# Patient Record
Sex: FEMALE | Race: BLACK OR AFRICAN AMERICAN | Employment: PART TIME | URBAN - METROPOLITAN AREA
[De-identification: names, ages, dates, MRNs, and addresses within clinical notes are randomized per-mention and may not be internally consistent; named-entity substitution may affect disease eponyms.]

---

## 2017-01-09 ENCOUNTER — OFFICE VISIT (OUTPATIENT)
Dept: INTERNAL MEDICINE CLINIC | Age: 27
End: 2017-01-09

## 2017-01-09 ENCOUNTER — PATIENT OUTREACH (OUTPATIENT)
Dept: INTERNAL MEDICINE CLINIC | Age: 27
End: 2017-01-09

## 2017-01-09 VITALS
RESPIRATION RATE: 20 BRPM | SYSTOLIC BLOOD PRESSURE: 130 MMHG | BODY MASS INDEX: 51.91 KG/M2 | OXYGEN SATURATION: 99 % | TEMPERATURE: 97.9 F | WEIGHT: 293 LBS | HEIGHT: 63 IN | DIASTOLIC BLOOD PRESSURE: 71 MMHG | HEART RATE: 82 BPM

## 2017-01-09 DIAGNOSIS — E08.00 DIABETES MELLITUS DUE TO UNDERLYING CONDITION WITH HYPEROSMOLARITY WITHOUT COMA, WITHOUT LONG-TERM CURRENT USE OF INSULIN (HCC): Primary | ICD-10-CM

## 2017-01-09 DIAGNOSIS — R73.9 ELEVATED BLOOD SUGAR: ICD-10-CM

## 2017-01-09 LAB
GLUCOSE POC: 346 MG/DL
HBA1C MFR BLD HPLC: 11.1 %

## 2017-01-09 RX ORDER — METFORMIN HYDROCHLORIDE 500 MG/1
500 TABLET ORAL 2 TIMES DAILY WITH MEALS
Qty: 60 TAB | Refills: 11 | Status: SHIPPED | OUTPATIENT
Start: 2017-01-09 | End: 2017-02-10 | Stop reason: SDUPTHER

## 2017-01-09 NOTE — PROGRESS NOTES
Spoke with patient today to educate on diabetes. Patient is newly diagnosed and has no understanding of diabetes. Patient stated her grandmother and mother are diabetic. Patient asked \"How long would she be a diabetic. \" Educated patient on diabetes disease. Patient encouraged to make dietary changes, exercise and monitor blood glucose daily to ensure diabetes is controlled. Instructed patient on side effects of not controlling blood glucose. During this encounter patient was not engaged with conversation the entire time, very nonchalant and texting on her telephone. Patient stated she was unable to purchase diabetic sticks for glucometer after verifying with her pharmacy the lowest costing supplies. Patient was encouraged to attend [facility] for more intense diabetic teaching. Pamphlets given for diet, disease process and education. Will see patient on return visit in 1 month.

## 2017-01-09 NOTE — PATIENT INSTRUCTIONS

## 2017-01-09 NOTE — MR AVS SNAPSHOT
Visit Information Date & Time Provider Department Dept. Phone Encounter #  
 1/9/2017  8:15 AM Marco Ferreira, [address] [id number] Upcoming Health Maintenance Date Due  
 HPV AGE 9Y-34Y (1 of 3 - Female 3 Dose Series) 7/14/2001 DTaP/Tdap/Td series (1 - Tdap) 7/14/2011 PAP AKA CERVICAL CYTOLOGY 9/6/2019 Allergies as of 1/9/2017  Review Complete On: 1/9/2017 By: Marco Ferreira MD  
 No Known Allergies Current Immunizations  Never Reviewed No immunizations on file. Not reviewed this visit You Were Diagnosed With   
  
 Codes Comments Diabetes mellitus due to underlying condition with hyperosmolarity without coma, without long-term current use of insulin (HCC)    -  Primary ICD-10-CM: E08.00 ICD-9-CM: 249.20 Elevated blood sugar     ICD-10-CM: R73.9 ICD-9-CM: 790.29 Vitals BP Pulse Temp Resp Height(growth percentile) Weight(growth percentile) 130/71 (BP 1 Location: Left arm, BP Patient Position: Sitting) 82 97.9 °F (36.6 °C) (Oral) 20 5' 3\" (1.6 m) 298 lb 4.8 oz (135.3 kg) SpO2 BMI OB Status Smoking Status 99% 52.84 kg/m2 Injection Never Smoker Vitals History BMI and BSA Data Body Mass Index Body Surface Area  
 52.84 kg/m 2 2.45 m 2 Preferred Pharmacy Pharmacy Name Phone Omay[facility] [address] [place] [address] [facility] [phone] Your Updated Medication List  
  
   
This list is accurate as of: 1/9/17  9:32 AM.  Always use your most recent med list.  
  
  
  
  
 medroxyPROGESTERone 150 mg/mL injection Commonly known as:  DEPO-PROVERA  
150 mg by IntraMUSCular route once. metFORMIN 500 mg tablet Commonly known as:  GLUCOPHAGE Take 1 Tab by mouth two (2) times daily (with meals).  Indications: type 2 diabetes mellitus  
  
 miconazole 2 % vaginal cream  
Commonly known as:  MONISTAT 7  
 Insert 1 Applicator into vagina nightly. Prescriptions Sent to Pharmacy Refills  
 metFORMIN (GLUCOPHAGE) 500 mg tablet 11 Sig: Take 1 Tab by mouth two (2) times daily (with meals). Indications: type 2 diabetes mellitus Class: Normal  
 Pharmacy: [facility] [address] [place] Meza [address] [place] [address] #: [phone] Route: Oral  
  
We Performed the Following AMB POC GLUCOSE BLOOD, BY GLUCOSE MONITORING DEVICE [85969 CPT(R)] AMB POC HEMOGLOBIN A1C [22582 CPT(R)] REFERRAL TO DIABETIC EDUCATION [REF20 Custom] Referral Information Referral ID Referred By Referred To  
  
 [id number] Isidoronasrin Sow Not Available Visits Status Start Date End Date 1 New Request 1/9/17 1/9/18 If your referral has a status of pending review or denied, additional information will be sent to support the outcome of this decision. Patient Instructions Learning About Diabetes Food Guidelines Your Care Instructions Meal planning is important to manage diabetes. It helps keep your blood sugar at a target level (which you set with your doctor). You don't have to eat special foods. You can eat what your family eats, including sweets once in a while. But you do have to pay attention to how often you eat and how much you eat of certain foods. You may want to work with a dietitian or a certified diabetes educator (CDE) to help you plan meals and snacks. A dietitian or CDE can also help you lose weight if that is one of your goals. What should you know about eating carbs? Managing the amount of carbohydrate (carbs) you eat is an important part of healthy meals when you have diabetes. Carbohydrate is found in many foods. · Learn which foods have carbs. And learn the amounts of carbs in different foods.  
¨ Bread, cereal, pasta, and rice have about 15 grams of carbs in a serving. A serving is 1 slice of bread (1 ounce), ½ cup of cooked cereal, or 1/3 cup of cooked pasta or rice. ¨ Fruits have 15 grams of carbs in a serving. A serving is 1 small fresh fruit, such as an apple or orange; ½ of a banana; ½ cup of cooked or canned fruit; ½ cup of fruit juice; 1 cup of melon or raspberries; or 2 tablespoons of dried fruit. ¨ Milk and no-sugar-added yogurt have 15 grams of carbs in a serving. A serving is 1 cup of milk or 2/3 cup of no-sugar-added yogurt. ¨ Starchy vegetables have 15 grams of carbs in a serving. A serving is ½ cup of mashed potatoes or sweet potato; 1 cup winter squash; ½ of a small baked potato; ½ cup of cooked beans; or ½ cup cooked corn or green peas. · Learn how much carbs to eat each day and at each meal. A dietitian or CDE can teach you how to keep track of the amount of carbs you eat. This is called carbohydrate counting. · If you are not sure how to count carbohydrate grams, use the Plate Method to plan meals. It is a good, quick way to make sure that you have a balanced meal. It also helps you spread carbs throughout the day. ¨ Divide your plate by types of foods. Put non-starchy vegetables on half the plate, meat or other protein food on one-quarter of the plate, and a grain or starchy vegetable in the final quarter of the plate. To this you can add a small piece of fruit and 1 cup of milk or yogurt, depending on how many carbs you are supposed to eat at a meal. 
· Try to eat about the same amount of carbs at each meal. Do not \"save up\" your daily allowance of carbs to eat at one meal. 
· Proteins have very little or no carbs per serving. Examples of proteins are beef, chicken, turkey, fish, eggs, tofu, cheese, cottage cheese, and peanut butter. A serving size of meat is 3 ounces, which is about the size of a deck of cards.  Examples of meat substitute serving sizes (equal to 1 ounce of meat) are 1/4 cup of cottage cheese, 1 egg, 1 tablespoon of peanut butter, and ½ cup of tofu. How can you eat out and still eat healthy? · Learn to estimate the serving sizes of foods that have carbohydrate. If you measure food at home, it will be easier to estimate the amount in a serving of restaurant food. · If the meal you order has too much carbohydrate (such as potatoes, corn, or baked beans), ask to have a low-carbohydrate food instead. Ask for a salad or green vegetables. · If you use insulin, check your blood sugar before and after eating out to help you plan how much to eat in the future. · If you eat more carbohydrate at a meal than you had planned, take a walk or do other exercise. This will help lower your blood sugar. What else should you know? · Limit saturated fat, such as the fat from meat and dairy products. This is a healthy choice because people who have diabetes are at higher risk of heart disease. So choose lean cuts of meat and nonfat or low-fat dairy products. Use olive or canola oil instead of butter or shortening when cooking. · Don't skip meals. Your blood sugar may drop too low if you skip meals and take insulin or certain medicines for diabetes. · Check with your doctor before you drink alcohol. Alcohol can cause your blood sugar to drop too low. Alcohol can also cause a bad reaction if you take certain diabetes medicines. Follow-up care is a key part of your treatment and safety. Be sure to make and go to all appointments, and call your doctor if you are having problems. It's also a good idea to know your test results and keep a list of the medicines you take. Where can you learn more? Go to http://phil-khari.info/. Enter K762 in the search box to learn more about \"Learning About Diabetes Food Guidelines. \" Current as of: May 23, 2016 Content Version: 11.1 © 4241-1087 [employer].  Care instructions adapted under license by [employer] (which disclaims liability or warranty for this information). If you have questions about a medical condition or this instruction, always ask your healthcare professional. [address] any warranty or liability for your use of this information. Introducing [facility] SERVICES! David Donis introduces [employer] patient portal. Now you can access parts of your medical record, email your doctor's office, and request medication refills online. 1. In your internet browser, go to [email or web address] 2. Click on the First Time User? Click Here link in the Sign In box. You will see the New Member Sign Up page. 3. Enter your [employer] Access Code exactly as it appears below. You will not need to use this code after youve completed the sign-up process. If you do not sign up before the expiration date, you must request a new code. · [employer] Access Code: [email or web address] Expires: 3/27/2017  9:53 PM 
 
4. Enter the last four digits of your Social Security Number (xxxx) and Date of Birth (mm/dd/yyyy) as indicated and click Submit. You will be taken to the next sign-up page. 5. Create a [employer] ID. This will be your [employer] login ID and cannot be changed, so think of one that is secure and easy to remember. 6. Create a [employer] password. You can change your password at any time. 7. Enter your Password Reset Question and Answer. This can be used at a later time if you forget your password. 8. Enter your e-mail address. You will receive e-mail notification when new information is available in [address] 9. Click Sign Up. You can now view and download portions of your medical record. 10. Click the Download Summary menu link to download a portable copy of your medical information. If you have questions, please visit the Frequently Asked Questions section of the [employer] website. Remember, [employer] is NOT to be used for urgent needs. For medical emergencies, dial 911. Now available from your iPhone and Android! Please provide this summary of care documentation to your next provider. Your primary care clinician is listed as Aniceto Habermann. If you have any questions after today's visit, please call [phone].

## 2017-01-09 NOTE — PROGRESS NOTES
Amilcar Nice is a 32 y.o. female and presents with ED Follow-up and High Blood Sugar  . No prior h/o DM. +Fmhx of DM in maternal grandmother. Denies increased thirst or urination. No increased yeast infection. No blurry vision. No CP, SOB, or edema. No paresthesias. Review of Systems  Constitutional: negative for fevers, chills, anorexia and weight loss  Eyes:   negative for visual disturbance and irritation  ENT:   negative for tinnitus,sore throat,nasal congestion,ear pains. hoarseness  Respiratory:  negative for cough, hemoptysis, dyspnea,wheezing  CV:   negative for chest pain, palpitations, lower extremity edema  GI:   negative for nausea, vomiting, diarrhea, abdominal pain,melena  Endo:               negative for polyuria,polydipsia,polyphagia,heat intolerance  Genitourinary: negative for frequency, dysuria and hematuria  Integument:  negative for rash and pruritus  Hematologic:  negative for easy bruising and gum/nose bleeding  Musculoskel: negative for myalgias, arthralgias, back pain, muscle weakness, joint pain  Neurological:  negative for headaches, dizziness, vertigo, memory problems and gait   Behavl/Psych: negative for feelings of anxiety, depression, mood changes    Past Medical History   Diagnosis Date    Asthma     Diabetes ([facility] [address] 2016    Other ill-defined conditions(799.89)      back pain    Seizures ([facility] [address]      Past Surgical History   Procedure Laterality Date    Hx tonsillectomy      Hx gyn       Pt reports  in past.     Social History     Social History    Marital status: SINGLE     Spouse name: N/A    Number of children: N/A    Years of education: N/A     Social History Main Topics    Smoking status: Never Smoker    Smokeless tobacco: Never Used    Alcohol use No    Drug use: No    Sexual activity: Yes     Partners: Male     Birth control/ protection: Condom     Other Topics Concern    None     Social History Narrative     Current Outpatient Prescriptions   Medication Sig Dispense Refill    metFORMIN (GLUCOPHAGE) 500 mg tablet Take 1 Tab by mouth two (2) times daily (with meals). Indications: type 2 diabetes mellitus 60 Tab 11    miconazole (MONISTAT 7) 2 % vaginal cream Insert 1 Applicator into vagina nightly. 45 g 0    medroxyPROGESTERone (DEPO-PROVERA) 150 mg/mL injection 150 mg by IntraMUSCular route once. No Known Allergies    Objective:  Visit Vitals    /71 (BP 1 Location: Left arm, BP Patient Position: Sitting)    Pulse 82    Temp 97.9 °F (36.6 °C) (Oral)    Resp 20    Ht 5' 3\" (1.6 m)    Wt 298 lb 4.8 oz (135.3 kg)    SpO2 99%    BMI 52.84 kg/m2     Physical Exam:   General appearance - alert, well appearing, and in no distress  Mental status - alert, oriented to person, place, and time  Chest - clear to auscultation, no wheezes, rales or rhonchi, symmetric air entry   Heart - normal rate, regular rhythm, normal S1, S2, no murmurs, rubs, clicks or gallops   Abdomen - soft, nontender, nondistended, no masses or organomegaly  Lymph- no adenopathy palpable  Ext-peripheral pulses normal, no pedal edema, no clubbing or cyanosis  Skin-Warm and dry. no hyperpigmentation, vitiligo, or suspicious lesions  Neuro -alert, oriented, normal speech, no focal findings or movement disorder noted      Results for orders placed or performed in visit on 01/09/17   AMB POC GLUCOSE BLOOD, BY GLUCOSE MONITORING DEVICE   Result Value Ref Range    Glucose  mg/dL   AMB POC HEMOGLOBIN A1C   Result Value Ref Range    Hemoglobin A1c (POC) 11.1 %       Assessment/Plan:    ICD-10-CM ICD-9-CM    1. Diabetes mellitus due to underlying condition with hyperosmolarity without coma, without long-term current use of insulin ([facility]) E08.00 249.20 REFERRAL TO DIABETIC EDUCATION   2.  Elevated blood sugar R73.9 790.29 AMB POC GLUCOSE BLOOD, BY GLUCOSE MONITORING DEVICE      AMB POC HEMOGLOBIN A1C     Orders Placed This Encounter    REFERRAL TO DIABETIC EDUCATION     Referral Priority:   Routine     Referral Type:   Consultation     Referral Reason:   Specialty Services Required    AMB POC GLUCOSE BLOOD, BY GLUCOSE MONITORING DEVICE    AMB POC HEMOGLOBIN A1C    metFORMIN (GLUCOPHAGE) 500 mg tablet     Sig: Take 1 Tab by mouth two (2) times daily (with meals).  Indications: type 2 diabetes mellitus     Dispense:  61 Tab     Refill:  6     Newly diagnosed diabetes- DM education ordered, Metformin 500mg bid  HTN- Well controlled  F/u in 1 month    Follow-up Disposition: Not on File

## 2017-01-09 NOTE — PROGRESS NOTES
1. Have you been to the ER, urgent care clinic since your last visit? Hospitalized since your last visit? Yes When: 12/27/2016 [facility] ED Acute Gastroenteritis and 12/28/2016 [facility] ED Elevated Blood Sugar/STD Exposure    2. Have you seen or consulted any other health care providers outside of the [address] [place] [address] since your last visit? Include any pap smears or colon screening.  No

## 2017-02-05 ENCOUNTER — HOSPITAL ENCOUNTER (EMERGENCY)
Age: 27
Discharge: HOME | End: 2017-02-05
Attending: INTERNAL MEDICINE | Admitting: INTERNAL MEDICINE
Payer: SELF-PAY

## 2017-02-05 VITALS
BODY MASS INDEX: 50.69 KG/M2 | HEART RATE: 87 BPM | RESPIRATION RATE: 20 BRPM | TEMPERATURE: 98.7 F | WEIGHT: 286.1 LBS | HEIGHT: 63 IN | OXYGEN SATURATION: 100 % | SYSTOLIC BLOOD PRESSURE: 145 MMHG | DIASTOLIC BLOOD PRESSURE: 86 MMHG

## 2017-02-05 DIAGNOSIS — J02.9 SORE THROAT: Primary | ICD-10-CM

## 2017-02-05 PROCEDURE — 99282 EMERGENCY DEPT VISIT SF MDM: CPT

## 2017-02-05 RX ORDER — TRAMADOL HYDROCHLORIDE AND ACETAMINOPHEN 37.5; 325 MG/1; MG/1
1 TABLET ORAL
Qty: 20 TAB | Refills: 0 | Status: SHIPPED | OUTPATIENT
Start: 2017-02-05 | End: 2017-02-09

## 2017-02-05 NOTE — ED PROVIDER NOTES
HPI Comments: Abigail Dumont is a 32 y.o. female with hx of NIDDM and asthma who presents ambulatory to the ED c/o constant \"10 out of 10\" sore throat since yesterday. She also c/o nasal congestion. Pt denies taking any medications for her symptoms. Other than DM, she denies any known hx of long standing diseases or daily medications. Pt specifically denies fevers, chills, cough, CP, SOB, nausea, vomiting, diarrhea, or abdominal pain. PCP: Barbi Dobbs MD    There are no other complaints, changes or physical findings at this time. The history is provided by the patient. Past Medical History:   Diagnosis Date    Asthma     Diabetes ([address] 2016    Other ill-defined conditions(799.89)      back pain    Seizures (HCC)        Past Surgical History:   Procedure Laterality Date    Hx tonsillectomy      Hx gyn  2014     Pt reports  in past.         Family History:   Problem Relation Age of Onset    Diabetes Mother     Diabetes Father     Diabetes Maternal Grandmother        Social History     Social History    Marital status: SINGLE     Spouse name: N/A    Number of children: N/A    Years of education: N/A     Occupational History    Not on file. Social History Main Topics    Smoking status: Never Smoker    Smokeless tobacco: Never Used    Alcohol use No    Drug use: No    Sexual activity: No     Other Topics Concern    Not on file     Social History Narrative         ALLERGIES: Review of patient's allergies indicates no known allergies. Review of Systems   Constitutional: Negative for chills, fatigue and fever. HENT: Positive for congestion and sore throat. Negative for rhinorrhea. Eyes: Negative for pain, discharge and visual disturbance. Respiratory: Negative for cough, chest tightness, shortness of breath and wheezing. Cardiovascular: Negative for chest pain, palpitations and leg swelling.    Gastrointestinal: Negative for abdominal pain, constipation, diarrhea, nausea and vomiting. Genitourinary: Negative for dysuria, frequency and hematuria. Musculoskeletal: Negative for arthralgias, back pain and myalgias. Skin: Negative for rash. Neurological: Negative for dizziness, weakness, light-headedness and headaches. Psychiatric/Behavioral: Negative. All other systems reviewed and are negative. Vitals:    02/05/17 0925   BP: 145/86   Pulse: 87   Resp: 20   Temp: 98.7 °F (37.1 °C)   SpO2: 100%   Weight: 129.8 kg (286 lb 1.6 oz)   Height: 5' 3\" (1.6 m)            Physical Exam   Constitutional: She is oriented to person, place, and time. She appears well-developed and well-nourished. HENT:   Head: Normocephalic and atraumatic. Mouth/Throat: Posterior oropharyngeal erythema (slight) present. Eyes: Conjunctivae and EOM are normal. Pupils are equal, round, and reactive to light. Neck: Normal range of motion. Neck supple. Cardiovascular: Normal rate, regular rhythm and normal heart sounds. Exam reveals no gallop and no friction rub. No murmur heard. Pulmonary/Chest: Effort normal and breath sounds normal. No respiratory distress. She has no wheezes. She has no rales. Abdominal: Soft. Bowel sounds are normal. She exhibits no distension. There is no tenderness. There is no rebound and no guarding. Musculoskeletal: Normal range of motion. She exhibits no edema or tenderness. Lymphadenopathy:     She has no cervical adenopathy. Neurological: She is alert and oriented to person, place, and time. She has normal strength. No cranial nerve deficit or sensory deficit. She displays a negative Romberg sign. Coordination and gait normal.   Skin: Skin is warm and dry. No ecchymosis, no lesion and no rash noted. Rash is not urticarial. She is not diaphoretic. No erythema. Psychiatric: She has a normal mood and affect. Nursing note and vitals reviewed.        MDM  Number of Diagnoses or Management Options  Sore throat:   Diagnosis management comments: DDx: URI, viral illness       Amount and/or Complexity of Data Reviewed  Review and summarize past medical records: yes    Patient Progress  Patient progress: stable    ED Course       Procedures    PROGRESS NOTE:  10:14 AM  Pt is now stating that she has attempted multiple OTC medications for her sore throat without any relief and is requesting a prescription for pain medications. Written by Endy Diamond, ED Scribe, as dictated by Mitzi Hitchcock MD.      IMPRESSION:  1. Sore throat        PLAN:  1. Discharge home. Rx: Ultracet  Discharge Medication List as of 2/5/2017  9:57 AM      CONTINUE these medications which have NOT CHANGED    Details   metFORMIN (GLUCOPHAGE) 500 mg tablet Take 1 Tab by mouth two (2) times daily (with meals). Indications: type 2 diabetes mellitus, Normal, Disp-60 Tab, R-11      miconazole (MONISTAT 7) 2 % vaginal cream Insert 1 Applicator into vagina nightly., Disp-45 g, R-0, Print      medroxyPROGESTERone (DEPO-PROVERA) 150 mg/mL injection 150 mg by IntraMUSCular route once., Historical Med           2. Follow-up Information     Follow up With Details Comments [address] Aury Gamboa MD In 2 days If symptoms worsen [address]  [phone]        Return to ED if worse     DISCHARGE NOTE  9:59 AM  The patient has been re-evaluated and is ready for discharge. Reviewed available results with patient. Counseled pt on diagnosis and care plan. Pt has expressed understanding, and all questions have been answered. Pt agrees with plan and agrees to F/U as recommended, or return to the ED if their sxs worsen. Discharge instructions have been provided and explained to the pt, along with reasons to return to the ED.     This note is prepared by Endy Diamond, acting as Scribe for Mitzi Hitchcock MD.    Mitzi Hitchcock MD: The scribe's documentation has been prepared under my direction and personally reviewed by me in its entirety. I confirm that the note above accurately reflects all work, treatment, procedures, and medical decision making performed by me.

## 2017-02-05 NOTE — ED NOTES
Patient (s)   given copy of dc instructions and 1 script(s). Patient (s)   verbalized understanding of instructions and script (s). Patient given a current medication reconciliation form and verbalized understanding of their medications. Patient (s)  verbalized understanding of the importance of discussing medications with  his or her physician or clinic they will be following up with. Patient alert and oriented and in no acute distress. Pt verbalizes pain scale of 5 out of 10. Patient discharged home ambulatory with self.

## 2017-02-05 NOTE — ED NOTES
Dr. Natanael Keller at bedside speaking with pt. Plan of care and meds accepted by pt. Pt refuse w/c and pt left unit steady gait. Patient (s)  given copy of dc instructions and 1 script(s). Patient(s)  verbalized understanding of instructions and script (s). Patient given a current medication reconciliation form and verbalized understanding of their medications. Patient (s) verbalized understanding of the importance of discussing medications with  his or her physician or clinic when they follow up. Patient alert and oriented and in no acute distress. Pt verbalizes pain scale of 5 out of 10. Patient discharged home ambulatory with self.

## 2017-02-05 NOTE — ED NOTES
Pt c/o sore throat since yesterday pt is able to speak in complete sentences. Emergency Department Nursing Plan of Care       The Nursing Plan of Care is developed from the Nursing assessment and Emergency Department Attending provider initial evaluation. The plan of care may be reviewed in the ED Provider note.     The Plan of Care was developed with the following considerations:   Patient / Family readiness to learn indicated by:verbalized understanding  Persons(s) to be included in education: patient  Barriers to Learning/Limitations:No    Signed     Dc Cooney RN    2/5/2017   9:31 AM

## 2017-02-05 NOTE — DISCHARGE INSTRUCTIONS
Sore Throat in Children: Care Instructions  Your Care Instructions  Infection by bacteria or a virus causes most sore throats. Cigarette smoke, dry air, air pollution, allergies, or yelling also can cause a sore throat. Sore throats can be painful and annoying. Fortunately, most sore throats go away on their own. Home treatment may help your child feel better sooner. Antibiotics are not needed unless your child has a strep infection. Follow-up care is a key part of your child's treatment and safety. Be sure to make and go to all appointments, and call your doctor if your child is having problems. It's also a good idea to know your child's test results and keep a list of the medicines your child takes. How can you care for your child at home? · If the doctor prescribed antibiotics for your child, give them as directed. Do not stop using them just because your child feels better. Your child needs to take the full course of antibiotics. · If your child is old enough to do so, have him or her gargle with warm salt water at least once each hour to help reduce swelling and relieve discomfort. Use 1 teaspoon of salt mixed in 8 ounces of warm water. Most children can gargle when they are 10to 6years old. · Give acetaminophen (Tylenol) or ibuprofen (Advil, Motrin) for pain. Read and follow all instructions on the label. Do not give aspirin to anyone younger than 20. It has been linked to Reye syndrome, a serious illness. · Try an over-the-counter anesthetic throat spray or throat lozenges, which may help relieve throat pain. Do not give lozenges to children younger than age Ron 79. If your child is younger than age 3, ask your doctor if you can give your child numbing medicines. · Have your child drink plenty of fluids, enough so that his or her urine is light yellow or clear like water. Drinks such as warm water or warm lemonade may ease throat pain.  Frozen ice treats, ice cream, scrambled eggs, gelatin dessert, and sherbet can also soothe the throat. If your child has kidney, heart, or liver disease and has to limit fluids, talk with your doctor before you increase the amount of fluids your child drinks. · Keep your child away from smoke. Do not smoke or let anyone else smoke around your child or in your house. Smoke irritates the throat. · Place a humidifier by your child's bed or close to your child. This may make it easier for your child to breathe. Follow the directions for cleaning the machine. When should you call for help? Call 911 anytime you think your child may need emergency care. For example, call if:  · Your child is confused, does not know where he or she is, or is extremely sleepy or hard to wake up. Call your doctor now or seek immediate medical care if:  · Your child has a new or higher fever. · Your child has a fever with a stiff neck or a severe headache. · Your child has any trouble breathing. · Your child cannot swallow or cannot drink enough because of throat pain. · Your child coughs up discolored or bloody mucus. Watch closely for changes in your child's health, and be sure to contact your doctor if:  · Your child has any new symptoms, such as a rash, an earache, vomiting, or nausea. · Your child is not getting better as expected. Where can you learn more? Go to http://phil-khari.info/. Enter K282 in the search box to learn more about \"Sore Throat in Children: Care Instructions. \"  Current as of: July 29, 2016  Content Version: 11.1  © 5583-1929 [employer]. Care instructions adapted under license by [employer] (which disclaims liability or warranty for this information). If you have questions about a medical condition or this instruction, always ask your healthcare professional. [place] [address] any warranty or liability for your use of this information.          Sore Throat: Care Instructions  Your Care Instructions    Infection by bacteria or a virus causes most sore throats. Cigarette smoke, dry air, air pollution, allergies, and yelling can also cause a sore throat. Sore throats can be painful and annoying. Fortunately, most sore throats go away on their own. If you have a bacterial infection, your doctor may prescribe antibiotics. Follow-up care is a key part of your treatment and safety. Be sure to make and go to all appointments, and call your doctor if you are having problems. It's also a good idea to know your test results and keep a list of the medicines you take. How can you care for yourself at home? · If your doctor prescribed antibiotics, take them as directed. Do not stop taking them just because you feel better. You need to take the full course of antibiotics. · Gargle with warm salt water once an hour to help reduce swelling and relieve discomfort. Use 1 teaspoon of salt mixed in 1 cup of warm water. · Take an over-the-counter pain medicine, such as acetaminophen (Tylenol), ibuprofen (Advil, Motrin), or naproxen (Aleve). Read and follow all instructions on the label. · Be careful when taking over-the-counter cold or flu medicines and Tylenol at the same time. Many of these medicines have acetaminophen, which is Tylenol. Read the labels to make sure that you are not taking more than the recommended dose. Too much acetaminophen (Tylenol) can be harmful. · Drink plenty of fluids. Fluids may help soothe an irritated throat. Hot fluids, such as tea or soup, may help decrease throat pain. · Use over-the-counter throat lozenges to soothe pain. Regular cough drops or hard candy may also help. These should not be given to young children because of the risk of choking. · Do not smoke or allow others to smoke around you. If you need help quitting, talk to your doctor about stop-smoking programs and medicines. These can increase your chances of quitting for good.   · Use a vaporizer or humidifier to add moisture to your bedroom. Follow the directions for cleaning the machine. When should you call for help? Call your doctor now or seek immediate medical care if:  · You have new or worse trouble swallowing. · Your sore throat gets much worse on one side. Watch closely for changes in your health, and be sure to contact your doctor if you do not get better as expected. Where can you learn more? Go to http://phil-khari.info/. Enter 062 [id number] 80 [id number] in the search box to learn more about \"Sore Throat: Care Instructions. \"  Current as of: July 29, 2016  Content Version: 11.1  © 6432-3183 [employer]. Care instructions adapted under license by [employer] (which disclaims liability or warranty for this information). If you have questions about a medical condition or this instruction, always ask your healthcare professional. [address] any warranty or liability for your use of this information.

## 2017-02-09 ENCOUNTER — HOSPITAL ENCOUNTER (EMERGENCY)
Age: 27
Discharge: HOME | End: 2017-02-09
Attending: EMERGENCY MEDICINE
Payer: SELF-PAY

## 2017-02-09 VITALS
SYSTOLIC BLOOD PRESSURE: 138 MMHG | TEMPERATURE: 98.3 F | RESPIRATION RATE: 18 BRPM | DIASTOLIC BLOOD PRESSURE: 79 MMHG | OXYGEN SATURATION: 100 % | HEART RATE: 86 BPM | BODY MASS INDEX: 67.64 KG/M2 | WEIGHT: 292.3 LBS | HEIGHT: 55 IN

## 2017-02-09 DIAGNOSIS — B00.1 COLD SORE: Primary | ICD-10-CM

## 2017-02-09 PROCEDURE — 74011250637 HC RX REV CODE- 250/637: Performed by: EMERGENCY MEDICINE

## 2017-02-09 PROCEDURE — 99283 EMERGENCY DEPT VISIT LOW MDM: CPT

## 2017-02-09 RX ORDER — NAPROXEN 250 MG/1
250 TABLET ORAL ONCE
Status: COMPLETED | OUTPATIENT
Start: 2017-02-09 | End: 2017-02-09

## 2017-02-09 RX ORDER — LANOLIN ALCOHOL/MO/W.PET/CERES
1000 CREAM (GRAM) TOPICAL 2 TIMES DAILY
Qty: 28 TAB | Refills: 0 | Status: SHIPPED | OUTPATIENT
Start: 2017-02-09 | End: 2017-02-16

## 2017-02-09 RX ORDER — TRAMADOL HYDROCHLORIDE AND ACETAMINOPHEN 37.5; 325 MG/1; MG/1
1 TABLET ORAL
Qty: 10 TAB | Refills: 0 | Status: SHIPPED | OUTPATIENT
Start: 2017-02-09 | End: 2017-06-17

## 2017-02-09 RX ORDER — ACYCLOVIR 400 MG/1
400 TABLET ORAL 3 TIMES DAILY
Qty: 30 TAB | Refills: 0 | Status: SHIPPED | OUTPATIENT
Start: 2017-02-09 | End: 2017-02-19

## 2017-02-09 RX ORDER — IBUPROFEN 600 MG/1
600 TABLET ORAL
Qty: 21 TAB | Refills: 0 | Status: SHIPPED | OUTPATIENT
Start: 2017-02-09 | End: 2017-06-17

## 2017-02-09 RX ORDER — TRAMADOL HYDROCHLORIDE 50 MG/1
50 TABLET ORAL
Status: COMPLETED | OUTPATIENT
Start: 2017-02-09 | End: 2017-02-09

## 2017-02-09 RX ADMIN — NAPROXEN 250 MG: 250 TABLET ORAL at 08:53

## 2017-02-09 RX ADMIN — TRAMADOL HYDROCHLORIDE 50 MG: 50 TABLET, FILM COATED ORAL at 08:53

## 2017-02-09 NOTE — ED NOTES
Patient given printed discharge instructions and four script(s). Pt verbalized understanding of instructions and script(s). Patient verbalized importance of following up with pcp. Patient alert and oriented, in no acute distress, ambulatory with self.

## 2017-02-09 NOTE — DISCHARGE INSTRUCTIONS
Cold Sores: Care Instructions  Your Care Instructions  Cold sores are clusters of small blisters on the lip and skin around or inside the mouth. Often the first sign of a cold sore is a spot that tingles, burns, or itches. A blister usually forms within 24 hours. The skin around the blisters can be red and inflamed. The blisters can break open, weep a clear fluid, and then scab over after a few days. Cold sores most often heal in 7 to 10 days without a scar. They are sometimes called fever blisters. Cold sores are caused by a virus called the herpes simplex virus. This virus also causes some cases of genital herpes. The virus can spread from a sore in the genital area to the lips. Or it can spread from a cold sore on the lips to the genital area. Cold sores most often go away on their own. But if they are severe, embarrass you, or cause pain, your doctor may prescribe antiviral medicine to relieve pain and help prevent outbreaks. Follow-up care is a key part of your treatment and safety. Be sure to make and go to all appointments, and call your doctor if you are having problems. It's also a good idea to know your test results and keep a list of the medicines you take. How can you care for yourself at home? · Wash your hands often. And try not to touch your cold sores. This will help to avoid spreading the virus to your eyes or genital area, or to other people. This is more likely to happen if this is your first cold sore outbreak. · Place ice or a cool, wet towel on the sores 3 times a day. Do this for 20 minutes each time. It may help to reduce redness and swelling. · If you are just getting a cold sore, try over-the-counter docosanol (Abreva) cream to reduce symptoms. · If your doctor prescribed antiviral medicine to relieve pain and help prevent outbreaks, be sure to follow the directions.   · Take an over-the-counter pain medicine, such as acetaminophen (Tylenol), ibuprofen (Advil, Motrin), or naproxen (Aleve), as needed. Read and follow all instructions on the label. · Do not take two or more pain medicines at the same time unless the doctor told you to. Many pain medicines have acetaminophen, which is Tylenol. Too much acetaminophen (Tylenol) can be harmful. · Avoid citrus fruit, tomatoes, and other foods that contain acid. · Use over-the-counter ointments to numb sore areas in the mouth or on the lips. These include Orajel and Anbesol. · Do not kiss or have oral sex with anyone while you have a cold sore. To prevent cold sores in the future  · Avoid long exposure of your lips to the sun. (Wear a hat to help shade your mouth.)  · Do not kiss or have oral sex with someone who has a cold sore. Do not have sex or oral sex with someone who has a genital herpes outbreak. · Using lip balm that contains sunscreen may help reduce outbreaks of cold sores. · Avoid foods that seem to cause your cold sores to come back. · Do not share towels, razors, silverware, toothbrushes, or other objects with a person who has a cold sore. When should you call for help? Call your doctor now or seek immediate medical care if:  · Your symptoms are painful and you want to try antiviral medicine. · You have signs of infection, such as:  ¨ Increased pain, swelling, warmth, or redness. ¨ Red streaks leading from a cold sore. ¨ Pus draining from a cold sore. ¨ A fever. · You have a cold sore and develop eye pain, eye discharge, or any changes in your vision. Watch closely for changes in your health, and be sure to contact your doctor if:  · The cold sore does not heal in 7 to 10 days. · You get cold sores often. Where can you learn more? Go to http://phil-khari.info/. Enter U353 in the search box to learn more about \"Cold Sores: Care Instructions. \"  Current as of: May 31, 2016  Content Version: 11.1  © 2459-4390 [employer].  Care instructions adapted under license by [employer] (which disclaims liability or warranty for this information). If you have questions about a medical condition or this instruction, always ask your healthcare professional. [address] any warranty or liability for your use of this information.

## 2017-02-09 NOTE — ED PROVIDER NOTES
HPI Comments: Chris Prince is a 32 y.o. female who presents ambulatory to ED c/o multiple sores on her lips for the past 2 days, along with associated moderate aching pain. Pt states she has popped several of the sores, but they are worsening. Pt notes she has been very busy at work, and she is not getting much sleep at night. Pt states she does not have any history of HIV or any other STD. Pt denies any known contact with people exhibiting similar symptoms, and denies any genital sores. PCP:  Doris Guo MD    There are no other complaints, changes or physical findings at this time. Written by MILAD Yee, as dictated by Celestino Wall MD.    The history is provided by the patient. No  was used. Past Medical History:   Diagnosis Date    Asthma     Diabetes ([address] 2016    Other ill-defined conditions(799.89)      back pain    Seizures (HCC)        Past Surgical History:   Procedure Laterality Date    Hx tonsillectomy      Hx gyn  2014     Pt reports  in past.         Family History:   Problem Relation Age of Onset    Diabetes Mother     Diabetes Father     Diabetes Maternal Grandmother        Social History     Social History    Marital status: SINGLE     Spouse name: N/A    Number of children: N/A    Years of education: N/A     Occupational History    Not on file. Social History Main Topics    Smoking status: Never Smoker    Smokeless tobacco: Never Used    Alcohol use No    Drug use: No    Sexual activity: No     Other Topics Concern    Not on file     Social History Narrative         ALLERGIES: Review of patient's allergies indicates no known allergies. Review of Systems   Constitutional: Negative for activity change, chills and fever. HENT: Negative for congestion and sore throat. Eyes: Negative for pain and redness. Respiratory: Negative for cough, chest tightness and shortness of breath.     Cardiovascular: Negative for chest pain and palpitations. Gastrointestinal: Negative for abdominal pain, diarrhea, nausea and vomiting. Genitourinary: Negative for dysuria, frequency and urgency. Musculoskeletal: Negative for back pain and neck pain. Skin: Positive for wound. + sore on lips   Neurological: Negative for syncope, light-headedness and headaches. Psychiatric/Behavioral: Negative for confusion. Vitals:    02/09/17 0844   BP: 138/79   Pulse: 86   Resp: 18   Temp: 98.3 °F (36.8 °C)   SpO2: 100%   Weight: 132.6 kg (292 lb 4.8 oz)   Height: 4' (1.219 m)            Physical Exam   Constitutional: She is oriented to person, place, and time. She appears well-developed and well-nourished. No distress. HENT:   Head: Normocephalic and atraumatic. Nose: Nose normal.   Mouth/Throat: Oropharynx is clear and moist.   Eyes: Conjunctivae and EOM are normal. Pupils are equal, round, and reactive to light. Right eye exhibits no discharge. Left eye exhibits no discharge. Neck: Normal range of motion. Neck supple. No tracheal deviation present. Cardiovascular: Normal rate, regular rhythm and normal heart sounds. No murmur heard. Pulmonary/Chest: Effort normal and breath sounds normal. No respiratory distress. She has no wheezes. She has no rales. Abdominal: Soft. Bowel sounds are normal. She exhibits no distension. There is no tenderness. There is no rebound and no guarding. Musculoskeletal: Normal range of motion. She exhibits no edema or tenderness. Lymphadenopathy:     She has no cervical adenopathy. Neurological: She is alert and oriented to person, place, and time. No cranial nerve deficit. Coordination normal.   Skin: Skin is warm and dry. No rash noted. Papular lesions to right upper lip outer border and right corner outer border. Left upper lip with crusted over lesion. No other facial swelling. No lymphadenopathy. Psychiatric: She has a normal mood and affect.  Her behavior is normal. Judgment and thought content normal.   Nursing note and vitals reviewed. MDM  Number of Diagnoses or Management Options  Cold sore:   Diagnosis management comments: DDx: herpes, folliculitis, cellulitis        Amount and/or Complexity of Data Reviewed  Review and summarize past medical records: yes    Patient Progress  Patient progress: stable    ED Course       Procedures    MEDICATIONS GIVEN:  Medications   traMADol (ULTRAM) tablet 50 mg (50 mg Oral Given 2/9/17 0853)   naproxen (NAPROSYN) tablet 250 mg (250 mg Oral Given 2/9/17 0853)       IMPRESSION:  1. Cold sore        PLAN:  1. Current Discharge Medication List      START taking these medications    Details   acyclovir (ZOVIRAX) 400 mg tablet Take 1 Tab by mouth three (3) times daily for 10 days. Qty: 30 Tab, Refills: 0      traMADol-acetaminophen (ULTRACET) 37.5-325 mg per tablet Take 1 Tab by mouth every six (6) hours as needed for Pain. Max Daily Amount: 4 Tabs. Qty: 10 Tab, Refills: 0      ascorbic acid, vitamin C, (VITAMIN C) 500 mg chew Take 2 Tabs by mouth two (2) times a day for 7 days. Qty: 28 Tab, Refills: 0      ibuprofen (MOTRIN) 600 mg tablet Take 1 Tab by mouth every eight (8) hours as needed for Pain. Qty: 21 Tab, Refills: 0         CONTINUE these medications which have NOT CHANGED    Details   metFORMIN (GLUCOPHAGE) 500 mg tablet Take 1 Tab by mouth two (2) times daily (with meals). Indications: type 2 diabetes mellitus  Qty: 60 Tab, Refills: 11      medroxyPROGESTERone (DEPO-PROVERA) 150 mg/mL injection 150 mg by IntraMUSCular route once.            2.   Follow-up Information     Follow up With Details Comments Contact Info    [facility] EMERGENCY DEPT  If symptoms worsen [address] [facility]    Ida Ybarra MD Call in 4 days If symptoms worsen [place]  [address]  [phone]          Return to ED if worse       DISCHARGE NOTE  8:54 AM  The patient has been re-evaluated and is ready for discharge. Reviewed available results with patient. Counseled pt on diagnosis and care plan. Pt has expressed understanding, and all questions have been answered. Pt agrees with plan and agrees to follow up as recommended, or return to the ED if their symptoms worsen. Discharge instructions have been provided and explained to the pt, along with reasons to return to the ED. Attestations: This note is prepared by Felicitas Johnson. Gustavo Hernandez, acting as Scribe for Jeniffer Woody MD.    Jeniffer Woody MD: The scribe's documentation has been prepared under my direction and personally reviewed by me in its entirety. I confirm that the note above accurately reflects all work, treatment, procedures, and medical decision making performed by me.

## 2017-02-09 NOTE — LETTER
[facility] EMERGENCY DEPT 
[address] [facility] [address] [id number] [phone] Work/School Note Date: 2/9/2017 To Whom It May concern: 
 
Brittaney Lemos was seen and treated today in the emergency room by the following provider(s): 
Attending Provider: Rubina Alberto MD. Brittaney Lemos may return to work on 02/13/17 or sooner if better. Sincerely, Rubina Alberto MD

## 2017-02-09 NOTE — ED NOTES
Patient has been instructed that they have been given Tramadol* which contains opioids, benzodiazepines, or other sedating drugs. Patient is aware that they  will need to refrain from driving or operating heavy machinery after taking this medication. Patient also instructed that they need to avoid drinking alcohol and using other products containing opioids, benzodiazepines, or other sedating drugs. Patient verbalized understanding.

## 2017-02-09 NOTE — ED NOTES
Emergency Department Nursing Plan of Care       The Nursing Plan of Care is developed from the Nursing assessment and Emergency Department Attending provider initial evaluation. The plan of care may be reviewed in the ED Provider note.     The Plan of Care was developed with the following considerations:   Patient / Family readiness to learn indicated by:verbalized understanding  Persons(s) to be included in education: patient  Barriers to Learning/Limitations:No    [facility]    2/9/2017   8:51 AM

## 2017-02-10 ENCOUNTER — OFFICE VISIT (OUTPATIENT)
Dept: INTERNAL MEDICINE CLINIC | Age: 27
End: 2017-02-10

## 2017-02-10 VITALS
WEIGHT: 289.5 LBS | RESPIRATION RATE: 18 BRPM | DIASTOLIC BLOOD PRESSURE: 82 MMHG | OXYGEN SATURATION: 99 % | TEMPERATURE: 98 F | BODY MASS INDEX: 66.99 KG/M2 | SYSTOLIC BLOOD PRESSURE: 144 MMHG | HEIGHT: 55 IN | HEART RATE: 80 BPM

## 2017-02-10 DIAGNOSIS — E08.00 DIABETES MELLITUS DUE TO UNDERLYING CONDITION WITH HYPEROSMOLARITY WITHOUT COMA, WITHOUT LONG-TERM CURRENT USE OF INSULIN (HCC): Primary | ICD-10-CM

## 2017-02-10 DIAGNOSIS — I10 ESSENTIAL HYPERTENSION, BENIGN: ICD-10-CM

## 2017-02-10 LAB
CHOLEST SERPL-MCNC: 115 MG/DL
GLUCOSE POC: 131 MG/DL
HDLC SERPL-MCNC: 32 MG/DL
LDL CHOLESTEROL POC: 33
NON-HDL GOAL (POC): 83
TCHOL/HDL RATIO (POC): 1
TRIGL SERPL-MCNC: 251 MG/DL

## 2017-02-10 RX ORDER — LISINOPRIL 10 MG/1
10 TABLET ORAL DAILY
Qty: 30 TAB | Refills: 11 | Status: SHIPPED | OUTPATIENT
Start: 2017-02-10 | End: 2017-12-07 | Stop reason: DRUGHIGH

## 2017-02-10 RX ORDER — METFORMIN HYDROCHLORIDE 500 MG/1
500 TABLET ORAL 2 TIMES DAILY WITH MEALS
Qty: 60 TAB | Refills: 11 | Status: SHIPPED | OUTPATIENT
Start: 2017-02-10 | End: 2017-10-14

## 2017-02-10 RX ORDER — LISINOPRIL 10 MG/1
10 TABLET ORAL DAILY
Qty: 30 TAB | Refills: 11 | Status: SHIPPED | OUTPATIENT
Start: 2017-02-10 | End: 2017-02-10 | Stop reason: SDUPTHER

## 2017-02-10 NOTE — PROGRESS NOTES
1. Have you been to the ER, urgent care clinic since your last visit? Hospitalized since your last visit? Yes When: 2[facility]10[facility]17 [facility]. 2. Have you seen or consulted any other health care providers outside of the [address] [place] [address] since your last visit? Include any pap smears or colon screening.  No.

## 2017-02-10 NOTE — PROGRESS NOTES
Frederic Louie is a 32 y.o. female and presents with Diabetes; Follow-up; and Letter for School/Work  . Compliant with meds and DM diet. No CP, SOB, or edema. Checks BS every day (100-135)No CP, SOB, or edema. No hypoglycemia. No DM class yet. Pt has been exercising daily and has changed diet and has lost 10 lbs. Review of Systems  Constitutional: negative for fevers, chills, anorexia and weight loss  Eyes:   negative for visual disturbance and irritation  ENT:   negative for tinnitus,sore throat,nasal congestion,ear pains. hoarseness  Respiratory:  negative for cough, hemoptysis, dyspnea,wheezing  CV:   negative for chest pain, palpitations, lower extremity edema  GI:   negative for nausea, vomiting, diarrhea, abdominal pain,melena  Endo:               negative for polyuria,polydipsia,polyphagia,heat intolerance  Genitourinary: negative for frequency, dysuria and hematuria  Integument:  negative for rash and pruritus  Hematologic:  negative for easy bruising and gum/nose bleeding  Musculoskel: negative for myalgias, arthralgias, back pain, muscle weakness, joint pain  Neurological:  negative for headaches, dizziness, vertigo, memory problems and gait   Behavl/Psych: negative for feelings of anxiety, depression, mood changes    Past Medical History   Diagnosis Date    Asthma     Diabetes ([facility] [address] 2016    Other ill-defined conditions(799.89)      back pain    Seizures ([facility] [address]      Past Surgical History   Procedure Laterality Date    Hx tonsillectomy      Hx gyn  2014     Pt reports  in past.     Social History     Social History    Marital status: SINGLE     Spouse name: N/A    Number of children: N/A    Years of education: N/A     Social History Main Topics    Smoking status: Never Smoker    Smokeless tobacco: Never Used    Alcohol use No    Drug use: No    Sexual activity: No     Other Topics Concern    None     Social History Narrative     Current Outpatient Prescriptions Medication Sig Dispense Refill    metFORMIN (GLUCOPHAGE) 500 mg tablet Take 1 Tab by mouth two (2) times daily (with meals). Indications: type 2 diabetes mellitus 60 Tab 11    lisinopril (PRINIVIL, ZESTRIL) 10 mg tablet Take 1 Tab by mouth daily. Indications: hypertension 30 Tab 11    acyclovir (ZOVIRAX) 400 mg tablet Take 1 Tab by mouth three (3) times daily for 10 days. 30 Tab 0    traMADol-acetaminophen (ULTRACET) 37.5-325 mg per tablet Take 1 Tab by mouth every six (6) hours as needed for Pain. Max Daily Amount: 4 Tabs. 10 Tab 0    ascorbic acid, vitamin C, (VITAMIN C) 500 mg chew Take 2 Tabs by mouth two (2) times a day for 7 days. 28 Tab 0    ibuprofen (MOTRIN) 600 mg tablet Take 1 Tab by mouth every eight (8) hours as needed for Pain. 21 Tab 0    medroxyPROGESTERone (DEPO-PROVERA) 150 mg/mL injection 150 mg by IntraMUSCular route once. No Known Allergies    Objective:  Visit Vitals    /82 (BP 1 Location: Left arm, BP Patient Position: Sitting)    Pulse 80    Temp 98 °F (36.7 °C) (Oral)    Resp 18    Ht 4' (1.219 m)    Wt 289 lb 8 oz (131.3 kg)    SpO2 99%    BMI 88.34 kg/m2     Physical Exam:   General appearance - alert, well appearing, and in no distress  Mental status - alert, oriented to person, place, and time  Chest - clear to auscultation, no wheezes, rales or rhonchi, symmetric air entry   Heart - normal rate, regular rhythm, normal S1, S2, no murmurs, rubs, clicks or gallops   Abdomen - soft, nontender, nondistended, no masses or organomegaly  Lymph- no adenopathy palpable  Ext-peripheral pulses normal, no pedal edema, no clubbing or cyanosis  Skin-Warm and dry.  no hyperpigmentation, vitiligo, or suspicious lesions  Neuro -alert, oriented, normal speech, no focal findings or movement disorder noted      Results for orders placed or performed in visit on 02/10/17   AMB POC GLUCOSE BLOOD, BY GLUCOSE MONITORING DEVICE   Result Value Ref Range    Glucose  mg/dL   AMB POC LIPID PROFILE   Result Value Ref Range    Cholesterol (POC) 115     Triglycerides (POC) 251     HDL Cholesterol (POC) 32     LDL Cholesterol (POC) 33     Non-HDL Goal (POC) 83     TChol/HDL Ratio (POC) 1.0        Assessment/Plan:    ICD-10-CM ICD-9-CM    1. Diabetes mellitus due to underlying condition with hyperosmolarity without coma, without long-term current use of insulin ([facility]) E08.00 249.20 AMB POC GLUCOSE BLOOD, BY GLUCOSE MONITORING DEVICE      AMB POC LIPID PROFILE   2. Essential hypertension, benign I10 401.1        DM- much better. Will watch for hypoglycemia. Call if BS are consistently less than 80. HTN- Poorly controlled. Added Lisinopril 10mg every day. Pt became upset at the thought that she HTN and tried to protest. I reviewed BP readings going back to 2014 showing elevated readings and stressed the importance of controlling DM & HTN. Congratulated her on her weight loss up to this and encouraged her to continue to make changes. Follow-up Disposition:  Return in about 4 weeks (around 3/10/2017) for HTN.

## 2017-02-10 NOTE — MR AVS SNAPSHOT
Visit Information Date & Time Provider Department Dept. Phone Encounter #  
 2/10/2017  8:45 AM Marco Ferreira, [address] [id number] Follow-up Instructions Return in about 4 weeks (around 3/10/2017) for HTN. Upcoming Health Maintenance Date Due  
 FOOT EXAM Q1 7/14/2000 MICROALBUMIN Q1 7/14/2000 EYE EXAM RETINAL OR DILATED Q1 7/14/2000 HPV AGE 9Y-26Y (1 of 3 - Female 3 Dose Series) 7/14/2001 Pneumococcal 19-64 Medium Risk (1 of 1 - PPSV23) 7/14/2009 DTaP/Tdap/Td series (1 - Tdap) 7/14/2011 LIPID PANEL Q1 10/1/2011 HEMOGLOBIN A1C Q6M 7/9/2017 PAP AKA CERVICAL CYTOLOGY 9/6/2019 Allergies as of 2/10/2017  Review Complete On: 2/10/2017 By: 200 Wilbert Ferreira MD  
 No Known Allergies Current Immunizations  Never Reviewed No immunizations on file. Not reviewed this visit You Were Diagnosed With   
  
 Codes Comments Diabetes mellitus due to underlying condition with hyperosmolarity without coma, without long-term current use of insulin (HCC)    -  Primary ICD-10-CM: E08.00 ICD-9-CM: 249.20 Essential hypertension, benign     ICD-10-CM: I10 
ICD-9-CM: 401.1 Vitals BP Pulse Temp Resp Height(growth percentile) Weight(growth percentile) 144/82 (BP 1 Location: Left arm, BP Patient Position: Sitting) 80 98 °F (36.7 °C) (Oral) 18 4' (1.219 m) 289 lb 8 oz (131.3 kg) SpO2 BMI OB Status Smoking Status 99% 88.34 kg/m2 Injection Never Smoker Vitals History BMI and BSA Data Body Mass Index Body Surface Area  
 88.34 kg/m 2 2.11 m 2 Preferred Pharmacy Pharmacy Name Phone [facility] [address]  [place] [phone] Your Updated Medication List  
  
   
This list is accurate as of: 2/10/17 10:06 AM.  Always use your most recent med list.  
  
  
  
  
 acyclovir 400 mg tablet Commonly known as:  ZOVIRAX Take 1 Tab by mouth three (3) times daily for 10 days. ascorbic acid (vitamin C) 500 mg Chew Commonly known as:  VITAMIN C Take 2 Tabs by mouth two (2) times a day for 7 days. ibuprofen 600 mg tablet Commonly known as:  MOTRIN Take 1 Tab by mouth every eight (8) hours as needed for Pain. lisinopril 10 mg tablet Commonly known as:  Tara November Take 1 Tab by mouth daily. Indications: hypertension  
  
 medroxyPROGESTERone 150 mg/mL injection Commonly known as:  DEPO-PROVERA  
150 mg by IntraMUSCular route once. metFORMIN 500 mg tablet Commonly known as:  GLUCOPHAGE Take 1 Tab by mouth two (2) times daily (with meals). Indications: type 2 diabetes mellitus  
  
 traMADol-acetaminophen 37.5-325 mg per tablet Commonly known as:  ULTRACET Take 1 Tab by mouth every six (6) hours as needed for Pain. Max Daily Amount: 4 Tabs. Prescriptions Sent to Pharmacy Refills  
 lisinopril (PRINIVIL, ZESTRIL) 10 mg tablet 11 Sig: Take 1 Tab by mouth daily. Indications: hypertension Class: Normal  
 [facility] [address] #: [phone] Route: Oral  
  
We Performed the Following AMB POC GLUCOSE BLOOD, BY GLUCOSE MONITORING DEVICE [01376 CPT(R)] AMB POC LIPID PROFILE [93617 CPT(R)] Follow-up Instructions Return in about 4 weeks (around 3/10/2017) for HTN. Introducing [facility]! Charlene Paredes introduces [employer] patient portal. Now you can access parts of your medical record, email your doctor's office, and request medication refills online. 1. In your internet browser, go to [email or web address] 2. Click on the First Time User? Click Here link in the Sign In box. You will see the New Member Sign Up page. 3. Enter your [employer] Access Code exactly as it appears below.  You will not need to use this code after youve completed the sign-up process. If you do not sign up before the expiration date, you must request a new code. · [employer] Access Code: [email or web address] Expires: 3/27/2017  9:53 PM 
 
4. Enter the last four digits of your Social Security Number (xxxx) and Date of Birth (mm/dd/yyyy) as indicated and click Submit. You will be taken to the next sign-up page. 5. Create a [employer]ve ID. This will be your [employer] login ID and cannot be changed, so think of one that is secure and easy to remember. 6. Create a [employer] password. You can change your password at any time. 7. Enter your Password Reset Question and Answer. This can be used at a later time if you forget your password. 8. Enter your e-mail address. You will receive e-mail notification when new information is available in [address]. 9. Click Sign Up. You can now view and download portions of your medical record. 10. Click the Download Summary menu link to download a portable copy of your medical information. If you have questions, please visit the Frequently Asked Questions section of the [employer] website. Remember, [employer] is NOT to be used for urgent needs. For medical emergencies, dial 911. Now available from your iPhone and Android! Please provide this summary of care documentation to your next provider. Your primary care clinician is listed as [facility]. If you have any questions after today's visit, please call [phone].

## 2017-03-17 ENCOUNTER — TELEPHONE (OUTPATIENT)
Dept: INTERNAL MEDICINE CLINIC | Age: 27
End: 2017-03-17

## 2017-03-17 NOTE — TELEPHONE ENCOUNTER
Pt is requesting is return call.  Pt states reason is personal.  Best contact is [phone]

## 2017-03-27 ENCOUNTER — DOCUMENTATION ONLY (OUTPATIENT)
Dept: INTERNAL MEDICINE CLINIC | Age: 27
End: 2017-03-27

## 2017-03-27 NOTE — PROGRESS NOTES
Attempt to call patient @ [phone] several's times the voice message stated that this patient does not accept in coming calls.

## 2017-06-17 ENCOUNTER — HOSPITAL ENCOUNTER (EMERGENCY)
Age: 27
Discharge: HOME | End: 2017-06-17
Attending: EMERGENCY MEDICINE
Payer: SELF-PAY

## 2017-06-17 VITALS
SYSTOLIC BLOOD PRESSURE: 154 MMHG | OXYGEN SATURATION: 98 % | DIASTOLIC BLOOD PRESSURE: 90 MMHG | RESPIRATION RATE: 12 BRPM | HEART RATE: 80 BPM | HEIGHT: 61 IN | WEIGHT: 293 LBS | BODY MASS INDEX: 55.32 KG/M2 | TEMPERATURE: 98.1 F

## 2017-06-17 DIAGNOSIS — J02.9 ACUTE PHARYNGITIS, UNSPECIFIED ETIOLOGY: Primary | ICD-10-CM

## 2017-06-17 PROCEDURE — 99282 EMERGENCY DEPT VISIT SF MDM: CPT

## 2017-06-17 RX ORDER — ACETAMINOPHEN 325 MG/1
650 TABLET ORAL
Qty: 20 TAB | Refills: 0 | Status: SHIPPED | OUTPATIENT
Start: 2017-06-17 | End: 2017-10-14

## 2017-06-17 NOTE — ED NOTES
Discharge instructions were given to the patient by provider. Patient was given 2 prescriptions and was encouraged to call or return to the ED for worsening issues or problems and was encouraged to schedule a follow up appointment for continuing care. Patient given a current medication reconciliation form and verbalized understanding of their medications and importance of discussing medications at follow-up. The patient verbalized understanding of discharge instructions and prescriptions, all questions were answered. The patient has no further concerns at this time. Patient stable at time of discharge. The patient left the Emergency Department ambulatory, with self, and in no acute distress. Patient declined wheelchair transport out of Emergency Department.

## 2017-06-17 NOTE — ED PROVIDER NOTES
Patient is a 32 y.o. female presenting with sore throat. The history is provided by the patient. Sore Throat    This is a new problem. The current episode started 3 to 5 hours ago. The problem has not changed since onset. There has been no fever. Pertinent negatives include no diarrhea, no vomiting, no congestion, no drooling, no ear discharge, no ear pain, no headaches, no plugged ear sensation, no shortness of breath, no stridor, no swollen glands, no trouble swallowing, no stiff neck and no cough. She has had no exposure to strep. She has tried nothing for the symptoms. Past Medical History:   Diagnosis Date    Asthma     Diabetes ([facility] [address] 2016    Hypertension     Other ill-defined conditions     back pain    Seizures ([facility] [address]        Past Surgical History:   Procedure Laterality Date    HX GYN  2014    Pt reports  in past.    HX TONSILLECTOMY           Family History:   Problem Relation Age of Onset    Diabetes Mother     Diabetes Father     Diabetes Maternal Grandmother        Social History     Social History    Marital status: SINGLE     Spouse name: N/A    Number of children: N/A    Years of education: N/A     Occupational History    Not on file. Social History Main Topics    Smoking status: Never Smoker    Smokeless tobacco: Never Used    Alcohol use No    Drug use: No    Sexual activity: No     Other Topics Concern    Not on file     Social History Narrative         ALLERGIES: Review of patient's allergies indicates no known allergies. Review of Systems   Constitutional: Negative for activity change, chills, fatigue and fever. HENT: Positive for sore throat. Negative for congestion, drooling, ear discharge, ear pain, facial swelling, postnasal drip, rhinorrhea, sinus pressure, sneezing and trouble swallowing. Eyes: Negative for photophobia, pain, discharge and visual disturbance.    Respiratory: Negative for cough, chest tightness, shortness of breath and stridor. Cardiovascular: Negative for chest pain. Gastrointestinal: Negative for abdominal pain, constipation, diarrhea, nausea and vomiting. Genitourinary: Negative. Musculoskeletal: Negative. Negative for myalgias and neck pain. Skin: Negative. Negative for rash. Neurological: Negative for headaches. Psychiatric/Behavioral: Negative. Vitals:    06/17/17 1246   BP: 154/90   Pulse: 80   Resp: 12   Temp: 98.1 °F (36.7 °C)   SpO2: 98%   Weight: 137.9 kg (304 lb)   Height: 5' 1\" (1.549 m)            Physical Exam   Constitutional: She is oriented to person, place, and time. She appears well-developed and well-nourished. No distress. HENT:   Head: Normocephalic and atraumatic. Right Ear: Hearing, tympanic membrane, external ear and ear canal normal.   Left Ear: Hearing, tympanic membrane, external ear and ear canal normal.   Nose: Mucosal edema and rhinorrhea present. Right sinus exhibits no maxillary sinus tenderness and no frontal sinus tenderness. Left sinus exhibits no maxillary sinus tenderness and no frontal sinus tenderness. Mouth/Throat: Uvula is midline, oropharynx is clear and moist and mucous membranes are normal. No oral lesions. No trismus in the jaw. No uvula swelling. No oropharyngeal exudate, posterior oropharyngeal edema, posterior oropharyngeal erythema or tonsillar abscesses. Tonsillectomy. Eyes: Conjunctivae and EOM are normal. Pupils are equal, round, and reactive to light. Right eye exhibits no discharge. Left eye exhibits no discharge. No scleral icterus. Neck: Normal range of motion. Neck supple. Cardiovascular: Normal rate, regular rhythm, normal heart sounds and intact distal pulses. Exam reveals no gallop and no friction rub. No murmur heard. Pulmonary/Chest: Effort normal and breath sounds normal. No accessory muscle usage. No respiratory distress. She has no decreased breath sounds. She has no wheezes. She has no rhonchi. She has no rales.  She exhibits no tenderness. Abdominal: Soft. Bowel sounds are normal. She exhibits no distension and no mass. There is no tenderness. There is no rebound and no guarding. Musculoskeletal: Normal range of motion. Lymphadenopathy:     She has no cervical adenopathy. Neurological: She is alert and oriented to person, place, and time. No cranial nerve deficit. Skin: Skin is warm and dry. No rash noted. She is not diaphoretic. Psychiatric: She has a normal mood and affect. Her behavior is normal. Judgment and thought content normal.   Nursing note and vitals reviewed. MDM  Number of Diagnoses or Management Options  Acute pharyngitis, unspecified etiology:   Diagnosis management comments: DDx: pharyngitis, uri, AOM, sinusitis    LABORATORY TESTS:  No results found for this or any previous visit (from the past 12 hour(s)). IMAGING RESULTS:  No orders to display    MEDICATIONS GIVEN:  Medications - No data to display    IMPRESSION:  Acute pharyngitis, unspecified etiology  (primary encounter diagnosis)    PLAN:  1. Current Discharge Medication List    START taking these medications    acetaminophen (TYLENOL) 325 mg tablet  Take 2 Tabs by mouth every four (4) hours as needed for Pain. Qty: 20 Tab Refills: 0    loratadine-pseudoephedrine (CLARITIN-D 12 HOUR) 5-120 mg per tablet  Take 1 Tab by mouth two (2) times a day. Qty: 20 Tab Refills: 0      CONTINUE these medications which have NOT CHANGED    metFORMIN (GLUCOPHAGE) 500 mg tablet  Take 1 Tab by mouth two (2) times daily (with meals). Indications: type 2 diabetes mellitus  Qty: 60 Tab Refills: 11    lisinopril (PRINIVIL, ZESTRIL) 10 mg tablet  Take 1 Tab by mouth daily. Indications: hypertension  Qty: 30 Tab Refills: 11    medroxyPROGESTERone (DEPO-PROVERA) 150 mg/mL injection  150 mg by IntraMUSCular route once.         2. Follow-up Information     Follow up With Details Comments [address] Aury Gamboa MD Schedule an appointment as soon as possible for a   visit in 1 week As needed, If symptoms worsen [place]  [address] [place]  [phone]        Return to ED if worse               Patient Progress  Patient progress: stable    ED Course       Procedures    1:06 PM  I have discussed with patient their diagnosis, treatment, and follow up plan. The patient agrees to follow up as outlined in discharge paperwork and also to return to the ED with any worsening.  Milton Avina PA-C

## 2017-06-17 NOTE — LETTER
[facility] EMERGENCY DEPT 
[address] [facility] [address] [id number] [phone] Work/School Note Date: 6/17/2017 To Whom It May concern: 
 
Diane Augustine was seen and treated today in the emergency room by the following provider(s): 
Attending Provider: Natalya Ballard MD 
Physician Assistant: Yuliya Lezama PA-C. Diane Augustine may return to work on 6/19/2017. Sincerely, Yuliya Lezama PA-C

## 2017-06-17 NOTE — DISCHARGE INSTRUCTIONS
Sore Throat: Care Instructions  Your Care Instructions    Infection by bacteria or a virus causes most sore throats. Cigarette smoke, dry air, air pollution, allergies, and yelling can also cause a sore throat. Sore throats can be painful and annoying. Fortunately, most sore throats go away on their own. If you have a bacterial infection, your doctor may prescribe antibiotics. Follow-up care is a key part of your treatment and safety. Be sure to make and go to all appointments, and call your doctor if you are having problems. It's also a good idea to know your test results and keep a list of the medicines you take. How can you care for yourself at home? · If your doctor prescribed antibiotics, take them as directed. Do not stop taking them just because you feel better. You need to take the full course of antibiotics. · Gargle with warm salt water once an hour to help reduce swelling and relieve discomfort. Use 1 teaspoon of salt mixed in 1 cup of warm water. · Take an over-the-counter pain medicine, such as acetaminophen (Tylenol), ibuprofen (Advil, Motrin), or naproxen (Aleve). Read and follow all instructions on the label. · Be careful when taking over-the-counter cold or flu medicines and Tylenol at the same time. Many of these medicines have acetaminophen, which is Tylenol. Read the labels to make sure that you are not taking more than the recommended dose. Too much acetaminophen (Tylenol) can be harmful. · Drink plenty of fluids. Fluids may help soothe an irritated throat. Hot fluids, such as tea or soup, may help decrease throat pain. · Use over-the-counter throat lozenges to soothe pain. Regular cough drops or hard candy may also help. These should not be given to young children because of the risk of choking. · Do not smoke or allow others to smoke around you. If you need help quitting, talk to your doctor about stop-smoking programs and medicines.  These can increase your chances of quitting for good. · Use a vaporizer or humidifier to add moisture to your bedroom. Follow the directions for cleaning the machine. When should you call for help? Call your doctor now or seek immediate medical care if:  · You have new or worse trouble swallowing. · Your sore throat gets much worse on one side. Watch closely for changes in your health, and be sure to contact your doctor if you do not get better as expected. Where can you learn more? Go to http://phil-khari.info/. Enter [phone] in the search box to learn more about \"Sore Throat: Care Instructions. \"  Current as of: July 29, 2016  Content Version: 11.2  © 7300-3791 [employer]. Care instructions adapted under license by [employer] (which disclaims liability or warranty for this information). If you have questions about a medical condition or this instruction, always ask your healthcare professional. [address] any warranty or liability for your use of this information.

## 2017-06-17 NOTE — ED NOTES
Emergency Department Nursing Plan of Care       The Nursing Plan of Care is developed from the Nursing assessment and Emergency Department Attending provider initial evaluation. The plan of care may be reviewed in the ED Provider note.     The Plan of Care was developed with the following considerations:   Patient / Family readiness to learn indicated by:verbalized understanding  Persons(s) to be included in education: patient  Barriers to Learning/Limitations:No    Signed     Ever Concepcion RN    6/17/2017   12:53 PM

## 2017-07-06 ENCOUNTER — OFFICE VISIT (OUTPATIENT)
Dept: OBGYN CLINIC | Age: 27
End: 2017-07-06

## 2017-07-06 ENCOUNTER — HOSPITAL ENCOUNTER (OUTPATIENT)
Dept: LAB | Age: 27
Discharge: HOME | End: 2017-07-06

## 2017-07-06 VITALS — HEIGHT: 61 IN | BODY MASS INDEX: 55.32 KG/M2 | WEIGHT: 293 LBS

## 2017-07-06 DIAGNOSIS — Z32.02 PREGNANCY TEST NEGATIVE: ICD-10-CM

## 2017-07-06 DIAGNOSIS — R87.810 ASCUS WITH POSITIVE HIGH RISK HPV CERVICAL: Primary | ICD-10-CM

## 2017-07-06 DIAGNOSIS — R87.610 ASCUS WITH POSITIVE HIGH RISK HPV CERVICAL: Primary | ICD-10-CM

## 2017-07-06 LAB
HCG URINE, QL. (POC): NEGATIVE
VALID INTERNAL CONTROL?: YES

## 2017-07-06 NOTE — PROGRESS NOTES
[facility]  OFFICE PROCEDURE PROGRESS NOTE        Chart reviewed for the following:   Jordan BAILEY, have reviewed the History, Physical and updated the Allergic reactions for [address] performed immediately prior to start of procedure:   Jordan BAILEY, have performed the following reviews on Florinda Carbajal prior to the start of the procedure:            * Patient was identified by name and date of birth   * Agreement on procedure being performed was verified  * Risks and Benefits explained to the patient  * Consent was signed and verified     Time: 9:30am    Date of procedure: 2017    Procedure performed by: Cady Navarro MD    Provider assisted by: Pati Rojas MA    Patient assisted by: self    How tolerated by patient: tolerated the procedure well with no complications    Post Procedural Pain Scale: 0 - No Hurt    Comments: none    Procedure Note: Colposcopy    Florinda Carbajal is a ,  32 y.o. female [address] whose No LMP recorded. Patient has had an injection. was on . She presents for colposcopy for evaluation of a cervical abnormality with a pap smear abnormality consisting of ASCUS and HPV positive. After being presented with the risks, benefits and alternatives has she signed a consent for the procedure. She states that she understands the need for the procedure and has no further questions. She was informed that she may experience discomfort. Procedure:  She was positioned in the dorsal lithotomy position and a speculum was inserted into the vagina. Dilute acetic acid was applied to the cervix. The colposcope was used to visualize the cervix. Procedure: The transformation zone was completely visualized. Findings: This colposcopy was satisfactory. Biopsies were taken from the cervix, placed in formalin, and sent to pathology. See accompanying diagram for biopsy sites. Monsels was applied to the cervix.  Endocervical currettage: An endocervical curettage was performed. Findings:The procedure was notable for white epithelium on the cervix. Post Procedure Status: The patient tolerated the procedure well with minimal discomfort.

## 2017-07-06 NOTE — PATIENT INSTRUCTIONS
Colposcopy: What to Expect at [address] may feel some soreness in your vagina for a day or two if you had a biopsy. Some vaginal bleeding or discharge is normal for up to a week after a biopsy. The discharge may be dark-colored if a solution was put on your cervix. You can use a sanitary pad for the bleeding. It may take a week or two for you to get the test results. This care sheet gives you a general idea about how long it will take for you to recover. But each person recovers at a different pace. Follow the steps below to feel better as quickly as possible. How can you care for yourself at home? Activity  · You can return to work and most daily activities right after the test.  Exercise  · Do not exercise for 1 day after the test.  Medicines  · Your doctor will tell you if and when you can restart your medicines. He or she will also give you instructions about taking any new medicines. · If you take blood thinners, such as warfarin (Coumadin), clopidogrel (Plavix), or aspirin, be sure to talk to your doctor. He or she will tell you if and when to start taking those medicines again. Make sure that you understand exactly what your doctor wants you to do. · Take an over-the-counter pain medicine, such as acetaminophen (Tylenol), ibuprofen (Advil, Motrin), or naproxen (Aleve). Be safe with medicines. Read and follow all instructions on the label. Do not take two or more pain medicines at the same time unless the doctor told you to. Many pain medicines have acetaminophen, which is Tylenol. Too much acetaminophen (Tylenol) can be harmful. Other instructions  · Use a pad if you have some bleeding. · Do not douche, have sexual intercourse, or use tampons for 1 week if you had a biopsy. This will allow time for your cervix to heal.  · You can take a bath or shower anytime after the test.  Follow-up care is a key part of your treatment and safety.  Be sure to make and go to all appointments, and call your doctor if you are having problems. It's also a good idea to know your test results and keep a list of the medicines you take. When should you call for help? Call your doctor now or seek immediate medical care if:  · You have severe vaginal bleeding. This means that you are soaking through your usual pads or tampons each hour for 2 or more hours. · You have pain that does not get better after you take pain medicine. · You have signs of infection, such as:  ¨ Increased pain. ¨ Bad-smelling vaginal discharge. ¨ A fever. Watch closely for any changes in your health, and be sure to contact your doctor if:  · You have questions or concerns. Where can you learn more? Go to http://phil-khari.info/. Enter M523 in the search box to learn more about \"Colposcopy: What to Expect at Home. \"  Current as of: May 3, 2017  Content Version: 11.3  © 1824-2547 HT[employer], [employer]. Care instructions adapted under license by [employer] (which disclaims liability or warranty for this information). If you have questions about a medical condition or this instruction, always ask your healthcare professional. [place] [address] any warranty or liability for your use of this information.

## 2017-07-06 NOTE — MR AVS SNAPSHOT
Visit Information Date & Time Provider Department Dept. Phone Encounter #  
 7/6/2017  9:30 AM Tyler Landon MD [phone] [id number] Upcoming Health Maintenance Date Due  
 HPV AGE 9Y-34Y (1 of 3 - Female 3 Dose Series) 7/14/2001 INFLUENZA AGE 9 TO ADULT 8/1/2017 PAP AKA CERVICAL CYTOLOGY 3/29/2020 Allergies as of 7/6/2017  Review Complete On: 7/6/2017 By: Neri Curtis No Known Allergies Current Immunizations  Never Reviewed No immunizations on file. Not reviewed this visit You Were Diagnosed With   
  
 Codes Comments ASCUS with positive high risk HPV cervical    -  Primary ICD-10-CM: R87.610, R87.810 ICD-9-CM: 795.01, 795.05 Pregnancy test negative     ICD-10-CM: Z32.02 
ICD-9-CM: V72.41 Vitals Height(growth percentile) Weight(growth percentile) BMI OB Status Smoking Status 5' 1\" (1.549 m) 304 lb (137.9 kg) 57.44 kg/m2 Injection Never Smoker BMI and BSA Data Body Mass Index Body Surface Area  
 57.44 kg/m 2 2.44 m 2 Preferred Pharmacy Pharmacy Name Phone [email or web address]  [facility] [place] [phone] Your Updated Medication List  
  
   
This list is accurate as of: 7/6/17  9:48 AM.  Always use your most recent med list.  
  
  
  
  
 acetaminophen 325 mg tablet Commonly known as:  TYLENOL Take 2 Tabs by mouth every four (4) hours as needed for Pain. lisinopril 10 mg tablet Commonly known as:  [place] Arnold Take 1 Tab by mouth daily. Indications: hypertension  
  
 loratadine-pseudoephedrine 5-120 mg per tablet Commonly known as:  CLARITIN-D 12 HOUR Take 1 Tab by mouth two (2) times a day. medroxyPROGESTERone 150 mg/mL injection Commonly known as:  DEPO-PROVERA  
150 mg by IntraMUSCular route once. metFORMIN 500 mg tablet Commonly known as:  GLUCOPHAGE  
 Take 1 Tab by mouth two (2) times daily (with meals). Indications: type 2 diabetes mellitus We Performed the Following AMB POC URINE PREGNANCY TEST, VISUAL COLOR COMPARISON [20304 CPT(R)] COLPOSC,CERVIX W/ADJ VAG,W/BX & CURRETAG [id number] CPT(R)] SURGICAL PATHOLOGY [RXZ5054 Custom] Patient Instructions Colposcopy: What to Expect at [facility] Your Recovery You may feel some soreness in your vagina for a day or two if you had a biopsy. Some vaginal bleeding or discharge is normal for up to a week after a biopsy. The discharge may be dark-colored if a solution was put on your cervix. You can use a sanitary pad for the bleeding. It may take a week or two for you to get the test results. This care sheet gives you a general idea about how long it will take for you to recover. But each person recovers at a different pace. Follow the steps below to feel better as quickly as possible. How can you care for yourself at home? Activity · You can return to work and most daily activities right after the test. 
Exercise · Do not exercise for 1 day after the test. 
Medicines · Your doctor will tell you if and when you can restart your medicines. He or she will also give you instructions about taking any new medicines. · If you take blood thinners, such as warfarin (Coumadin), clopidogrel (Plavix), or aspirin, be sure to talk to your doctor. He or she will tell you if and when to start taking those medicines again. Make sure that you understand exactly what your doctor wants you to do. · Take an over-the-counter pain medicine, such as acetaminophen (Tylenol), ibuprofen (Advil, Motrin), or naproxen (Aleve). Be safe with medicines. Read and follow all instructions on the label. Do not take two or more pain medicines at the same time unless the doctor told you to. Many pain medicines have acetaminophen, which is Tylenol. Too much acetaminophen (Tylenol) can be harmful. Other instructions · Use a pad if you have some bleeding. · Do not douche, have sexual intercourse, or use tampons for 1 week if you had a biopsy. This will allow time for your cervix to heal. 
· You can take a bath or shower anytime after the test. 
Follow-up care is a key part of your treatment and safety. Be sure to make and go to all appointments, and call your doctor if you are having problems. It's also a good idea to know your test results and keep a list of the medicines you take. When should you call for help? Call your doctor now or seek immediate medical care if: 
· You have severe vaginal bleeding. This means that you are soaking through your usual pads or tampons each hour for 2 or more hours. · You have pain that does not get better after you take pain medicine. · You have signs of infection, such as: 
¨ Increased pain. ¨ Bad-smelling vaginal discharge. ¨ A fever. Watch closely for any changes in your health, and be sure to contact your doctor if: 
· You have questions or concerns. Where can you learn more? Go to http://phil-khari.info/. Enter M523 in the search box to learn more about \"Colposcopy: What to Expect at Home. \" Current as of: May 3, 2017 Content Version: 11.3 © 2278-2555 [employer] ([employer]. Care instructions adapted under license by [employer] (which disclaims liability or warranty for this information). If you have questions about a medical condition or this instruction, always ask your healthcare professional. [place] [address] any warranty or liability for your use of this information. Introducing [facility]! Bucky Mccarthy introduces [employer] patient portal. Now you can access parts of your medical record, email your doctor's office, and request medication refills online. 1. In your internet browser, go to [email or web address]. [email or web address] 2. Click on the First Time User? Click Here link in the Sign In box.  You will see the New Member Sign Up page. 3. Enter your Atreca Access Code exactly as it appears below. You will not need to use this code after youve completed the sign-up process. If you do not sign up before the expiration date, you must request a new code. · Atreca Access Code: [email or web address] Expires: 8/6/2017  2:52 PM 
 
4. Enter the last four digits of your Social Security Number (xxxx) and Date of Birth (mm/dd/yyyy) as indicated and click Submit. You will be taken to the next sign-up page. 5. Create a Atreca ID. This will be your Atreca login ID and cannot be changed, so think of one that is secure and easy to remember. 6. Create a Atreca password. You can change your password at any time. 7. Enter your Password Reset Question and Answer. This can be used at a later time if you forget your password. 8. Enter your e-mail address. You will receive e-mail notification when new information is available in [address]. 9. Click Sign Up. You can now view and download portions of your medical record. 10. Click the Download Summary menu link to download a portable copy of your medical information. If you have questions, please visit the Frequently Asked Questions section of the Atreca website. Remember, Atreca is NOT to be used for urgent needs. For medical emergencies, dial 911. Now available from your iPhone and Android! Please provide this summary of care documentation to your next provider. Your primary care clinician is listed as Ziggy Donn. If you have any questions after today's visit, please call [phone].

## 2017-07-18 ENCOUNTER — HOSPITAL ENCOUNTER (EMERGENCY)
Age: 27
Discharge: HOME | End: 2017-07-18
Attending: EMERGENCY MEDICINE
Payer: SELF-PAY

## 2017-07-18 VITALS
WEIGHT: 293 LBS | HEART RATE: 79 BPM | OXYGEN SATURATION: 100 % | TEMPERATURE: 98.3 F | DIASTOLIC BLOOD PRESSURE: 78 MMHG | HEIGHT: 61 IN | SYSTOLIC BLOOD PRESSURE: 153 MMHG | BODY MASS INDEX: 55.32 KG/M2 | RESPIRATION RATE: 20 BRPM

## 2017-07-18 DIAGNOSIS — N76.0 BV (BACTERIAL VAGINOSIS): Primary | ICD-10-CM

## 2017-07-18 DIAGNOSIS — N39.0 URINARY TRACT INFECTION WITHOUT HEMATURIA, SITE UNSPECIFIED: ICD-10-CM

## 2017-07-18 DIAGNOSIS — Z71.1 CONCERN ABOUT STD IN FEMALE WITHOUT DIAGNOSIS: ICD-10-CM

## 2017-07-18 DIAGNOSIS — B96.89 BV (BACTERIAL VAGINOSIS): Primary | ICD-10-CM

## 2017-07-18 LAB
APPEARANCE UR: CLEAR
BACTERIA URNS QL MICRO: ABNORMAL /HPF
BILIRUB UR QL: NEGATIVE
CLUE CELLS VAG QL WET PREP: NORMAL
COLOR UR: ABNORMAL
EPITH CASTS URNS QL MICRO: ABNORMAL /LPF
GLUCOSE UR STRIP.AUTO-MCNC: NEGATIVE MG/DL
HCG UR QL: NEGATIVE
HGB UR QL STRIP: NEGATIVE
KETONES UR QL STRIP.AUTO: NEGATIVE MG/DL
KOH PREP SPEC: NORMAL
LEUKOCYTE ESTERASE UR QL STRIP.AUTO: ABNORMAL
NITRITE UR QL STRIP.AUTO: NEGATIVE
PH UR STRIP: 6 [PH] (ref 5–8)
PROT UR STRIP-MCNC: NEGATIVE MG/DL
RBC #/AREA URNS HPF: ABNORMAL /HPF (ref 0–5)
SERVICE CMNT-IMP: NORMAL
SP GR UR REFRACTOMETRY: 1.02 (ref 1–1.03)
T VAGINALIS VAG QL WET PREP: NORMAL
UA: UC IF INDICATED,UAUC: ABNORMAL
UROBILINOGEN UR QL STRIP.AUTO: 0.2 EU/DL (ref 0.2–1)
WBC URNS QL MICRO: ABNORMAL /HPF (ref 0–4)

## 2017-07-18 PROCEDURE — 74011250637 HC RX REV CODE- 250/637: Performed by: PHYSICIAN ASSISTANT

## 2017-07-18 PROCEDURE — 87210 SMEAR WET MOUNT SALINE/INK: CPT | Performed by: EMERGENCY MEDICINE

## 2017-07-18 PROCEDURE — 74011250636 HC RX REV CODE- 250/636: Performed by: PHYSICIAN ASSISTANT

## 2017-07-18 PROCEDURE — 99284 EMERGENCY DEPT VISIT MOD MDM: CPT

## 2017-07-18 PROCEDURE — 96372 THER/PROPH/DIAG INJ SC/IM: CPT

## 2017-07-18 PROCEDURE — 87491 CHLMYD TRACH DNA AMP PROBE: CPT | Performed by: EMERGENCY MEDICINE

## 2017-07-18 PROCEDURE — 81001 URINALYSIS AUTO W/SCOPE: CPT | Performed by: EMERGENCY MEDICINE

## 2017-07-18 PROCEDURE — 87086 URINE CULTURE/COLONY COUNT: CPT | Performed by: EMERGENCY MEDICINE

## 2017-07-18 PROCEDURE — 81025 URINE PREGNANCY TEST: CPT

## 2017-07-18 PROCEDURE — 74011000250 HC RX REV CODE- 250: Performed by: PHYSICIAN ASSISTANT

## 2017-07-18 RX ORDER — AZITHROMYCIN 250 MG/1
1000 TABLET, FILM COATED ORAL
Status: COMPLETED | OUTPATIENT
Start: 2017-07-18 | End: 2017-07-18

## 2017-07-18 RX ORDER — CEPHALEXIN 500 MG/1
500 CAPSULE ORAL 2 TIMES DAILY
Qty: 15 CAP | Refills: 0 | Status: SHIPPED | OUTPATIENT
Start: 2017-07-18 | End: 2017-09-12

## 2017-07-18 RX ORDER — METRONIDAZOLE 500 MG/1
500 TABLET ORAL 2 TIMES DAILY
Qty: 14 TAB | Refills: 0 | Status: SHIPPED | OUTPATIENT
Start: 2017-07-18 | End: 2017-09-12

## 2017-07-18 RX ADMIN — AZITHROMYCIN 1000 MG: 250 TABLET, FILM COATED ORAL at 18:34

## 2017-07-18 RX ADMIN — LIDOCAINE HYDROCHLORIDE 250 MG: 10 INJECTION, SOLUTION EPIDURAL; INFILTRATION; INTRACAUDAL; PERINEURAL at 18:35

## 2017-07-18 NOTE — DISCHARGE INSTRUCTIONS
Bacterial Vaginosis: Care Instructions  Your Care Instructions    Bacterial vaginosis is a type of vaginal infection. It is caused by excess growth of certain bacteria that are normally found in the vagina. Symptoms can include itching, swelling, pain when you urinate or have sex, and a gray or yellow discharge with a \"fishy\" odor. It is not considered an infection that is spread through sexual contact. Although symptoms can be annoying and uncomfortable, bacterial vaginosis does not usually cause other health problems. However, if you have it while you are pregnant, it can cause complications. While the infection may go away on its own, most doctors use antibiotics to treat it. You may have been prescribed pills or vaginal cream. With treatment, bacterial vaginosis usually clears up in 5 to 7 days. Follow-up care is a key part of your treatment and safety. Be sure to make and go to all appointments, and call your doctor if you are having problems. It's also a good idea to know your test results and keep a list of the medicines you take. How can you care for yourself at home? · Take your antibiotics as directed. Do not stop taking them just because you feel better. You need to take the full course of antibiotics. · Do not eat or drink anything that contains alcohol if you are taking metronidazole (Flagyl). · Keep using your medicine if you start your period. Use pads instead of tampons while using a vaginal cream or suppository. Tampons can absorb the medicine. · Wear loose cotton clothing. Do not wear nylon and other materials that hold body heat and moisture close to the skin. · Do not scratch. Relieve itching with a cold pack or a cool bath. · Do not wash your vaginal area more than once a day. Use plain water or a mild, unscented soap. Do not douche. When should you call for help?   Watch closely for changes in your health, and be sure to contact your doctor if:  · You have unexpected vaginal bleeding. · You have a fever. · You have new or increased pain in your vagina or pelvis. · You are not getting better after 1 week. · Your symptoms return after you finish the course of your medicine. Where can you learn more? Go to http://phil-khari.info/. Alcon Sow in the search box to learn more about \"Bacterial Vaginosis: Care Instructions. \"  Current as of: October 13, 2016  Content Version: 11.3  © 6048-3323 [employer]. Care instructions adapted under license by [employer] (which disclaims liability or warranty for this information). If you have questions about a medical condition or this instruction, always ask your healthcare professional. [place] [address] any warranty or liability for your use of this information. Urinary Tract Infection in Women: Care Instructions  Your Care Instructions    A urinary tract infection, or UTI, is a general term for an infection anywhere between the kidneys and the urethra (where urine comes out). Most UTIs are bladder infections. They often cause pain or burning when you urinate. UTIs are caused by bacteria and can be cured with antibiotics. Be sure to complete your treatment so that the infection goes away. Follow-up care is a key part of your treatment and safety. Be sure to make and go to all appointments, and call your doctor if you are having problems. It's also a good idea to know your test results and keep a list of the medicines you take. How can you care for yourself at home? · Take your antibiotics as directed. Do not stop taking them just because you feel better. You need to take the full course of antibiotics. · Drink extra water and other fluids for the next day or two. This may help wash out the bacteria that are causing the infection.  (If you have kidney, heart, or liver disease and have to limit fluids, talk with your doctor before you increase your fluid intake.)  · Avoid drinks that are carbonated or have caffeine. They can irritate the bladder. · Urinate often. Try to empty your bladder each time. · To relieve pain, take a hot bath or lay a heating pad set on low over your lower belly or genital area. Never go to sleep with a heating pad in place. To prevent UTIs  · Drink plenty of water each day. This helps you urinate often, which clears bacteria from your system. (If you have kidney, heart, or liver disease and have to limit fluids, talk with your doctor before you increase your fluid intake.)  · Urinate when you need to. · Urinate right after you have sex. · Change sanitary pads often. · Avoid douches, bubble baths, feminine hygiene sprays, and other feminine hygiene products that have deodorants. · After going to the bathroom, wipe from front to back. When should you call for help? Call your doctor now or seek immediate medical care if:  · Symptoms such as fever, chills, nausea, or vomiting get worse or appear for the first time. · You have new pain in your back just below your rib cage. This is called flank pain. · There is new blood or pus in your urine. · You have any problems with your antibiotic medicine. Watch closely for changes in your health, and be sure to contact your doctor if:  · You are not getting better after taking an antibiotic for 2 days. · Your symptoms go away but then come back. Where can you learn more? Go to http://phil-khari.info/. Enter I997 in the search box to learn more about \"Urinary Tract Infection in Women: Care Instructions. \"  Current as of: November 28, 2016  Content Version: 11.3  © 0006-8016 "[employer]. Care instructions adapted under license by [employer] (which disclaims liability or warranty for this information).  If you have questions about a medical condition or this instruction, always ask your healthcare professional. Jose Rafael Valentine disclaims any warranty or liability for your use of this information.

## 2017-07-18 NOTE — ED NOTES
Pt c/o vaginal d/c with foul odor x several days.  No c/o dysuria. + c/o left flank pain since arrival.

## 2017-07-18 NOTE — ED PROVIDER NOTES
Patient is a 32 y.o. female presenting with vaginal discharge. The history is provided by the patient. Vaginal Discharge    Chronicity: few days to a week. The problem occurs constantly. The problem has not changed since onset. The discharge was white, malodorous and milky. She is not pregnant. She has not missed her period. Pertinent negatives include no anorexia, no diaphoresis, no fever, no abdominal swelling, no abdominal pain, no constipation, no diarrhea, no nausea, no vomiting, no dyspareunia, no dysuria, no frequency, no genital burning, no genital itching, no genital lesions, no perineal pain, no perineal odor and no painful intercourse. She has tried nothing for the symptoms. Last intercourse few months ago. Vaginal itching. No recent abx. No known specific std exposure. Colposcopy few weeks (she thinks three) with new obgyn upstairs (doesnt recall name). Reportedly negative for malignancy, neg for hpv. Past Medical History:   Diagnosis Date    Asthma     Diabetes ([facility] 2016    Hypertension     Other ill-defined conditions     back pain    Seizures ([facility] [facility]        Past Surgical History:   Procedure Laterality Date    HX GYN  2014    Pt reports  in past.    HX TONSILLECTOMY           Family History:   Problem Relation Age of Onset    Diabetes Mother     Diabetes Father     Diabetes Maternal Grandmother        Social History     Social History    Marital status: SINGLE     Spouse name: N/A    Number of children: N/A    Years of education: N/A     Occupational History    Not on file. Social History Main Topics    Smoking status: Never Smoker    Smokeless tobacco: Never Used    Alcohol use No    Drug use: No    Sexual activity: No     Other Topics Concern    Not on file     Social History Narrative         ALLERGIES: Review of patient's allergies indicates no known allergies. Review of Systems   Constitutional: Negative for diaphoresis and fever. HENT: Negative for congestion, ear pain and rhinorrhea. Respiratory: Negative for cough and shortness of breath. Cardiovascular: Negative for chest pain and palpitations. Gastrointestinal: Negative for abdominal pain, anorexia, constipation, diarrhea, nausea and vomiting. Genitourinary: Positive for vaginal discharge. Negative for difficulty urinating, dyspareunia, dysuria, flank pain, frequency, hematuria, pelvic pain, urgency and vaginal pain. Neurological: Negative for seizures and headaches. Vitals:    07/18/17 1558   BP: 153/78   Pulse: 79   Resp: 20   Temp: 98.3 °F (36.8 °C)   SpO2: 100%   Weight: 140.2 kg (309 lb)   Height: 5' 1\" (1.549 m)            Physical Exam   Constitutional: She is oriented to person, place, and time. She appears well-developed and well-nourished. No distress. HENT:   Head: Normocephalic. Eyes: Pupils are equal, round, and reactive to light. Right eye exhibits no discharge. Left eye exhibits no discharge. Neck: Normal range of motion. Cardiovascular: Normal rate. Exam reveals no friction rub. No murmur heard. Pulmonary/Chest: Effort normal. She has wheezes. She has no rales. Abdominal: Soft. There is no tenderness. There is no rebound and no guarding. Musculoskeletal: Normal range of motion. Lymphadenopathy:     She has no cervical adenopathy. Neurological: She is alert and oriented to person, place, and time. Skin: Skin is warm and dry. She is not diaphoretic. Psychiatric: She has a normal mood and affect. Her behavior is normal.   Nursing note and vitals reviewed. MDM  Number of Diagnoses or Management Options  Diagnosis management comments: DDX: uti, sti, vaginitis    ED Course       Pelvic Exam  Date/Time: 7/18/2017 5:53 PM  Performed by: PA  Type of exam performed: bimanual and speculum. External genitalia appearance: normal.    Vaginal exam:  discharge. The amount of discharge was:  mild.   The discharge was grey, milky and white.   Cervical exam:  normal.    Specimen(s) collected:  chlamydia and GC. Bimanual exam:  normal.    Patient tolerance: Patient tolerated the procedure well with no immediate complications              LABORATORY TESTS:  Recent Results (from the past 12 hour(s))   URINALYSIS W/ REFLEX CULTURE    Collection Time: 07/18/17  5:09 PM   Result Value Ref Range    Color YELLOW/STRAW      Appearance CLEAR CLEAR      Specific gravity 1.020 1.003 - 1.030      pH (UA) 6.0 5.0 - 8.0      Protein NEGATIVE  NEG mg/dL    Glucose NEGATIVE  NEG mg/dL    Ketone NEGATIVE  NEG mg/dL    Bilirubin NEGATIVE  NEG      Blood NEGATIVE  NEG      Urobilinogen 0.2 0.2 - 1.0 EU/dL    Nitrites NEGATIVE  NEG      Leukocyte Esterase TRACE (A) NEG      WBC 0-4 0 - 4 /hpf    RBC 0-5 0 - 5 /hpf    Epithelial cells MODERATE (A) FEW /lpf    Bacteria 1+ (A) NEG /hpf    UA:UC IF INDICATED URINE CULTURE ORDERED (A) CNI     HCG URINE, QL. - POC    Collection Time: 07/18/17  5:10 PM   Result Value Ref Range    Pregnancy test,urine (POC) NEGATIVE  NEG     KOH, OTHER SOURCES    Collection Time: 07/18/17  5:50 PM   Result Value Ref Range    Special Requests: NO SPECIAL REQUESTS      KOH NO YEAST SEEN     WET PREP    Collection Time: 07/18/17  5:50 PM   Result Value Ref Range    Clue cells CLUE CELLS PRESENT      Wet prep NO TRICHOMONAS SEEN         IMAGING RESULTS:  No orders to display       MEDICATIONS GIVEN:  Medications   cefTRIAXone (ROCEPHIN) 250 mg in lidocaine (PF) (XYLOCAINE) 10 mg/mL (1 %) IM injection (250 mg IntraMUSCular Given 7/18/17 1835)   azithromycin (ZITHROMAX) tablet 1,000 mg (1,000 mg Oral Given 7/18/17 1834)       IMPRESSION:  1. BV (bacterial vaginosis)    2. Concern about STD in female without diagnosis    3. Urinary tract infection without hematuria, site unspecified        PLAN:  Follow up health department for HIV testing which is not done in ED. Pt agrees to above.    1.   Current Discharge Medication List      START taking these medications    Details   metroNIDAZOLE (FLAGYL) 500 mg tablet Take 1 Tab by mouth two (2) times a day. Qty: 14 Tab, Refills: 0      cephALEXin (KEFLEX) 500 mg capsule Take 1 Cap by mouth two (2) times a day. Qty: 15 Cap, Refills: 0           2.    Follow-up Information     Follow up With Details Comments [id number] Aury Gamboa MD  As needed [address]  [phone]      Follow up with your GYN doctor as directed           Return to ED if worse

## 2017-07-18 NOTE — ED NOTES
Emergency Department Nursing Plan of Care       The Nursing Plan of Care is developed from the Nursing assessment and Emergency Department Attending provider initial evaluation. The plan of care may be reviewed in the ED Provider note.     The Plan of Care was developed with the following considerations:   Patient / Family readiness to learn indicated by:verbalized understanding  Persons(s) to be included in education: patient  Barriers to Learning/Limitations:No    Via Emani Ye RN    7/18/2017   5:01 PM

## 2017-07-19 LAB
C TRACH DNA SPEC QL NAA+PROBE: NEGATIVE
N GONORRHOEA DNA SPEC QL NAA+PROBE: NEGATIVE
SAMPLE TYPE: NORMAL
SERVICE CMNT-IMP: NORMAL
SPECIMEN SOURCE: NORMAL

## 2017-07-20 LAB
BACTERIA SPEC CULT: NORMAL
CC UR VC: NORMAL
SERVICE CMNT-IMP: NORMAL

## 2017-09-12 ENCOUNTER — HOSPITAL ENCOUNTER (EMERGENCY)
Age: 27
Discharge: HOME | End: 2017-09-12
Attending: EMERGENCY MEDICINE
Payer: SELF-PAY

## 2017-09-12 VITALS
HEART RATE: 77 BPM | HEIGHT: 62 IN | TEMPERATURE: 98.3 F | DIASTOLIC BLOOD PRESSURE: 80 MMHG | RESPIRATION RATE: 16 BRPM | BODY MASS INDEX: 53.92 KG/M2 | SYSTOLIC BLOOD PRESSURE: 149 MMHG | WEIGHT: 293 LBS | OXYGEN SATURATION: 100 %

## 2017-09-12 DIAGNOSIS — M54.6 ACUTE MIDLINE THORACIC BACK PAIN: Primary | ICD-10-CM

## 2017-09-12 PROCEDURE — 99283 EMERGENCY DEPT VISIT LOW MDM: CPT

## 2017-09-12 PROCEDURE — 96372 THER/PROPH/DIAG INJ SC/IM: CPT

## 2017-09-12 PROCEDURE — 74011250636 HC RX REV CODE- 250/636: Performed by: EMERGENCY MEDICINE

## 2017-09-12 PROCEDURE — 74011250637 HC RX REV CODE- 250/637: Performed by: EMERGENCY MEDICINE

## 2017-09-12 RX ORDER — LIDOCAINE HCL 4 G/100G
1 CREAM TOPICAL
Qty: 1 TUBE | Refills: 0 | Status: SHIPPED | OUTPATIENT
Start: 2017-09-12 | End: 2017-10-14

## 2017-09-12 RX ORDER — CYCLOBENZAPRINE HCL 10 MG
10 TABLET ORAL
Status: COMPLETED | OUTPATIENT
Start: 2017-09-12 | End: 2017-09-12

## 2017-09-12 RX ORDER — CYCLOBENZAPRINE HCL 5 MG
5 TABLET ORAL
Qty: 20 TAB | Refills: 0 | Status: SHIPPED | OUTPATIENT
Start: 2017-09-12 | End: 2017-10-14

## 2017-09-12 RX ORDER — NAPROXEN 500 MG/1
500 TABLET ORAL
Qty: 20 TAB | Refills: 0 | Status: SHIPPED | OUTPATIENT
Start: 2017-09-12 | End: 2017-10-14

## 2017-09-12 RX ORDER — KETOROLAC TROMETHAMINE 30 MG/ML
30 INJECTION, SOLUTION INTRAMUSCULAR; INTRAVENOUS
Status: COMPLETED | OUTPATIENT
Start: 2017-09-12 | End: 2017-09-12

## 2017-09-12 RX ORDER — LIDOCAINE 50 MG/G
1 PATCH TOPICAL EVERY 24 HOURS
Status: DISCONTINUED | OUTPATIENT
Start: 2017-09-12 | End: 2017-09-12 | Stop reason: HOSPADM

## 2017-09-12 RX ADMIN — CYCLOBENZAPRINE HYDROCHLORIDE 10 MG: 10 TABLET, FILM COATED ORAL at 01:06

## 2017-09-12 RX ADMIN — KETOROLAC TROMETHAMINE 30 MG: 30 INJECTION, SOLUTION INTRAMUSCULAR at 01:07

## 2017-09-12 NOTE — ED PROVIDER NOTES
HPI Comments: Inderjit Montes is a 32 y.o. female with PMhx significant for seizures, back pain, asthma, DM, HTN who presents ambulatory to the ED with cc of sudden onset of sharp mid back pain that onset today around 1700. 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 She states that her symptoms are exacerbated with deep inspiration and movement. Pt reports taking ibuprofen 800 mg at 1830 and tylenol 500 mg with no relief of her symptoms. She denies any recent falls or traumas. Pt specifically denies any nausea, vomiting, numbness, weakness, dysuria, fevers, or chills. Social history significant for: - Tobacco, - EtOH, - Illicit drug use    PCP: Karolina Phillip MD    There are no other complaints, changes or physical findings at this time. Written by MILAD Brown, as dictated by Tomas Klein M.D. The history is provided by the patient. No  was used. Past Medical History:   Diagnosis Date    Asthma     Diabetes ([facility] [address] 2016    Hypertension     Other ill-defined conditions     back pain    Seizures ([facility] [address]        Past Surgical History:   Procedure Laterality Date    HX GYN  2014    Pt reports  in past.    HX TONSILLECTOMY           Family History:   Problem Relation Age of Onset    Diabetes Mother     Diabetes Father     Diabetes Maternal Grandmother        Social History     Social History    Marital status: SINGLE     Spouse name: N/A    Number of children: N/A    Years of education: N/A     Occupational History    Not on file. Social History Main Topics    Smoking status: Never Smoker    Smokeless tobacco: Never Used    Alcohol use No    Drug use: No    Sexual activity: No     Other Topics Concern    Not on file     Social History Narrative         ALLERGIES: Review of patient's allergies indicates no known allergies. Review of Systems   Constitutional: Negative for chills and fever.    Respiratory: Negative for cough and shortness of breath. Cardiovascular: Negative for chest pain. Gastrointestinal: Negative for constipation, diarrhea, nausea and vomiting. Genitourinary: Negative for dysuria. Musculoskeletal: Positive for back pain (mid). Neurological: Negative for weakness and numbness. All other systems reviewed and are negative. Patient Vitals for the past 12 hrs:   Temp Pulse Resp BP SpO2   09/12/17 0018 98.3 °F (36.8 °C) 77 16 149/80 100 %     Physical Exam   Constitutional: She is oriented to person, place, and time. She appears well-developed and well-nourished. Obese female  Appears comfortable   HENT:   Head: Normocephalic and atraumatic. Eyes: Conjunctivae and EOM are normal.   Neck: Normal range of motion. Neck supple. Cardiovascular: Normal rate and regular rhythm. Pulmonary/Chest: Effort normal and breath sounds normal. No respiratory distress. Abdominal: Soft. She exhibits no distension. There is no tenderness. Musculoskeletal: Normal range of motion. Tenderness over mid thoracic spine and paraspinal muscles. Tenderness over the lower back. Neurological: She is alert and oriented to person, place, and time. Skin: Skin is warm and dry. Psychiatric: She has a normal mood and affect. Nursing note and vitals reviewed. MDM  Number of Diagnoses or Management Options  Acute midline thoracic back pain:   Diagnosis management comments: The patient complains of low back pain and mid-thoracic back pain. These symptoms are consistent with a lumbar strain. Less likely  pathology, aortic dissection or AAA, or cauda equina given that there are no red flags and a benign physical exam.    I have recommended rest, avoiding heavy lifting until better, use of intermittent heat (avoid sleeping on a heating pad), and use of OTC NSAID's (Advil, Aleve etc) or Tylenol prn for pain. Call PCP if back pain persists or she develops leg symptoms.   It has also been explained that this may take up to three months to fully resolved and that smoking may slow that process even more. Amount and/or Complexity of Data Reviewed  Review and summarize past medical records: yes    Patient Progress  Patient progress: stable    Procedures    PROGRESS NOTE:  1:27 AM  On discharge, pt was able to get out of bed quickly and without difficulty. Written by MILAD Cruz, as dictated by Crissy Louis M.D.     Nayla Pérez:  Medications   lidocaine (LIDODERM) 5 % patch 1 Patch (1 Patch TransDERmal Apply Patch 9/12/17 0107)   ketorolac (TORADOL) injection 30 mg (30 mg IntraMUSCular Given 9/12/17 0107)   cyclobenzaprine (FLEXERIL) tablet 10 mg (10 mg Oral Given 9/12/17 0106)       IMPRESSION:  1. Acute midline thoracic back pain        PLAN:  1. Discharge Medication List as of 9/12/2017 12:59 AM      START taking these medications    Details   cyclobenzaprine (FLEXERIL) 5 mg tablet Take 1 Tab by mouth three (3) times daily as needed for Muscle Spasm(s). , Normal, Disp-20 Tab, R-0      naproxen (NAPROSYN) 500 mg tablet Take 1 Tab by mouth every twelve (12) hours as needed for Pain., Normal, Disp-20 Tab, R-0      lidocaine (ASPERCREME, LIDOCAINE,) 4 % topical cream Apply 76.5 g to affected area four (4) times daily as needed for Pain., Normal, Disp-1 Tube, R-0         CONTINUE these medications which have NOT CHANGED    Details   acetaminophen (TYLENOL) 325 mg tablet Take 2 Tabs by mouth every four (4) hours as needed for Pain., Normal, Disp-20 Tab, R-0      medroxyPROGESTERone (DEPO-PROVERA) 150 mg/mL injection 150 mg by IntraMUSCular route once., Historical Med      metFORMIN (GLUCOPHAGE) 500 mg tablet Take 1 Tab by mouth two (2) times daily (with meals). Indications: type 2 diabetes mellitus, Print, Disp-60 Tab, R-11      lisinopril (PRINIVIL, ZESTRIL) 10 mg tablet Take 1 Tab by mouth daily. Indications: hypertension, Print, Disp-30 Tab, R-11           2.    Follow-up Information Follow up With Details Comments [address] Aury Gamboa MD  As needed Joanne Gibson [facility]  [phone]          Return to ED if worse     DISCHARGE NOTE  12:40 AM  The patient has been re-evaluated and is ready for discharge. Reviewed available results with patient. Counseled patient on diagnosis and care plan. Patient has expressed understanding, and all questions have been answered. Patient agrees with plan and agrees to follow up as recommended, or return to the ED if their symptoms worsen. Discharge instructions have been provided and explained to the patient, along with reasons to return to the ED. This note is prepared by Yoselin Rooney, acting as Scribe for Leanna Hudson M.D. Leanna Hudson M.D: The scribe's documentation has been prepared under my direction and personally reviewed by me in its entirety. I confirm that the note above accurately reflects all work, treatment, procedures, and medical decision making performed by me.

## 2017-09-12 NOTE — LETTER
[facility] EMERGENCY DEPT 
[address] [phone] 
[phone] Work/School Note Date: 9/12/2017 To Whom It May concern: 
 
Felicia Montgomery was seen and treated today in the emergency room by the following provider(s): 
Attending Provider: Rebecca Jane MD. Felicia Montgomery may return to work on 9/13. Sincerely, Rebecca Jane MD

## 2017-09-12 NOTE — DISCHARGE INSTRUCTIONS
Back Pain: Care Instructions  Your Care Instructions    Back pain has many possible causes. It is often related to problems with muscles and ligaments of the back. It may also be related to problems with the nerves, discs, or bones of the back. Moving, lifting, standing, sitting, or sleeping in an awkward way can strain the back. Sometimes you don't notice the injury until later. Arthritis is another common cause of back pain. Although it may hurt a lot, back pain usually improves on its own within several weeks. Most people recover in 12 weeks or less. Using good home treatment and being careful not to stress your back can help you feel better sooner. Follow-up care is a key part of your treatment and safety. Be sure to make and go to all appointments, and call your doctor if you are having problems. Its also a good idea to know your test results and keep a list of the medicines you take. How can you care for yourself at home? · Sit or lie in positions that are most comfortable and reduce your pain. Try one of these positions when you lie down:  ¨ Lie on your back with your knees bent and supported by large pillows. ¨ Lie on the floor with your legs on the seat of a sofa or chair. Paddy Diony on your side with your knees and hips bent and a pillow between your legs. ¨ Lie on your stomach if it does not make pain worse. · Do not sit up in bed, and avoid soft couches and twisted positions. Bed rest can help relieve pain at first, but it delays healing. Avoid bed rest after the first day of back pain. · Change positions every 30 minutes. If you must sit for long periods of time, take breaks from sitting. Get up and walk around, or lie in a comfortable position. · Try using a heating pad on a low or medium setting for 15 to 20 minutes every 2 or 3 hours. Try a warm shower in place of one session with the heating pad. · You can also try an ice pack for 10 to 15 minutes every 2 to 3 hours.  Put a thin cloth between the ice pack and your skin. · Take pain medicines exactly as directed. ¨ If the doctor gave you a prescription medicine for pain, take it as prescribed. ¨ If you are not taking a prescription pain medicine, ask your doctor if you can take an over-the-counter medicine. · Take short walks several times a day. You can start with 5 to 10 minutes, 3 or 4 times a day, and work up to longer walks. Walk on level surfaces and avoid hills and stairs until your back is better. · Return to work and other activities as soon as you can. Continued rest without activity is usually not good for your back. · To prevent future back pain, do exercises to stretch and strengthen your back and stomach. Learn how to use good posture, safe lifting techniques, and proper body mechanics. When should you call for help? Call your doctor now or seek immediate medical care if:  · You have new or worsening numbness in your legs. · You have new or worsening weakness in your legs. (This could make it hard to stand up.)  · You lose control of your bladder or bowels. Watch closely for changes in your health, and be sure to contact your doctor if:  · Your pain gets worse. · You are not getting better after 2 weeks. Where can you learn more? Go to http://phil-khari.info/. Enter I736 in the search box to learn more about \"Back Pain: Care Instructions. \"  Current as of: March 21, 2017  Content Version: 11.3  © 9213-4325 [employer]. Care instructions adapted under license by [employer] (which disclaims liability or warranty for this information). If you have questions about a medical condition or this instruction, always ask your healthcare professional. [address] any warranty or liability for your use of this information.

## 2017-09-12 NOTE — ED NOTES
Discharge instructions were given to the patient by MD.     The patient left the Emergency Department ambulatory, alert and oriented and in no acute distress with 3 prescriptions. The patient was encouraged to call or return to the ED for worsening issues or problems and was encouraged to schedule a follow up appointment for continuing care. The patient verbalized understanding of discharge instructions and prescriptions, all questions were answered. The patient has no further concerns at this time.

## 2017-09-12 NOTE — LETTER
[facility] EMERGENCY DEPT 
[address] [id number] [phone] Work/School Note Date: 9/12/2017 To Whom It May concern: 
 
Denisha Coronado was seen and treated today in the emergency room by the following provider(s): 
Attending Provider: Sin Padilla MD. Denisha Coronado may return to work on 9/13. She should not lift anything heavy over 10 lbs for the next 1 week. Sincerely, Sin Padilla MD

## 2017-09-12 NOTE — ED NOTES
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 Pt reports injury occurred around [address] [facility], tried otc meds with no relief. Pt is alert and oriented x 4, RR even and unlabored, skin is warm and dry. Assesment completed and pt updated on plan of care. Emergency Department Nursing Plan of Care       The Nursing Plan of Care is developed from the Nursing assessment and Emergency Department Attending provider initial evaluation. The plan of care may be reviewed in the ED Provider note.     The Plan of Care was developed with the following considerations:   Patient / Family readiness to learn indicated by:verbalized understanding  Persons(s) to be included in education: patient  Barriers to Learning/Limitations:No    Signed     Sheldon Barraza RN    9/12/2017   12:28 AM

## 2017-10-13 ENCOUNTER — HOSPITAL ENCOUNTER (EMERGENCY)
Age: 27
Discharge: HOME | End: 2017-10-14
Attending: EMERGENCY MEDICINE
Payer: SELF-PAY

## 2017-10-13 VITALS
HEIGHT: 63 IN | HEART RATE: 81 BPM | OXYGEN SATURATION: 100 % | RESPIRATION RATE: 18 BRPM | DIASTOLIC BLOOD PRESSURE: 77 MMHG | WEIGHT: 293 LBS | SYSTOLIC BLOOD PRESSURE: 157 MMHG | TEMPERATURE: 98.4 F | BODY MASS INDEX: 51.91 KG/M2

## 2017-10-13 DIAGNOSIS — R73.9 HYPERGLYCEMIA: Primary | ICD-10-CM

## 2017-10-13 LAB
APPEARANCE UR: CLEAR
BILIRUB UR QL: NEGATIVE
CLUE CELLS VAG QL WET PREP: NORMAL
COLOR UR: ABNORMAL
GLUCOSE BLD STRIP.AUTO-MCNC: 362 MG/DL (ref 65–100)
GLUCOSE UR STRIP.AUTO-MCNC: >1000 MG/DL
HCG UR QL: NEGATIVE
HGB UR QL STRIP: NEGATIVE
KETONES UR QL STRIP.AUTO: NEGATIVE MG/DL
KOH PREP SPEC: NORMAL
LEUKOCYTE ESTERASE UR QL STRIP.AUTO: NEGATIVE
NITRITE UR QL STRIP.AUTO: NEGATIVE
PH UR STRIP: 7 [PH] (ref 5–8)
PROT UR STRIP-MCNC: NEGATIVE MG/DL
SERVICE CMNT-IMP: ABNORMAL
SERVICE CMNT-IMP: NORMAL
SP GR UR REFRACTOMETRY: 1 (ref 1–1.03)
T VAGINALIS VAG QL WET PREP: NORMAL
UROBILINOGEN UR QL STRIP.AUTO: 1 EU/DL (ref 0.2–1)

## 2017-10-13 PROCEDURE — 96372 THER/PROPH/DIAG INJ SC/IM: CPT

## 2017-10-13 PROCEDURE — 82962 GLUCOSE BLOOD TEST: CPT

## 2017-10-13 PROCEDURE — 87491 CHLMYD TRACH DNA AMP PROBE: CPT | Performed by: PHYSICIAN ASSISTANT

## 2017-10-13 PROCEDURE — 99284 EMERGENCY DEPT VISIT MOD MDM: CPT

## 2017-10-13 PROCEDURE — 81025 URINE PREGNANCY TEST: CPT

## 2017-10-13 PROCEDURE — 87210 SMEAR WET MOUNT SALINE/INK: CPT | Performed by: PHYSICIAN ASSISTANT

## 2017-10-13 PROCEDURE — 96374 THER/PROPH/DIAG INJ IV PUSH: CPT

## 2017-10-13 PROCEDURE — 81003 URINALYSIS AUTO W/O SCOPE: CPT | Performed by: EMERGENCY MEDICINE

## 2017-10-13 RX ORDER — SODIUM CHLORIDE 0.9 % (FLUSH) 0.9 %
5-10 SYRINGE (ML) INJECTION AS NEEDED
Status: DISCONTINUED | OUTPATIENT
Start: 2017-10-13 | End: 2017-10-14 | Stop reason: HOSPADM

## 2017-10-13 RX ORDER — AZITHROMYCIN 250 MG/1
1000 TABLET, FILM COATED ORAL
Status: COMPLETED | OUTPATIENT
Start: 2017-10-13 | End: 2017-10-14

## 2017-10-13 RX ORDER — SODIUM CHLORIDE 0.9 % (FLUSH) 0.9 %
5-10 SYRINGE (ML) INJECTION EVERY 8 HOURS
Status: DISCONTINUED | OUTPATIENT
Start: 2017-10-13 | End: 2017-10-14 | Stop reason: HOSPADM

## 2017-10-13 NOTE — LETTER
10/16/2017 Rosmery Prieto [address], [place] Jerilee Sicard [facility] [address] [phone] Dear Ms. Lana Pollard You were seen in the Emergency Department of [address] [place] [address] on 10/13/2017 and had lab and/or radiology tests performed. The chlamydia from your Emergency Department visit on 10/13/17 was positive. You were treated appropriately during your visit. No further treatment is required. If you have not improved or are worsening, please follow up with your primary care doctor or Emergency department as soon as possible. Your partner needs to be treated. If you have any questions please contact the Emergency Department at [phone]. Sincerely, JESRON Navarrete [place] [facility] EMERGENCY DEPT 
[address] [place] [address] [phone]

## 2017-10-14 LAB
ALBUMIN SERPL-MCNC: 3.4 G/DL (ref 3.5–5)
ALBUMIN/GLOB SERPL: 0.8 {RATIO} (ref 1.1–2.2)
ALP SERPL-CCNC: 73 U/L (ref 45–117)
ALT SERPL-CCNC: 25 U/L (ref 12–78)
ANION GAP SERPL CALC-SCNC: 7 MMOL/L (ref 5–15)
AST SERPL-CCNC: 12 U/L (ref 15–37)
BASOPHILS # BLD: 0 K/UL (ref 0–0.1)
BASOPHILS NFR BLD: 0 % (ref 0–1)
BILIRUB SERPL-MCNC: 0.3 MG/DL (ref 0.2–1)
BUN SERPL-MCNC: 9 MG/DL (ref 6–20)
BUN/CREAT SERPL: 9 (ref 12–20)
CALCIUM SERPL-MCNC: 8.8 MG/DL (ref 8.5–10.1)
CHLORIDE SERPL-SCNC: 100 MMOL/L (ref 97–108)
CO2 SERPL-SCNC: 29 MMOL/L (ref 21–32)
CREAT SERPL-MCNC: 1.05 MG/DL (ref 0.55–1.02)
DIFFERENTIAL METHOD BLD: NORMAL
EOSINOPHIL # BLD: 0 K/UL (ref 0–0.4)
EOSINOPHIL NFR BLD: 0 % (ref 0–7)
ERYTHROCYTE [DISTWIDTH] IN BLOOD BY AUTOMATED COUNT: 13.3 % (ref 11.5–14.5)
GLOBULIN SER CALC-MCNC: 4.3 G/DL (ref 2–4)
GLUCOSE BLD STRIP.AUTO-MCNC: 312 MG/DL (ref 65–100)
GLUCOSE SERPL-MCNC: 381 MG/DL (ref 65–100)
HCT VFR BLD AUTO: 36.6 % (ref 35–47)
HGB BLD-MCNC: 12.3 G/DL (ref 11.5–16)
KETONES SERPL QL: NEGATIVE
LIPASE SERPL-CCNC: 173 U/L (ref 73–393)
LYMPHOCYTES # BLD: 2.3 K/UL (ref 0.8–3.5)
LYMPHOCYTES NFR BLD: 30 % (ref 12–49)
MCH RBC QN AUTO: 26.9 PG (ref 26–34)
MCHC RBC AUTO-ENTMCNC: 33.6 G/DL (ref 30–36.5)
MCV RBC AUTO: 80.1 FL (ref 80–99)
MONOCYTES # BLD: 0.5 K/UL (ref 0–1)
MONOCYTES NFR BLD: 6 % (ref 5–13)
NEUTS SEG # BLD: 4.9 K/UL (ref 1.8–8)
NEUTS SEG NFR BLD: 64 % (ref 32–75)
PLATELET # BLD AUTO: 256 K/UL (ref 150–400)
POTASSIUM SERPL-SCNC: 3.7 MMOL/L (ref 3.5–5.1)
PROT SERPL-MCNC: 7.7 G/DL (ref 6.4–8.2)
RBC # BLD AUTO: 4.57 M/UL (ref 3.8–5.2)
RBC MORPH BLD: NORMAL
SERVICE CMNT-IMP: ABNORMAL
SODIUM SERPL-SCNC: 136 MMOL/L (ref 136–145)
WBC # BLD AUTO: 7.7 K/UL (ref 3.6–11)

## 2017-10-14 PROCEDURE — 85025 COMPLETE CBC W/AUTO DIFF WBC: CPT | Performed by: PHYSICIAN ASSISTANT

## 2017-10-14 PROCEDURE — 82962 GLUCOSE BLOOD TEST: CPT

## 2017-10-14 PROCEDURE — 83690 ASSAY OF LIPASE: CPT | Performed by: PHYSICIAN ASSISTANT

## 2017-10-14 PROCEDURE — 80053 COMPREHEN METABOLIC PANEL: CPT | Performed by: PHYSICIAN ASSISTANT

## 2017-10-14 PROCEDURE — 74011636637 HC RX REV CODE- 636/637: Performed by: PHYSICIAN ASSISTANT

## 2017-10-14 PROCEDURE — 74011250637 HC RX REV CODE- 250/637: Performed by: PHYSICIAN ASSISTANT

## 2017-10-14 PROCEDURE — 74011000250 HC RX REV CODE- 250: Performed by: PHYSICIAN ASSISTANT

## 2017-10-14 PROCEDURE — 36415 COLL VENOUS BLD VENIPUNCTURE: CPT | Performed by: PHYSICIAN ASSISTANT

## 2017-10-14 PROCEDURE — 82009 KETONE BODYS QUAL: CPT | Performed by: PHYSICIAN ASSISTANT

## 2017-10-14 PROCEDURE — 74011250636 HC RX REV CODE- 250/636: Performed by: PHYSICIAN ASSISTANT

## 2017-10-14 RX ORDER — METFORMIN HYDROCHLORIDE 500 MG/1
500 TABLET ORAL 2 TIMES DAILY WITH MEALS
Qty: 60 TAB | Refills: 0 | Status: SHIPPED | OUTPATIENT
Start: 2017-10-14 | End: 2017-12-07 | Stop reason: DRUGHIGH

## 2017-10-14 RX ADMIN — SODIUM CHLORIDE 1000 ML: 900 INJECTION, SOLUTION INTRAVENOUS at 00:04

## 2017-10-14 RX ADMIN — LIDOCAINE HYDROCHLORIDE 250 MG: 10 INJECTION, SOLUTION EPIDURAL; INFILTRATION; INTRACAUDAL; PERINEURAL at 00:13

## 2017-10-14 RX ADMIN — AZITHROMYCIN 1000 MG: 250 TABLET, FILM COATED ORAL at 00:13

## 2017-10-14 RX ADMIN — HUMAN INSULIN 6 UNITS: 100 INJECTION, SOLUTION SUBCUTANEOUS at 00:04

## 2017-10-14 NOTE — ED NOTES
Pt in ED w/ complaint of urinary frequency w/ urgency, R flank pain, and vaginal discharge. Pt is A&O X 4 and appears in no distress. Emergency Department Nursing Plan of Care       The Nursing Plan of Care is developed from the Nursing assessment and Emergency Department Attending provider initial evaluation. The plan of care may be reviewed in the ED Provider note.     The Plan of Care was developed with the following considerations:   Patient / Family readiness to learn indicated by:verbalized understanding  Persons(s) to be included in education: patient  Barriers to Learning/Limitations:No    Signed     Neno Okeefe RN    10/14/2017   1:31 AM

## 2017-10-14 NOTE — ED NOTES
Provider informed of unsuccessful IV attempts, provider would like pt to drink a large cup of water. Pt given a large cup of water.

## 2017-10-14 NOTE — ED PROVIDER NOTES
Patient is a 32 y.o. female presenting with frequency. The history is provided by the patient. Urinary Frequency    This is a new problem. The current episode started more than 2 days ago. The problem occurs every urination. The problem has been gradually worsening. The quality of the pain is described as burning. The pain is at a severity of 9/10. The pain is moderate. There has been no fever. She is sexually active. There is no history of pyelonephritis. Associated symptoms include discharge, frequency, vaginal discharge and abdominal pain (avery lower). Pertinent negatives include no chills, no sweats, no nausea, no vomiting, no hematuria, no hesitancy, no urgency, no flank pain and no back pain. It is unknown if the patient is pregnant. She has tried nothing for the symptoms. Past Medical History:   Diagnosis Date    Asthma     Diabetes ([address] 2016    Hypertension     Other ill-defined conditions(799.89)     back pain    Seizures ([facility] [address]        Past Surgical History:   Procedure Laterality Date    HX GYN  2014    Pt reports  in past.    HX TONSILLECTOMY           Family History:   Problem Relation Age of Onset    Diabetes Mother     Diabetes Father     Diabetes Maternal Grandmother        Social History     Social History    Marital status: SINGLE     Spouse name: N/A    Number of children: N/A    Years of education: N/A     Occupational History    Not on file. Social History Main Topics    Smoking status: Never Smoker    Smokeless tobacco: Never Used    Alcohol use No    Drug use: No    Sexual activity: No     Other Topics Concern    Not on file     Social History Narrative         ALLERGIES: Review of patient's allergies indicates no known allergies. Review of Systems   Constitutional: Negative. Negative for activity change, appetite change, chills and fever. HENT: Negative. Negative for congestion, ear pain, postnasal drip and sore throat. Eyes: Negative. Negative for pain and visual disturbance. Respiratory: Negative. Negative for cough and shortness of breath. Cardiovascular: Negative. Negative for chest pain. Gastrointestinal: Positive for abdominal pain (avery lower). Negative for anal bleeding, diarrhea, nausea, rectal pain and vomiting. Endocrine: Positive for polyuria. Genitourinary: Positive for frequency and vaginal discharge. Negative for difficulty urinating, dysuria, flank pain, hematuria, hesitancy, menstrual problem, pelvic pain, urgency, vaginal bleeding and vaginal pain. Musculoskeletal: Negative. Negative for back pain and joint swelling. Skin: Negative. Negative for rash. Neurological: Negative. Negative for dizziness, light-headedness and headaches. Psychiatric/Behavioral: Negative. Vitals:    10/13/17 2239   BP: 157/77   Pulse: 81   Resp: 18   Temp: 98.4 °F (36.9 °C)   SpO2: 100%   Weight: 143 kg (315 lb 4.8 oz)   Height: 5' 3\" (1.6 m)            Physical Exam   Constitutional: She is oriented to person, place, and time. She appears well-developed and well-nourished. No distress. HENT:   Head: Normocephalic and atraumatic. Right Ear: External ear normal.   Left Ear: External ear normal.   Eyes: Conjunctivae and EOM are normal. Pupils are equal, round, and reactive to light. Neck: Normal range of motion. Cardiovascular: Normal rate, regular rhythm and normal heart sounds. Pulmonary/Chest: Effort normal and breath sounds normal.   Abdominal: Soft. Bowel sounds are normal. There is no tenderness. There is no rigidity, no rebound, no guarding, no CVA tenderness, no tenderness at McBurney's point and negative Rodriguez's sign. Obese   Genitourinary: Uterus normal. Pelvic exam was performed with patient supine. There is no rash, tenderness, lesion or injury on the right labia. There is no rash, tenderness, lesion or injury on the left labia. Uterus is not deviated, not enlarged, not fixed and not tender.  Cervix exhibits no motion tenderness, no discharge and no friability. Right adnexum displays no mass, no tenderness and no fullness. Left adnexum displays no mass, no tenderness and no fullness. No erythema, tenderness or bleeding in the vagina. No foreign body in the vagina. No signs of injury around the vagina. Vaginal discharge (clant white) found. Musculoskeletal: Normal range of motion. Neurological: She is alert and oriented to person, place, and time. Skin: Skin is warm and dry. She is not diaphoretic. Psychiatric: She has a normal mood and affect. Her behavior is normal. Judgment and thought content normal.   Nursing note and vitals reviewed. MDM  Number of Diagnoses or Management Options  Diagnosis management comments: DDx: uti, pregnancy, pyelo, sti, bv, vaginal candidiasis, pid, hyperglycemia       Amount and/or Complexity of Data Reviewed  Clinical lab tests: ordered and reviewed  Tests in the medicine section of CPT®: ordered and reviewed  Discuss the patient with other providers: yes (I reviewed pt's hx, sxs, vitals, and PE w/ attending, Dr. Jodee Keith. She is in agreement with the care plan.  )    Patient Progress  Patient progress: stable    ED Course       Procedures  11:27 PM  Pt endorses her PCP d/c her Metformin 3 months ago because she was losing a lot of weight. Pt has not been on any Diabetes medication/ insulin. 11:40 PM  Pt care transferred to attending, Dr. Jodee Keith, at this time. She has been updated on pt's sxs, hx, vitals, PE, labs, and results thus far. 12:39 AM  Unable to continue IV fluids. Will give pt a large cup of water. 1:31 AM  Explained to pt she needsto take her Metformin, and pt said her doctor took her off because her BG levels had been good. .  Explained to pt that she still needs to take her Metformin and needs to follow up with her PCP to inform her that her BG level was elevated.      LABORATORY TESTS:  Recent Results (from the past 12 hour(s))   URINALYSIS W/ RFLX MICROSCOPIC    Collection Time: 10/13/17 10:56 PM   Result Value Ref Range    Color YELLOW/STRAW      Appearance CLEAR CLEAR      Specific gravity 1.005 1.003 - 1.030      pH (UA) 7.0 5.0 - 8.0      Protein NEGATIVE  NEG mg/dL    Glucose >1000 (A) NEG mg/dL    Ketone NEGATIVE  NEG mg/dL    Bilirubin NEGATIVE  NEG      Blood NEGATIVE  NEG      Urobilinogen 1.0 0.2 - 1.0 EU/dL    Nitrites NEGATIVE  NEG      Leukocyte Esterase NEGATIVE  NEG     WET PREP    Collection Time: 10/13/17 10:56 PM   Result Value Ref Range    Clue cells CLUE CELLS ABSENT      Wet prep NO TRICHOMONAS SEEN     KOH, OTHER SOURCES    Collection Time: 10/13/17 10:56 PM   Result Value Ref Range    Special Requests: NO SPECIAL REQUESTS      KOH NO YEAST SEEN     GLUCOSE, POC    Collection Time: 10/13/17 11:30 PM   Result Value Ref Range    Glucose (POC) 362 (H) 65 - 100 mg/dL    Performed by Vasques Nan    HCG URINE, QL. - POC    Collection Time: 10/13/17 11:31 PM   Result Value Ref Range    Pregnancy test,urine (POC) NEGATIVE  NEG     CBC WITH AUTOMATED DIFF    Collection Time: 10/14/17 12:19 AM   Result Value Ref Range    WBC 7.7 3.6 - 11.0 K/uL    RBC 4.57 3.80 - 5.20 M/uL    HGB 12.3 11.5 - 16.0 g/dL    HCT 36.6 35.0 - 47.0 %    MCV 80.1 80.0 - 99.0 FL    MCH 26.9 26.0 - 34.0 PG    MCHC 33.6 30.0 - 36.5 g/dL    RDW 13.3 11.5 - 14.5 %    PLATELET 931 499 - 650 K/uL    NEUTROPHILS 64 32 - 75 %    LYMPHOCYTES 30 12 - 49 %    MONOCYTES 6 5 - 13 %    EOSINOPHILS 0 0 - 7 %    BASOPHILS 0 0 - 1 %    ABS. NEUTROPHILS 4.9 1.8 - 8.0 K/UL    ABS. LYMPHOCYTES 2.3 0.8 - 3.5 K/UL    ABS. MONOCYTES 0.5 0.0 - 1.0 K/UL    ABS. EOSINOPHILS 0.0 0.0 - 0.4 K/UL    ABS.  BASOPHILS 0.0 0.0 - 0.1 K/UL    DF SMEAR SCANNED      RBC COMMENTS NORMOCYTIC, NORMOCHROMIC     ACETONE/KETONE, QL    Collection Time: 10/14/17 12:19 AM   Result Value Ref Range    Acetone/Ketone serum, QL. NEGATIVE  NEG        METABOLIC PANEL, COMPREHENSIVE    Collection Time: 10/14/17 12:19 AM   Result Value Ref Range    Sodium 136 136 - 145 mmol/L    Potassium 3.7 3.5 - 5.1 mmol/L    Chloride 100 97 - 108 mmol/L    CO2 29 21 - 32 mmol/L    Anion gap 7 5 - 15 mmol/L    Glucose 381 (H) 65 - 100 mg/dL    BUN 9 6 - 20 MG/DL    Creatinine 1.05 (H) 0.55 - 1.02 MG/DL    BUN/Creatinine ratio 9 (L) 12 - 20      GFR est AA >60 >60 ml/min/1.73m2    GFR est non-AA >60 >60 ml/min/1.73m2    Calcium 8.8 8.5 - 10.1 MG/DL    Bilirubin, total 0.3 0.2 - 1.0 MG/DL    ALT (SGPT) 25 12 - 78 U/L    AST (SGOT) 12 (L) 15 - 37 U/L    Alk. phosphatase 73 45 - 117 U/L    Protein, total 7.7 6.4 - 8.2 g/dL    Albumin 3.4 (L) 3.5 - 5.0 g/dL    Globulin 4.3 (H) 2.0 - 4.0 g/dL    A-G Ratio 0.8 (L) 1.1 - 2.2     LIPASE    Collection Time: 10/14/17 12:19 AM   Result Value Ref Range    Lipase 173 73 - 393 U/L   GLUCOSE, POC    Collection Time: 10/14/17  1:17 AM   Result Value Ref Range    Glucose (POC) 312 (H) 65 - 100 mg/dL    Performed by Warner Rosario        MEDICATIONS GIVEN:  Medications   sodium chloride (NS) flush 5-10 mL (not administered)   sodium chloride (NS) flush 5-10 mL (not administered)   azithromycin (ZITHROMAX) tablet 1,000 mg (1,000 mg Oral Given 10/14/17 0013)   cefTRIAXone (ROCEPHIN) 250 mg in lidocaine (PF) (XYLOCAINE) 10 mg/mL (1 %) IM injection (250 mg IntraMUSCular Given 10/14/17 0013)   sodium chloride 0.9 % bolus infusion 1,000 mL (0 mL IntraVENous IV Completed 10/14/17 0015)   insulin regular (NOVOLIN R, HUMULIN R) injection 6 Units (6 Units IntraVENous Given 10/14/17 0004)       IMPRESSION:  1. Hyperglycemia        PLAN:  1. Current Discharge Medication List      CONTINUE these medications which have CHANGED    Details   metFORMIN (GLUCOPHAGE) 500 mg tablet Take 1 Tab by mouth two (2) times daily (with meals).  Indications: type 2 diabetes mellitus  Qty: 60 Tab, Refills: 0         CONTINUE these medications which have NOT CHANGED    Details   lisinopril (PRINIVIL, ZESTRIL) 10 mg tablet Take 1 Tab by mouth

## 2017-10-14 NOTE — DISCHARGE INSTRUCTIONS
Learning About High Blood Sugar  What is high blood sugar? Your body turns the food you eat into glucose (sugar), which it uses for energy. But if your body isn't able to use the sugar right away, it can build up in your blood and lead to high blood sugar. When the amount of sugar in your blood stays too high for too much of the time, you may have diabetes. Diabetes is a disease that can cause serious health problems. The good news is that lifestyle changes may help you get your blood sugar back to normal and avoid or delay diabetes. What causes high blood sugar? Sugar (glucose) can build up in your blood if you:  · Are overweight. · Have a family history of diabetes. · Take certain medicines, such as steroids. What are the symptoms? Having high blood sugar may not cause any symptoms at all. Or it may make you feel very thirsty or very hungry. You may also urinate more often than usual, have blurry vision, or lose weight without trying. How is high blood sugar treated? You can take steps to lower your blood sugar level if you understand what makes it get higher. Your doctor may want you to learn how to test your blood sugar level at home. Then you can see how illness, stress, or different kinds of food or medicine raise or lower your blood sugar level. Other tests may be needed to see if you have diabetes. How can you prevent high blood sugar? · Watch your weight. If you're overweight, losing just a small amount of weight may help. Reducing fat around your waist is most important. · Limit the amount of calories, sweets, and unhealthy fat you eat. Ask your doctor if a dietitian can help you. A registered dietitian can help you create meal plans that fit your lifestyle. · Get at least 30 minutes of exercise on most days of the week. Exercise helps control your blood sugar. It also helps you maintain a healthy weight. Walking is a good choice.  You also may want to do other activities, such as running, swimming, cycling, or playing tennis or team sports. · If your doctor prescribed medicines, take them exactly as prescribed. Call your doctor if you think you are having a problem with your medicine. You will get more details on the specific medicines your doctor prescribes. Follow-up care is a key part of your treatment and safety. Be sure to make and go to all appointments, and call your doctor if you are having problems. It's also a good idea to know your test results and keep a list of the medicines you take. Where can you learn more? Go to http://phil-khari.info/. Enter O108 in the search box to learn more about \"Learning About High Blood Sugar. \"  Current as of: March 13, 2017  Content Version: 11.3  © 9926-0098 [employer]. Care instructions adapted under license by [employer] (which disclaims liability or warranty for this information). If you have questions about a medical condition or this instruction, always ask your healthcare professional. [address] any warranty or liability for your use of this information.

## 2017-10-16 LAB
C TRACH DNA SPEC QL NAA+PROBE: POSITIVE
N GONORRHOEA DNA SPEC QL NAA+PROBE: NEGATIVE
SAMPLE TYPE: ABNORMAL
SERVICE CMNT-IMP: ABNORMAL
SPECIMEN SOURCE: ABNORMAL

## 2017-11-03 ENCOUNTER — HOSPITAL ENCOUNTER (EMERGENCY)
Age: 27
Discharge: HOME | End: 2017-11-03
Attending: EMERGENCY MEDICINE
Payer: SELF-PAY

## 2017-11-03 ENCOUNTER — APPOINTMENT (OUTPATIENT)
Dept: CT IMAGING | Age: 27
End: 2017-11-03
Attending: EMERGENCY MEDICINE
Payer: SELF-PAY

## 2017-11-03 ENCOUNTER — APPOINTMENT (OUTPATIENT)
Dept: GENERAL RADIOLOGY | Age: 27
End: 2017-11-03
Attending: EMERGENCY MEDICINE
Payer: SELF-PAY

## 2017-11-03 VITALS
WEIGHT: 290 LBS | RESPIRATION RATE: 20 BRPM | DIASTOLIC BLOOD PRESSURE: 99 MMHG | BODY MASS INDEX: 49.51 KG/M2 | OXYGEN SATURATION: 97 % | HEART RATE: 82 BPM | HEIGHT: 64 IN | SYSTOLIC BLOOD PRESSURE: 147 MMHG | TEMPERATURE: 97.8 F

## 2017-11-03 DIAGNOSIS — S16.1XXA STRAIN OF NECK MUSCLE, INITIAL ENCOUNTER: Primary | ICD-10-CM

## 2017-11-03 DIAGNOSIS — S29.019A THORACIC MYOFASCIAL STRAIN, INITIAL ENCOUNTER: ICD-10-CM
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
## 2017-11-03 PROCEDURE — 74011250636 HC RX REV CODE- 250/636: Performed by: EMERGENCY MEDICINE

## 2017-11-03 PROCEDURE — 74011636320 HC RX REV CODE- 636/320: Performed by: EMERGENCY MEDICINE

## 2017-11-03 PROCEDURE — 96374 THER/PROPH/DIAG INJ IV PUSH: CPT

## 2017-11-03 PROCEDURE — 96375 TX/PRO/DX INJ NEW DRUG ADDON: CPT

## 2017-11-03 PROCEDURE — 71275 CT ANGIOGRAPHY CHEST: CPT

## 2017-11-03 PROCEDURE — 72125 CT NECK SPINE W/O DYE: CPT

## 2017-11-03 PROCEDURE — 72100 X-RAY EXAM L-S SPINE 2/3 VWS: CPT

## 2017-11-03 PROCEDURE — 99284 EMERGENCY DEPT VISIT MOD MDM: CPT

## 2017-11-03 RX ORDER — SODIUM CHLORIDE 9 MG/ML
50 INJECTION, SOLUTION INTRAVENOUS
Status: DISCONTINUED | OUTPATIENT
Start: 2017-11-03 | End: 2017-11-03 | Stop reason: HOSPADM

## 2017-11-03 RX ORDER — KETOROLAC TROMETHAMINE 30 MG/ML
15 INJECTION, SOLUTION INTRAMUSCULAR; INTRAVENOUS
Status: COMPLETED | OUTPATIENT
Start: 2017-11-03 | End: 2017-11-03

## 2017-11-03 RX ORDER — SODIUM CHLORIDE 0.9 % (FLUSH) 0.9 %
10 SYRINGE (ML) INJECTION
Status: DISCONTINUED | OUTPATIENT
Start: 2017-11-03 | End: 2017-11-03 | Stop reason: HOSPADM

## 2017-11-03 RX ORDER — ONDANSETRON 2 MG/ML
4 INJECTION INTRAMUSCULAR; INTRAVENOUS
Status: COMPLETED | OUTPATIENT
Start: 2017-11-03 | End: 2017-11-03

## 2017-11-03 RX ORDER — OXYCODONE AND ACETAMINOPHEN 5; 325 MG/1; MG/1
1 TABLET ORAL
Qty: 10 TAB | Refills: 0 | Status: SHIPPED | OUTPATIENT
Start: 2017-11-03 | End: 2017-11-14 | Stop reason: ALTCHOICE

## 2017-11-03 RX ORDER — NAPROXEN 500 MG/1
500 TABLET ORAL 2 TIMES DAILY WITH MEALS
Qty: 14 TAB | Refills: 0 | Status: SHIPPED | OUTPATIENT
Start: 2017-11-03 | End: 2017-11-10

## 2017-11-03 RX ORDER — CYCLOBENZAPRINE HCL 10 MG
5 TABLET ORAL
Qty: 15 TAB | Refills: 0 | Status: SHIPPED | OUTPATIENT
Start: 2017-11-03 | End: 2017-11-08

## 2017-11-03 RX ADMIN — Medication 10 ML: at 15:51

## 2017-11-03 RX ADMIN — ONDANSETRON HYDROCHLORIDE 4 MG: 2 INJECTION, SOLUTION INTRAMUSCULAR; INTRAVENOUS at 17:31

## 2017-11-03 RX ADMIN — KETOROLAC TROMETHAMINE 15 MG: 30 INJECTION, SOLUTION INTRAMUSCULAR at 17:31

## 2017-11-03 RX ADMIN — SODIUM CHLORIDE 50 ML/HR: 900 INJECTION, SOLUTION INTRAVENOUS at 15:51

## 2017-11-03 RX ADMIN — IOPAMIDOL 100 ML: 755 INJECTION, SOLUTION INTRAVENOUS at 15:51
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
## 2017-11-03 NOTE — LETTER
NOTIFICATION RETURN TO WORK / SCHOOL 
 
11/3/2017 6:49 PM 
 
Ms. Rikki Sun [address] [facility] [address] [facility] [address] [id number] To Whom It May Concern: 
 
Rikki Sun is currently under the care of [facility] EMERGENCY DEPT. She will return to work/school on: 11/5/17 If there are questions or concerns please have the patient contact our office. Sincerely, Shane Shirley, DO

## 2017-11-03 NOTE — ED NOTES
Patient resting in bed. Call bell in reach, bed in lowest position, and patient placed in a position of comfort. Updated on plan of care.
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
## 2017-11-06 ENCOUNTER — HOSPITAL ENCOUNTER (EMERGENCY)
Age: 27
Discharge: HOME | End: 2017-11-06
Attending: EMERGENCY MEDICINE
Payer: SELF-PAY

## 2017-11-06 VITALS
RESPIRATION RATE: 18 BRPM | HEIGHT: 64 IN | TEMPERATURE: 98.9 F | BODY MASS INDEX: 50.02 KG/M2 | WEIGHT: 293 LBS | DIASTOLIC BLOOD PRESSURE: 99 MMHG | OXYGEN SATURATION: 100 % | SYSTOLIC BLOOD PRESSURE: 143 MMHG | HEART RATE: 88 BPM
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
## 2017-11-06 DIAGNOSIS — R07.89 MUSCULOSKELETAL CHEST PAIN: ICD-10-CM

## 2017-11-06 PROCEDURE — 93005 ELECTROCARDIOGRAM TRACING: CPT

## 2017-11-06 PROCEDURE — 99283 EMERGENCY DEPT VISIT LOW MDM: CPT
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
## 2017-11-06 NOTE — ED NOTES
Emergency Department Nursing Plan of Care       The Nursing Plan of Care is developed from the Nursing assessment and Emergency Department Attending provider initial evaluation. The plan of care may be reviewed in the ED Provider note.     The Plan of Care was developed with the following considerations:   Patient / Family readiness to learn indicated by:verbalized understanding  Persons(s) to be included in education: patient  Barriers to Learning/Limitations:No    [address] [facility]    11/6/2017   6:49 PM

## 2017-11-06 NOTE — LETTER
[facility] EMERGENCY DEPT 
[address] [address] 
[phone] Work/School Note Date: 11/6/2017 To Whom It May concern: 
 
Chris Prince was seen and treated today in the emergency room by the following provider(s): 
Attending Provider: Efren Alfred MD 
Physician Assistant: Helen Fischer PA-C. Chris Prince may return to work on 11/8/17. Sincerely, Helen Fischer PA-C

## 2017-11-07 LAB
ATRIAL RATE: 88 BPM
CALCULATED P AXIS, ECG09: 47 DEGREES
CALCULATED R AXIS, ECG10: 51 DEGREES
CALCULATED T AXIS, ECG11: 3 DEGREES
DIAGNOSIS, 93000: NORMAL
P-R INTERVAL, ECG05: 144 MS
Q-T INTERVAL, ECG07: 356 MS
QRS DURATION, ECG06: 70 MS
QTC CALCULATION (BEZET), ECG08: 430 MS
VENTRICULAR RATE, ECG03: 88 BPM
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
## 2017-11-13 ENCOUNTER — OFFICE VISIT (OUTPATIENT)
Dept: INTERNAL MEDICINE CLINIC | Age: 27
End: 2017-11-13

## 2017-11-13 VITALS
TEMPERATURE: 98.1 F | HEART RATE: 96 BPM | HEIGHT: 64 IN | WEIGHT: 293 LBS | RESPIRATION RATE: 18 BRPM | SYSTOLIC BLOOD PRESSURE: 128 MMHG | DIASTOLIC BLOOD PRESSURE: 83 MMHG | OXYGEN SATURATION: 98 % | BODY MASS INDEX: 50.02 KG/M2

## 2017-11-13 DIAGNOSIS — E66.01 MORBID OBESITY (HCC): ICD-10-CM

## 2017-11-13 DIAGNOSIS — M62.838 MUSCLE SPASM: ICD-10-CM

## 2017-11-13 DIAGNOSIS — M54.6 THORACIC BACK PAIN, UNSPECIFIED BACK PAIN LATERALITY, UNSPECIFIED CHRONICITY: Primary | ICD-10-CM

## 2017-11-13 RX ORDER — DICLOFENAC SODIUM 75 MG/1
75 TABLET, DELAYED RELEASE ORAL 2 TIMES DAILY
Qty: 60 TAB | Refills: 1 | Status: SHIPPED | OUTPATIENT
Start: 2017-11-13 | End: 2017-12-07
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
## 2017-11-19 ENCOUNTER — HOSPITAL ENCOUNTER (EMERGENCY)
Age: 27
Discharge: HOME | End: 2017-11-19
Attending: EMERGENCY MEDICINE
Payer: SELF-PAY

## 2017-11-19 VITALS
TEMPERATURE: 97.7 F | RESPIRATION RATE: 20 BRPM | WEIGHT: 293 LBS | HEART RATE: 99 BPM | DIASTOLIC BLOOD PRESSURE: 90 MMHG | SYSTOLIC BLOOD PRESSURE: 163 MMHG | HEIGHT: 64 IN | OXYGEN SATURATION: 100 % | BODY MASS INDEX: 50.02 KG/M2

## 2017-11-19 DIAGNOSIS — N92.0 MENORRHAGIA WITH REGULAR CYCLE: Primary | ICD-10-CM

## 2017-11-19 LAB
ANION GAP BLD CALC-SCNC: 18 MMOL/L (ref 5–15)
APPEARANCE UR: CLEAR
BACTERIA URNS QL MICRO: NEGATIVE /HPF
BILIRUB UR QL: NEGATIVE
BUN BLD-MCNC: 13 MG/DL (ref 9–20)
CA-I BLD-MCNC: 1.15 MMOL/L (ref 1.12–1.32)
CHLORIDE BLD-SCNC: 99 MMOL/L (ref 98–107)
CLUE CELLS VAG QL WET PREP: NORMAL
CO2 BLD-SCNC: 24 MMOL/L (ref 21–32)
COLOR UR: ABNORMAL
CREAT BLD-MCNC: 0.8 MG/DL (ref 0.6–1.3)
EPITH CASTS URNS QL MICRO: ABNORMAL /LPF
GLUCOSE BLD-MCNC: 285 MG/DL (ref 65–100)
GLUCOSE UR STRIP.AUTO-MCNC: >1000 MG/DL
HCG UR QL: NEGATIVE
HCT VFR BLD CALC: 39 % (ref 35–47)
HGB BLD-MCNC: 13.3 GM/DL (ref 11.5–16)
HGB UR QL STRIP: ABNORMAL
KETONES UR QL STRIP.AUTO: NEGATIVE MG/DL
KOH PREP SPEC: NORMAL
LEUKOCYTE ESTERASE UR QL STRIP.AUTO: NEGATIVE
MUCOUS THREADS URNS QL MICRO: ABNORMAL /LPF
NITRITE UR QL STRIP.AUTO: NEGATIVE
PH UR STRIP: 6 [PH] (ref 5–8)
POTASSIUM BLD-SCNC: 4.1 MMOL/L (ref 3.5–5.1)
PROT UR STRIP-MCNC: NEGATIVE MG/DL
RBC #/AREA URNS HPF: ABNORMAL /HPF (ref 0–5)
SERVICE CMNT-IMP: ABNORMAL
SERVICE CMNT-IMP: NORMAL
SODIUM BLD-SCNC: 135 MMOL/L (ref 136–145)
SP GR UR REFRACTOMETRY: 1.02 (ref 1–1.03)
T VAGINALIS VAG QL WET PREP: NORMAL
UA: UC IF INDICATED,UAUC: ABNORMAL
UROBILINOGEN UR QL STRIP.AUTO: 0.2 EU/DL (ref 0.2–1)
WBC URNS QL MICRO: ABNORMAL /HPF (ref 0–4)

## 2017-11-19 PROCEDURE — 80047 BASIC METABLC PNL IONIZED CA: CPT

## 2017-11-19 PROCEDURE — 81001 URINALYSIS AUTO W/SCOPE: CPT | Performed by: PHYSICIAN ASSISTANT

## 2017-11-19 PROCEDURE — 87210 SMEAR WET MOUNT SALINE/INK: CPT | Performed by: PHYSICIAN ASSISTANT

## 2017-11-19 PROCEDURE — 99285 EMERGENCY DEPT VISIT HI MDM: CPT

## 2017-11-19 PROCEDURE — 87491 CHLMYD TRACH DNA AMP PROBE: CPT | Performed by: PHYSICIAN ASSISTANT

## 2017-11-19 PROCEDURE — 81025 URINE PREGNANCY TEST: CPT

## 2017-11-19 NOTE — ED NOTES
Emergency Department Nursing Plan of Care       The Nursing Plan of Care is developed from the Nursing assessment and Emergency Department Attending provider initial evaluation. The plan of care may be reviewed in the ED Provider note.     The Plan of Care was developed with the following considerations:   Patient / Family readiness to learn indicated by:verbalized understanding  Persons(s) to be included in education: patient  Barriers to Learning/Limitations:No    Signed     Komal Hayes RN    11/19/2017   3:28 PM

## 2017-11-19 NOTE — DISCHARGE INSTRUCTIONS
Heavy Menstrual Periods: Care Instructions  Your Care Instructions    Many women get heavy menstrual periods and painful cramps. For some women, this means passing large blood clots and changing sanitary pads or tampons often. You may also have periods that last longer than 7 days. A change in hormones or an irritation in the uterus can cause heavy bleeding. Women who are overweight are more likely to have heavy menstrual periods. But there may not be a specific cause for your heavy menstrual periods. Your doctor may recommend hormone treatments to slow or stop your periods. If a fibroid (a growth that is not cancer) is causing your heavy bleeding, your doctor may recommend surgery or other treatments to remove the growth. Because blood loss from heavy menstrual periods can make you very tired and weak (anemic), your doctor may recommend that you take extra iron. Follow-up care is a key part of your treatment and safety. Be sure to make and go to all appointments, and call your doctor if you are having problems. It's also a good idea to know your test results and keep a list of the medicines you take. How can you care for yourself at home? · Get plenty of rest.  · Keep a record of your periods. Write down when your period begins and ends and how much flow you have. That means counting the number of pads and tampons you use. Note whether they are soaked. Note any other symptoms. Take this record to your doctor appointments. · Take your medicines exactly as prescribed. Call your doctor if you think you are having a problem with your medicine. · Take pain medicines exactly as directed. ¨ If the doctor gave you a prescription medicine for pain, take it as prescribed. ¨ If you are not taking a prescription pain medicine, ask your doctor if you can take an over-the-counter medicine. · Try to reach a healthy weight. If you are trying to lose weight, do it slowly with your doctor's advice.   · If you are taking iron pills:  ¨ Try to take the pills about 1 hour before or 2 hours after meals. But you may need to take iron with some food to avoid an upset stomach. ¨ Vitamin C (from food or pills) helps your body absorb iron. Try taking iron pills with a glass of orange juice or other citrus fruit juice. ¨ Do not take antacids or drink milk or caffeine drinks (such as coffee, tea, or cola) at the same time or within 2 hours of the time that you take your iron. They can make it hard for your body to absorb the iron. ¨ Iron pills may cause stomach problems, such as heartburn, nausea, diarrhea, constipation, and cramps. Be sure to drink plenty of fluids, and include fruits, vegetables, and fiber in your diet each day. ¨ If you forget to take an iron pill, do not take a double dose of iron the next time you take a pill. ¨ Keep iron pills out of the reach of small children. An overdose of iron can be very dangerous. When should you call for help? Call 911 anytime you think you may need emergency care. For example, call if:  ? · You passed out (lost consciousness). ?Call your doctor now or seek immediate medical care if:  ? · You have new or worse belly or pelvic pain. ? · You have severe vaginal bleeding. ? · You feel dizzy or lightheaded, or you feel like you may faint. ? Watch closely for changes in your health, and be sure to contact your doctor if:  ? · You think you may be pregnant. ? · Your bleeding gets worse. ? · You do not get better as expected. Where can you learn more? Go to http://phil-khari.info/. Enter F477 in the search box to learn more about \"Heavy Menstrual Periods: Care Instructions. \"  Current as of: October 13, 2016  Content Version: 11.4  © 5502-6292 [employer]. Care instructions adapted under license by [employer] (which disclaims liability or warranty for this information).  If you have questions about a medical condition or this instruction, always ask your healthcare professional. [place] [address] any warranty or liability for your use of this information.

## 2017-11-19 NOTE — ED PROVIDER NOTES
Patient is a 32 y.o. female presenting with vaginal bleeding. The history is provided by the patient. Vaginal Bleeding   This is a new (pt initially reports this is first cycle since depo) problem. Episode onset: pt states she started her cycle 4 days ago and it's very heavy, she can't take it and she needs a pill to make it stop. Pt reports last Depo shot in Feb. and is not currently on anything. The problem occurs constantly. The problem has not changed since onset. Associated symptoms include abdominal pain (10/10 pain). Associated symptoms comments: After further conversation pt reports she had bleeding about 3 wks ago and it lasted for a month. Discussed with pt bleeding for 4 days doesn't require medicine to stop bleeding and then she changes story stating \"we'll I've been bleeding for a long time, I'll come back here every day\". Nothing aggravates the symptoms. Nothing relieves the symptoms. She has tried nothing for the symptoms. Past Medical History:   Diagnosis Date    Asthma     Diabetes ([facility] [address] 2016    Hypertension     Other ill-defined conditions(799.89)     back pain    Seizures ([facility] [address]        Past Surgical History:   Procedure Laterality Date    HX GYN      Pt reports  in past.    HX TONSILLECTOMY           Family History:   Problem Relation Age of Onset    Diabetes Mother     Diabetes Father     Diabetes Maternal Grandmother        Social History     Social History    Marital status: SINGLE     Spouse name: N/A    Number of children: N/A    Years of education: N/A     Occupational History    Not on file. Social History Main Topics    Smoking status: Never Smoker    Smokeless tobacco: Never Used    Alcohol use No    Drug use: No    Sexual activity: No     Other Topics Concern    Not on file     Social History Narrative         ALLERGIES: Review of patient's allergies indicates no known allergies.     Review of Systems   Gastrointestinal: Positive for abdominal pain (10/10 pain). Genitourinary: Positive for menstrual problem, pelvic pain and vaginal bleeding. Neurological: Negative for speech difficulty. All other systems reviewed and are negative. Vitals:    11/19/17 1516   BP: 163/90   Pulse: 99   Resp: 20   Temp: 97.7 °F (36.5 °C)   SpO2: 100%   Weight: 132.9 kg (293 lb)   Height: 5' 4\" (1.626 m)            Physical Exam   Constitutional: She is oriented to person, place, and time. She appears well-developed and well-nourished. No distress. HENT:   Head: Normocephalic and atraumatic. Eyes: Conjunctivae are normal.   Cardiovascular: Normal rate, regular rhythm and normal heart sounds. Pulmonary/Chest: Effort normal and breath sounds normal. No respiratory distress. She has no wheezes. She has no rales. Abdominal: Soft. Bowel sounds are normal. Distention: obese. There is no tenderness. There is no rebound and no guarding. Genitourinary: Pelvic exam was performed with patient supine. Cervix exhibits no motion tenderness. Discharge: small amount of blood noted at cervical os, no clots appreciated. Right adnexum displays no tenderness. Left adnexum displays tenderness. There is bleeding in the vagina. Neurological: She is alert and oriented to person, place, and time. Skin: Skin is warm and dry. Psychiatric: She has a normal mood and affect. Her behavior is normal. Judgment and thought content normal.   Nursing note and vitals reviewed.        MDM  Number of Diagnoses or Management Options  Diagnosis management comments: DDX: UTI, dysmenorrhea, normal menses, anemia, menorrhagia       Amount and/or Complexity of Data Reviewed  Clinical lab tests: reviewed and ordered      ED Course       Procedures    MEDICATIONS GIVEN:  Medications - No data to display    LABS REVIEWED:  Recent Results (from the past 24 hour(s))   POC CHEM8    Collection Time: 11/19/17  3:43 PM   Result Value Ref Range    Calcium, ionized (POC) 1.15 1.12 - 1.32 MMOL/L Sodium (POC) 135 (L) 136 - 145 MMOL/L    Potassium (POC) 4.1 3.5 - 5.1 MMOL/L    Chloride (POC) 99 98 - 107 MMOL/L    CO2 (POC) 24 21 - 32 MMOL/L    Anion gap (POC) 18 (H) 5 - 15 mmol/L    Glucose (POC) 285 (H) 65 - 100 MG/DL    BUN (POC) 13 9 - 20 MG/DL    Creatinine (POC) 0.8 0.6 - 1.3 MG/DL    GFRAA, POC >60 >60 ml/min/1.73m2    GFRNA, POC >60 >60 ml/min/1.73m2    Hemoglobin (POC) 13.3 11.5 - 16.0 GM/DL    Hematocrit (POC) 39 35.0 - 47.0 %    Comment Comment Not Indicated. URINALYSIS W/ REFLEX CULTURE    Collection Time: 11/19/17  4:26 PM   Result Value Ref Range    Color YELLOW/STRAW      Appearance CLEAR CLEAR      Specific gravity 1.020 1.003 - 1.030      pH (UA) 6.0 5.0 - 8.0      Protein NEGATIVE  NEG mg/dL    Glucose >1000 (A) NEG mg/dL    Ketone NEGATIVE  NEG mg/dL    Bilirubin NEGATIVE  NEG      Blood MODERATE (A) NEG      Urobilinogen 0.2 0.2 - 1.0 EU/dL    Nitrites NEGATIVE  NEG      Leukocyte Esterase NEGATIVE  NEG      WBC 0-4 0 - 4 /hpf    RBC 10-20 0 - 5 /hpf    Epithelial cells FEW FEW /lpf    Bacteria NEGATIVE  NEG /hpf    UA:UC IF INDICATED CULTURE NOT INDICATED BY UA RESULT CNI      Mucus TRACE (A) NEG /lpf   CARMELLA, OTHER SOURCES    Collection Time: 11/19/17  4:26 PM   Result Value Ref Range    Special Requests: NO SPECIAL REQUESTS      KOH NO YEAST SEEN     WET PREP    Collection Time: 11/19/17  4:26 PM   Result Value Ref Range    Clue cells CLUE CELLS ABSENT      Wet prep NO TRICHOMONAS SEEN     HCG URINE, QL. - POC    Collection Time: 11/19/17  4:28 PM   Result Value Ref Range    Pregnancy test,urine (POC) NEGATIVE  NEG         PROGRESS NOTE:  A/P  4:48 PM  Reviewed lab results with pt. The patient's signs, symptoms, diagnosis, and discharge instruction have been discussed and the patient has conveyed their understanding. The patient is to follow up with PCP  or return to the ER should their symptoms worsen. Plan has been discussed and the patient is in agreement.   Pt is ready for discharge      DIAGNOSIS:  1. Menorrhagia with regular cycle        PLAN:  Follow-up Information     Follow up With Details Comments [address], [place] Schedule an appointment as soon as possible for a visit in 2 days As needed [facility] [address] Aaron Patel  [phone]          Current Discharge Medication List      CONTINUE these medications which have NOT CHANGED    Details   metFORMIN (GLUCOPHAGE) 500 mg tablet Take 1 Tab by mouth two (2) times daily (with meals). Indications: type 2 diabetes mellitus  Qty: 60 Tab, Refills: 0      lisinopril (PRINIVIL, ZESTRIL) 10 mg tablet Take 1 Tab by mouth daily. Indications: hypertension  Qty: 30 Tab, Refills: 11      diclofenac EC (VOLTAREN) 75 mg EC tablet Take 1 Tab by mouth two (2) times a day. Qty: 60 Tab, Refills: 1    Associated Diagnoses: Thoracic back pain, unspecified back pain laterality, unspecified chronicity; Muscle spasm      medroxyPROGESTERone (DEPO-PROVERA) 150 mg/mL injection 150 mg by IntraMUSCular route once.

## 2017-11-19 NOTE — ED NOTES
Patient states she is here today with c/o vaginal bleeding and pain x 4 days. She is requesting a pill to stop the bleeding. Patient was previously on Depo and has gone off depo.

## 2017-11-29 ENCOUNTER — HOSPITAL ENCOUNTER (OUTPATIENT)
Dept: PHYSICAL THERAPY | Age: 27
Discharge: HOME | End: 2017-11-29
Payer: SELF-PAY

## 2017-11-29 PROCEDURE — 97014 ELECTRIC STIMULATION THERAPY: CPT

## 2017-11-29 PROCEDURE — 97162 PT EVAL MOD COMPLEX 30 MIN: CPT

## 2017-11-29 PROCEDURE — 97535 SELF CARE MNGMENT TRAINING: CPT
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
## 2017-11-30 ENCOUNTER — HOSPITAL ENCOUNTER (EMERGENCY)
Age: 27
Discharge: HOME | End: 2017-11-30
Attending: EMERGENCY MEDICINE
Payer: SELF-PAY

## 2017-11-30 VITALS
WEIGHT: 293 LBS | RESPIRATION RATE: 18 BRPM | OXYGEN SATURATION: 100 % | BODY MASS INDEX: 48.82 KG/M2 | TEMPERATURE: 98.5 F | HEART RATE: 98 BPM | DIASTOLIC BLOOD PRESSURE: 57 MMHG | HEIGHT: 65 IN | SYSTOLIC BLOOD PRESSURE: 150 MMHG

## 2017-11-30 DIAGNOSIS — N92.1 MENORRHAGIA WITH IRREGULAR CYCLE: Primary | ICD-10-CM

## 2017-11-30 PROCEDURE — 99282 EMERGENCY DEPT VISIT SF MDM: CPT

## 2017-12-01 NOTE — ED NOTES
Discharge instructions were given to the patient by Sarita Valdez RN. The patient left the Emergency Department ambulatory, alert and oriented and in no acute distress with 0 prescriptions. The patient was encouraged to call or return to the ED for worsening issues or problems and was encouraged to schedule a follow up appointment for continuing care. The patient verbalized understanding of discharge instructions and prescriptions, all questions were answered. The patient has no further concerns at this time.

## 2017-12-01 NOTE — DISCHARGE INSTRUCTIONS
Abnormal Uterine Bleeding: Care Instructions  Your Care Instructions    Abnormal uterine bleeding (AUB) is irregular bleeding from the uterus that is longer or heavier than usual or does not occur at your regular time. Sometimes it is caused by changes in hormone levels. It can also be caused by growths in the uterus, such as fibroids or polyps. Sometimes a cause cannot be found. You may have heavy bleeding when you are not expecting your period. Your doctor may suggest a pregnancy test, if you think you are pregnant. Follow-up care is a key part of your treatment and safety. Be sure to make and go to all appointments, and call your doctor if you are having problems. It's also a good idea to know your test results and keep a list of the medicines you take. How can you care for yourself at home? · Be safe with medicines. Take pain medicines exactly as directed. ¨ If the doctor gave you a prescription medicine for pain, take it as prescribed. ¨ If you are not taking a prescription pain medicine, ask your doctor if you can take an over-the-counter medicine. · You may be low in iron because of blood loss. Eat a balanced diet that is high in iron and vitamin C. Foods rich in iron include red meat, shellfish, eggs, beans, and leafy green vegetables. Talk to your doctor about whether you need to take iron pills or a multivitamin. When should you call for help? Call 911 anytime you think you may need emergency care. For example, call if:  ? · You passed out (lost consciousness). ?Call your doctor now or seek immediate medical care if:  ? · You have new or worse belly or pelvic pain. ? · You have severe vaginal bleeding. ? · You feel dizzy or lightheaded, or you feel like you may faint. ? Watch closely for changes in your health, and be sure to contact your doctor if:  ? · You think you may be pregnant. ? · Your bleeding gets worse. ? · You do not get better as expected.    Where can you learn more?  Go to http://phil-khari.info/. Enter G095 in the search box to learn more about \"Abnormal Uterine Bleeding: Care Instructions. \"  Current as of: October 13, 2016  Content Version: 11.4  © 0426-2735 [employer]. Care instructions adapted under license by [employer] (which disclaims liability or warranty for this information). If you have questions about a medical condition or this instruction, always ask your healthcare professional. [place] [address] any warranty or liability for your use of this information.

## 2017-12-01 NOTE — ED PROVIDER NOTES
EMERGENCY DEPARTMENT HISTORY AND PHYSICAL EXAM      Date: 11/30/2017  Patient Name: Stefano Lama    History of Presenting Illness     Chief Complaint   Patient presents with    Vaginal Bleeding     here on 11/19/17 for same, reports still bleeding and not getting any better     History Provided By: Patient    HPI: Stefano Lama, 32 y.o. female with PMHx significant for HTN, DM, seizures, and asthma, presents ambulatory to the ED with cc of gradually worsening vaginal bleeding x one month. Per pt, she has been experiencing vaginal bleeding for the past three months persistently without relief. Pt notes she was on BCP earlier this year and stopped it in February 2017 with subsequent irregular cycles. Pt reports she recently began BCP again but has visualized no relief to the vaginal bleeding. Pt reports she has been unable to be evaluated by her PCP/OBGYN secondary to no appointments; she notes the earliest she can be seen is 12/02/2017, but was concerned. Pt expresses concern of acute blood loss anemia. Pt reports past pregnancies, but denies any current one. Pt additionally specifically denies any nausea, vomiting, fevers, chills, chest pain, or SOB. PCP: Trina Cuenca MD    There are no other complaints, changes, or physical findings at this time. Current Outpatient Prescriptions   Medication Sig Dispense Refill    diclofenac EC (VOLTAREN) 75 mg EC tablet Take 1 Tab by mouth two (2) times a day. 60 Tab 1    metFORMIN (GLUCOPHAGE) 500 mg tablet Take 1 Tab by mouth two (2) times daily (with meals). Indications: type 2 diabetes mellitus 60 Tab 0    lisinopril (PRINIVIL, ZESTRIL) 10 mg tablet Take 1 Tab by mouth daily. Indications: hypertension 30 Tab 11    medroxyPROGESTERone (DEPO-PROVERA) 150 mg/mL injection 150 mg by IntraMUSCular route once.        Past History     Past Medical History:  Past Medical History:   Diagnosis Date    Asthma     Diabetes ([facility] [address] 12/28/2016    Hypertension     Other ill-defined conditions(799.89)     back pain    Seizures ([address]      Past Surgical History:  Past Surgical History:   Procedure Laterality Date    HX GYN  2014    Pt reports  in past.    HX TONSILLECTOMY       Family History:  Family History   Problem Relation Age of Onset    Diabetes Mother     Diabetes Father     Diabetes Maternal Grandmother      Social History:  Social History   Substance Use Topics    Smoking status: Never Smoker    Smokeless tobacco: Never Used    Alcohol use No     Allergies:  No Known Allergies    Review of Systems   Review of Systems   Constitutional: Negative for appetite change, chills, diaphoresis, fatigue and fever. HENT: Negative for sore throat and trouble swallowing. Respiratory: Negative for cough and shortness of breath. Cardiovascular: Negative for chest pain. Gastrointestinal: Negative for abdominal pain, diarrhea, nausea and vomiting. Genitourinary: Positive for vaginal bleeding. Negative for difficulty urinating, dysuria and frequency. Musculoskeletal: Negative for arthralgias, back pain and myalgias. Skin: Negative for color change and rash. Neurological: Negative for weakness and numbness. Hematological: Does not bruise/bleed easily. All other systems reviewed and are negative. Physical Exam   Physical Exam   Constitutional: She is oriented to person, place, and time. She appears well-developed and well-nourished. She does not appear ill. No distress. HENT:   Head: Normocephalic and atraumatic. Right Ear: Hearing and external ear normal.   Left Ear: Hearing and external ear normal.   Nose: Nose normal.   Mouth/Throat: Uvula is midline, oropharynx is clear and moist and mucous membranes are normal.   Eyes: Pupils are equal, round, and reactive to light. Neck: Normal range of motion. Cardiovascular: Normal rate, regular rhythm and normal pulses. Exam reveals no gallop and no friction rub. No murmur heard.   Pulmonary/Chest: Effort normal. No respiratory distress. She has no wheezes. She has no rhonchi. She has no rales. Abdominal: Soft. She exhibits no distension. There is no tenderness. There is no rebound and no guarding. Musculoskeletal: Normal range of motion. She exhibits no edema. Neurological: She is alert and oriented to person, place, and time. She has normal strength. Skin: Skin is warm and dry. No rash noted. She is not diaphoretic. Psychiatric: She has a normal mood and affect. Her speech is normal and behavior is normal.   Nursing note and vitals reviewed. Medical Decision Making   I am the first provider for this patient. I reviewed the vital signs, available nursing notes, past medical history, past surgical history, family history and social history. Vital Signs-Reviewed the patient's vital signs. Patient Vitals for the past 12 hrs:   Temp Pulse Resp BP SpO2   11/30/17 2038 98.5 °F (36.9 °C) 98 18 150/57 100 %     Pulse Oximetry Analysis - 100% on RA    Cardiac Monitor:   Rate: 98 bpm  Rhythm: Normal Sinus Rhythm      Records Reviewed: Old Medical Records    Provider Notes (Medical Decision Making):   DDx: menorrhea, possible anemia, BCP withdrawal, unlikely miscarriage       ED Course:   Initial assessment performed. The patients presenting problems have been discussed, and they are in agreement with the care plan formulated and outlined with them. I have encouraged them to ask questions as they arise throughout their visit. Disposition:  DISCHARGE NOTE:    8:59 PM  The patient is ready for discharge. The patient signs, symptoms, diagnosis, and discharge instructions have been discussed and the patient has conveyed their understanding. The patient is to follow-up as reccommended or returned to the ER should their symptoms worsen. Plan has been discussed and patient is in agreement. PLAN:  1.    Follow-up Information     Follow up With Details Comments Jeffrey Mars MD [address] [facility]  [phone]          Return to ED if worse     Diagnosis     Clinical Impression:   1. Menorrhagia with irregular cycle      Attestations: This note is prepared by J Luis Middleton, acting as Scribe for Rachel Barron MD.    Rachel Barron MD: The scribe's documentation has been prepared under my direction and personally reviewed by me in its entirety. I confirm that the note above accurately reflects all work, treatment, procedures, and medical decision making performed by me.

## 2017-12-01 NOTE — ED NOTES
Pt presents ambulatory to ED complaining of vaginal bleeding X3 months. Pt requesting pill to make her bleeding stop. Pt is alert and oriented x 4, RR even and unlabored, skin is warm and dry. Assesment completed and pt updated on plan of care. Emergency Department Nursing Plan of Care       The Nursing Plan of Care is developed from the Nursing assessment and Emergency Department Attending provider initial evaluation. The plan of care may be reviewed in the ED Provider note.     The Plan of Care was developed with the following considerations:   Patient / Family readiness to learn indicated by:verbalized understanding  Persons(s) to be included in education: patient  Barriers to Learning/Limitations:No    Signed     Silver Valencia RN    11/30/2017   8:52 PM

## 2017-12-04 ENCOUNTER — APPOINTMENT (OUTPATIENT)
Dept: PHYSICAL THERAPY | Age: 27
End: 2017-12-04
Payer: SELF-PAY

## 2017-12-07 ENCOUNTER — OFFICE VISIT (OUTPATIENT)
Dept: INTERNAL MEDICINE CLINIC | Age: 27
End: 2017-12-07

## 2017-12-07 VITALS
HEIGHT: 65 IN | BODY MASS INDEX: 48.82 KG/M2 | DIASTOLIC BLOOD PRESSURE: 88 MMHG | RESPIRATION RATE: 18 BRPM | WEIGHT: 293 LBS | SYSTOLIC BLOOD PRESSURE: 143 MMHG | HEART RATE: 95 BPM | TEMPERATURE: 97.7 F

## 2017-12-07 DIAGNOSIS — E66.01 OBESITY, MORBID (HCC): ICD-10-CM

## 2017-12-07 DIAGNOSIS — E08.00 DIABETES MELLITUS DUE TO UNDERLYING CONDITION WITH HYPEROSMOLARITY WITHOUT COMA, WITHOUT LONG-TERM CURRENT USE OF INSULIN (HCC): Primary | ICD-10-CM

## 2017-12-07 LAB
GLUCOSE POC: 93 MG/DL
HBA1C MFR BLD HPLC: 9 %

## 2017-12-07 RX ORDER — LISINOPRIL 20 MG/1
20 TABLET ORAL DAILY
Qty: 30 TAB | Refills: 5 | Status: SHIPPED | OUTPATIENT
Start: 2017-12-07 | End: 2018-06-12

## 2017-12-07 RX ORDER — METFORMIN HYDROCHLORIDE 850 MG/1
850 TABLET ORAL 2 TIMES DAILY WITH MEALS
Qty: 60 TAB | Refills: 3 | Status: SHIPPED | OUTPATIENT
Start: 2017-12-07 | End: 2018-06-12

## 2017-12-07 NOTE — PROGRESS NOTES
1. Have you been to the ER, urgent care clinic since your last visit? Hospitalized since your last visit? No    2. Have you seen or consulted any other health care providers outside of the [address] [place] [address] since your last visit? Include any pap smears or colon screening. Yes When: 12/3/17 [place] ED chest pain. Blood glucose/A1C verbal order DR. Delarosa/CAIN Daniels

## 2017-12-07 NOTE — PROGRESS NOTES
Quoc Louis is a 32 y.o. female and presents with Hospital Follow Up  . Subjective:  [facility] for chest pain. W/u neg. Pt relays she was upset w her desean and developed chest pain. Pt was dx'ed w [facility] pain. Pts chest pain has reso;triny.  *Pt has no insurance  Pt relays her fsg are \"fine\". She has it checked occas @ work. Pt works PRN as nurses aid.   Pt relays she only takes her metformin once daily due to SE-headaches        Review of Systems  Constitutional: negative for fevers, chills, anorexia and weight loss  Respiratory:  negative for cough, hemoptysis, dyspnea,wheezing  CV:   negative for chest pain, palpitations, lower extremity edema  GI:   negative for nausea, vomiting, diarrhea, abdominal pain,melena  Musculoskel: negative for myalgias, arthralgias, back pain, muscle weakness, joint pain  Neurological:  negative for headaches, dizziness, vertigo, memory problems and gait   Behavl/Psych: negative for feelings of anxiety, depression, mood changes    Past Medical History:   Diagnosis Date    Asthma     Diabetes ([facility] [address] 2016    Hypertension     Other ill-defined conditions(799.89)     back pain    Seizures ([facility])      Past Surgical History:   Procedure Laterality Date    HX GYN      Pt reports  in past.    HX TONSILLECTOMY       Social History     Social History    Marital status: SINGLE     Spouse name: N/A    Number of children: N/A    Years of education: N/A     Social History Main Topics    Smoking status: Never Smoker    Smokeless tobacco: Never Used    Alcohol use No    Drug use: No    Sexual activity: No     Other Topics Concern    None     Social History Narrative     Family History   Problem Relation Age of Onset    Diabetes Mother     Diabetes Father     Diabetes Maternal Grandmother      Current Outpatient Prescriptions   Medication Sig Dispense Refill    metFORMIN (GLUCOPHAGE) 850 mg tablet Take 1 Tab by mouth two (2) times daily (with meals). Indications: type 2 diabetes mellitus 60 Tab 3    lisinopril (PRINIVIL, ZESTRIL) 20 mg tablet Take 1 Tab by mouth daily. 30 Tab 5    medroxyPROGESTERone (DEPO-PROVERA) 150 mg/mL injection 150 mg by IntraMUSCular route once. No Known Allergies    Objective:  Visit Vitals    /88 (BP 1 Location: Left arm, BP Patient Position: Sitting)    Pulse 95    Temp 97.7 °F (36.5 °C) (Oral)    Resp 18    Ht 5' 5\" (1.651 m)    Wt 313 lb (142 kg)    BMI 52.09 kg/m2     Physical Exam:   General appearance - alert, morbidly obese in NAD  Mental status - alert, oriented to person, place, and time  EYE-EOMI  Neck - supple, no significant adenopathy   Chest - clear to auscultation, no wheezes, rales or rhonchi, symmetric air entry   Heart - normal rate, regular rhythm, normal S1, S2, no murmurs, rubs, clicks or gallops   Abdomen - soft, nontender, nondistended, no masses or organomegaly  Ext-peripheral pulses normal, no pedal edema, no clubbing or cyanosis  Skin-Warm and dry. no hyperpigmentation, vitiligo, or suspicious lesions  Neuro -alert, oriented, normal speech, no focal findings or movement disorder noted      Results for orders placed or performed in visit on 12/07/17   AMB POC GLUCOSE BLOOD, BY GLUCOSE MONITORING DEVICE   Result Value Ref Range    Glucose POC 93 mg/dL   AMB POC HEMOGLOBIN A1C   Result Value Ref Range    Hemoglobin A1c (POC) 9.0 %       Assessment/Plan:    ICD-10-CM ICD-9-CM    1. Diabetes mellitus due to underlying condition with hyperosmolarity without coma, without long-term current use of insulin ([facility]) E08.00 249.20 AMB POC GLUCOSE BLOOD, BY GLUCOSE MONITORING DEVICE      AMB POC HEMOGLOBIN A1C   2. Obesity, morbid ([facility] E66.01 278.01      Orders Placed This Encounter    AMB POC GLUCOSE BLOOD, BY GLUCOSE MONITORING DEVICE    AMB POC HEMOGLOBIN A1C    metFORMIN (GLUCOPHAGE) 850 mg tablet     Sig: Take 1 Tab by mouth two (2) times daily (with meals).  Indications: type 2 diabetes mellitus     Dispense:  60 Tab     Refill:  3    lisinopril (PRINIVIL, ZESTRIL) 20 mg tablet     Sig: Take 1 Tab by mouth daily. Dispense:  30 Tab     Refill:  5     lose weight, increase physical activity,  Take 81mg aspirin daily  There are no Patient Instructions on file for this visit. Follow-up Disposition:  Return in about 3 months (around 3/7/2018) for DM . I have reviewed with the patient details of the assessment and plan and all questions were answered. Relevent patient education was performed. The most recent lab findings were reviewed with the patient. An After Visit Summary was printed and given to the patient.

## 2017-12-11 ENCOUNTER — HOSPITAL ENCOUNTER (OUTPATIENT)
Dept: PHYSICAL THERAPY | Age: 27
Discharge: HOME | End: 2017-12-11
Payer: SELF-PAY

## 2017-12-11 PROCEDURE — 97014 ELECTRIC STIMULATION THERAPY: CPT

## 2017-12-11 PROCEDURE — 97110 THERAPEUTIC EXERCISES: CPT

## 2017-12-11 NOTE — PROGRESS NOTES
PT DAILY TREATMENT NOTE - [facility] 2-15    Patient Name: Abigail Dumont  Date:2017  : 1990  [x]  Patient  Verified  Payor: SELF PAY / [facility] / Product Type: Self Pay /    In time:7:25am  Out time:8:20am  Total Treatment Time (min): 55  Total Timed Codes (min): 40  1:1 Treatment Time ( only): --   Visit #: 2     Treatment Area: Pain in thoracic spine [M54.6]    SUBJECTIVE  Pain Level (0-10 scale): 7/10  Any medication changes, allergies to medications, adverse drug reactions, diagnosis change, or new procedure performed?: [x] No    [] Yes (see summary sheet for update)  Subjective functional status/changes:   [] No changes reported  Pt reports missing 2 PT sessions due to having a respiratory infection and is currently on antibiotics. Pt complains of limitation standing >30 minutes and lying on either side due to increased pain in upper back.       OBJECTIVE  Modality rationale: decrease pain and increase tissue extensibility to improve the patients ability to perform functional activities with increased ease   Min Type Additional Details   15 [x] Estim: []Att   [x]Unatt        []TENS instruct                  []IFC  [x]Premod   []NMES                     []Other:  []w/US   []w/ice   [x]w/heat  Position: prone with pillow under chest and ankles  Location: thoracic spine at end of session    []  Traction: [] Cervical       []Lumbar                       [] Prone          []Supine                       []Intermittent   []Continuous Lbs:  [] before manual  [] after manual  []w/heat    []  Ultrasound: []Continuous   [] Pulsed at:                           [x]1MHz   []3MHz Location:  W/cm2:    [] Paraffin         Location:   []w/heat    []  Ice     []  Heat  []  Ice massage Position:  Location:    []  Laser  []  Other: Position:  Location:      []  Vasopneumatic Device Pressure:       [] lo [] med [] hi   Temperature:    [x] Skin assessment post-treatment:  [x]intact []redness- no adverse reaction    []redness  adverse reaction:     40 min Therapeutic Exercise:  [x] See flow sheet :   Rationale: increase ROM, increase strength and improve coordination to improve the patients ability to perform functional activities and work duties with increased ease          With   [] TE   [] TA   [] neuro   [] other: Patient Education: [x] Review HEP- pt given handout with exercises for HEP    [] Progressed/Changed HEP based on:   [] positioning   [] body mechanics   [] transfers   [] heat/ice application    [] other:      Other Objective/Functional Measures: --     Pain Level (0-10 scale) post treatment: 5/10    ASSESSMENT/Changes in Function:   Pt able to perform all exercises without increase in pain. Patient will continue to benefit from skilled PT services to modify and progress therapeutic interventions, address functional mobility deficits, address ROM deficits, address strength deficits, analyze and address soft tissue restrictions, analyze and cue movement patterns, analyze and modify body mechanics/ergonomics and assess and modify postural abnormalities to attain remaining goals. []  See Plan of Care  []  See progress note/recertification  []  See Discharge Summary         Progress towards goals / Updated goals:  Short Term Goals: To be accomplished in 3 weeks:  1) Pt will be Independent with HEP. 2) Pt will be able to stand greater than 15 minutes without increase of pain to perform household chores and meal preparation. 3) Pt will be able to ambulate greater than 1 block without increase of pain.     Long Term Goals: To be accomplished in 6 weeks:  1) Pt will be able to sit greater than 60 minutes without pain. 2) Pt will be able to stand greater than 30 minutes without increase of pain. 3) Pt will be able to ambulate greater than 2 blocks without increase of pain. 4) Pt will be able to carry >/= 20 lbs without pain.   5) Pt will report improvement in overall functional mobility, as measured by FOTO, with an increased score of at least 26 points, from 37 to 63.     PLAN  [x]  Upgrade activities as tolerated     [x]  Continue plan of care  []  Update interventions per flow sheet       []  Discharge due to:_  []  Other:_      Hair De Oliveira, PT, DPT   12/11/2017  7:34 AM

## 2017-12-18 ENCOUNTER — HOSPITAL ENCOUNTER (OUTPATIENT)
Dept: PHYSICAL THERAPY | Age: 27
Discharge: HOME | End: 2017-12-18
Payer: SELF-PAY

## 2017-12-18 PROCEDURE — 97110 THERAPEUTIC EXERCISES: CPT

## 2017-12-18 NOTE — PROGRESS NOTES
PT DAILY TREATMENT NOTE - [facility] 2-15    Patient Name: Dante Mcclelland  Date:2017  : 1990  [x]  Patient  Verified  Payor: SELF PAY / Plan: [facility] SELF PAY / Product Type: Self Pay /    In time: 10:00A  Out time: 10:20A  Total Treatment Time (min): 20  Total Timed Codes (min): 20  1:1 Treatment Time ([facility] only): --   Visit #: 3     Treatment Area: Pain in thoracic spine [M54.6]    SUBJECTIVE  Pain Level (0-10 scale): 8/10  Any medication changes, allergies to medications, adverse drug reactions, diagnosis change, or new procedure performed?: [x] No    [] Yes (see summary sheet for update)  Subjective functional status/changes:   [] No changes reported  Pt reported higher pain today secondary to not being able to take her pain medicine this morning. OBJECTIVE    20 min Therapeutic Exercise:  [x] See flow sheet :   Rationale: increase ROM, increase strength and improve coordination to improve the patients ability to perform functional activities and work duties with increased ease          With   [] TE   [] TA   [] neuro   [] other: Patient Education: [x] Review HEP- pt given handout with exercises for HEP    [] Progressed/Changed HEP based on:   [] positioning   [] body mechanics   [] transfers   [] heat/ice application    [] other:      Other Objective/Functional Measures: --     Pain Level (0-10 scale) post treatment: 5/10    ASSESSMENT/Changes in Function:   Pt's program limited today secondary to needing to leave to get to work on time. Pt reported increase in forearm pain with tband shoulder extension and B ER with Tband. Pt was educated on proper exercise technique but still reported increase in pain. Pt required frequent VC regarding proper exercise technique.   Patient will continue to benefit from skilled PT services to modify and progress therapeutic interventions, address functional mobility deficits, address ROM deficits, address strength deficits, analyze and address soft tissue restrictions, analyze and cue movement patterns, analyze and modify body mechanics/ergonomics and assess and modify postural abnormalities to attain remaining goals. [x]  See Plan of Care  []  See progress note/recertification  []  See Discharge Summary         Progress towards goals / Updated goals:  Short Term Goals: To be accomplished in 3 weeks:  1) Pt will be Independent with HEP. 2) Pt will be able to stand greater than 15 minutes without increase of pain to perform household chores and meal preparation. 3) Pt will be able to ambulate greater than 1 block without increase of pain.     Long Term Goals: To be accomplished in 6 weeks:  1) Pt will be able to sit greater than 60 minutes without pain. 2) Pt will be able to stand greater than 30 minutes without increase of pain. 3) Pt will be able to ambulate greater than 2 blocks without increase of pain. 4) Pt will be able to carry >/= 20 lbs without pain. 5) Pt will report improvement in overall functional mobility, as measured by FOTO, with an increased score of at least 26 points, from 37 to 63.     PLAN  [x]  Upgrade activities as tolerated     [x]  Continue plan of care  []  Update interventions per flow sheet       []  Discharge due to:_  []  Other:_      Meredith Wheeler, GIFTY   12/18/2017  7:34 AM

## 2017-12-21 ENCOUNTER — HOSPITAL ENCOUNTER (OUTPATIENT)
Dept: PHYSICAL THERAPY | Age: 27
Discharge: HOME | End: 2017-12-21
Payer: SELF-PAY

## 2017-12-21 PROCEDURE — 97110 THERAPEUTIC EXERCISES: CPT

## 2017-12-21 NOTE — PROGRESS NOTES
PT DAILY TREATMENT NOTE - [facility] 2-15    Patient Name: Dante Mcclelland  Date:2017  : 1990  [x]  Patient  Verified  Payor: SELF PAY / Plan: [facility] SELF PAY / Product Type: Self Pay /    In time: 8:25A  Out time: 9:10A  Total Treatment Time (min): 45  Total Timed Codes (min): 45  1:1 Treatment Time ([address] only): --   Visit #: 4     Treatment Area: Pain in thoracic spine [M54.6]    SUBJECTIVE  Pain Level (0-10 scale): 0/10  Any medication changes, allergies to medications, adverse drug reactions, diagnosis change, or new procedure performed?: [x] No    [] Yes (see summary sheet for update)  Subjective functional status/changes:   [] No changes reported  Pt reported no pain today. OBJECTIVE    45 min Therapeutic Exercise:  [x] See flow sheet :   Rationale: increase ROM, increase strength and improve coordination to improve the patients ability to perform functional activities and work duties with increased ease          With   [] TE   [] TA   [] neuro   [] other: Patient Education: [x] Review HEP- pt given handout with exercises for HEP    [] Progressed/Changed HEP based on:   [] positioning   [] body mechanics   [] transfers   [] heat/ice application    [] other:      Other Objective/Functional Measures: --     Pain Level (0-10 scale) post treatment: 9/10    ASSESSMENT/Changes in Function:   Pt stated that she can clean the bathroom without much pain. Pt reported that when she cleans the toilet and jeff she bends at the waist. Pt advised to use a small stool or thick towel to kneel on instead of bending at the waist. Pt continues to require cuing on proper exercise technique. Pt reported increase in mid back with exercises today. Pt decline modalities following today's session to reduce pain.    Patient will continue to benefit from skilled PT services to modify and progress therapeutic interventions, address functional mobility deficits, address ROM deficits, address strength deficits, analyze and address soft tissue restrictions, analyze and cue movement patterns, analyze and modify body mechanics/ergonomics and assess and modify postural abnormalities to attain remaining goals. [x]  See Plan of Care  []  See progress note/recertification  []  See Discharge Summary         Progress towards goals / Updated goals:  Short Term Goals: To be accomplished in 3 weeks:  1) Pt will be Independent with HEP. MET  2) Pt will be able to stand greater than 15 minutes without increase of pain to perform household chores and meal preparation. NOT MET  3) Pt will be able to ambulate greater than 1 block without increase of pain. MET     Long Term Goals: To be accomplished in 6 weeks:  1) Pt will be able to sit greater than 60 minutes without pain. Progressing  2) Pt will be able to stand greater than 30 minutes without increase of pain. 3) Pt will be able to ambulate greater than 2 blocks without increase of pain. 4) Pt will be able to carry >/= 20 lbs without pain. 5) Pt will report improvement in overall functional mobility, as measured by FOTO, with an increased score of at least 26 points, from 37 to 63.     PLAN  [x]  Upgrade activities as tolerated     [x]  Continue plan of care  []  Update interventions per flow sheet       []  Discharge due to:_  []  Other:_      Pepe De Anda PTA   12/21/2017  7:34 AM

## 2018-01-02 ENCOUNTER — APPOINTMENT (OUTPATIENT)
Dept: PHYSICAL THERAPY | Age: 28
End: 2018-01-02
Payer: SELF-PAY

## 2018-01-03 ENCOUNTER — APPOINTMENT (OUTPATIENT)
Dept: PHYSICAL THERAPY | Age: 28
End: 2018-01-03
Payer: SELF-PAY

## 2018-01-08 ENCOUNTER — HOSPITAL ENCOUNTER (OUTPATIENT)
Dept: PHYSICAL THERAPY | Age: 28
Discharge: HOME | End: 2018-01-08
Payer: SELF-PAY

## 2018-01-08 PROCEDURE — 97535 SELF CARE MNGMENT TRAINING: CPT
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
## 2018-01-17 ENCOUNTER — OFFICE VISIT (OUTPATIENT)
Dept: INTERNAL MEDICINE CLINIC | Age: 28
End: 2018-01-17

## 2018-01-17 VITALS
DIASTOLIC BLOOD PRESSURE: 64 MMHG | OXYGEN SATURATION: 96 % | HEART RATE: 89 BPM | TEMPERATURE: 97.4 F | HEIGHT: 65 IN | WEIGHT: 293 LBS | SYSTOLIC BLOOD PRESSURE: 127 MMHG | RESPIRATION RATE: 18 BRPM | BODY MASS INDEX: 48.82 KG/M2

## 2018-01-17 DIAGNOSIS — E11.9 TYPE 2 DIABETES MELLITUS WITHOUT COMPLICATION, WITHOUT LONG-TERM CURRENT USE OF INSULIN (HCC): ICD-10-CM

## 2018-01-17 DIAGNOSIS — M54.2 NECK PAIN: ICD-10-CM

## 2018-01-17 DIAGNOSIS — M54.5 LOW BACK PAIN, UNSPECIFIED BACK PAIN LATERALITY, UNSPECIFIED CHRONICITY, WITH SCIATICA PRESENCE UNSPECIFIED: ICD-10-CM

## 2018-01-17 DIAGNOSIS — E66.01 OBESITY, MORBID (HCC): ICD-10-CM

## 2018-01-17 DIAGNOSIS — F41.9 ANXIETY: ICD-10-CM

## 2018-01-17 DIAGNOSIS — R07.89 OTHER CHEST PAIN: Primary | ICD-10-CM

## 2018-01-17 DIAGNOSIS — I10 ESSENTIAL HYPERTENSION: ICD-10-CM

## 2018-01-17 RX ORDER — IBUPROFEN 800 MG/1
800 TABLET ORAL
Qty: 60 TAB | Refills: 1 | Status: SHIPPED | OUTPATIENT
Start: 2018-01-17 | End: 2018-06-12
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
## 2018-01-17 NOTE — PROGRESS NOTES
1. Have you been to the ER, urgent care clinic since your last visit? Hospitalized since your last visit? No.    2. Have you seen or consulted any other health care providers outside of the [address] [place] [address] since your last visit? Include any pap smears or colon screening.  No.

## 2018-01-25 ENCOUNTER — HOSPITAL ENCOUNTER (OUTPATIENT)
Dept: PHYSICAL THERAPY | Age: 28
Discharge: HOME | End: 2018-01-25
Payer: SELF-PAY

## 2018-01-25 PROCEDURE — 97110 THERAPEUTIC EXERCISES: CPT
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
## 2018-02-05 ENCOUNTER — APPOINTMENT (OUTPATIENT)
Dept: PHYSICAL THERAPY | Age: 28
End: 2018-02-05

## 2018-02-07 ENCOUNTER — APPOINTMENT (OUTPATIENT)
Dept: PHYSICAL THERAPY | Age: 28
End: 2018-02-07

## 2018-02-08 ENCOUNTER — HOSPITAL ENCOUNTER (EMERGENCY)
Age: 28
Discharge: HOME | End: 2018-02-08
Attending: EMERGENCY MEDICINE
Payer: SELF-PAY

## 2018-02-08 VITALS
RESPIRATION RATE: 15 BRPM | TEMPERATURE: 98.3 F | SYSTOLIC BLOOD PRESSURE: 153 MMHG | HEART RATE: 93 BPM | WEIGHT: 293 LBS | BODY MASS INDEX: 53.92 KG/M2 | HEIGHT: 62 IN | DIASTOLIC BLOOD PRESSURE: 87 MMHG | OXYGEN SATURATION: 99 %

## 2018-02-08 DIAGNOSIS — N76.0 VAGINITIS AND VULVOVAGINITIS: Primary | ICD-10-CM

## 2018-02-08 LAB
APPEARANCE UR: ABNORMAL
BACTERIA URNS QL MICRO: NEGATIVE /HPF
BILIRUB UR QL: NEGATIVE
CLUE CELLS VAG QL WET PREP: NORMAL
COLOR UR: ABNORMAL
EPITH CASTS URNS QL MICRO: NORMAL /LPF
GLUCOSE UR STRIP.AUTO-MCNC: >1000 MG/DL
HCG UR QL: NEGATIVE
HGB UR QL STRIP: ABNORMAL
KETONES UR QL STRIP.AUTO: NEGATIVE MG/DL
KOH PREP SPEC: NORMAL
LEUKOCYTE ESTERASE UR QL STRIP.AUTO: NEGATIVE
NITRITE UR QL STRIP.AUTO: NEGATIVE
PH UR STRIP: 6.5 [PH] (ref 5–8)
PROT UR STRIP-MCNC: NEGATIVE MG/DL
RBC #/AREA URNS HPF: NORMAL /HPF (ref 0–5)
SERVICE CMNT-IMP: NORMAL
SP GR UR REFRACTOMETRY: 1.02 (ref 1–1.03)
T VAGINALIS VAG QL WET PREP: NORMAL
UROBILINOGEN UR QL STRIP.AUTO: 0.2 EU/DL (ref 0.2–1)
WBC URNS QL MICRO: NORMAL /HPF (ref 0–4)

## 2018-02-08 PROCEDURE — 96372 THER/PROPH/DIAG INJ SC/IM: CPT

## 2018-02-08 PROCEDURE — 74011250637 HC RX REV CODE- 250/637: Performed by: EMERGENCY MEDICINE

## 2018-02-08 PROCEDURE — 81025 URINE PREGNANCY TEST: CPT

## 2018-02-08 PROCEDURE — 74011250636 HC RX REV CODE- 250/636: Performed by: EMERGENCY MEDICINE

## 2018-02-08 PROCEDURE — 87210 SMEAR WET MOUNT SALINE/INK: CPT | Performed by: EMERGENCY MEDICINE

## 2018-02-08 PROCEDURE — 87491 CHLMYD TRACH DNA AMP PROBE: CPT | Performed by: EMERGENCY MEDICINE

## 2018-02-08 PROCEDURE — 99284 EMERGENCY DEPT VISIT MOD MDM: CPT

## 2018-02-08 PROCEDURE — 74011000250 HC RX REV CODE- 250: Performed by: EMERGENCY MEDICINE

## 2018-02-08 PROCEDURE — 81001 URINALYSIS AUTO W/SCOPE: CPT | Performed by: EMERGENCY MEDICINE

## 2018-02-08 RX ORDER — METRONIDAZOLE 500 MG/1
2000 TABLET ORAL
Qty: 4 TAB | Refills: 0 | Status: SHIPPED | OUTPATIENT
Start: 2018-02-08 | End: 2018-02-08

## 2018-02-08 RX ORDER — CEFTRIAXONE 250 MG/8ML
250 INJECTION, POWDER, FOR SOLUTION INTRAMUSCULAR; INTRAVENOUS
Status: DISCONTINUED | OUTPATIENT
Start: 2018-02-08 | End: 2018-02-08 | Stop reason: CLARIF

## 2018-02-08 RX ORDER — AZITHROMYCIN 250 MG/1
1000 TABLET, FILM COATED ORAL
Status: COMPLETED | OUTPATIENT
Start: 2018-02-08 | End: 2018-02-08

## 2018-02-08 RX ADMIN — LIDOCAINE HYDROCHLORIDE 250 MG: 10 INJECTION, SOLUTION EPIDURAL; INFILTRATION; INTRACAUDAL; PERINEURAL at 19:19

## 2018-02-08 RX ADMIN — AZITHROMYCIN 1000 MG: 250 TABLET, FILM COATED ORAL at 19:20

## 2018-02-08 NOTE — DISCHARGE INSTRUCTIONS

## 2018-02-08 NOTE — ED PROVIDER NOTES
EMERGENCY DEPARTMENT HISTORY AND PHYSICAL EXAM      Date: 2/8/2018  Patient Name: Briscoe Holter    History of Presenting Illness     Chief Complaint   Patient presents with    Vaginal Discharge     5 days of symtpoms, denies recent sex, fishy odor       History Provided By: Patient    HPI: Briscoe Holter, 32 y.o. female with PMHx significant for seizures, asthma, DM, and HTN, presents ambulatory to the ED with cc of constant vaginal discharge which started 5 days ago. Pt notes the discharge is malodorous. Her symptoms were sudden in onset. She currently doubts pregnancy. Her last vaginal exam was 3 months ago. Pt reports no associated pain or modifying factors with her symptoms. Pt denies dysuria. As there are no complaints of pain, there are no severity (0/10) or quality regarding the pt's presenting complaint. PCP: Kit Garcia MD    There are no other complaints, changes, or physical findings at this time. Current Facility-Administered Medications   Medication Dose Route Frequency Provider Last Rate Last Dose    cefTRIAXone (ROCEPHIN) injection 250 mg  250 mg IntraMUSCular NOW Elysia Hilario MD        azithromycin (ZITHROMAX) tablet 1,000 mg  1,000 mg Oral NOW Elysia Hilario MD         Current Outpatient Prescriptions   Medication Sig Dispense Refill    metroNIDAZOLE (FLAGYL) 500 mg tablet Take 4 Tabs by mouth now for 1 dose. Take all 4 pills one time. 4 Tab 0    metFORMIN (GLUCOPHAGE) 850 mg tablet Take 1 Tab by mouth two (2) times daily (with meals). Indications: type 2 diabetes mellitus 60 Tab 3    lisinopril (PRINIVIL, ZESTRIL) 20 mg tablet Take 1 Tab by mouth daily. 30 Tab 5    ibuprofen (MOTRIN) 800 mg tablet Take 1 Tab by mouth every eight (8) hours as needed for Pain. Take w food 60 Tab 1    medroxyPROGESTERone (DEPO-PROVERA) 150 mg/mL injection 150 mg by IntraMUSCular route once.          Past History     Past Medical History:  Past Medical History:   Diagnosis Date  Asthma     pt denies    Diabetes ([facility] [address] 2016    Hypertension     Other ill-defined conditions(799.89)     back pain    Seizures ([facility] [address]        Past Surgical History:  Past Surgical History:   Procedure Laterality Date    HX GYN  2014    Pt reports  in past.    HX TONSILLECTOMY         Family History:  Family History   Problem Relation Age of Onset    Diabetes Mother     Diabetes Father     Diabetes Maternal Grandmother        Social History:  Social History   Substance Use Topics    Smoking status: Never Smoker    Smokeless tobacco: Never Used    Alcohol use No       Allergies:  No Known Allergies      Review of Systems   Review of Systems   Constitutional: Negative for chills and fever. HENT: Negative for congestion, rhinorrhea, sneezing and sore throat. Eyes: Negative for redness and visual disturbance. Respiratory: Negative for shortness of breath. Cardiovascular: Negative for leg swelling. Gastrointestinal: Negative for abdominal pain, nausea and vomiting. Genitourinary: Positive for vaginal discharge. Negative for difficulty urinating, dysuria and frequency. Musculoskeletal: Negative for back pain, myalgias and neck stiffness. Skin: Negative for rash. Neurological: Negative for dizziness, syncope, weakness and headaches. Hematological: Negative for adenopathy. All other systems reviewed and are negative. Physical Exam   Physical Exam   Constitutional: She is oriented to person, place, and time. She appears well-developed and well-nourished. HENT:   Head: Normocephalic. Mouth/Throat: Oropharynx is clear and moist.   Eyes: Conjunctivae and EOM are normal. Pupils are equal, round, and reactive to light. Neck: Normal range of motion. Neck supple. Cardiovascular: Normal rate, regular rhythm, normal heart sounds and intact distal pulses. Pulmonary/Chest: Effort normal and breath sounds normal.   Abdominal: Soft.  Bowel sounds are normal. She exhibits no distension. There is no rebound. Genitourinary:   Genitourinary Comments: Pelvic: No discharge. No CMT. No masses, lesions, or skin problems. Musculoskeletal: Normal range of motion. She exhibits no edema or deformity. Neurological: She is alert and oriented to person, place, and time. Skin: Skin is warm and dry. Psychiatric: She has a normal mood and affect. Her behavior is normal. Judgment and thought content normal.         Diagnostic Study Results     Labs -     Recent Results (from the past 12 hour(s))   HCG URINE, QL. - POC    Collection Time: 02/08/18  6:35 PM   Result Value Ref Range    Pregnancy test,urine (POC) NEGATIVE  NEG       Medical Decision Making   I am the first provider for this patient. I reviewed the vital signs, available nursing notes, past medical history, past surgical history, family history and social history. Vital Signs-Reviewed the patient's vital signs. Patient Vitals for the past 12 hrs:   Temp Pulse Resp BP SpO2   02/08/18 1732 98.3 °F (36.8 °C) 93 15 153/87 99 %     Records Reviewed: Nursing Notes and Old Medical Records    Provider Notes (Medical Decision Making):   DDx: UTI vs STD    ED Course:   Initial assessment performed. The patients presenting problems have been discussed, and they are in agreement with the care plan formulated and outlined with them. I have encouraged them to ask questions as they arise throughout their visit. Procedure Note - Pelvic Exam:    6:35 PM  Performed by: Herb Corcoran MD  Chaperoned by: Leyla Andrade  Pelvic exam was performed using bimanual and speculum. Further findings noted in physical exam.   The procedure took 1-15 minutes, and pt tolerated well. Disposition:  DISCHARGE NOTE  6:58 PM  The patient has been re-evaluated and is ready for discharge. Reviewed available results with patient. Counseled patient on diagnosis and care plan. Patient has expressed understanding, and all questions have been answered. Patient agrees with plan and agrees to follow up as recommended, or return to the ED if their symptoms worsen. Discharge instructions have been provided and explained to the patient, along with reasons to return to the ED. PLAN:  1. Current Discharge Medication List      START taking these medications    Details   metroNIDAZOLE (FLAGYL) 500 mg tablet Take 4 Tabs by mouth now for 1 dose. Take all 4 pills one time. Qty: 4 Tab, Refills: 0           2. Follow-up Information     Follow up With Details Comments [address], MD Call  [address] [place] [id number]  [phone]      [facility] EMERGENCY DEPT  As needed, If symptoms worsen [address] [facility]  [phone]        Return to ED if worse     Diagnosis     Clinical Impression:   1. Vaginitis and vulvovaginitis        Attestations:    Attestation Note:  This note is prepared by NING Arellano, acting as Scribe for MD Harper Gutierres MD: The scribe's documentation has been prepared under my direction and personally reviewed by me in its entirety. I confirm that the note above accurately reflects all work, treatment, procedures, and medical decision making performed by me.

## 2018-02-08 NOTE — ED NOTES
Pt reports white vaginal discharge x 5 days; denies urinary symptoms      Emergency Department Nursing Plan of Care       The Nursing Plan of Care is developed from the Nursing assessment and Emergency Department Attending provider initial evaluation. The plan of care may be reviewed in the ED Provider note.     The Plan of Care was developed with the following considerations:   Patient / Family readiness to learn indicated by:verbalized understanding  Persons(s) to be included in education: patient  Barriers to Learning/Limitations:No    Signed     Aditya Velazquez RN    2/8/2018   6:22 PM

## 2018-02-09 NOTE — ED NOTES
Patient given copy of discharge instructions and 1 script(s). Patient given a current medication reconciliation form and verbalized understanding of their medications and importance of discussing medications at follow-up. Patient stable at discharge. Ambulatory out of ED with self.

## 2018-04-19 ENCOUNTER — APPOINTMENT (OUTPATIENT)
Dept: GENERAL RADIOLOGY | Age: 28
End: 2018-04-19
Attending: EMERGENCY MEDICINE
Payer: SELF-PAY

## 2018-04-19 ENCOUNTER — HOSPITAL ENCOUNTER (EMERGENCY)
Age: 28
Discharge: HOME | End: 2018-04-19
Attending: EMERGENCY MEDICINE
Payer: SELF-PAY

## 2018-04-19 VITALS
OXYGEN SATURATION: 98 % | DIASTOLIC BLOOD PRESSURE: 73 MMHG | RESPIRATION RATE: 17 BRPM | SYSTOLIC BLOOD PRESSURE: 141 MMHG | TEMPERATURE: 98 F | WEIGHT: 293 LBS | BODY MASS INDEX: 55.32 KG/M2 | HEART RATE: 98 BPM | HEIGHT: 61 IN

## 2018-04-19 DIAGNOSIS — R07.89 CHEST WALL PAIN: Primary | ICD-10-CM

## 2018-04-19 PROCEDURE — 99283 EMERGENCY DEPT VISIT LOW MDM: CPT

## 2018-04-19 PROCEDURE — 74011250637 HC RX REV CODE- 250/637: Performed by: EMERGENCY MEDICINE

## 2018-04-19 PROCEDURE — 93005 ELECTROCARDIOGRAM TRACING: CPT

## 2018-04-19 PROCEDURE — 74011250636 HC RX REV CODE- 250/636: Performed by: EMERGENCY MEDICINE

## 2018-04-19 PROCEDURE — 71046 X-RAY EXAM CHEST 2 VIEWS: CPT

## 2018-04-19 PROCEDURE — 96372 THER/PROPH/DIAG INJ SC/IM: CPT

## 2018-04-19 RX ORDER — KETOROLAC TROMETHAMINE 30 MG/ML
30 INJECTION, SOLUTION INTRAMUSCULAR; INTRAVENOUS
Status: COMPLETED | OUTPATIENT
Start: 2018-04-19 | End: 2018-04-19

## 2018-04-19 RX ADMIN — ALUMINUM HYDROXIDE AND MAGNESIUM HYDROXIDE 30 ML: 200; 200 SUSPENSION ORAL at 18:09

## 2018-04-19 RX ADMIN — KETOROLAC TROMETHAMINE 30 MG: 30 INJECTION, SOLUTION INTRAMUSCULAR; INTRAVENOUS at 18:09

## 2018-04-19 NOTE — ED NOTES
Changed into gown for exam and radiology. Patient skin warm and dry to touch. Speaks in complete sentences w/o difficulty. NAD. Emergency Department Nursing Plan of Care       The Nursing Plan of Care is developed from the Nursing assessment and Emergency Department Attending provider initial evaluation. The plan of care may be reviewed in the ED Provider note.     The Plan of Care was developed with the following considerations:   Patient / Family readiness to learn indicated by:verbalized understanding  Persons(s) to be included in education: patient  Barriers to Learning/Limitations:No    Signed     Ana María Rollins RN    4/19/2018   6:05 PM

## 2018-04-19 NOTE — ED PROVIDER NOTES
EMERGENCY DEPARTMENT HISTORY AND PHYSICAL EXAM      Date: 2018  Patient Name: Ga Lyles    History of Presenting Illness     Chief Complaint   Patient presents with    Chest Pain     intermittent, pressure, mid sternal chest pain x 30 min ago, denies sob, nothing makes the pain better or worse. History Provided By: Patient    HPI: Ga Lyles, 32 y.o. female with PMHx significant for diabetes and hypertension, presents ambulatory to the ED with cc of intermittent episodes of moderate mid-sternal chest pain for the past 30 minutes prior to coming to the ED. Pt states she has a history of similar symptoms with previous episodes of costochondritis. Her pain is exacerbated by deep inspiration and movement. She denies recent abdominal pain, fever, cough, shortness of breath. PCP: Jamie Mena MD    There are no other complaints, changes, or physical findings at this time. Current Outpatient Prescriptions   Medication Sig Dispense Refill    ibuprofen (MOTRIN) 800 mg tablet Take 1 Tab by mouth every eight (8) hours as needed for Pain. Take w food 60 Tab 1    metFORMIN (GLUCOPHAGE) 850 mg tablet Take 1 Tab by mouth two (2) times daily (with meals). Indications: type 2 diabetes mellitus 60 Tab 3    lisinopril (PRINIVIL, ZESTRIL) 20 mg tablet Take 1 Tab by mouth daily. 30 Tab 5    medroxyPROGESTERone (DEPO-PROVERA) 150 mg/mL injection 150 mg by IntraMUSCular route once.          Past History     Past Medical History:  Past Medical History:   Diagnosis Date    Asthma     pt denies    Diabetes ([address] 2016    Hypertension     Other ill-defined conditions(799.89)     back pain    Seizures ([address]        Past Surgical History:  Past Surgical History:   Procedure Laterality Date    HX GYN  2014    Pt reports  in past.    HX TONSILLECTOMY         Family History:  Family History   Problem Relation Age of Onset    Diabetes Mother     Diabetes Father     Diabetes Maternal Grandmother        Social History:  Social History   Substance Use Topics    Smoking status: Never Smoker    Smokeless tobacco: Never Used    Alcohol use No       Allergies:  No Known Allergies      Review of Systems   Review of Systems   Constitutional: Negative for chills and fever. HENT: Negative for congestion and sore throat. Eyes: Negative for visual disturbance. Respiratory: Negative for cough and shortness of breath. Cardiovascular: Positive for chest pain. Negative for leg swelling. Gastrointestinal: Negative for abdominal pain, blood in stool, diarrhea and nausea. Endocrine: Negative for polyuria. Genitourinary: Negative for dysuria, flank pain, vaginal bleeding and vaginal discharge. Musculoskeletal: Negative for myalgias. Skin: Negative for rash. Allergic/Immunologic: Negative for immunocompromised state. Neurological: Negative for weakness and headaches. Psychiatric/Behavioral: Negative for confusion. Physical Exam   Physical Exam   Constitutional: She is oriented to person, place, and time. She appears well-developed and well-nourished. Obese    HENT:   Head: Normocephalic and atraumatic. Moist mucous membranes   Eyes: Conjunctivae are normal. Pupils are equal, round, and reactive to light. Right eye exhibits no discharge. Left eye exhibits no discharge. Neck: Normal range of motion. Neck supple. No tracheal deviation present. Cardiovascular: Normal rate, regular rhythm and normal heart sounds. No murmur heard. Pulmonary/Chest: Effort normal and breath sounds normal. No respiratory distress. She has no wheezes. She has no rales. Abdominal: Soft. Bowel sounds are normal. There is no tenderness. There is no rebound and no guarding. Musculoskeletal: Normal range of motion. She exhibits no edema, tenderness or deformity. Neurological: She is alert and oriented to person, place, and time. Skin: Skin is warm and dry. No rash noted. No erythema. Psychiatric: Her behavior is normal.   Nursing note and vitals reviewed. Diagnostic Study Results     Radiologic Studies -   CXR Results  (Last 48 hours)               04/19/18 1826  XR CHEST PA LAT Final result    Impression:  IMPRESSION:   1. No radiographic evidence of acute cardiopulmonary disease. Narrative:  INDICATION: . chest pain   COMPARISON: CT of the chest, 11/3/2017. LIMITATIONS: Portable technique. Kedar Marquez FINDINGS: Single frontal view of the chest.    .   Lines/tubes/surgical: None. Heart/mediastinum: Unremarkable. Lungs/pleura:  No focal consolidation or mass. No visualized pleural effusion or   pneumothorax. Additional Comments: None. .               Medical Decision Making   I am the first provider for this patient. I reviewed the vital signs, available nursing notes, past medical history, past surgical history, family history and social history. Vital Signs-Reviewed the patient's vital signs. Patient Vitals for the past 12 hrs:   Temp Pulse Resp BP SpO2   04/19/18 1801 98 °F (36.7 °C) 98 17 141/73 98 %       Pulse Oximetry Analysis - 98% on room air    Cardiac Monitor:   Rate: 87 bpm  Rhythm: Normal Sinus Rhythm      EKG interpretation: (Preliminary) 6:04 PM  Rhythm: normal sinus rhythm; and regular . Rate (approx.): 87; Axis: normal; CT interval: normal; QRS interval: normal ; ST/T wave: normal; Other findings: normal.  Written by Mackenzie Johnson. Bill Chavez, ED Scribe, as dictated by Elysia Hernandez DO. Records Reviewed: Nursing Notes and Old Medical Records    Provider Notes (Medical Decision Making):   Patient presents with CP. DDx:  ACS, Aortic dissection, PNA, PE, PTX, pericarditis, myocarditis, GERD, costochondritis, anxiety. Most likely costochondritis given the HPI and Physical exam. Will obtain labs, CXR, EKG. - I have re-examined the patient and the patient denies chest pain on re-examination.   The patient has had onset of chest pain greater than 8 hours and one negative set of cardiac enzymes or 2 negative sets of cardiac enzymes in the ER during this visit. The diagnosis, follow up, return instructions, test results, x-rays and medications have been discussed and reviewed with the patient. The patient has been given the opportunity to ask questions. The patient  expresses understanding of the diagnosis, follow-up and return instructions. The patient agrees to follow up with primary care or cardiology as directed and to return immediately if the chest pain worsens. The patient expresses understanding that although cardiac testing at this time is negative, a cardiac problem could still be present and that a follow-up appointment for further evaluation and risk factor modification is necessary to complete the evaluation of this complaint. ED Course:   Initial assessment performed. The patients presenting problems have been discussed, and they are in agreement with the care plan formulated and outlined with them. I have encouraged them to ask questions as they arise throughout their visit. Disposition:  DISCHARGE NOTE  7:03 PM  The patient has been re-evaluated and is ready for discharge. Reviewed available results with patient. Counseled pt on diagnosis and care plan. Pt has expressed understanding, and all questions have been answered. Pt agrees with plan and agrees to follow up as recommended, or return to the ED if their symptoms worsen. Discharge instructions have been provided and explained to the pt, along with reasons to return to the ED. PLAN:  1. Discharge Medication List as of 4/19/2018  6:52 PM        2.    Follow-up Information     Follow up With Details Comments [address], [place] Call in 1 day  [facility] [address] EMERGENCY DEPT  If symptoms worsen [address] [phone]        Return to ED if worse     Diagnosis     Clinical Impression:   1. Chest wall pain        Attestations: This note is prepared by Tino Mike, acting as Scribe for [employer] DO. [employer], DO: The scribe's documentation has been prepared under my direction and personally reviewed by me in its entirety. I confirm that the note above accurately reflects all work, treatment, procedures, and medical decision making performed by me.
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
## 2018-04-19 NOTE — ED NOTES

## 2018-04-19 NOTE — DISCHARGE INSTRUCTIONS
Musculoskeletal Chest Pain: Care Instructions  Your Care Instructions    Chest pain is not always a sign that something is wrong with your heart or that you have another serious problem. The doctor thinks your chest pain is caused by strained muscles or ligaments, inflamed chest cartilage, or another problem in your chest, rather than by your heart. You may need more tests to find the cause of your chest pain. Follow-up care is a key part of your treatment and safety. Be sure to make and go to all appointments, and call your doctor if you are having problems. It's also a good idea to know your test results and keep a list of the medicines you take. How can you care for yourself at home? · Take pain medicines exactly as directed. ¨ If the doctor gave you a prescription medicine for pain, take it as prescribed. ¨ If you are not taking a prescription pain medicine, ask your doctor if you can take an over-the-counter medicine. · Rest and protect the sore area. · Stop, change, or take a break from any activity that may be causing your pain or soreness. · Put ice or a cold pack on the sore area for 10 to 20 minutes at a time. Try to do this every 1 to 2 hours for the next 3 days (when you are awake) or until the swelling goes down. Put a thin cloth between the ice and your skin. · After 2 or 3 days, apply a heating pad set on low or a warm cloth to the area that hurts. Some doctors suggest that you go back and forth between hot and cold. · Do not wrap or tape your ribs for support. This may cause you to take smaller breaths, which could increase your risk of lung problems. · Mentholated creams such as Bengay or Icy Hot may soothe sore muscles. Follow the instructions on the package. · Follow your doctor's instructions for exercising. · Gentle stretching and massage may help you get better faster. Stretch slowly to the point just before pain begins, and hold the stretch for at least 15 to 30 seconds.  Do this 3 or 4 times a day. Stretch just after you have applied heat. · As your pain gets better, slowly return to your normal activities. Any increased pain may be a sign that you need to rest a while longer. When should you call for help? Call 911 anytime you think you may need emergency care. For example, call if:  ? · You have chest pain or pressure. This may occur with:  ¨ Sweating. ¨ Shortness of breath. ¨ Nausea or vomiting. ¨ Pain that spreads from the chest to the neck, jaw, or one or both shoulders or arms. ¨ Dizziness or lightheadedness. ¨ A fast or uneven pulse. After calling 911, chew 1 adult-strength aspirin. Wait for an ambulance. Do not try to drive yourself. ? · You have sudden chest pain and shortness of breath, or you cough up blood. ?Call your doctor now or seek immediate medical care if:  ? · You have any trouble breathing. ? · Your chest pain gets worse. ? · Your chest pain occurs consistently with exercise and is relieved by rest.   ? Watch closely for changes in your health, and be sure to contact your doctor if:  ? · Your chest pain does not get better after 1 week. Where can you learn more? Go to http://phil-khari.info/. Enter V293 in the search box to learn more about \"Musculoskeletal Chest Pain: Care Instructions. \"  Current as of: March 20, 2017  Content Version: 11.4  © 8318-0039 [employer] Care instructions adapted under license by [employer] (which disclaims liability or warranty for this information). If you have questions about a medical condition or this instruction, always ask your healthcare professional. [place] [address] any warranty or liability for your use of this information. Chest Pain: Care Instructions  Your Care Instructions    There are many things that can cause chest pain. Some are not serious and will get better on their own in a few days.  But some kinds of chest pain need more testing and treatment. Your doctor may have recommended a follow-up visit in the next 8 to 12 hours. If you are not getting better, you may need more tests or treatment. Even though your doctor has released you, you still need to watch for any problems. The doctor carefully checked you, but sometimes problems can develop later. If you have new symptoms or if your symptoms do not get better, get medical care right away. If you have worse or different chest pain or pressure that lasts more than 5 minutes or you passed out (lost consciousness), call 911 or seek other emergency help right away. A medical visit is only one step in your treatment. Even if you feel better, you still need to do what your doctor recommends, such as going to all suggested follow-up appointments and taking medicines exactly as directed. This will help you recover and help prevent future problems. How can you care for yourself at home? · Rest until you feel better. · Take your medicine exactly as prescribed. Call your doctor if you think you are having a problem with your medicine. · Do not drive after taking a prescription pain medicine. When should you call for help? Call 911 if:  ? · You passed out (lost consciousness). ? · You have severe difficulty breathing. ? · You have symptoms of a heart attack. These may include:  ¨ Chest pain or pressure, or a strange feeling in your chest.  ¨ Sweating. ¨ Shortness of breath. ¨ Nausea or vomiting. ¨ Pain, pressure, or a strange feeling in your back, neck, jaw, or upper belly or in one or both shoulders or arms. ¨ Lightheadedness or sudden weakness. ¨ A fast or irregular heartbeat. After you call 911, the  may tell you to chew 1 adult-strength or 2 to 4 low-dose aspirin. Wait for an ambulance. Do not try to drive yourself. ?Call your doctor today if:  ? · You have any trouble breathing. ? · Your chest pain gets worse.    ? · You are dizzy or lightheaded, or you feel like you may faint. ? · You are not getting better as expected. ? · You are having new or different chest pain. Where can you learn more? Go to http://phil-khari.info/. Enter A120 in the search box to learn more about \"Chest Pain: Care Instructions. \"  Current as of: March 20, 2017  Content Version: 11.4  © 3052-1847 [employer]. Care instructions adapted under license by [employer] (which disclaims liability or warranty for this information). If you have questions about a medical condition or this instruction, always ask your healthcare professional. [place] [address] any warranty or liability for your use of this information.

## 2018-04-20 LAB
ATRIAL RATE: 87 BPM
CALCULATED P AXIS, ECG09: 47 DEGREES
CALCULATED R AXIS, ECG10: 49 DEGREES
CALCULATED T AXIS, ECG11: 3 DEGREES
DIAGNOSIS, 93000: NORMAL
P-R INTERVAL, ECG05: 142 MS
Q-T INTERVAL, ECG07: 360 MS
QRS DURATION, ECG06: 80 MS
QTC CALCULATION (BEZET), ECG08: 433 MS
VENTRICULAR RATE, ECG03: 87 BPM

## 2018-06-12 ENCOUNTER — HOSPITAL ENCOUNTER (EMERGENCY)
Age: 28
Discharge: HOME | End: 2018-06-12
Attending: EMERGENCY MEDICINE
Payer: SELF-PAY

## 2018-06-12 VITALS
BODY MASS INDEX: 53.92 KG/M2 | OXYGEN SATURATION: 99 % | SYSTOLIC BLOOD PRESSURE: 161 MMHG | TEMPERATURE: 97.8 F | RESPIRATION RATE: 18 BRPM | DIASTOLIC BLOOD PRESSURE: 77 MMHG | HEART RATE: 88 BPM | WEIGHT: 293 LBS | HEIGHT: 62 IN

## 2018-06-12 DIAGNOSIS — H66.92 LEFT OTITIS MEDIA, UNSPECIFIED OTITIS MEDIA TYPE: Primary | ICD-10-CM

## 2018-06-12 DIAGNOSIS — R09.81 NASAL CONGESTION: ICD-10-CM

## 2018-06-12 PROCEDURE — 74011250637 HC RX REV CODE- 250/637: Performed by: EMERGENCY MEDICINE

## 2018-06-12 PROCEDURE — 99283 EMERGENCY DEPT VISIT LOW MDM: CPT

## 2018-06-12 RX ORDER — METFORMIN HYDROCHLORIDE 500 MG/1
500 TABLET ORAL
COMMUNITY
End: 2019-05-27

## 2018-06-12 RX ORDER — LISINOPRIL 10 MG/1
10 TABLET ORAL DAILY
COMMUNITY
End: 2019-05-27

## 2018-06-12 RX ORDER — AMOXICILLIN 500 MG/1
500 TABLET, FILM COATED ORAL 3 TIMES DAILY
Qty: 30 TAB | Refills: 0 | Status: SHIPPED | OUTPATIENT
Start: 2018-06-12 | End: 2018-12-23

## 2018-06-12 RX ORDER — ACETAMINOPHEN 325 MG/1
650 TABLET ORAL
Status: COMPLETED | OUTPATIENT
Start: 2018-06-12 | End: 2018-06-12

## 2018-06-12 RX ADMIN — ACETAMINOPHEN 650 MG: 325 TABLET, FILM COATED ORAL at 09:53

## 2018-06-12 NOTE — ED PROVIDER NOTES
EMERGENCY DEPARTMENT HISTORY AND PHYSICAL EXAM      Date: 2018  Patient Name: Cony Bernard    History of Presenting Illness     Chief Complaint   Patient presents with    Cough       History Provided By: Patient    HPI: Cony Bernard, 32 y.o. female with PMHx significant for szs, non-insulin dependent DM, HTN, and asthma, presents ambulatory to the ED with cc of a persistent, moderate nasal congestion and bilateral ear pain that started yesterday alongside an associated sore throat. Pt states that she can breath, but has a stuffy nose. Pt reports that she takes Metformin and Lisinopril, and that she took these today. Pt states that she has NKA to any medications. Pt specifically denies having any coughing, fever, or vomiting. Pt does not endorse smoking tobacco and does not drink alcohol. Chief Complaint: nasal congestion and bilateral ear pain  Duration: yesterday   Timing:  Constant  Location: nose and bilateral ears  Quality: Nose is stuffy  Severity: 10 out of 10  Modifying Factors: Metformin and Lisinopril   Associated Symptoms: sore throat     There are no other complaints, changes, or physical findings at this time. PCP: Leticia Hauser MD    Current Outpatient Prescriptions   Medication Sig Dispense Refill    lisinopril (PRINIVIL, ZESTRIL) 10 mg tablet Take 10 mg by mouth daily.  metFORMIN (GLUCOPHAGE) 500 mg tablet Take 500 mg by mouth daily (with breakfast).  amoxicillin 500 mg tab Take 500 mg by mouth three (3) times daily. 30 Tab 0    medroxyPROGESTERone (DEPO-PROVERA) 150 mg/mL injection 150 mg by IntraMUSCular route once.          Past History     Past Medical History:  Past Medical History:   Diagnosis Date    Asthma     pt denies    Diabetes ([address] 2016    Hypertension     Other ill-defined conditions(799.89)     back pain    Se[address] [address]        Past Surgical History:  Past Surgical History:   Procedure Laterality Date    HX GYN      Pt reports  in past.    HX TONSILLECTOMY         Family History:  Family History   Problem Relation Age of Onset    Diabetes Mother     Diabetes Father     Diabetes Maternal Grandmother        Social History:  Social History   Substance Use Topics    Smoking status: Never Smoker    Smokeless tobacco: Never Used    Alcohol use No       Allergies:  No Known Allergies      Review of Systems   Review of Systems   Constitutional: Negative for chills and fever. HENT: Positive for congestion (nasal), ear pain (bilaterally) and sore throat. Negative for rhinorrhea and sneezing. Eyes: Negative for redness and visual disturbance. Respiratory: Negative for cough and shortness of breath. Cardiovascular: Negative for leg swelling. Gastrointestinal: Negative for abdominal pain, nausea and vomiting. Genitourinary: Negative for difficulty urinating and frequency. Musculoskeletal: Negative for back pain, myalgias and neck stiffness. Skin: Negative for rash. Neurological: Negative for dizziness, syncope, weakness and headaches. Hematological: Negative for adenopathy. All other systems reviewed and are negative. Physical Exam   Physical Exam   Constitutional: She is oriented to person, place, and time. She appears well-developed and well-nourished. HENT:   Head: Normocephalic. Left Ear: Tympanic membrane is erythematous. Mouth/Throat: Oropharynx is clear and moist. No tonsillar abscesses. No tonsillar exudate or abscess. Erythema of L TM. Eyes: Conjunctivae and EOM are normal. Pupils are equal, round, and reactive to light. Neck: Normal range of motion. Neck supple. Cardiovascular: Normal rate, regular rhythm, normal heart sounds and intact distal pulses. Pulmonary/Chest: Effort normal and breath sounds normal.   Abdominal: Soft. Bowel sounds are normal. She exhibits no distension. There is no rebound. Musculoskeletal: Normal range of motion. She exhibits no edema or deformity. Neurological: She is alert and oriented to person, place, and time. Skin: Skin is warm and dry. Psychiatric: She has a normal mood and affect. Her behavior is normal. Judgment and thought content normal.       Diagnostic Study Results     Labs -   No results found for this or any previous visit (from the past 12 hour(s)). Radiologic Studies -   No orders to display     CT Results  (Last 48 hours)    None        CXR Results  (Last 48 hours)    None            Medical Decision Making   I am the first provider for this patient. I reviewed the vital signs, available nursing notes, past medical history, past surgical history, family history and social history. Vital Signs-Reviewed the patient's vital signs. Patient Vitals for the past 12 hrs:   Temp Pulse Resp BP SpO2   06/12/18 0927 97.8 °F (36.6 °C) 88 18 161/77 99 %       Pulse Oximetry Analysis - 99% on RA    Records Reviewed: Nursing Notes and Old Medical Records    Provider Notes (Medical Decision Making):   DDx: URI vs otitis media vs sinusitis     ED Course:   Initial assessment performed. The patients presenting problems have been discussed, and they are in agreement with the care plan formulated and outlined with them. I have encouraged them to ask questions as they arise throughout their visit. Critical Care Time: 0 minutes    Disposition:  DISCHARGE NOTE  9:49 AM  The patient has been re-evaluated and is ready for discharge. Reviewed available results with patient. Counseled pt on diagnosis and care plan. Pt has expressed understanding, and all questions have been answered. Pt agrees with plan and agrees to F/U as recommended, or return to the ED if their sxs worsen. Discharge instructions have been provided and explained to the pt, along with reasons to return to the ED. Written by MILAD Blanchard, as dictated by  Esther Polanco MD.    PLAN:  1.    Discharge Medication List as of 6/12/2018 10:29 AM      START taking these medications    Details   amoxicillin 500 mg tab Take 500 mg by mouth three (3) times daily. , Normal, Disp-30 Tab, R-0         CONTINUE these medications which have NOT CHANGED    Details   lisinopril (PRINIVIL, ZESTRIL) 10 mg tablet Take 10 mg by mouth daily. , Historical Med      metFORMIN (GLUCOPHAGE) 500 mg tablet Take 500 mg by mouth daily (with breakfast). , Historical Med      medroxyPROGESTERone (DEPO-PROVERA) 150 mg/mL injection 150 mg by IntraMUSCular route once., Historical Med           2. Follow-up Information     Follow up With Details Comments [address], [place] Call  [address]  [address] [address] [phone]      [facility] EMERGENCY DEPT  As needed, If symptoms worsen [address] [facility]  [phone]        Return to ED if worse     Diagnosis     Clinical Impression:   1. Left otitis media, unspecified otitis media type    2. Nasal congestion      Attestation: This note is prepared by Jareth Aponte, acting as Scribe for  MD Kaushal Huber MD: The scribe's documentation has been prepared under my direction and personally reviewed by me in its entirety. I confirm that the note above accurately reflects all work, treatment, procedures, and medical decision making performed by me.

## 2018-06-12 NOTE — ED NOTES
Pt given printed discharge instructions and 1 script(s). Pt verbalized understanding of instructions and script(s). Pt verbalized importance of following up with PCP. Pt alert and oriented, in no acute distress, ambulatory with self. Pt given work note.

## 2018-06-12 NOTE — LETTER
[facility] EMERGENCY DEPT 
[address] [facility] [place] [address] [id number] [phone] Work/School Note Date: 6/12/2018 To Whom It May concern: 
 
Molly Gongora was seen and treated today in the emergency room by the following provider(s): 
Attending Provider: José Miguel Cleaning MD. Molly Gongora may return to work on 6/14/18. Sincerely, José Miguel Cleaning MD

## 2018-06-12 NOTE — DISCHARGE INSTRUCTIONS
Ear Infection (Otitis Media): Care Instructions  Your Care Instructions    An ear infection may start with a cold and affect the middle ear (otitis media). It can hurt a lot. Most ear infections clear up on their own in a couple of days. Most often you will not need antibiotics. This is because many ear infections are caused by a virus. Antibiotics don't work against a virus. Regular doses of pain medicines are the best way to reduce your fever and help you feel better. Follow-up care is a key part of your treatment and safety. Be sure to make and go to all appointments, and call your doctor if you are having problems. It's also a good idea to know your test results and keep a list of the medicines you take. How can you care for yourself at home? · Take pain medicines exactly as directed. ¨ If the doctor gave you a prescription medicine for pain, take it as prescribed. ¨ If you are not taking a prescription pain medicine, take an over-the-counter medicine, such as acetaminophen (Tylenol), ibuprofen (Advil, Motrin), or naproxen (Aleve). Read and follow all instructions on the label. ¨ Do not take two or more pain medicines at the same time unless the doctor told you to. Many pain medicines have acetaminophen, which is Tylenol. Too much acetaminophen (Tylenol) can be harmful. · Plan to take a full dose of pain reliever before bedtime. Getting enough sleep will help you get better. · Try a warm, moist washcloth on the ear. It may help relieve pain. · If your doctor prescribed antibiotics, take them as directed. Do not stop taking them just because you feel better. You need to take the full course of antibiotics. When should you call for help? Call your doctor now or seek immediate medical care if:  ? · You have new or increasing ear pain. ? · You have new or increasing pus or blood draining from your ear. ? · You have a fever with a stiff neck or a severe headache. ? Watch closely for changes in your health, and be sure to contact your doctor if:  ? · You have new or worse symptoms. ? · You are not getting better after taking an antibiotic for 2 days. Where can you learn more? Go to http://phil-khari.info/. Enter V966 in the search box to learn more about \"Ear Infection (Otitis Media): Care Instructions. \"  Current as of: May 12, 2017  Content Version: 11.4  © 2533-9241 [employer]. Care instructions adapted under license by [employer] (which disclaims liability or warranty for this information). If you have questions about a medical condition or this instruction, always ask your healthcare professional. [place] [address] any warranty or liability for your use of this information.

## 2018-06-13 NOTE — PROGRESS NOTES
Documented on 6/13/18:    Spiritual Care Partner Volunteer visited patient in ED on 6/12/18. Documented by:  Rita Stewart M.Div.    Paging Service [phone])

## 2018-08-20 ENCOUNTER — HOSPITAL ENCOUNTER (EMERGENCY)
Age: 28
Discharge: HOME | End: 2018-08-21
Attending: EMERGENCY MEDICINE | Admitting: EMERGENCY MEDICINE
Payer: SELF-PAY

## 2018-08-20 ENCOUNTER — APPOINTMENT (OUTPATIENT)
Dept: ULTRASOUND IMAGING | Age: 28
End: 2018-08-20
Attending: PHYSICIAN ASSISTANT
Payer: SELF-PAY

## 2018-08-20 VITALS
SYSTOLIC BLOOD PRESSURE: 147 MMHG | RESPIRATION RATE: 16 BRPM | TEMPERATURE: 98.6 F | HEART RATE: 92 BPM | WEIGHT: 293 LBS | HEIGHT: 62 IN | DIASTOLIC BLOOD PRESSURE: 57 MMHG | OXYGEN SATURATION: 99 % | BODY MASS INDEX: 53.92 KG/M2

## 2018-08-20 DIAGNOSIS — N30.00 ACUTE CYSTITIS WITHOUT HEMATURIA: ICD-10-CM

## 2018-08-20 DIAGNOSIS — N83.202 CYST OF LEFT OVARY: ICD-10-CM

## 2018-08-20 DIAGNOSIS — N93.9 ABNORMAL UTERINE BLEEDING: Primary | ICD-10-CM

## 2018-08-20 DIAGNOSIS — R73.9 HYPERGLYCEMIA: ICD-10-CM

## 2018-08-20 LAB
ANION GAP BLD CALC-SCNC: 18 MMOL/L (ref 10–20)
APPEARANCE UR: CLEAR
BACTERIA URNS QL MICRO: ABNORMAL /HPF
BASOPHILS # BLD: 0 K/UL (ref 0–0.1)
BASOPHILS NFR BLD: 0 % (ref 0–1)
BILIRUB UR QL: NEGATIVE
BUN BLD-MCNC: 9 MG/DL (ref 9–20)
CA-I BLD-MCNC: 1.23 MMOL/L (ref 1.12–1.32)
CHLORIDE BLD-SCNC: 98 MMOL/L (ref 98–107)
CO2 BLD-SCNC: 25 MMOL/L (ref 21–32)
COLOR UR: ABNORMAL
CREAT BLD-MCNC: 0.6 MG/DL (ref 0.6–1.3)
DIFFERENTIAL METHOD BLD: ABNORMAL
EOSINOPHIL # BLD: 0 K/UL (ref 0–0.4)
EOSINOPHIL NFR BLD: 0 % (ref 0–7)
EPITH CASTS URNS QL MICRO: ABNORMAL /LPF
ERYTHROCYTE [DISTWIDTH] IN BLOOD BY AUTOMATED COUNT: 12.9 % (ref 11.5–14.5)
GLUCOSE BLD-MCNC: 381 MG/DL (ref 65–100)
GLUCOSE UR STRIP.AUTO-MCNC: >1000 MG/DL
HCG UR QL: NEGATIVE
HCT VFR BLD AUTO: 37.5 % (ref 35–47)
HCT VFR BLD CALC: 38 % (ref 35–47)
HGB BLD-MCNC: 12.4 G/DL (ref 11.5–16)
HGB UR QL STRIP: ABNORMAL
IMM GRANULOCYTES # BLD: 0.1 K/UL (ref 0–0.04)
IMM GRANULOCYTES NFR BLD AUTO: 1 % (ref 0–0.5)
KETONES UR QL STRIP.AUTO: ABNORMAL MG/DL
LEUKOCYTE ESTERASE UR QL STRIP.AUTO: NEGATIVE
LYMPHOCYTES # BLD: 1.9 K/UL (ref 0.8–3.5)
LYMPHOCYTES NFR BLD: 26 % (ref 12–49)
MCH RBC QN AUTO: 27.1 PG (ref 26–34)
MCHC RBC AUTO-ENTMCNC: 33.1 G/DL (ref 30–36.5)
MCV RBC AUTO: 81.9 FL (ref 80–99)
MONOCYTES # BLD: 0.5 K/UL (ref 0–1)
MONOCYTES NFR BLD: 7 % (ref 5–13)
NEUTS SEG # BLD: 4.9 K/UL (ref 1.8–8)
NEUTS SEG NFR BLD: 65 % (ref 32–75)
NITRITE UR QL STRIP.AUTO: NEGATIVE
NRBC # BLD: 0 K/UL (ref 0–0.01)
NRBC BLD-RTO: 0 PER 100 WBC
PH UR STRIP: 6 [PH] (ref 5–8)
PLATELET # BLD AUTO: 243 K/UL (ref 150–400)
PMV BLD AUTO: 10.8 FL (ref 8.9–12.9)
POTASSIUM BLD-SCNC: 3.7 MMOL/L (ref 3.5–5.1)
PROT UR STRIP-MCNC: NEGATIVE MG/DL
RBC # BLD AUTO: 4.58 M/UL (ref 3.8–5.2)
RBC #/AREA URNS HPF: ABNORMAL /HPF (ref 0–5)
SERVICE CMNT-IMP: ABNORMAL
SODIUM BLD-SCNC: 136 MMOL/L (ref 136–145)
SP GR UR REFRACTOMETRY: 1.01 (ref 1–1.03)
TSH SERPL DL<=0.05 MIU/L-ACNC: 2.8 UIU/ML (ref 0.36–3.74)
UA: UC IF INDICATED,UAUC: ABNORMAL
UROBILINOGEN UR QL STRIP.AUTO: 0.2 EU/DL (ref 0.2–1)
WBC # BLD AUTO: 7.5 K/UL (ref 3.6–11)
WBC URNS QL MICRO: ABNORMAL /HPF (ref 0–4)

## 2018-08-20 PROCEDURE — 76830 TRANSVAGINAL US NON-OB: CPT

## 2018-08-20 PROCEDURE — 99284 EMERGENCY DEPT VISIT MOD MDM: CPT

## 2018-08-20 PROCEDURE — 80047 BASIC METABLC PNL IONIZED CA: CPT

## 2018-08-20 PROCEDURE — 85025 COMPLETE CBC W/AUTO DIFF WBC: CPT | Performed by: PHYSICIAN ASSISTANT

## 2018-08-20 PROCEDURE — 86900 BLOOD TYPING SEROLOGIC ABO: CPT | Performed by: PHYSICIAN ASSISTANT

## 2018-08-20 PROCEDURE — 74011250636 HC RX REV CODE- 250/636: Performed by: PHYSICIAN ASSISTANT

## 2018-08-20 PROCEDURE — 87086 URINE CULTURE/COLONY COUNT: CPT | Performed by: PHYSICIAN ASSISTANT

## 2018-08-20 PROCEDURE — 96360 HYDRATION IV INFUSION INIT: CPT

## 2018-08-20 PROCEDURE — 36415 COLL VENOUS BLD VENIPUNCTURE: CPT | Performed by: PHYSICIAN ASSISTANT

## 2018-08-20 PROCEDURE — 81025 URINE PREGNANCY TEST: CPT

## 2018-08-20 PROCEDURE — 81001 URINALYSIS AUTO W/SCOPE: CPT | Performed by: PHYSICIAN ASSISTANT

## 2018-08-20 PROCEDURE — 87147 CULTURE TYPE IMMUNOLOGIC: CPT | Performed by: PHYSICIAN ASSISTANT

## 2018-08-20 PROCEDURE — 84443 ASSAY THYROID STIM HORMONE: CPT | Performed by: PHYSICIAN ASSISTANT

## 2018-08-20 PROCEDURE — 76856 US EXAM PELVIC COMPLETE: CPT

## 2018-08-20 RX ORDER — SODIUM CHLORIDE 0.9 % (FLUSH) 0.9 %
5-10 SYRINGE (ML) INJECTION AS NEEDED
Status: DISCONTINUED | OUTPATIENT
Start: 2018-08-20 | End: 2018-08-21 | Stop reason: HOSPADM

## 2018-08-20 RX ORDER — SODIUM CHLORIDE 0.9 % (FLUSH) 0.9 %
5-10 SYRINGE (ML) INJECTION EVERY 8 HOURS
Status: DISCONTINUED | OUTPATIENT
Start: 2018-08-20 | End: 2018-08-21 | Stop reason: HOSPADM

## 2018-08-20 RX ADMIN — Medication 10 ML: at 23:10

## 2018-08-20 RX ADMIN — SODIUM CHLORIDE 1000 ML: 900 INJECTION, SOLUTION INTRAVENOUS at 23:09

## 2018-08-20 NOTE — LETTER
[facility] EMERGENCY DEPT 
[address] [facility] [address] [id number] [phone] Work/School Note Date: 8/20/2018 To Whom It May concern: 
 
Yaa Poon was seen and treated today in the emergency room by the following provider(s): 
Physician Assistant: Danny Farris. Yaa Poon may return to work on 8/23/2018. Sincerely, IAN Farris

## 2018-08-21 LAB
ABO + RH BLD: NORMAL
BLOOD GROUP ANTIBODIES SERPL: NORMAL
GLUCOSE BLD STRIP.AUTO-MCNC: 266 MG/DL (ref 65–100)
SERVICE CMNT-IMP: ABNORMAL
SPECIMEN EXP DATE BLD: NORMAL

## 2018-08-21 PROCEDURE — 82962 GLUCOSE BLOOD TEST: CPT

## 2018-08-21 RX ORDER — CEPHALEXIN 500 MG/1
500 CAPSULE ORAL 2 TIMES DAILY
Qty: 14 CAP | Refills: 0 | Status: SHIPPED | OUTPATIENT
Start: 2018-08-21 | End: 2018-08-28

## 2018-08-21 NOTE — ED NOTES
Pt arrived to ED via ambulatory with c/o vaginal bleeding, heavy with clots x5 mos. Pt. States she is on the depo shot for birth control. Pt is alert and orientated X 4; skin is intact; lungs are clear; pt breathes well on room air; Pt is in no acute distress. Will continue to monitor. See nursing assessment. Safety precautions in place; call light within reach. Emergency Department Nursing Plan of Care       The Nursing Plan of Care is developed from the Nursing assessment and Emergency Department Attending provider initial evaluation. The plan of care may be reviewed in the ED Provider note.     The Plan of Care was developed with the following considerations:   Patient / Family readiness to learn indicated by:verbalized understanding  Persons(s) to be included in education: patient  Barriers to Learning/Limitations:No    Signed     Andre Brittle, RN    8/20/2018   9:20 PM

## 2018-08-21 NOTE — DISCHARGE INSTRUCTIONS
Abnormal Uterine Bleeding: Care Instructions  Your Care Instructions    Abnormal uterine bleeding (AUB) is irregular bleeding from the uterus that is longer or heavier than usual or does not occur at your regular time. Sometimes it is caused by changes in hormone levels. It can also be caused by growths in the uterus, such as fibroids or polyps. Sometimes a cause cannot be found. You may have heavy bleeding when you are not expecting your period. Your doctor may suggest a pregnancy test, if you think you are pregnant. Follow-up care is a key part of your treatment and safety. Be sure to make and go to all appointments, and call your doctor if you are having problems. It's also a good idea to know your test results and keep a list of the medicines you take. How can you care for yourself at home? · Be safe with medicines. Take pain medicines exactly as directed. ¨ If the doctor gave you a prescription medicine for pain, take it as prescribed. ¨ If you are not taking a prescription pain medicine, ask your doctor if you can take an over-the-counter medicine. · You may be low in iron because of blood loss. Eat a balanced diet that is high in iron and vitamin C. Foods rich in iron include red meat, shellfish, eggs, beans, and leafy green vegetables. Talk to your doctor about whether you need to take iron pills or a multivitamin. When should you call for help? Call 911 anytime you think you may need emergency care. For example, call if:    · You passed out (lost consciousness).    Call your doctor now or seek immediate medical care if:    · You have new or worse belly or pelvic pain.     · You have severe vaginal bleeding.     · You feel dizzy or lightheaded, or you feel like you may faint.    Watch closely for changes in your health, and be sure to contact your doctor if:    · You think you may be pregnant.     · Your bleeding gets worse.     · You do not get better as expected.    Where can you learn more?  Go to http://phil-khari.info/. Enter O933 in the search box to learn more about \"Abnormal Uterine Bleeding: Care Instructions. \"  Current as of: October 6, 2017  Content Version: 11.7  © 5524-1757 [employer]. Care instructions adapted under license by [employer] (which disclaims liability or warranty for this information). If you have questions about a medical condition or this instruction, always ask your healthcare professional. [address] any warranty or liability for your use of this information. Learning About High Blood Sugar  What is high blood sugar? Your body turns the food you eat into glucose (sugar), which it uses for energy. But if your body isn't able to use the sugar right away, it can build up in your blood and lead to high blood sugar. When the amount of sugar in your blood stays too high for too much of the time, you may have diabetes. Diabetes is a disease that can cause serious health problems. The good news is that lifestyle changes may help you get your blood sugar back to normal and avoid or delay diabetes. What causes high blood sugar? Sugar (glucose) can build up in your blood if you:  · Are overweight. · Have a family history of diabetes. · Take certain medicines, such as steroids. What are the symptoms? Having high blood sugar may not cause any symptoms at all. Or it may make you feel very thirsty or very hungry. You may also urinate more often than usual, have blurry vision, or lose weight without trying. How is high blood sugar treated? You can take steps to lower your blood sugar level if you understand what makes it get higher. Your doctor may want you to learn how to test your blood sugar level at home. Then you can see how illness, stress, or different kinds of food or medicine raise or lower your blood sugar level. Other tests may be needed to see if you have diabetes.   How can you prevent high blood sugar? · Watch your weight. If you're overweight, losing just a small amount of weight may help. Reducing fat around your waist is most important. · Limit the amount of calories, sweets, and unhealthy fat you eat. Ask your doctor if a dietitian can help you. A registered dietitian can help you create meal plans that fit your lifestyle. · Get at least 30 minutes of exercise on most days of the week. Exercise helps control your blood sugar. It also helps you maintain a healthy weight. Walking is a good choice. You also may want to do other activities, such as running, swimming, cycling, or playing tennis or team sports. · If your doctor prescribed medicines, take them exactly as prescribed. Call your doctor if you think you are having a problem with your medicine. You will get more details on the specific medicines your doctor prescribes. Follow-up care is a key part of your treatment and safety. Be sure to make and go to all appointments, and call your doctor if you are having problems. It's also a good idea to know your test results and keep a list of the medicines you take. Where can you learn more? Go to http://phil-khari.info/. Enter O108 in the search box to learn more about \"Learning About High Blood Sugar. \"  Current as of: December 7, 2017  Content Version: 11.7  © 0954-1812 [employer]. Care instructions adapted under license by [employer] (which disclaims liability or warranty for this information). If you have questions about a medical condition or this instruction, always ask your healthcare professional. [address] any warranty or liability for your use of this information.

## 2018-08-21 NOTE — ED NOTES
Danny Shea at bedside reviewing patient's discharge instructions and reviewing medications. Patient ambulatory home with self. Patient in no apparent distress.

## 2018-08-21 NOTE — ED PROVIDER NOTES
EMERGENCY DEPARTMENT HISTORY AND PHYSICAL EXAM      Date: 8/20/2018  Patient Name: Stefano Lama    History of Presenting Illness     Chief Complaint   Patient presents with    Vaginal Bleeding     x5 months, reports \"constant bleeding. \" Is on depo provera shots but it has not stopped her bleeding     History Provided By: Patient    HPI: Stefano Lama, 29 y.o. female with PMHx significant for HTN, diabetes, seizures, and asthma, presents ambulatory to the ED with cc of persistent, gradually worsening vaginal bleeding with clots starting 5 months ago, and new onset, mildly painful, nonradiating abdominal cramping since today. The pt states that her vaginal bleeding became worse today and reports experiencing heavier vaginal bleeding with multiple quarter sized clots. The pt reports that she visited a clinic a month ago and was told that if the vaginal bleeding persisted for another month then she should visit the ED. She reports going through 5-6 menstrual pads a day. The pt states that she has been on Depo-Provera for 4 years and reports that she has never had these sxs before. She denies taking medications for her sxs. The pt denies past pregnancies. She denies PMHx of fibroids or cysts. Denies change in vaginal discharge. The pt specifically denies dizziness, CP, or SOB. The pt does not currently endorse smoking tobacco. The pt does not currently endorse drinking alcohol. Chief Complaint: vaginal bleeding with clots, and abdominal cramping  Duration: the vaginal bleeding started 5 months ago and the abdominal cramping started today. Timing:  Gradual and Worsening vaginal bleeding and new onset abdominal cramping  Location: vagina and abdomen  Quality: Cramping abdomen  Severity: Mild abdominal cramping   Modifying Factors:  The pt denied having any modifying factors  Associated Symptoms: denies any other associated signs or symptoms    There are no other complaints, changes, or physical findings at this time.    PCP: Fleet Area, MD    Current Facility-Administered Medications   Medication Dose Route Frequency Provider Last Rate Last Dose    sodium chloride (NS) flush 5-10 mL  5-10 mL IntraVENous Q8H [place]   10 mL at 18 2310    sodium chloride (NS) flush 5-10 mL  5-10 mL IntraVENous PRN IAN Cedeno         Current Outpatient Prescriptions   Medication Sig Dispense Refill    cephALEXin (KEFLEX) 500 mg capsule Take 1 Cap by mouth two (2) times a day for 7 days. 14 Cap 0    lisinopril (PRINIVIL, ZESTRIL) 10 mg tablet Take 10 mg by mouth daily.  metFORMIN (GLUCOPHAGE) 500 mg tablet Take 500 mg by mouth daily (with breakfast).  amoxicillin 500 mg tab Take 500 mg by mouth three (3) times daily. 30 Tab 0    medroxyPROGESTERone (DEPO-PROVERA) 150 mg/mL injection 150 mg by IntraMUSCular route once. Past History     Past Medical History:  Past Medical History:   Diagnosis Date    Asthma     pt denies    Diabetes ([address] 2016    Hypertension     Other ill-defined conditions(799.89)     back pain    Seizures ([facility] [address]        Past Surgical History:  Past Surgical History:   Procedure Laterality Date    HX GYN  2014    Pt reports  in past.    HX TONSILLECTOMY         Family History:  Family History   Problem Relation Age of Onset    Diabetes Mother     Diabetes Father     Diabetes Maternal Grandmother        Social History:  Social History   Substance Use Topics    Smoking status: Never Smoker    Smokeless tobacco: Never Used    Alcohol use No       Allergies:  No Known Allergies      Review of Systems   Review of Systems   Constitutional: Negative for chills and fever. Respiratory: Negative for shortness of breath. Cardiovascular: Negative for chest pain. Gastrointestinal: Positive for abdominal pain. Negative for nausea and vomiting. Genitourinary: Positive for menstrual problem and vaginal bleeding (with clots).  Negative for flank pain.   Musculoskeletal: Negative for back pain and myalgias. Skin: Negative for color change, pallor, rash and wound. Neurological: Negative for dizziness, weakness and light-headedness. All other systems reviewed and are negative. Physical Exam   Physical Exam   Constitutional: She is oriented to person, place, and time. She appears well-developed and well-nourished. No distress. HENT:   Head: Normocephalic and atraumatic. Mouth/Throat: Mucous membranes are normal. Mucous membranes are not pale, not dry and not cyanotic. Eyes: Conjunctivae are normal.   Cardiovascular: Normal rate, regular rhythm and normal heart sounds. Pulmonary/Chest: Effort normal and breath sounds normal. No respiratory distress. Abdominal: Soft. Bowel sounds are normal. She exhibits no distension. There is no tenderness. Abdominal pain not reproducible on exam.   Musculoskeletal: Normal range of motion. Neurological: She is alert and oriented to person, place, and time. Skin: Skin is warm. No rash noted. Psychiatric: She has a normal mood and affect. Her behavior is normal.   Nursing note and vitals reviewed. Diagnostic Study Results     Labs -     Recent Results (from the past 12 hour(s))   CBC WITH AUTOMATED DIFF    Collection Time: 08/20/18  9:44 PM   Result Value Ref Range    WBC 7.5 3.6 - 11.0 K/uL    RBC 4.58 3.80 - 5.20 M/uL    HGB 12.4 11.5 - 16.0 g/dL    HCT 37.5 35.0 - 47.0 %    MCV 81.9 80.0 - 99.0 FL    MCH 27.1 26.0 - 34.0 PG    MCHC 33.1 30.0 - 36.5 g/dL    RDW 12.9 11.5 - 14.5 %    PLATELET 390 269 - 679 K/uL    MPV 10.8 8.9 - 12.9 FL    NRBC 0.0 0  WBC    ABSOLUTE NRBC 0.00 0.00 - 0.01 K/uL    NEUTROPHILS 65 32 - 75 %    LYMPHOCYTES 26 12 - 49 %    MONOCYTES 7 5 - 13 %    EOSINOPHILS 0 0 - 7 %    BASOPHILS 0 0 - 1 %    IMMATURE GRANULOCYTES 1 (H) 0.0 - 0.5 %    ABS. NEUTROPHILS 4.9 1.8 - 8.0 K/UL    ABS. LYMPHOCYTES 1.9 0.8 - 3.5 K/UL    ABS. MONOCYTES 0.5 0.0 - 1.0 K/UL    ABS. EOSINOPHILS 0.0 0.0 - 0.4 K/UL    ABS. BASOPHILS 0.0 0.0 - 0.1 K/UL    ABS. IMM. GRANS. 0.1 (H) 0.00 - 0.04 K/UL    DF AUTOMATED     URINALYSIS W/ REFLEX CULTURE    Collection Time: 08/20/18  9:44 PM   Result Value Ref Range    Color YELLOW/STRAW      Appearance CLEAR CLEAR      Specific gravity 1.015 1.003 - 1.030      pH (UA) 6.0 5.0 - 8.0      Protein NEGATIVE  NEG mg/dL    Glucose >1000 (A) NEG mg/dL    Ketone TRACE (A) NEG mg/dL    Bilirubin NEGATIVE  NEG      Blood SMALL (A) NEG      Urobilinogen 0.2 0.2 - 1.0 EU/dL    Nitrites NEGATIVE  NEG      Leukocyte Esterase NEGATIVE  NEG      WBC 0-4 0 - 4 /hpf    RBC 0-5 0 - 5 /hpf    Epithelial cells FEW FEW /lpf    Bacteria 1+ (A) NEG /hpf    UA:UC IF INDICATED URINE CULTURE ORDERED (A) CNI     TYPE & SCREEN    Collection Time: 08/20/18  9:44 PM   Result Value Ref Range    Crossmatch Expiration 08/23/2018     ABO/Rh(D) O POSITIVE     Antibody screen NEG    TSH 3RD GENERATION    Collection Time: 08/20/18  9:54 PM   Result Value Ref Range    TSH 2.80 0.36 - 3.74 uIU/mL   HCG URINE, QL. - POC    Collection Time: 08/20/18 10:02 PM   Result Value Ref Range    Pregnancy test,urine (POC) NEGATIVE  NEG     POC CHEM8    Collection Time: 08/20/18 10:04 PM   Result Value Ref Range    Calcium, ionized (POC) 1.23 1.12 - 1.32 mmol/L    Sodium (POC) 136 136 - 145 mmol/L    Potassium (POC) 3.7 3.5 - 5.1 mmol/L    Chloride (POC) 98 98 - 107 mmol/L    CO2 (POC) 25 21 - 32 mmol/L    Anion gap (POC) 18 10 - 20 mmol/L    Glucose (POC) 381 (H) 65 - 100 mg/dL    BUN (POC) 9 9 - 20 mg/dL    Creatinine (POC) 0.6 0.6 - 1.3 mg/dL    GFRAA, POC >60 >60 ml/min/1.73m2    GFRNA, POC >60 >60 ml/min/1.73m2    Hematocrit (POC) 38 35.0 - 47.0 %    Comment Comment Not Indicated.      GLUCOSE, POC    Collection Time: 08/21/18 12:05 AM   Result Value Ref Range    Glucose (POC) 266 (H) 65 - 100 mg/dL    Performed by Kirsten Brian        Radiologic Studies -   US PELV NON OBS   Final Result      US TRANSVAGINAL   Final Result        CT Results  (Last 48 hours)    None        CXR Results  (Last 48 hours)    None            Medical Decision Making   I am the first provider for this patient. I reviewed the vital signs, available nursing notes, past medical history, past surgical history, family history and social history. Vital Signs-Reviewed the patient's vital signs. Patient Vitals for the past 12 hrs:   Temp Pulse Resp BP SpO2   08/20/18 2122 - - - - 99 %   08/20/18 2112 98.6 °F (37 °C) 92 16 147/57 99 %     Records Reviewed: Nursing Notes and Old Medical Records    Provider Notes (Medical Decision Making):   DDx: abnormal uterine bleed, uterine fibroid, ovarian cyst vs rupture, menorrhagia, PCOS    No further imaging ordered since this would not change treatment plan - f/u gyn for further management. No evidence of rupture. No provera prescribed since vitals are hemodynamically stable and hgb is stable. ED Course:   Initial assessment performed. The patients presenting problems have been discussed, and they are in agreement with the care plan formulated and outlined with them. I have encouraged them to ask questions as they arise throughout their visit. Discussed elevated glucose with pt. States she takes her metformin irregularly. Denies N/V, urinary frequency. Discussed importance of taking metformin and prompt PCP f/u for management of DM. Critical Care Time: 0 minutes    Disposition:  DISCHARGE NOTE  The patient has been re-evaluated and is ready for discharge. Reviewed available results with patient. Counseled pt on diagnosis and care plan. Pt has expressed understanding, and all questions have been answered. Pt agrees with plan and agrees to F/U as recommended, or return to the ED if their sxs worsen. Discharge instructions have been provided and explained to the pt, along with reasons to return to the ED. Written by Julien Bello ED Scribe, as dictated by Autumn Davis, JERSON. PLAN:  1. Discharge Medication List as of 8/21/2018 12:12 AM      START taking these medications    Details   cephALEXin (KEFLEX) 500 mg capsule Take 1 Cap by mouth two (2) times a day for 7 days. , Normal, Disp-14 Cap, R-0         CONTINUE these medications which have NOT CHANGED    Details   lisinopril (PRINIVIL, ZESTRIL) 10 mg tablet Take 10 mg by mouth daily. , Historical Med      metFORMIN (GLUCOPHAGE) 500 mg tablet Take 500 mg by mouth daily (with breakfast). , Historical Med      amoxicillin 500 mg tab Take 500 mg by mouth three (3) times daily. , Normal, Disp-30 Tab, R-0      medroxyPROGESTERone (DEPO-PROVERA) 150 mg/mL injection 150 mg by IntraMUSCular route once., Historical Med           2. Follow-up Information     Follow up With Details Comments [address] Davi Ferreira MD Schedule an appointment as soon as possible for a visit in 1 day for gyn follow up [address] Schedule an appointment as soon as possible for a visit in 1 day for gyn follow up [address] [facility] [id number] [phone]        Return to ED if worse     Diagnosis     Clinical Impression:   1. Abnormal uterine bleeding    2. Hyperglycemia    3. Acute cystitis without hematuria    4. Cyst of left ovary        Attestations: This note is prepared by Deniz Polanco, acting as Scribe for Fiona Hayes PA-C. Mary Hayes PA-C: The scribe's documentation has been prepared under my direction and personally reviewed by me in its entirety. I confirm that the note above accurately reflects all work, treatment, procedures, and medical decision making performed by me.

## 2018-08-21 NOTE — ED NOTES
Patient requesting crackers as \"I feel weak\". Crackers provided to patient. Patient states, \"No, I want placido crackers\", Nurse explained to patient that she is unable to give her placido crackers as her blood sugar is elevated. Patient then states, \"Well, forget it then! \" Physician notified.

## 2018-08-22 LAB
BACTERIA SPEC CULT: ABNORMAL
CC UR VC: ABNORMAL
SERVICE CMNT-IMP: ABNORMAL

## 2018-08-23 ENCOUNTER — OFFICE VISIT (OUTPATIENT)
Dept: OBGYN CLINIC | Age: 28
End: 2018-08-23

## 2018-08-23 VITALS
HEIGHT: 62 IN | BODY MASS INDEX: 53.92 KG/M2 | DIASTOLIC BLOOD PRESSURE: 74 MMHG | TEMPERATURE: 96 F | OXYGEN SATURATION: 98 % | WEIGHT: 293 LBS | SYSTOLIC BLOOD PRESSURE: 137 MMHG | HEART RATE: 76 BPM

## 2018-08-23 DIAGNOSIS — N83.202 LEFT OVARIAN CYST: ICD-10-CM

## 2018-08-23 DIAGNOSIS — R10.2 PELVIC PAIN: Primary | ICD-10-CM

## 2018-08-23 RX ORDER — NAPROXEN SODIUM 550 MG/1
550 TABLET ORAL 2 TIMES DAILY WITH MEALS
Qty: 20 TAB | Refills: 0 | Status: SHIPPED | OUTPATIENT
Start: 2018-08-23 | End: 2018-08-23 | Stop reason: CLARIF

## 2018-08-23 RX ORDER — NAPROXEN SODIUM 550 MG/1
550 TABLET ORAL 2 TIMES DAILY WITH MEALS
Qty: 30 TAB | Refills: 0 | Status: SHIPPED | OUTPATIENT
Start: 2018-08-23 | End: 2019-08-18

## 2018-08-23 RX ORDER — IBUPROFEN 200 MG
TABLET ORAL
COMMUNITY
End: 2019-08-18

## 2018-08-23 RX ORDER — ACETAMINOPHEN 325 MG/1
TABLET ORAL
COMMUNITY
End: 2019-08-18

## 2018-08-23 NOTE — PROGRESS NOTES
Chief Complaint   Patient presents with    New Patient    Ovarian Cyst    Abdominal Pain    Vaginal Bleeding       Patient presents with left ovarian cyst. Seen in ED on 8/201/8 for abdominal pain. Ultrasound completed in ED. Patient c/o severe vaginal pain and vaginal bleeding x 4 months. Pt is currently alternating otc motrin and tylenol with no relief.

## 2018-08-23 NOTE — MR AVS SNAPSHOT
[facility] [address] [place] [address] 
[phone] Patient: Chris Prince MRN: [id number] [id number] Visit Information Date & Time Provider Department Dept. Phone Encounter #  
 8/23/2018 10:30 AM Janet Beckford NP [facility] AT [address] [id number] Upcoming Health Maintenance Date Due Influenza Age 5 to Adult 8/1/2018 PAP AKA CERVICAL CYTOLOGY 3/29/2020 Allergies as of 8/23/2018  Review Complete On: 8/23/2018 By: Janet Beckford NP No Known Allergies Current Immunizations  Never Reviewed No immunizations on file. Not reviewed this visit You Were Diagnosed With   
  
 Codes Comments Left ovarian cyst    -  Primary ICD-10-CM: X30.191 ICD-9-CM: 620.2 Vitals BP Pulse Temp Height(growth percentile) Weight(growth percentile) SpO2  
 137/74 76 96 °F (35.6 °C) (Oral) 5' 2\" (1.575 m) 302 lb 12.8 oz (137.3 kg) 98% BMI OB Status Smoking Status 55.38 kg/m2 Injection Never Smoker Vitals History BMI and BSA Data Body Mass Index Body Surface Area  
 55.38 kg/m 2 2.45 m 2 Preferred Pharmacy Pharmacy Name Phone [address] [place] [address] [phone] Your Updated Medication List  
  
   
This list is accurate as of 8/23/18 10:58 AM.  Always use your most recent med list.  
  
  
  
  
 amoxicillin 500 mg Tab Take 500 mg by mouth three (3) times daily. cephALEXin 500 mg capsule Commonly known as:  Tawny Screws Take 1 Cap by mouth two (2) times a day for 7 days. ibuprofen 200 mg tablet Commonly known as:  MOTRIN Take  by mouth.  
  
 lisinopril 10 mg tablet Commonly known as:  Ora Bee Take 10 mg by mouth daily. medroxyPROGESTERone 150 mg/mL injection Commonly known as:  DEPO-PROVERA  
150 mg by IntraMUSCular route once. metFORMIN 500 mg tablet Commonly known as:  GLUCOPHAGE Take 500 mg by mouth daily (with breakfast). naproxen sodium 550 mg tablet Commonly known as:  Latrelle Shane Take 1 Tab by mouth two (2) times daily (with meals). TYLENOL 325 mg tablet Generic drug:  acetaminophen Take  by mouth every four (4) hours as needed for Pain. Prescriptions Printed Refills  
 naproxen sodium (ANAPROX) 550 mg tablet 0 Sig: Take 1 Tab by mouth two (2) times daily (with meals). Class: Print Route: Oral  
  
To-Do List   
 09/23/2018 Imaging:  CT PELV W CONT Patient Instructions Functional Ovarian Cyst: Care Instructions Your Care Instructions A functional ovarian cyst is a sac that forms on the surface of a woman's ovary during ovulation. The sac holds a maturing egg. Usually the sac goes away after the egg is released. But if the egg is not released, or if the sac closes up after the egg is released, the sac can swell up with fluid and form a cyst. 
Functional ovarian cysts are different than ovarian growths caused by other problems, such as cancer. Most functional ovarian cysts cause no symptoms and go away on their own. Some cause mild pain. Others can cause severe pain when they rupture or bleed. Follow-up care is a key part of your treatment and safety. Be sure to make and go to all appointments, and call your doctor if you are having problems. It's also a good idea to know your test results and keep a list of the medicines you take. How can you care for yourself at home? · Use heat, such as a hot water bottle, a heating pad set on low, or a warm bath, to relax tense muscles and relieve cramping. · Be safe with medicines. Take pain medicines exactly as directed. ¨ If the doctor gave you a prescription medicine for pain, take it as prescribed. ¨ If you are not taking a prescription pain medicine, ask your doctor if you can take an over-the-counter medicine. · Avoid constipation. Make sure you drink enough fluids and include fruits, vegetables, and fiber in your diet each day. Constipation does not cause ovarian cysts, but it may make your pelvic pain worse. When should you call for help? Call your doctor now or seek immediate medical care if: 
  · You have severe vaginal bleeding.  
  · You have new or worse belly or pelvic pain.  
 Watch closely for changes in your health, and be sure to contact your doctor if: 
  · You have unusual vaginal bleeding.  
  · You do not get better as expected. Where can you learn more? Go to http://phil-khari.info/. Enter A368 in the search box to learn more about \"Functional Ovarian Cyst: Care Instructions. \" Current as of: October 6, 2017 Content Version: 11.7 © 8599-6473 [employer]. Care instructions adapted under license by [employer] (which disclaims liability or warranty for this information). If you have questions about a medical condition or this instruction, always ask your healthcare professional. [place] [address] any warranty or liability for your use of this information. Introducing [facility] & HEALTH SERVICES! [facility] introduces VirtualScopics patient portal. Now you can access parts of your medical record, email your doctor's office, and request medication refills online. 1. In your internet browser, go to [email or web address] 2. Click on the First Time User? Click Here link in the Sign In box. You will see the New Member Sign Up page. 3. Enter your VirtualScopics Access Code exactly as it appears below. You will not need to use this code after youve completed the sign-up process. If you do not sign up before the expiration date, you must request a new code. · VirtualScopics Access Code: [email or web address] Expires: 11/18/2018  9:11 PM 
 
4.  Enter the last four digits of your Social Security Number (xxxx) and Date of Birth (mm/dd/yyyy) as indicated and click Submit. You will be taken to the next sign-up page. 5. Create a [employer] ID. This will be your [employer] login ID and cannot be changed, so think of one that is secure and easy to remember. 6. Create a [employer] password. You can change your password at any time. 7. Enter your Password Reset Question and Answer. This can be used at a later time if you forget your password. 8. Enter your e-mail address. You will receive e-mail notification when new information is available in [address] 9. Click Sign Up. You can now view and download portions of your medical record. 10. Click the Download Summary menu link to download a portable copy of your medical information. If you have questions, please visit the Frequently Asked Questions section of the [employer] website. Remember, [employer] is NOT to be used for urgent needs. For medical emergencies, dial 911. Now available from your iPhone and Android! Please provide this summary of care documentation to your next provider. Your primary care clinician is listed as Aubrie Colbert. If you have any questions after today's visit, please call [phone].

## 2018-08-23 NOTE — PROGRESS NOTES
PATIENT EXAM  Young Adult Woman    SUBJECTIVE: Sandy Fisher is a 29 y.o. female who presents for concerns of persistent vaginal bleeding and possible ovarian cyst.  Pt. Was seen in the [facility] ED for similar complaints about 3 days ago. She was found to have a UIT due to GBS and given Rx. However she did not start it yet. She describes that she takes Depo Provera injections for birth control and \"has never had irregular bleeding\" with Depo in the past.  She describes it as light red but persistent daily. She is frustrated that is has persisted for 4 months. She states she is current on her depo shots. Pt. Describes the abdominal pain as on the left size and 10/10. Pt. States she was not given any medication in the ED for pain and instead has been taking Advil and Motrin 800 ( that she had from the dentist) to treat her symptoms. Pt. Is diabetic and only \"takes her Metformin when her sugars are high\". Her recent value in the ED was 361 and  Was advised to resume her Metformin under PCP recommendation. Pt. However has chosen to take [employer]" products to lose weight rapidly so \"she won't have diabetes anymore\". Pt. Denies vaginitis symptoms however has not been evaluated for this related to her complaints of pelvic pain. Her recent UPT was negative and she was not anemic during her ED visit. Pt. Had pelvic ultrasound that showed a small cystic structure on the left near the fallopian tube or ovary--they were unable to determine. A CT scan was recognized. No LMP recorded. Patient has had an injection.     GYN History  Menarche: as teen  Contraception:  Depo-Provera injections  Sexual History:  unremarkable  Sexually transmitted diseases/infections: NO    Last pap: 2017  The prior Pap result: mild dysplasia--need pap again soon (2018)      Past Medical History:   Diagnosis Date    Asthma     pt denies    Diabetes ([address] 12/28/2016    Hypertension     Other ill-defined conditions(799.89)     back pain  Seizures [address] [address]      Past Surgical History:   Procedure Laterality Date    HX GYN  2014    Pt reports  in past.    HX TONSILLECTOMY       OB History      Para Term  AB Living    2    1     SAB TAB Ectopic Molar Multiple Live Births                 Family History   Problem Relation Age of Onset    Diabetes Mother     Diabetes Father     Diabetes Maternal Grandmother      Social History     Social History    Marital status: SINGLE     Spouse name: N/A    Number of children: N/A    Years of education: N/A     Occupational History    Not on file. Social History Main Topics    Smoking status: Never Smoker    Smokeless tobacco: Never Used    Alcohol use No    Drug use: No    Sexual activity: No     Other Topics Concern    Not on file     Social History Narrative       Current Outpatient Prescriptions   Medication Sig Dispense Refill    ibuprofen (MOTRIN) 200 mg tablet Take  by mouth.  acetaminophen (TYLENOL) 325 mg tablet Take  by mouth every four (4) hours as needed for Pain.  naproxen sodium (ANAPROX) 550 mg tablet Take 1 Tab by mouth two (2) times daily (with meals). 30 Tab 0    lisinopril (PRINIVIL, ZESTRIL) 10 mg tablet Take 10 mg by mouth daily.  metFORMIN (GLUCOPHAGE) 500 mg tablet Take 500 mg by mouth daily (with breakfast).  cephALEXin (KEFLEX) 500 mg capsule Take 1 Cap by mouth two (2) times a day for 7 days. 14 Cap 0    amoxicillin 500 mg tab Take 500 mg by mouth three (3) times daily. 30 Tab 0    medroxyPROGESTERone (DEPO-PROVERA) 150 mg/mL injection 150 mg by IntraMUSCular route once. Review of Systems:   Complete review of systems reviewed from social and history data forms. Pertinent positives in HPI.       Objective:     Visit Vitals    /74    Pulse 76    Temp 96 °F (35.6 °C) (Oral)    Ht 5' 2\" (1.575 m)    Wt 302 lb 12.8 oz (137.3 kg)    SpO2 98%    BMI 55.38 kg/m2       General:  alert, cooperative, no distress, appears stated age   Skin:  Normal.                   Abdomen: soft, non-tender. Bowel sounds normal. No masses,  no organomegaly        Genitourinary: BUS normal. Introitus normal. Normal appearing vaginal epithelium, Vaginal discharge described as light, serous bleeding.,  Normal cervix without lesions or tenderness, Uterus normal size anteverted. NT., Adnexa normal in size left and right without tenderness. Extremities:  extremities normal, atraumatic, no cyanosis or edema         ASSESSMENT:      ICD-10-CM ICD-9-CM    1. Left ovarian cyst N83.202 620.2 CT PELV W CONT   2. Poorly controlled diabetes  3. Morbid obesity  4. AUB  5. Pap due . 6.  UTI    Plan:  Advised begin Antibiotics from ED. Advised see PCP regarding DM control. Discourage weight loss schemes--better to make long term life style changes. Pap due before end of year with well woman exam.  CT scan ordered with contrast per Radiology recommendation. Rx. Anaprox DS, #20 , 1 po bid for pain, no refill. NuSwab taken. RTO in 2 weeks after scan completed and antibiotics--for pap and development of treatment plan regarding possible cyst and address BTB with Depo Provera. Pt. Voices understanding of treatment plan.   Follow-up Disposition: Not on TITO Farmer

## 2018-08-23 NOTE — PATIENT INSTRUCTIONS
Functional Ovarian Cyst: Care Instructions  Your Care Instructions    A functional ovarian cyst is a sac that forms on the surface of a woman's ovary during ovulation. The sac holds a maturing egg. Usually the sac goes away after the egg is released. But if the egg is not released, or if the sac closes up after the egg is released, the sac can swell up with fluid and form a cyst.  Functional ovarian cysts are different than ovarian growths caused by other problems, such as cancer. Most functional ovarian cysts cause no symptoms and go away on their own. Some cause mild pain. Others can cause severe pain when they rupture or bleed. Follow-up care is a key part of your treatment and safety. Be sure to make and go to all appointments, and call your doctor if you are having problems. It's also a good idea to know your test results and keep a list of the medicines you take. How can you care for yourself at home? · Use heat, such as a hot water bottle, a heating pad set on low, or a warm bath, to relax tense muscles and relieve cramping. · Be safe with medicines. Take pain medicines exactly as directed. ¨ If the doctor gave you a prescription medicine for pain, take it as prescribed. ¨ If you are not taking a prescription pain medicine, ask your doctor if you can take an over-the-counter medicine. · Avoid constipation. Make sure you drink enough fluids and include fruits, vegetables, and fiber in your diet each day. Constipation does not cause ovarian cysts, but it may make your pelvic pain worse. When should you call for help? Call your doctor now or seek immediate medical care if:    · You have severe vaginal bleeding.     · You have new or worse belly or pelvic pain.    Watch closely for changes in your health, and be sure to contact your doctor if:    · You have unusual vaginal bleeding.     · You do not get better as expected. Where can you learn more?   Go to http://phil-khari.info/. Enter S742 in the search box to learn more about \"Functional Ovarian Cyst: Care Instructions. \"  Current as of: October 6, 2017  Content Version: 11.7  © 4883-6330 [employer]. Care instructions adapted under license by [employer] (which disclaims liability or warranty for this information). If you have questions about a medical condition or this instruction, always ask your healthcare professional. [address] any warranty or liability for your use of this information.

## 2018-08-25 LAB
C TRACH RRNA SPEC QL NAA+PROBE: NEGATIVE
N GONORRHOEA RRNA SPEC QL NAA+PROBE: NEGATIVE

## 2018-08-27 ENCOUNTER — TELEPHONE (OUTPATIENT)
Dept: INTERNAL MEDICINE CLINIC | Age: 28
End: 2018-08-27

## 2018-10-01 ENCOUNTER — HOSPITAL ENCOUNTER (OUTPATIENT)
Dept: CT IMAGING | Age: 28
Discharge: HOME | End: 2018-10-01
Attending: NURSE PRACTITIONER
Payer: SELF-PAY

## 2018-10-01 DIAGNOSIS — N83.202 LEFT OVARIAN CYST: ICD-10-CM

## 2018-10-01 LAB — CREAT BLD-MCNC: 0.6 MG/DL (ref 0.6–1.3)

## 2018-10-01 PROCEDURE — 82565 ASSAY OF CREATININE: CPT

## 2018-10-01 PROCEDURE — 74011636320 HC RX REV CODE- 636/320: Performed by: NURSE PRACTITIONER

## 2018-10-01 PROCEDURE — 72193 CT PELVIS W/DYE: CPT

## 2018-10-01 RX ORDER — SODIUM CHLORIDE 0.9 % (FLUSH) 0.9 %
5-10 SYRINGE (ML) INJECTION
Status: COMPLETED | OUTPATIENT
Start: 2018-10-01 | End: 2018-10-01

## 2018-10-01 RX ADMIN — Medication 10 ML: at 17:28

## 2018-10-01 RX ADMIN — IOPAMIDOL 100 ML: 755 INJECTION, SOLUTION INTRAVENOUS at 17:28

## 2018-10-03 NOTE — PROGRESS NOTES
CT scan returned --shows likely a small ovarian cyst is favored. Dr. Annalise Carbajal advises another pelvic ultrasound in 8- 12 weeks to see if there is any change.

## 2018-10-05 NOTE — PROGRESS NOTES
Attempted to contact the patient. Message received, \"at the prescribes request, this phone does not accept incoming calls. \"

## 2018-10-17 ENCOUNTER — OFFICE VISIT (OUTPATIENT)
Dept: OBGYN CLINIC | Age: 28
End: 2018-10-17

## 2018-10-17 ENCOUNTER — DOCUMENTATION ONLY (OUTPATIENT)
Dept: OBGYN CLINIC | Age: 28
End: 2018-10-17

## 2018-10-17 NOTE — PROGRESS NOTES
Pt informed that the CT scan shows likely a small ovarian cyst is favored.  Dr. Ivery Babinski advises another pelvic ultrasound in 8- 12 weeks to see if there is any change. Understanding voiced. Pt informed to call the office in 3 months to have the ultrasound ordered for her.

## 2018-12-23 ENCOUNTER — HOSPITAL ENCOUNTER (EMERGENCY)
Age: 28
Discharge: HOME | End: 2018-12-23
Attending: EMERGENCY MEDICINE | Admitting: EMERGENCY MEDICINE
Payer: SELF-PAY

## 2018-12-23 VITALS
TEMPERATURE: 98.2 F | OXYGEN SATURATION: 98 % | HEIGHT: 61 IN | WEIGHT: 293 LBS | BODY MASS INDEX: 55.32 KG/M2 | RESPIRATION RATE: 19 BRPM | SYSTOLIC BLOOD PRESSURE: 114 MMHG | HEART RATE: 100 BPM | DIASTOLIC BLOOD PRESSURE: 59 MMHG

## 2018-12-23 DIAGNOSIS — R73.9 HYPERGLYCEMIA: ICD-10-CM

## 2018-12-23 DIAGNOSIS — B37.31 VAGINAL CANDIDA: Primary | ICD-10-CM

## 2018-12-23 LAB
APPEARANCE UR: CLEAR
BACTERIA URNS QL MICRO: NEGATIVE /HPF
BILIRUB UR QL: NEGATIVE
CLUE CELLS VAG QL WET PREP: NORMAL
COLOR UR: ABNORMAL
EPITH CASTS URNS QL MICRO: ABNORMAL /LPF
GLUCOSE BLD STRIP.AUTO-MCNC: 450 MG/DL (ref 65–100)
GLUCOSE UR STRIP.AUTO-MCNC: >1000 MG/DL
HCG UR QL: NEGATIVE
HGB UR QL STRIP: NEGATIVE
KETONES UR QL STRIP.AUTO: NEGATIVE MG/DL
KOH PREP SPEC: NORMAL
LEUKOCYTE ESTERASE UR QL STRIP.AUTO: NEGATIVE
NITRITE UR QL STRIP.AUTO: NEGATIVE
PH UR STRIP: 5.5 [PH] (ref 5–8)
PROT UR STRIP-MCNC: 30 MG/DL
RBC #/AREA URNS HPF: ABNORMAL /HPF (ref 0–5)
SERVICE CMNT-IMP: ABNORMAL
SERVICE CMNT-IMP: NORMAL
SP GR UR REFRACTOMETRY: 1.03 (ref 1–1.03)
T VAGINALIS VAG QL WET PREP: NORMAL
UA: UC IF INDICATED,UAUC: ABNORMAL
UROBILINOGEN UR QL STRIP.AUTO: 0.2 EU/DL (ref 0.2–1)
WBC URNS QL MICRO: ABNORMAL /HPF (ref 0–4)
YEAST URNS QL MICRO: PRESENT

## 2018-12-23 PROCEDURE — 74011250637 HC RX REV CODE- 250/637: Performed by: PHYSICIAN ASSISTANT

## 2018-12-23 PROCEDURE — 82962 GLUCOSE BLOOD TEST: CPT

## 2018-12-23 PROCEDURE — 87210 SMEAR WET MOUNT SALINE/INK: CPT

## 2018-12-23 PROCEDURE — 81025 URINE PREGNANCY TEST: CPT

## 2018-12-23 PROCEDURE — 96372 THER/PROPH/DIAG INJ SC/IM: CPT

## 2018-12-23 PROCEDURE — 74011250636 HC RX REV CODE- 250/636: Performed by: PHYSICIAN ASSISTANT

## 2018-12-23 PROCEDURE — 87491 CHLMYD TRACH DNA AMP PROBE: CPT

## 2018-12-23 PROCEDURE — 99284 EMERGENCY DEPT VISIT MOD MDM: CPT

## 2018-12-23 PROCEDURE — 81001 URINALYSIS AUTO W/SCOPE: CPT

## 2018-12-23 RX ORDER — METRONIDAZOLE 500 MG/1
500 TABLET ORAL 2 TIMES DAILY
Qty: 14 TAB | Refills: 0 | Status: SHIPPED | OUTPATIENT
Start: 2018-12-23 | End: 2018-12-30

## 2018-12-23 RX ORDER — AZITHROMYCIN 500 MG/1
1000 TABLET, FILM COATED ORAL
Status: COMPLETED | OUTPATIENT
Start: 2018-12-23 | End: 2018-12-23

## 2018-12-23 RX ADMIN — AZITHROMYCIN 1000 MG: 500 TABLET, FILM COATED ORAL at 12:07

## 2018-12-23 RX ADMIN — LIDOCAINE HYDROCHLORIDE 250 MG: 10 INJECTION, SOLUTION EPIDURAL; INFILTRATION; INTRACAUDAL; PERINEURAL at 12:07

## 2018-12-23 NOTE — ED NOTES
Patient admits to being a diabetic and has not been taking her Metformin and does not recall that last time she took it. I explained the risks associated with her non- compliance and the damage to her kidneys and other vital organs.

## 2018-12-23 NOTE — DISCHARGE INSTRUCTIONS
Vaginal Yeast Infection: Care Instructions  Your Care Instructions    A vaginal yeast infection is caused by too many yeast cells in the vagina. This is common in women of all ages. Itching, vaginal discharge and irritation, and other symptoms can bother you. But yeast infections don't often cause other health problems. Some medicines can increase your risk of getting a yeast infection. These include antibiotics, birth control pills, hormones, and steroids. You may also be more likely to get a yeast infection if you are pregnant, have diabetes, douche, or wear tight clothes. With treatment, most yeast infections get better in 2 to 3 days. Follow-up care is a key part of your treatment and safety. Be sure to make and go to all appointments, and call your doctor if you are having problems. It's also a good idea to know your test results and keep a list of the medicines you take. How can you care for yourself at home? · Take your medicines exactly as prescribed. Call your doctor if you think you are having a problem with your medicine. · Ask your doctor about over-the-counter (OTC) medicines for yeast infections. They may cost less than prescription medicines. If you use an OTC treatment, read and follow all instructions on the label. · Do not use tampons while using a vaginal cream or suppository. The tampons can absorb the medicine. Use pads instead. · Wear loose cotton clothing. Do not wear nylon or other fabric that holds body heat and moisture close to the skin. · Try sleeping without underwear. · Do not scratch. Relieve itching with a cold pack or a cool bath. · Do not wash your vaginal area more than once a day. Use plain water or a mild, unscented soap. Air-dry the vaginal area. · Change out of wet swimsuits after swimming. · Do not have sex until you have finished your treatment. · Do not douche. When should you call for help?   Call your doctor now or seek immediate medical care if:    · You have unexpected vaginal bleeding.     · You have new or increased pain in your vagina or pelvis.    Watch closely for changes in your health, and be sure to contact your doctor if:    · You have a fever.     · You are not getting better after 2 days.     · Your symptoms come back after you finish your medicines. Where can you learn more? Go to http://phil-khari.info/. Enter N552 in the search box to learn more about \"Vaginal Yeast Infection: Care Instructions. \"  Current as of: May 15, 2018  Content Version: 11.8  © 9163-6625 [employer]. Care instructions adapted under license by [employer] (which disclaims liability or warranty for this information). If you have questions about a medical condition or this instruction, always ask your healthcare professional. [address] any warranty or liability for your use of this information. Learning About High Blood Sugar  What is high blood sugar? Your body turns the food you eat into glucose (sugar), which it uses for energy. But if your body isn't able to use the sugar right away, it can build up in your blood and lead to high blood sugar. When the amount of sugar in your blood stays too high for too much of the time, you may have diabetes. Diabetes is a disease that can cause serious health problems. The good news is that lifestyle changes may help you get your blood sugar back to normal and avoid or delay diabetes. What causes high blood sugar? Sugar (glucose) can build up in your blood if you:  · Are overweight. · Have a family history of diabetes. · Take certain medicines, such as steroids. What are the symptoms? Having high blood sugar may not cause any symptoms at all. Or it may make you feel very thirsty or very hungry. You may also urinate more often than usual, have blurry vision, or lose weight without trying. How is high blood sugar treated?   You can take steps to lower your blood sugar level if you understand what makes it get higher. Your doctor may want you to learn how to test your blood sugar level at home. Then you can see how illness, stress, or different kinds of food or medicine raise or lower your blood sugar level. Other tests may be needed to see if you have diabetes. How can you prevent high blood sugar? · Watch your weight. If you're overweight, losing just a small amount of weight may help. Reducing fat around your waist is most important. · Limit the amount of calories, sweets, and unhealthy fat you eat. Ask your doctor if a dietitian can help you. A registered dietitian can help you create meal plans that fit your lifestyle. · Get at least 30 minutes of exercise on most days of the week. Exercise helps control your blood sugar. It also helps you maintain a healthy weight. Walking is a good choice. You also may want to do other activities, such as running, swimming, cycling, or playing tennis or team sports. · If your doctor prescribed medicines, take them exactly as prescribed. Call your doctor if you think you are having a problem with your medicine. You will get more details on the specific medicines your doctor prescribes. Follow-up care is a key part of your treatment and safety. Be sure to make and go to all appointments, and call your doctor if you are having problems. It's also a good idea to know your test results and keep a list of the medicines you take. Where can you learn more? Go to http://phil-khari.info/. Enter O108 in the search box to learn more about \"Learning About High Blood Sugar. \"  Current as of: December 7, 2017  Content Version: 11.8  © 2197-6825 [employer]. Care instructions adapted under license by [employer] (which disclaims liability or warranty for this information).  If you have questions about a medical condition or this instruction, always ask your healthcare professional. [facility] disclaims any warranty or liability for your use of this information.

## 2018-12-23 NOTE — ED NOTES
Emergency Department Nursing Plan of Care       The Nursing Plan of Care is developed from the Nursing assessment and Emergency Department Attending provider initial evaluation. The plan of care may be reviewed in the ED Provider note.     The Plan of Care was developed with the following considerations:   Patient / Family readiness to learn indicated by:verbalized understanding  Persons(s) to be included in education: patient  Barriers to Learning/Limitations:No    Signed     Isaak Vazquez    12/23/2018   11:24 AM

## 2018-12-23 NOTE — ED PROVIDER NOTES
EMERGENCY DEPARTMENT HISTORY AND PHYSICAL EXAM      Date: 2018  Patient Name: Frank Laureano    History of Presenting Illness     HPI: Frank Laureano is a 29 y.o. female with pmhx of morbid obesity, DM2 w/ medication non compliance presents to the ED for white vaginal d/c. Pt says she has had vaginal itching and irritation after using new soap. She says she has been having wet underwear and has had to change her underwear several times a day. Pt says she has had unprotected sex recently. She denies polyphagia/polyuria/polydypsia, vaginal bleeding, fever/chills, n/v, pelvic pain, among other assoc sx's. PCP: Jo Perea MD    Current Outpatient Medications   Medication Sig Dispense Refill    metroNIDAZOLE (FLAGYL) 500 mg tablet Take 1 Tab by mouth two (2) times a day for 7 days. 14 Tab 0    lisinopril (PRINIVIL, ZESTRIL) 10 mg tablet Take 10 mg by mouth daily.  metFORMIN (GLUCOPHAGE) 500 mg tablet Take 500 mg by mouth daily (with breakfast).  ibuprofen (MOTRIN) 200 mg tablet Take  by mouth.  acetaminophen (TYLENOL) 325 mg tablet Take  by mouth every four (4) hours as needed for Pain.  naproxen sodium (ANAPROX) 550 mg tablet Take 1 Tab by mouth two (2) times daily (with meals). 30 Tab 0    medroxyPROGESTERone (DEPO-PROVERA) 150 mg/mL injection 150 mg by IntraMUSCular route once.          Past History     Past Medical History:  Past Medical History:   Diagnosis Date    Asthma     pt denies    Diabetes ([address] 2016    Hypertension     Other ill-defined conditions(799.89)     back pain    Seizures ([facility] [address]        Past Surgical History:  Past Surgical History:   Procedure Laterality Date    HX GYN  2014    Pt reports  in past.    HX TONSILLECTOMY         Family History:  Family History   Problem Relation Age of Onset    Diabetes Mother     Diabetes Father     Diabetes Maternal Grandmother        Social History:  Social History     Tobacco Use    Smoking status: Never Smoker    Smokeless tobacco: Never Used   Substance Use Topics    Alcohol use: No    Drug use: No       Allergies:  No Known Allergies      Review of Systems   Review of Systems   Constitutional: Negative for chills, fever and unexpected weight change. Respiratory: Negative for shortness of breath. Cardiovascular: Negative for chest pain and palpitations. Gastrointestinal: Negative for abdominal pain, nausea and vomiting. Genitourinary: Positive for vaginal discharge. Negative for flank pain, pelvic pain, urgency and vaginal bleeding. Musculoskeletal: Negative for arthralgias and myalgias. Skin: Negative for rash. Neurological: Negative for light-headedness and headaches. All other systems reviewed and are negative. Physical Exam     Vitals:    12/23/18 1117   BP: 114/59   Pulse: 100   Resp: 19   Temp: 98.2 °F (36.8 °C)   SpO2: 98%   Weight: 135.6 kg (299 lb)   Height: 5' 1.2\" (1.554 m)     Physical Exam   Constitutional: She is oriented to person, place, and time. She appears well-developed and well-nourished. No distress. HENT:   Head: Normocephalic and atraumatic. Cardiovascular: Normal rate, regular rhythm and normal heart sounds. Exam reveals no gallop and no friction rub. No murmur heard. Pulmonary/Chest: Effort normal and breath sounds normal.   Abdominal: Soft. Bowel sounds are normal. She exhibits no distension and no mass. There is no tenderness. There is no rebound and no guarding. Neurological: She is alert and oriented to person, place, and time. Skin: Skin is warm and dry. She is not diaphoretic. Psychiatric: She has a normal mood and affect.  Her behavior is normal. Judgment and thought content normal.         Diagnostic Study Results     Labs -     Recent Results (from the past 12 hour(s))   URINALYSIS W/ REFLEX CULTURE    Collection Time: 12/23/18 11:46 AM   Result Value Ref Range    Color YELLOW/STRAW      Appearance CLEAR CLEAR      Specific gravity 1.030 1.003 - 1.030      pH (UA) 5.5 5.0 - 8.0      Protein 30 (A) NEG mg/dL    Glucose >1,000 (A) NEG mg/dL    Ketone NEGATIVE  NEG mg/dL    Bilirubin NEGATIVE  NEG      Blood NEGATIVE  NEG      Urobilinogen 0.2 0.2 - 1.0 EU/dL    Nitrites NEGATIVE  NEG      Leukocyte Esterase NEGATIVE  NEG      WBC 0-4 0 - 4 /hpf    RBC 0-5 0 - 5 /hpf    Epithelial cells FEW FEW /lpf    Bacteria NEGATIVE  NEG /hpf    UA:UC IF INDICATED CULTURE NOT INDICATED BY UA RESULT CNI      Yeast PRESENT (A) NEG     WET PREP    Collection Time: 12/23/18 11:46 AM   Result Value Ref Range    Clue cells CLUE CELLS ABSENT      Wet prep NO TRICHOMONAS SEEN     KOH, OTHER SOURCES    Collection Time: 12/23/18 11:46 AM   Result Value Ref Range    Special Requests: NO SPECIAL REQUESTS      KOH NO YEAST SEEN     HCG URINE, QL. - POC    Collection Time: 12/23/18 11:48 AM   Result Value Ref Range    Pregnancy test,urine (POC) NEGATIVE  NEG     GLUCOSE, POC    Collection Time: 12/23/18 12:51 PM   Result Value Ref Range    Glucose (POC) 450 (H) 65 - 100 mg/dL    Performed by Renate Gutiérrez \"Flower\" Whit        Radiologic Studies -   No orders to display     CT Results  (Last 48 hours)    None        CXR Results  (Last 48 hours)    None            Medical Decision Making   I am the first provider for this patient. I reviewed the vital signs, available nursing notes, past medical history, past surgical history, family history, social history    ED Course:   Initial assessment performed. The patients presenting problems have been discussed, and they are in agreement with the care plan formulated and outlined with them. I have encouraged them to ask questions as they arise throughout their visit. Vital Signs-Reviewed the patient's vital signs.   Vitals:    12/23/18 1117   BP: 114/59   BP 1 Location: Left arm   BP Patient Position: Sitting   Pulse: 100   Resp: 19   Temp: 98.2 °F (36.8 °C)   SpO2: 98%   Weight: 135.6 kg (299 lb)   Height: 5' 1.2\" (1.554 m) Medications Administered During ED Course  Medications   cefTRIAXone (ROCEPHIN) 250 mg in lidocaine (PF) (XYLOCAINE) 10 mg/mL (1 %) IM injection (250 mg IntraMUSCular Given 12/23/18 1207)   azithromycin (ZITHROMAX) tablet 1,000 mg (1,000 mg Oral Given 12/23/18 1207)       Progress Note:  On re evaluation pt is resting comfortably, is requesting discharge, and has no new complaints, changes, or physical findings. Pt was educated on the risks of DM2 and medication non compliance including end organ damage. She says that she stopped taking her metformin but will start again once she gets home. Disposition:  D/c home    DISCHARGE NOTE:   I Counseled the patient on diagnosis and care plan. All available lab and imaging results have been reviewed by me and were discussed with the patient, including all incidental findings. The likelihood of other entities in the differential is insufficient to justify any further testing for them. This was explained to the patient. Patient agrees with plan and agrees to follow up with PCP as recommended, or return to the ED if their symptoms worsen. All medications were reviewed with the patient. All of pt's questions and concerns were addressed. The patient was advised that new or worsening symptoms would require further evaluation and should prompt immediate return to the Emergency Department. Discharge instructions have been provided and explained to the patient, along with reasons to return to the ED. Patient voices understanding and is agreeable with the plan for discharge. Patient is ready to go home.     Follow-up Information     Follow up With Specialties Details Why Contact Info    Emma Chavez MD Internal Medicine Schedule an appointment as soon as possible for a visit for hyperglycemia and yeast infection [address] [place] [address]  [phone]            Discharge Medication List as of 12/23/2018  1:09 PM          Provider Notes (Medical Decision Making):   DDx: DM2 w/ medication non compliance, yeast infection, STD exposure, low concern for DKA or HHNK. ED CONSULT NOTE:   Mini Overton PA-C spoke with Dr. Analia Link, ED Physician  Discussed pt's hx, disposition, and available diagnostic and imaging results. Reviewed care plans. Consultant agrees with plans as outlined. Procedures:  Procedures    Critical Care Time: none      Diagnosis     Clinical Impression:   1. Vaginal candida    2.  Hyperglycemia

## 2019-01-07 ENCOUNTER — HOSPITAL ENCOUNTER (EMERGENCY)
Age: 29
Discharge: HOME | End: 2019-01-07
Attending: EMERGENCY MEDICINE
Payer: MEDICAID

## 2019-01-07 VITALS
TEMPERATURE: 98 F | OXYGEN SATURATION: 100 % | SYSTOLIC BLOOD PRESSURE: 136 MMHG | DIASTOLIC BLOOD PRESSURE: 92 MMHG | HEART RATE: 96 BPM | RESPIRATION RATE: 18 BRPM | HEIGHT: 60 IN | BODY MASS INDEX: 57.52 KG/M2 | WEIGHT: 293 LBS

## 2019-01-07 DIAGNOSIS — K04.7 DENTAL INFECTION: Primary | ICD-10-CM

## 2019-01-07 PROCEDURE — 74011000250 HC RX REV CODE- 250: Performed by: PHYSICIAN ASSISTANT

## 2019-01-07 PROCEDURE — 99283 EMERGENCY DEPT VISIT LOW MDM: CPT

## 2019-01-07 PROCEDURE — 74011250637 HC RX REV CODE- 250/637: Performed by: PHYSICIAN ASSISTANT

## 2019-01-07 RX ORDER — TRAMADOL HYDROCHLORIDE 50 MG/1
50 TABLET ORAL
Qty: 6 TAB | Refills: 0 | Status: SHIPPED | OUTPATIENT
Start: 2019-01-07 | End: 2019-01-07

## 2019-01-07 RX ORDER — HYDROCODONE BITARTRATE AND ACETAMINOPHEN 5; 325 MG/1; MG/1
1 TABLET ORAL
Qty: 5 TAB | Refills: 0 | Status: SHIPPED | OUTPATIENT
Start: 2019-01-07 | End: 2019-08-18

## 2019-01-07 RX ORDER — PENICILLIN V POTASSIUM 500 MG/1
500 TABLET, FILM COATED ORAL 4 TIMES DAILY
Qty: 28 TAB | Refills: 0 | Status: SHIPPED | OUTPATIENT
Start: 2019-01-07 | End: 2019-01-14

## 2019-01-07 RX ORDER — NAPROXEN 500 MG/1
500 TABLET ORAL 2 TIMES DAILY WITH MEALS
Qty: 20 TAB | Refills: 0 | Status: SHIPPED | OUTPATIENT
Start: 2019-01-07 | End: 2019-08-18

## 2019-01-07 RX ADMIN — LIDOCAINE HYDROCHLORIDE: 20 SOLUTION ORAL; TOPICAL at 18:14

## 2019-01-07 NOTE — ED NOTES
Patient (s)  given copy of dc instructions and 2 script(s). Patient (s)  verbalized understanding of instructions and script (s). Patient given a current medication reconciliation form and verbalized understanding of their medications. Patient (s) verbalized understanding of the importance of discussing medications with  his or her physician or clinic they will be following up with. Patient alert and oriented and in no acute distress. Patient discharged home ambulatory with self. Reported that she has been taking Naproxen and has not been working. Provider notified and changed out prescription which she gave her.

## 2019-01-07 NOTE — LETTER
[facility] EMERGENCY DEPT 
[address] [facility] [address] [id number] [phone] Work/School Note Date: 1/7/2019 To Whom It May concern: 
 
Bi Wu was seen and treated today in the emergency room by the following provider(s): 
Attending Provider: Yuliya Whittington MD 
Physician Assistant: IAN Parsons. Bi Wu may return to work on 1/8/2019. Sincerely, IAN Walters

## 2019-01-07 NOTE — ED NOTES
Emergency Department Nursing Plan of Care The Nursing Plan of Care is developed from the Nursing assessment and Emergency Department Attending provider initial evaluation. The plan of care may be reviewed in the ED Provider note. The Plan of Care was developed with the following considerations:  
Patient / Family readiness to learn indicated by:verbalized understanding Persons(s) to be included in education: patient Barriers to Learning/Limitations:No 
 
Signed Joseph Roque RN   
1/7/2019   6:25 PM

## 2019-01-07 NOTE — ED PROVIDER NOTES
EMERGENCY DEPARTMENT HISTORY AND PHYSICAL EXAM 
 
 
Date: 2019 Patient Name: Nayely Chaves History of Presenting Illness Chief Complaint Patient presents with  Dental Pain History Provided By: Patient HPI: Nayely Chaves, 29 y.o. female with PMHx significant for seizures, DM, htn, athma  presents ambulatory to the ED with cc of left lower dental pain x 1 week. Rates pain 10/10. Describes pain as intermittent ache, made worse with chewing. Has not tried anything for sx. Denies fever/chills, drainage, facial swelling. Has not f/u dentist. Denies trauma/injury. There are no other complaints, changes, or physical findings at this time. PCP: Fariha Puckett MD 
 
No current facility-administered medications on file prior to encounter. Current Outpatient Medications on File Prior to Encounter Medication Sig Dispense Refill  ibuprofen (MOTRIN) 200 mg tablet Take  by mouth.  acetaminophen (TYLENOL) 325 mg tablet Take  by mouth every four (4) hours as needed for Pain.  naproxen sodium (ANAPROX) 550 mg tablet Take 1 Tab by mouth two (2) times daily (with meals). 30 Tab 0  
 lisinopril (PRINIVIL, ZESTRIL) 10 mg tablet Take 10 mg by mouth daily.  metFORMIN (GLUCOPHAGE) 500 mg tablet Take 500 mg by mouth daily (with breakfast).  medroxyPROGESTERone (DEPO-PROVERA) 150 mg/mL injection 150 mg by IntraMUSCular route once. Past History Past Medical History: 
Past Medical History:  
Diagnosis Date  Asthma   
 pt denies  Diabetes ([address] 2016  Hypertension  Other ill-defined conditions(799.89)   
 back pain  Seizures ([address] Past Surgical History: 
Past Surgical History:  
Procedure Laterality Date  HX GYN  2014 Pt reports  in past.  
 HX TONSILLECTOMY Family History: 
Family History Problem Relation Age of Onset  Diabetes Mother  Diabetes Father  Diabetes Maternal Grandmother Social History: 
Social History Tobacco Use  Smoking status: Never Smoker  Smokeless tobacco: Never Used Substance Use Topics  Alcohol use: No  
 Drug use: No  
 
 
Allergies: 
No Known Allergies Review of Systems Review of Systems Constitutional: Negative for chills and fever. HENT: Positive for dental problem. Negative for congestion, facial swelling, sinus pain, sore throat and tinnitus. Respiratory: Negative for shortness of breath. Cardiovascular: Negative for chest pain. Gastrointestinal: Negative for abdominal pain, nausea and vomiting. Genitourinary: Negative for flank pain. Musculoskeletal: Negative for back pain and myalgias. Skin: Negative for color change, pallor, rash and wound. Neurological: Negative for dizziness, weakness and light-headedness. All other systems reviewed and are negative. Physical Exam  
Physical Exam  
Constitutional: She is oriented to person, place, and time. She appears well-developed and well-nourished. No distress. HENT:  
Head: Normocephalic and atraumatic. Mouth/Throat:  
 
 
Eyes: Conjunctivae are normal.  
Cardiovascular: Normal rate, regular rhythm and normal heart sounds. Pulmonary/Chest: Effort normal and breath sounds normal. No respiratory distress. Abdominal: Soft. Bowel sounds are normal. She exhibits no distension. Musculoskeletal: Normal range of motion. Neurological: She is alert and oriented to person, place, and time. Skin: Skin is warm. No rash noted. Psychiatric: She has a normal mood and affect. Her behavior is normal.  
Nursing note and vitals reviewed. Diagnostic Study Results Labs - No results found for this or any previous visit (from the past 12 hour(s)). Radiologic Studies - No orders to display CT Results  (Last 48 hours) None CXR Results  (Last 48 hours) None Medical Decision Making I am the first provider for this patient. I reviewed the vital signs, available nursing notes, past medical history, past surgical history, family history and social history. Vital Signs-Reviewed the patient's vital signs. Patient Vitals for the past 12 hrs: 
 Temp Pulse Resp BP SpO2  
01/07/19 1727 98 °F (36.7 °C) 96 18 (!) 136/92 100 % Records Reviewed: Nursing Notes and Old Medical Records Provider Notes (Medical Decision Making): DDx: Dental pain vs infection vs abscess ED Course:  
Initial assessment performed. The patients presenting problems have been discussed, and they are in agreement with the care plan formulated and outlined with them. I have encouraged them to ask questions as they arise throughout their visit. Disposition: 
6:19 PM 
Discussed dx and treatment plan. Discussed importance of PCP follow up. All questions answered. Pt voiced they understood. Return if sx worsen. PLAN: 
1. Current Discharge Medication List  
  
START taking these medications Details  
penicillin v potassium (VEETID) 500 mg tablet Take 1 Tab by mouth four (4) times daily for 7 days. Qty: 28 Tab, Refills: 0  
  
naproxen (NAPROSYN) 500 mg tablet Take 1 Tab by mouth two (2) times daily (with meals). Qty: 20 Tab, Refills: 0  
  
  
 
2. Follow-up Information Follow up With Specialties Details Why Contact Info Gabby Ness MD Internal Medicine Schedule an appointment as soon as possible for a visit As needed [address] [facility] [address] [address] 
[phone] Return to ED if worse Diagnosis Clinical Impression: 1. Dental infection

## 2019-01-07 NOTE — DISCHARGE INSTRUCTIONS
Emergency [address]  [address] [facility], [place], [address]   Phone: [phone]    [place] [address], [place], [facility]   Phone: [phone], select option (2) to confirm time for treatment     [facility]  [address] [facility], [facility]   Monday-Friday: 8am-4pm  Phone: [phone] of Dentistry Urgent [address] [facility], [address] [facility], [facility] [place] [address]  Phone: [phone] to confirm a time for emergency treatment  Pediatrics: [phone]     [facility]  Phone: [phone]    [facility]  [address], [address] [address]   Phone [phone] or [phone]  Hours [id number] (extractions)  Simple tooth extraction: $60 per tooth, $55 per x-ray     [facility] (in [place])  [facility] only  Phone: [phone], leave message saying you need an appointment to register  Hours: Wed 6-9p     [facility]  Disabled and over age 58 only  Phone: [phone]    Non-Urgent [facility] ([facility])  [address] [address]  Phone: [phone]     [facility] [address] [facility] [address] [facility]   Dental Clinic: [phone]  Oral Surgery Clinic: [phone]         Local Dentists    [place] [address] [address] [facility]  [phone]    Alexis Romero DDS  [address]  [phone]    Rudi Sterling, ALEJO  [facility] [address]  [phone]    Ginger Wesley  [address] [place] [address]  [phone]              Dental Pain: After Your Visit  Your Care Instructions  The most common cause of dental pain is tooth decay. It can also be caused by an infection of the tooth (abscess) or gum, a tooth that has not broken all the way through the gum (impacted tooth), or a problem with the nerve-filled center of the tooth.   Follow-up care is a key part of your treatment and safety. Be sure to make and go to all appointments, and call your doctor if you are having problems. Its also a good idea to know your test results and keep a list of the medicines you take. How can you care for yourself at home? · Contact a dentist for follow-up care. · Put ice or a cold pack on the outside of your mouth for 10 to 20 minutes at a time to reduce pain and swelling. Put a thin cloth between the ice and your skin. · Take an over-the-counter pain medicine, such as acetaminophen (Tylenol), ibuprofen (Advil, Motrin), or naproxen (Aleve). Read and follow all instructions on the label. · Do not take two or more pain medicines at the same time unless the doctor told you to. Many pain medicines have acetaminophen, which is Tylenol. Too much acetaminophen (Tylenol) can be harmful. · Rinse your mouth with warm salt water every 2 hours to help relieve pain and swelling from an infected tooth. Mix 1 teaspoon of salt in 8 ounces of water. · If your doctor prescribed antibiotics, take them as directed. Do not stop taking them just because you feel better. You need to take the full course of antibiotics. When should you call for help? Call your doctor now or seek immediate medical care if:  · You have signs of infection, such as:  ¨ Increased pain, swelling, warmth, or redness. ¨ Pus draining from the gum, tooth, or face. ¨ A fever. Watch closely for changes in your health, and be sure to contact your doctor if:  · You do not get better as expected. Where can you learn more? Go to [employer]  Enter V264 in the search box to learn more about \"Dental Pain: After Your Visit. \"   © 4969-2313 [employer]wise, Incorporated. Care instructions adapted under license by [facility] (which disclaims liability or warranty for this information).  This care instruction is for use with your licensed healthcare professional. If you have questions about a medical condition or this instruction, always ask your healthcare professional. [place] [address] any warranty or liability for your use of this information. Content Version: 45.8.600988;  Last Revised: May 17, 2013

## 2019-04-02 ENCOUNTER — DOCUMENTATION ONLY (OUTPATIENT)
Dept: INTERNAL MEDICINE CLINIC | Age: 29
End: 2019-04-02

## 2019-05-27 ENCOUNTER — HOSPITAL ENCOUNTER (EMERGENCY)
Age: 29
Discharge: HOME | End: 2019-05-27
Attending: EMERGENCY MEDICINE
Payer: COMMERCIAL

## 2019-05-27 VITALS
SYSTOLIC BLOOD PRESSURE: 137 MMHG | HEART RATE: 97 BPM | BODY MASS INDEX: 50.02 KG/M2 | TEMPERATURE: 98.5 F | RESPIRATION RATE: 18 BRPM | DIASTOLIC BLOOD PRESSURE: 86 MMHG | OXYGEN SATURATION: 97 % | WEIGHT: 293 LBS | HEIGHT: 64 IN

## 2019-05-27 DIAGNOSIS — R73.9 HYPERGLYCEMIA: ICD-10-CM

## 2019-05-27 DIAGNOSIS — I10 ESSENTIAL HYPERTENSION: ICD-10-CM

## 2019-05-27 DIAGNOSIS — N93.8 DUB (DYSFUNCTIONAL UTERINE BLEEDING): Primary | ICD-10-CM

## 2019-05-27 LAB
ANION GAP SERPL CALC-SCNC: 8 MMOL/L (ref 5–15)
APPEARANCE UR: CLEAR
BACTERIA URNS QL MICRO: NEGATIVE /HPF
BASOPHILS # BLD: 0 K/UL (ref 0–0.1)
BASOPHILS NFR BLD: 0 % (ref 0–1)
BILIRUB UR QL: NEGATIVE
BUN SERPL-MCNC: 10 MG/DL (ref 6–20)
BUN/CREAT SERPL: 12 (ref 12–20)
CALCIUM SERPL-MCNC: 9.5 MG/DL (ref 8.5–10.1)
CHLORIDE SERPL-SCNC: 99 MMOL/L (ref 97–108)
CO2 SERPL-SCNC: 26 MMOL/L (ref 21–32)
COLOR UR: ABNORMAL
CREAT SERPL-MCNC: 0.85 MG/DL (ref 0.55–1.02)
DIFFERENTIAL METHOD BLD: ABNORMAL
EOSINOPHIL # BLD: 0.1 K/UL (ref 0–0.4)
EOSINOPHIL NFR BLD: 1 % (ref 0–7)
EPITH CASTS URNS QL MICRO: ABNORMAL /LPF
ERYTHROCYTE [DISTWIDTH] IN BLOOD BY AUTOMATED COUNT: 12.7 % (ref 11.5–14.5)
GLUCOSE SERPL-MCNC: 303 MG/DL (ref 65–100)
GLUCOSE UR STRIP.AUTO-MCNC: >1000 MG/DL
HCG UR QL: NEGATIVE
HCT VFR BLD AUTO: 37.6 % (ref 35–47)
HGB BLD-MCNC: 12.6 G/DL (ref 11.5–16)
HGB UR QL STRIP: ABNORMAL
IMM GRANULOCYTES # BLD AUTO: 0 K/UL (ref 0–0.04)
IMM GRANULOCYTES NFR BLD AUTO: 1 % (ref 0–0.5)
KETONES UR QL STRIP.AUTO: NEGATIVE MG/DL
LEUKOCYTE ESTERASE UR QL STRIP.AUTO: NEGATIVE
LYMPHOCYTES # BLD: 2.3 K/UL (ref 0.8–3.5)
LYMPHOCYTES NFR BLD: 33 % (ref 12–49)
MCH RBC QN AUTO: 26.9 PG (ref 26–34)
MCHC RBC AUTO-ENTMCNC: 33.5 G/DL (ref 30–36.5)
MCV RBC AUTO: 80.3 FL (ref 80–99)
MONOCYTES # BLD: 0.5 K/UL (ref 0–1)
MONOCYTES NFR BLD: 7 % (ref 5–13)
NEUTS SEG # BLD: 4.1 K/UL (ref 1.8–8)
NEUTS SEG NFR BLD: 58 % (ref 32–75)
NITRITE UR QL STRIP.AUTO: NEGATIVE
NRBC # BLD: 0 K/UL (ref 0–0.01)
NRBC BLD-RTO: 0 PER 100 WBC
PH UR STRIP: 6.5 [PH] (ref 5–8)
PLATELET # BLD AUTO: 264 K/UL (ref 150–400)
PMV BLD AUTO: 9.9 FL (ref 8.9–12.9)
POTASSIUM SERPL-SCNC: 3.7 MMOL/L (ref 3.5–5.1)
PROT UR STRIP-MCNC: NEGATIVE MG/DL
RBC # BLD AUTO: 4.68 M/UL (ref 3.8–5.2)
RBC #/AREA URNS HPF: ABNORMAL /HPF (ref 0–5)
SODIUM SERPL-SCNC: 133 MMOL/L (ref 136–145)
SP GR UR REFRACTOMETRY: 1.01 (ref 1–1.03)
UA: UC IF INDICATED,UAUC: ABNORMAL
UROBILINOGEN UR QL STRIP.AUTO: 0.2 EU/DL (ref 0.2–1)
WBC # BLD AUTO: 7 K/UL (ref 3.6–11)
WBC URNS QL MICRO: ABNORMAL /HPF (ref 0–4)

## 2019-05-27 PROCEDURE — 36415 COLL VENOUS BLD VENIPUNCTURE: CPT

## 2019-05-27 PROCEDURE — 99284 EMERGENCY DEPT VISIT MOD MDM: CPT

## 2019-05-27 PROCEDURE — 81025 URINE PREGNANCY TEST: CPT

## 2019-05-27 PROCEDURE — 85025 COMPLETE CBC W/AUTO DIFF WBC: CPT

## 2019-05-27 PROCEDURE — 81001 URINALYSIS AUTO W/SCOPE: CPT

## 2019-05-27 PROCEDURE — 80048 BASIC METABOLIC PNL TOTAL CA: CPT

## 2019-05-27 RX ORDER — LISINOPRIL 10 MG/1
10 TABLET ORAL DAILY
Qty: 10 TAB | Refills: 0 | Status: SHIPPED | OUTPATIENT
Start: 2019-05-27 | End: 2019-09-17

## 2019-05-27 RX ORDER — METFORMIN HYDROCHLORIDE 500 MG/1
500 TABLET ORAL
Qty: 10 TAB | Refills: 0 | Status: SHIPPED | OUTPATIENT
Start: 2019-05-27 | End: 2020-01-21

## 2019-05-27 NOTE — LETTER
[facility] EMERGENCY DEPT 
[address] [facility] [address] [address] [phone] Work/School Note Date: 5/27/2019 To Whom It May concern: 
 
Pillo Almanza was seen and treated today in the emergency room by the following provider(s): 
Attending Provider: Morales Heart MD 
Physician Assistant: IAN Billy. Pillo Almanza may return to work on 5/29/2019. Sincerely, IAN Millard

## 2019-05-28 NOTE — ED NOTES
IAN Hdz at bedside reviewing patient's discharge instructions and reviewing medications. Patient ambulatory home with self. Patient in no apparent distress.

## 2019-05-28 NOTE — ED PROVIDER NOTES
EMERGENCY DEPARTMENT HISTORY AND PHYSICAL EXAM      Date: 2019  Patient Name: Tony Page  Patient Age and Sex: 29 y.o. female    History of Presenting Illness     Chief Complaint   Patient presents with    Vaginal Bleeding       History Provided By: Patient    HPI: Tony Page, 29 y.o. female with PMHx significant for seizures, DM, htn presents ambulatory to the ED with c/o of constant vaginal bleeding x 7 months. Pt reports she saw gyn two weeks ago, and she was prescribed Provera. Pt has taken Depo shot x 4 years. Pt reports she recently starting having quarter sized clots over the last few days. Pt reports intermittent cramping suprapubic abd pain. Rates pain 10/10. Ha taken aleve and midol without relief of sx. Denies aggravating or alleviating sx. Denies dysuria, hematuria, change in vaginal discharge, N/V, diarrhea. Denies dizziness, blurred vision, SOB, CP. Denies previous blood transfusion. Past Medical History:   Diagnosis Date    Asthma     pt denies    Diabetes ([address] 2016    Hypertension     Other ill-defined conditions(799.89)     back pain    Seizures ([address]      Past Surgical History:   Procedure Laterality Date    HX GYN  2014    Pt reports  in past.    HX TONSILLECTOMY         Pt denies any other alleviating or exacerbating factors. Additionally, pt specifically denies any recent fever, chills, headache, nausea, vomiting, abdominal pain, CP, SOB, lightheadedness, dizziness, numbness, weakness, BLE swelling, heart palpitations, urinary sxs, diarrhea, constipation, melena, hematochezia, cough, or congestion. PCP: Jodie Chaidez MD    There are no other complaints, changes or physical findings at this time.      Past History   Past Medical History:  Past Medical History:   Diagnosis Date    Asthma     pt denies    Diabetes ([address] 2016    Hypertension     Other ill-defined conditions(799.89)     back pain    Seizures ([address]        Past Surgical History:  Past Surgical History:   Procedure Laterality Date    HX GYN  2014    Pt reports  in past.    HX TONSILLECTOMY         Family History:  Family History   Problem Relation Age of Onset    Diabetes Mother     Diabetes Father     Diabetes Maternal Grandmother        Social History:  Social History     Tobacco Use    Smoking status: Never Smoker    Smokeless tobacco: Never Used   Substance Use Topics    Alcohol use: No    Drug use: No       Allergies:  No Known Allergies    Current Medications:  No current facility-administered medications on file prior to encounter. Current Outpatient Medications on File Prior to Encounter   Medication Sig Dispense Refill    ibuprofen (MOTRIN) 200 mg tablet Take  by mouth.  naproxen (NAPROSYN) 500 mg tablet Take 1 Tab by mouth two (2) times daily (with meals). 20 Tab 0    HYDROcodone-acetaminophen (NORCO) 5-325 mg per tablet Take 1 Tab by mouth every six (6) hours as needed for Pain. Max Daily Amount: 4 Tabs. 5 Tab 0    acetaminophen (TYLENOL) 325 mg tablet Take  by mouth every four (4) hours as needed for Pain.  naproxen sodium (ANAPROX) 550 mg tablet Take 1 Tab by mouth two (2) times daily (with meals). 30 Tab 0    medroxyPROGESTERone (DEPO-PROVERA) 150 mg/mL injection 150 mg by IntraMUSCular route once. Review of Systems   Review of Systems   Constitutional: Negative for chills and fever. Respiratory: Negative for shortness of breath. Cardiovascular: Negative for chest pain. Gastrointestinal: Positive for abdominal pain. Negative for nausea and vomiting. Genitourinary: Positive for vaginal bleeding. Negative for dysuria, flank pain, frequency, genital sores, vaginal discharge and vaginal pain. Musculoskeletal: Negative for back pain and myalgias. Skin: Negative for color change, pallor, rash and wound. Neurological: Negative for dizziness, weakness and light-headedness.    All other systems reviewed and are negative. Physical Exam   Physical Exam   Constitutional: She is oriented to person, place, and time. She appears well-developed and well-nourished. No distress. HENT:   Head: Normocephalic and atraumatic. Eyes: Conjunctivae are normal.   Cardiovascular: Normal rate, regular rhythm and normal heart sounds. Pulmonary/Chest: Effort normal and breath sounds normal. No respiratory distress. Abdominal: Soft. Bowel sounds are normal. She exhibits no distension. Abdomen soft, nontender  Pain not reproducible on exam   Genitourinary: Uterus is not tender. Cervix exhibits no motion tenderness and no discharge. Right adnexum displays no tenderness. Left adnexum displays no tenderness. There is bleeding in the vagina. No erythema or tenderness in the vagina. No foreign body in the vagina. No signs of injury around the vagina. No vaginal discharge found. Genitourinary Comments: Moderate amount of dark red blood with dime-sized clots. Cervical os closed. No CMT. Musculoskeletal: Normal range of motion. Neurological: She is alert and oriented to person, place, and time. Skin: Skin is warm. No rash noted. Psychiatric: She has a normal mood and affect. Her behavior is normal.   Nursing note and vitals reviewed.       Diagnostic Study Results     Labs -  Recent Results (from the past 24 hour(s))   URINALYSIS W/ REFLEX CULTURE    Collection Time: 05/27/19  9:22 PM   Result Value Ref Range    Color YELLOW/STRAW      Appearance CLEAR CLEAR      Specific gravity 1.010 1.003 - 1.030      pH (UA) 6.5 5.0 - 8.0      Protein NEGATIVE  NEG mg/dL    Glucose >1,000 (A) NEG mg/dL    Ketone NEGATIVE  NEG mg/dL    Bilirubin NEGATIVE  NEG      Blood LARGE (A) NEG      Urobilinogen 0.2 0.2 - 1.0 EU/dL    Nitrites NEGATIVE  NEG      Leukocyte Esterase NEGATIVE  NEG      WBC 0-4 0 - 4 /hpf    RBC 20-50 0 - 5 /hpf    Epithelial cells FEW FEW /lpf    Bacteria NEGATIVE  NEG /hpf    UA:UC IF INDICATED CULTURE NOT INDICATED BY UA RESULT CNI     HCG URINE, QL. - POC    Collection Time: 05/27/19  9:26 PM   Result Value Ref Range    Pregnancy test,urine (POC) NEGATIVE  NEG     CBC WITH AUTOMATED DIFF    Collection Time: 05/27/19  9:49 PM   Result Value Ref Range    WBC 7.0 3.6 - 11.0 K/uL    RBC 4.68 3.80 - 5.20 M/uL    HGB 12.6 11.5 - 16.0 g/dL    HCT 37.6 35.0 - 47.0 %    MCV 80.3 80.0 - 99.0 FL    MCH 26.9 26.0 - 34.0 PG    MCHC 33.5 30.0 - 36.5 g/dL    RDW 12.7 11.5 - 14.5 %    PLATELET 840 254 - 771 K/uL    MPV 9.9 8.9 - 12.9 FL    NRBC 0.0 0  WBC    ABSOLUTE NRBC 0.00 0.00 - 0.01 K/uL    NEUTROPHILS 58 32 - 75 %    LYMPHOCYTES 33 12 - 49 %    MONOCYTES 7 5 - 13 %    EOSINOPHILS 1 0 - 7 %    BASOPHILS 0 0 - 1 %    IMMATURE GRANULOCYTES 1 (H) 0.0 - 0.5 %    ABS. NEUTROPHILS 4.1 1.8 - 8.0 K/UL    ABS. LYMPHOCYTES 2.3 0.8 - 3.5 K/UL    ABS. MONOCYTES 0.5 0.0 - 1.0 K/UL    ABS. EOSINOPHILS 0.1 0.0 - 0.4 K/UL    ABS. BASOPHILS 0.0 0.0 - 0.1 K/UL    ABS. IMM. GRANS. 0.0 0.00 - 0.04 K/UL    DF AUTOMATED     METABOLIC PANEL, BASIC    Collection Time: 05/27/19  9:49 PM   Result Value Ref Range    Sodium 133 (L) 136 - 145 mmol/L    Potassium 3.7 3.5 - 5.1 mmol/L    Chloride 99 97 - 108 mmol/L    CO2 26 21 - 32 mmol/L    Anion gap 8 5 - 15 mmol/L    Glucose 303 (H) 65 - 100 mg/dL    BUN 10 6 - 20 MG/DL    Creatinine 0.85 0.55 - 1.02 MG/DL    BUN/Creatinine ratio 12 12 - 20      GFR est AA >60 >60 ml/min/1.73m2    GFR est non-AA >60 >60 ml/min/1.73m2    Calcium 9.5 8.5 - 10.1 MG/DL       Radiologic Studies -   No orders to display     CT Results  (Last 48 hours)    None        CXR Results  (Last 48 hours)    None          Medical Decision Making   I am the first provider for this patient. I reviewed the vital signs, available nursing notes, past medical history, past surgical history, family history and social history. Vital Signs-Reviewed the patient's vital signs.   Patient Vitals for the past 24 hrs:   Temp Pulse Resp BP SpO2   05/27/19 2114     97 %   05/27/19 2102 98.5 °F (36.9 °C) 97 18 137/86 97 %         Records Reviewed: Nursing Notes, Old Medical Records, Previous electrocardiograms, Previous Radiology Studies and Previous Laboratory Studies    Provider Notes (Medical Decision Making):   Patient presents with vaginal bleeding. DDx: pregnancy, ectopic, ovarian torsion, cyst, endometriosis, fibroid, PCOS, dysmenorrhea/menorrhagia. Will obtain urinalysis, urine pregnancy, perform pelvic and send pelvic labs. Will reassess need for further imaging. Negative pregnancy test. Low level of concern for ovarian torsion due to lack of abdominal tenderness on exam.     Previous US from 8/2018 - cystic structure posterior and inferior to uterus. No fibroids visualized. ED Course:   Initial assessment performed. The patients presenting problems have been discussed, and they are in agreement with the care plan formulated and outlined with them. I have encouraged them to ask questions as they arise throughout their visit. Medications Administered During ED Course:  Medications - No data to display       Progress Note:  Discussed elevated glucose with pt. Pt states she took metformin just PTA. Denies N/V, abd pain, diarrhea. Discussed importance of medication compliance and PCP f/u. Pt requesting lisinopril and metformin refills. Discussed importance of prompt PCP f/u. All questions answered. Pt states she agrees and understands. I have answered all questions to the patient's satisfaction. Strict return precautions given. She both understood and agreed with plan as discussed above. Vital signs stable for discharge. Critical Care Time: 0    Disposition: DISCHARGE     The pt is ready for discharge. The pt's signs, symptoms, diagnosis, and discharge instructions have been discussed and pt has conveyed their understanding. The pt is to follow up as recommended or return to ER should their symptoms worsen.  Plan has been discussed and pt is in agreement. PLAN:  1. Current Discharge Medication List      CONTINUE these medications which have CHANGED    Details   metFORMIN (GLUCOPHAGE) 500 mg tablet Take 1 Tab by mouth daily (with breakfast). Qty: 10 Tab, Refills: 0      lisinopril (PRINIVIL, ZESTRIL) 10 mg tablet Take 1 Tab by mouth daily. Qty: 10 Tab, Refills: 0           2. Follow-up Information     Follow up With Specialties Details Why Contact Info       Follow up with gyn at walk in appt tomorrow     Tremayne Martinez MD Internal Medicine Schedule an appointment as soon as possible for a visit in 1 day  [address] [place] [address]  [phone]            Return to ED if worse  Diagnosis     Clinical Impression:   1. DUB (dysfunctional uterine bleeding)    2. Hyperglycemia    3.  Essential hypertension

## 2019-05-28 NOTE — ED NOTES
Pt arrived to ED via ambulatory with c/o nonstop bleeding since October. Pt. Reports large clots. Pt. States has already been seen x2 weeks ago at the clinic and was placed on flagyl for bacterial infection. Lungs clear. Pt is in no acute distress. Will continue to monitor. See nursing assessment. Safety precautions in place; call light within reach. Emergency Department Nursing Plan of Care       The Nursing Plan of Care is developed from the Nursing assessment and Emergency Department Attending provider initial evaluation. The plan of care may be reviewed in the ED Provider note.     The Plan of Care was developed with the following considerations:   Patient / Family readiness to learn indicated by:verbalized understanding  Persons(s) to be included in education: patient  Barriers to Learning/Limitations:No    Signed     Rody Navarrete RN    5/27/2019   9:12 PM

## 2019-05-28 NOTE — ED TRIAGE NOTES
Reports vaginal bleeding x7 months.  Pt reports being seen and treated with birth control without relief, recently finished flagyl for BV

## 2019-05-28 NOTE — DISCHARGE INSTRUCTIONS
Patient Education        DASH Diet: Care Instructions  Your Care Instructions    The DASH diet is an eating plan that can help lower your blood pressure. DASH stands for Dietary Approaches to Stop Hypertension. Hypertension is high blood pressure. The DASH diet focuses on eating foods that are high in calcium, potassium, and magnesium. These nutrients can lower blood pressure. The foods that are highest in these nutrients are fruits, vegetables, low-fat dairy products, nuts, seeds, and legumes. But taking calcium, potassium, and magnesium supplements instead of eating foods that are high in those nutrients does not have the same effect. The DASH diet also includes whole grains, fish, and poultry. The DASH diet is one of several lifestyle changes your doctor may recommend to lower your high blood pressure. Your doctor may also want you to decrease the amount of sodium in your diet. Lowering sodium while following the DASH diet can lower blood pressure even further than just the DASH diet alone. Follow-up care is a key part of your treatment and safety. Be sure to make and go to all appointments, and call your doctor if you are having problems. It's also a good idea to know your test results and keep a list of the medicines you take. How can you care for yourself at home? Following the DASH diet  · Eat 4 to 5 servings of fruit each day. A serving is 1 medium-sized piece of fruit, ½ cup chopped or canned fruit, 1/4 cup dried fruit, or 4 ounces (½ cup) of fruit juice. Choose fruit more often than fruit juice. · Eat 4 to 5 servings of vegetables each day. A serving is 1 cup of lettuce or raw leafy vegetables, ½ cup of chopped or cooked vegetables, or 4 ounces (½ cup) of vegetable juice. Choose vegetables more often than vegetable juice. · Get 2 to 3 servings of low-fat and fat-free dairy each day. A serving is 8 ounces of milk, 1 cup of yogurt, or 1 ½ ounces of cheese. · Eat 6 to 8 servings of grains each day.  A serving is 1 slice of bread, 1 ounce of dry cereal, or ½ cup of cooked rice, pasta, or cooked cereal. Try to choose whole-grain products as much as possible. · Limit lean meat, poultry, and fish to 2 servings each day. A serving is 3 ounces, about the size of a deck of cards. · Eat 4 to 5 servings of nuts, seeds, and legumes (cooked dried beans, lentils, and split peas) each week. A serving is 1/3 cup of nuts, 2 tablespoons of seeds, or ½ cup of cooked beans or peas. · Limit fats and oils to 2 to 3 servings each day. A serving is 1 teaspoon of vegetable oil or 2 tablespoons of salad dressing. · Limit sweets and added sugars to 5 servings or less a week. A serving is 1 tablespoon jelly or jam, ½ cup sorbet, or 1 cup of lemonade. · Eat less than 2,300 milligrams (mg) of sodium a day. If you limit your sodium to 1,500 mg a day, you can lower your blood pressure even more. Tips for success  · Start small. Do not try to make dramatic changes to your diet all at once. You might feel that you are missing out on your favorite foods and then be more likely to not follow the plan. Make small changes, and stick with them. Once those changes become habit, add a few more changes. · Try some of the following:  ? Make it a goal to eat a fruit or vegetable at every meal and at snacks. This will make it easy to get the recommended amount of fruits and vegetables each day. ? Try yogurt topped with fruit and nuts for a snack or healthy dessert. ? Add lettuce, tomato, cucumber, and onion to sandwiches. ? Combine a ready-made pizza crust with low-fat mozzarella cheese and lots of vegetable toppings. Try using tomatoes, squash, spinach, broccoli, carrots, cauliflower, and onions. ? Have a variety of cut-up vegetables with a low-fat dip as an appetizer instead of chips and dip. ? Sprinkle sunflower seeds or chopped almonds over salads. Or try adding chopped walnuts or almonds to cooked vegetables.   ? Try some vegetarian meals using beans and peas. Add garbanzo or kidney beans to salads. Make burritos and tacos with mashed esparza beans or black beans. Where can you learn more? Go to http://phil-khari.info/. Enter D248 in the search box to learn more about \"DASH Diet: Care Instructions. \"  Current as of: July 22, 2018  Content Version: 11.9  © 2006-2018 [employer]. Care instructions adapted under license by [employer] (which disclaims liability or warranty for this information). If you have questions about a medical condition or this instruction, always ask your healthcare professional. [place] [address] any warranty or liability for your use of this information. Patient Education        Learning About High Blood Sugar  What is high blood sugar? Your body turns the food you eat into glucose (sugar), which it uses for energy. But if your body isn't able to use the sugar right away, it can build up in your blood and lead to high blood sugar. When the amount of sugar in your blood stays too high for too much of the time, you may have diabetes. Diabetes is a disease that can cause serious health problems. The good news is that lifestyle changes may help you get your blood sugar back to normal and avoid or delay diabetes. What causes high blood sugar? Sugar (glucose) can build up in your blood if you:  · Are overweight. · Have a family history of diabetes. · Take certain medicines, such as steroids. What are the symptoms? Having high blood sugar may not cause any symptoms at all. Or it may make you feel very thirsty or very hungry. You may also urinate more often than usual, have blurry vision, or lose weight without trying. How is high blood sugar treated? You can take steps to lower your blood sugar level if you understand what makes it get higher. Your doctor may want you to learn how to test your blood sugar level at home.  Then you can see how illness, stress, or different kinds of food or medicine raise or lower your blood sugar level. Other tests may be needed to see if you have diabetes. How can you prevent high blood sugar? · Watch your weight. If you're overweight, losing just a small amount of weight may help. Reducing fat around your waist is most important. · Limit the amount of calories, sweets, and unhealthy fat you eat. Ask your doctor if a dietitian can help you. A registered dietitian can help you create meal plans that fit your lifestyle. · Get at least 30 minutes of exercise on most days of the week. Exercise helps control your blood sugar. It also helps you maintain a healthy weight. Walking is a good choice. You also may want to do other activities, such as running, swimming, cycling, or playing tennis or team sports. · If your doctor prescribed medicines, take them exactly as prescribed. Call your doctor if you think you are having a problem with your medicine. You will get more details on the specific medicines your doctor prescribes. Follow-up care is a key part of your treatment and safety. Be sure to make and go to all appointments, and call your doctor if you are having problems. It's also a good idea to know your test results and keep a list of the medicines you take. Where can you learn more? Go to http://phil-khari.info/. Enter O108 in the search box to learn more about \"Learning About High Blood Sugar. \"  Current as of: July 25, 2018  Content Version: 11.9  © 6900-9064 [employer]. Care instructions adapted under license by [employer] (which disclaims liability or warranty for this information). If you have questions about a medical condition or this instruction, always ask your healthcare professional. [place] [address] any warranty or liability for your use of this information.          Patient Education        Vaginal Bleeding in Nonpregnant Women: Care Instructions  Your Care Instructions    Many women have bleeding or spotting between periods. Lots of things can cause it. You may bleed because of hormone problems, stress, or ovulation. Fibroids and IUDs (intrauterine devices) can also cause bleeding. If your bleeding or spotting is caused by one of these things and is not heavy or doesn't happen often, you probably don't need to worry. But in rare cases, infection, cancer, or other serious conditions can cause bleeding. So you may need more tests to find the cause of your bleeding. The doctor has checked you carefully, but problems can develop later. If you notice any problems or new symptoms, get medical treatment right away. Follow-up care is a key part of your treatment and safety. Be sure to make and go to all appointments, and call your doctor if you are having problems. It's also a good idea to know your test results and keep a list of the medicines you take. How can you care for yourself at home? · Take pain medicines exactly as directed. ? If the doctor gave you a prescription medicine for pain, take it as prescribed. ? If you are not taking a prescription pain medicine, ask your doctor if you can take an over-the-counter medicine. Do not take aspirin, which may make bleeding worse. · If your doctor prescribed birth control pills for your bleeding, take them as directed. · Eat foods that are high in iron and vitamin C. Foods high in iron include red meat, shellfish, eggs, beans, and leafy green vegetables. Foods high in vitamin C include citrus fruits, tomatoes, and broccoli. Ask your doctor if you need to take iron pills or a multivitamin. · Ask your doctor when it is okay to have sex. When should you call for help? Call 911 anytime you think you may need emergency care.  For example, call if:    · You passed out (lost consciousness).    Call your doctor now or seek immediate medical care if:    · You have severe vaginal bleeding.     · You are dizzy or lightheaded, or you feel like you may faint.     · You have new or worse belly or pelvic pain.    Watch closely for changes in your health, and be sure to contact your doctor if:    · Your bleeding gets worse.     · You think you might be pregnant.     · You do not get better as expected. Where can you learn more? Go to http://phil-khari.info/. Enter U437 in the search box to learn more about \"Vaginal Bleeding in Nonpregnant Women: Care Instructions. \"  Current as of: May 14, 2018  Content Version: 11.9  © 5541-8645 [employer]. Care instructions adapted under license by [employer] (which disclaims liability or warranty for this information). If you have questions about a medical condition or this instruction, always ask your healthcare professional. [address] any warranty or liability for your use of this information.

## 2019-08-18 ENCOUNTER — HOSPITAL ENCOUNTER (EMERGENCY)
Age: 29
Discharge: HOME | End: 2019-08-18
Attending: EMERGENCY MEDICINE
Payer: MEDICAID

## 2019-08-18 VITALS
BODY MASS INDEX: 50.02 KG/M2 | WEIGHT: 293 LBS | OXYGEN SATURATION: 98 % | RESPIRATION RATE: 18 BRPM | DIASTOLIC BLOOD PRESSURE: 78 MMHG | HEIGHT: 64 IN | HEART RATE: 83 BPM | SYSTOLIC BLOOD PRESSURE: 133 MMHG | TEMPERATURE: 98 F

## 2019-08-18 DIAGNOSIS — K08.89 DENTALGIA: Primary | ICD-10-CM

## 2019-08-18 DIAGNOSIS — K04.7 DENTAL INFECTION: ICD-10-CM

## 2019-08-18 DIAGNOSIS — K05.10 GINGIVITIS: ICD-10-CM

## 2019-08-18 PROCEDURE — 74011000250 HC RX REV CODE- 250: Performed by: EMERGENCY MEDICINE

## 2019-08-18 PROCEDURE — 74011250637 HC RX REV CODE- 250/637: Performed by: EMERGENCY MEDICINE

## 2019-08-18 PROCEDURE — 99283 EMERGENCY DEPT VISIT LOW MDM: CPT

## 2019-08-18 RX ORDER — PENICILLIN V POTASSIUM 250 MG/1
500 TABLET, FILM COATED ORAL
Status: COMPLETED | OUTPATIENT
Start: 2019-08-18 | End: 2019-08-18

## 2019-08-18 RX ORDER — IBUPROFEN 800 MG/1
800 TABLET ORAL
Qty: 20 TAB | Refills: 0 | Status: SHIPPED | OUTPATIENT
Start: 2019-08-18 | End: 2019-08-25

## 2019-08-18 RX ORDER — PENICILLIN V POTASSIUM 500 MG/1
500 TABLET, FILM COATED ORAL 4 TIMES DAILY
Qty: 28 TAB | Refills: 0 | Status: SHIPPED | OUTPATIENT
Start: 2019-08-18 | End: 2019-08-25

## 2019-08-18 RX ADMIN — DIPHENHYDRAMINE HYDROCHLORIDE: 12.5 LIQUID ORAL at 09:03

## 2019-08-18 RX ADMIN — PENICILLIN V POTASSIUM 500 MG: 250 TABLET ORAL at 09:04

## 2019-08-18 NOTE — ED PROVIDER NOTES
EMERGENCY DEPARTMENT HISTORY AND PHYSICAL EXAM      Date: 2019  Patient Name: Zita Morales  Patient Age and Sex: 34 y.o. female    History of Presenting Illness     Chief Complaint   Patient presents with    Dental Pain     times two days        History Provided By: Patient    HPI: Zita Morales, 34 y.o. female with past medical history as documented below presents to the ED with c/o of two days of left upper dental pain. Pt describes pain as throbbing, 8/10 and unrelieved with gargling warm water and OTC medications. Pt denies any other alleviating or exacerbating factors. Additionally, pt specifically denies any recent fever, chills, headache, nausea, vomiting, abdominal pain, CP, SOB, lightheadedness, dizziness, numbness, weakness, BLE swelling, heart palpitations, urinary sxs, diarrhea, constipation, melena, hematochezia, cough, or congestion. There are no other complaints, changes or physical findings at this time. PCP: Mariia Briones MD    Past History   Past Medical History:  Past Medical History:   Diagnosis Date    Asthma     pt denies    Diabetes ([facility] [address] 2016    Hypertension     Other ill-defined conditions(799.89)     back pain    Seizures ([facility] [address]        Past Surgical History:  Past Surgical History:   Procedure Laterality Date    HX GYN  2014    Pt reports  in past.    HX TONSILLECTOMY         Family History:  Family History   Problem Relation Age of Onset    Diabetes Mother     Diabetes Father     Diabetes Maternal Grandmother        Social History:  Social History     Tobacco Use    Smoking status: Never Smoker    Smokeless tobacco: Never Used   Substance Use Topics    Alcohol use: No    Drug use: No       Allergies:  No Known Allergies    Current Medications:  No current facility-administered medications on file prior to encounter.       Current Outpatient Medications on File Prior to Encounter   Medication Sig Dispense Refill    metFORMIN (GLUCOPHAGE) 500 mg tablet Take 1 Tab by mouth daily (with breakfast). 10 Tab 0    lisinopril (PRINIVIL, ZESTRIL) 10 mg tablet Take 1 Tab by mouth daily. 10 Tab 0    naproxen (NAPROSYN) 500 mg tablet Take 1 Tab by mouth two (2) times daily (with meals). 20 Tab 0    HYDROcodone-acetaminophen (NORCO) 5-325 mg per tablet Take 1 Tab by mouth every six (6) hours as needed for Pain. Max Daily Amount: 4 Tabs. 5 Tab 0    ibuprofen (MOTRIN) 200 mg tablet Take  by mouth.  acetaminophen (TYLENOL) 325 mg tablet Take  by mouth every four (4) hours as needed for Pain.  naproxen sodium (ANAPROX) 550 mg tablet Take 1 Tab by mouth two (2) times daily (with meals). 30 Tab 0    medroxyPROGESTERone (DEPO-PROVERA) 150 mg/mL injection 150 mg by IntraMUSCular route once. Review of Systems   Review of Systems   Constitutional: Negative. Negative for chills and fever. HENT: Positive for dental problem. Negative for congestion, facial swelling, rhinorrhea, sore throat, trouble swallowing and voice change. Eyes: Negative. Respiratory: Negative. Negative for apnea, cough, chest tightness, shortness of breath and wheezing. Cardiovascular: Negative. Negative for chest pain, palpitations and leg swelling. Gastrointestinal: Negative. Negative for abdominal distention, abdominal pain, blood in stool, constipation, diarrhea, nausea and vomiting. Endocrine: Negative. Negative for cold intolerance, heat intolerance and polyuria. Genitourinary: Negative. Negative for difficulty urinating, dysuria, flank pain, frequency, hematuria and urgency. Musculoskeletal: Negative. Negative for arthralgias, back pain, myalgias, neck pain and neck stiffness. Skin: Negative. Negative for color change and rash. Neurological: Negative. Negative for dizziness, syncope, facial asymmetry, speech difficulty, weakness, light-headedness, numbness and headaches. Hematological: Negative. Does not bruise/bleed easily. Psychiatric/Behavioral: Negative. Negative for confusion and self-injury. The patient is not nervous/anxious. Physical Exam   Physical Exam   Constitutional: She is oriented to person, place, and time. She appears well-developed and well-nourished. No distress. HENT:   Head: Normocephalic and atraumatic. Mouth/Throat: Oropharynx is clear and moist. No oropharyngeal exudate. TTP tooth #15-16, surrounding induration, no abscess   Eyes: Pupils are equal, round, and reactive to light. Conjunctivae and EOM are normal.   Neck: Normal range of motion. Cardiovascular: Normal rate, regular rhythm and normal heart sounds. Exam reveals no gallop and no friction rub. No murmur heard. Pulmonary/Chest: Effort normal and breath sounds normal. No respiratory distress. She has no wheezes. She has no rales. She exhibits no tenderness. Abdominal: Soft. Bowel sounds are normal. She exhibits no distension and no mass. There is no tenderness. There is no rebound and no guarding. Musculoskeletal: Normal range of motion. She exhibits no edema, tenderness or deformity. Neurological: She is alert and oriented to person, place, and time. She displays normal reflexes. No cranial nerve deficit. She exhibits normal muscle tone. Coordination normal.   Skin: Skin is warm. No rash noted. She is not diaphoretic. Psychiatric: She has a normal mood and affect. Nursing note and vitals reviewed. Diagnostic Study Results     Labs -  No results found for this or any previous visit (from the past 24 hour(s)). Radiologic Studies -   No orders to display     CT Results  (Last 48 hours)    None        CXR Results  (Last 48 hours)    None          Medical Decision Making   I am the first provider for this patient. I reviewed the vital signs, available nursing notes, past medical history, past surgical history, family history and social history. Vital Signs-Reviewed the patient's vital signs.   Patient Vitals for the past 24 hrs:   Temp Pulse Resp BP SpO2   08/18/19 0835 98 °F (36.7 °C) 83 18 133/78 98 %       Pulse Oximetry Analysis - 98% on RA    Cardiac Monitor:   Rate: 83 bpm  Rhythm: Normal Sinus Rhythm      Records Reviewed: Nursing Notes, Old Medical Records, Previous electrocardiograms, Previous Radiology Studies and Previous Laboratory Studies    Provider Notes (Medical Decision Making):   Patient presents with dental pain. Stable vitals and exam without obvious abscess that needs drainage. Airway stable and patient speaking in full sentences. No red flags that make PTA, RPA, ludwigs angina concerning. Will tx with dental ball, antibiotics and outpatient analgesics. Given information on dentists and importance of followup and no smoking. ED Course:   Initial assessment performed. The patients presenting problems have been discussed, and they are in agreement with the care plan formulated and outlined with them. I have encouraged them to ask questions as they arise throughout their visit. HYPERTENSION COUNSELING  Education was provided to the patient today regarding their hypertension. Patient is made aware of their elevated blood pressure and is instructed to follow up this week with their Primary Care for a recheck. Patient is counseled regarding consequences of chronic, uncontrolled hypertension including kidney disease, heart disease, stroke or even death. Patient states their understanding and agrees to follow up this week. Additionally, during their visit, I discussed sodium restriction, maintaining ideal body weight and regular exercise program as physiologic means to achieve blood pressure control. The patient will strive towards this. I reviewed our electronic medical record system for any past medical records that were available that may contribute to the patient's current condition, the nursing notes and vital signs from today's visit.   Kayode Harrison MD    Orders Placed During ED Course:  Orders Placed This Encounter    dental ball (lidocaine/Benadryl/Cetacaine) mixture    penicillin v potassium (VEETID) tablet 500 mg    penicillin v potassium (VEETID) 500 mg tablet    benzocaine (ANBESOL, BENZOCAINE,) 20 % mucosal liquid    ibuprofen (MOTRIN) 800 mg tablet     Medications Administered:  Medications   dental ball (lidocaine/Benadryl/Cetacaine) mixture (has no administration in time range)   penicillin v potassium (VEETID) tablet 500 mg (has no administration in time range)     Progress Note:  Patient has been reassessed and reports feeling better and symptoms have improved after ED treatment. The patient's emergency department is now complete. Marcell Eugene's final labs and imaging have been reviewed with her. She has been counseled regarding her diagnosis. She verbally conveys understanding and agreement of the signs, symptoms, diagnosis, treatment and prognosis and additionally agrees to follow up as recommended with Dr. Lavanda Riedel, MD and/or specialist in 24 - 48 hours. She also agrees with the care-plan we created together and conveys that all of her questions have been answered. I have also put together some discharge instructions for her that include: 1) educational information regarding their diagnosis, 2) how to care for their diagnosis at home, as well a 3) list of reasons why they would want to return to the ED prior to their follow-up appointment, should their condition change. I have answered all questions to the patient's satisfaction. Strict return precautions given. She both understood and agreed with plan as discussed above. Vital signs stable for discharge. Disposition: DISCHARGE     The pt is ready for discharge. The pt's signs, symptoms, diagnosis, and discharge instructions have been discussed and pt has conveyed their understanding. The pt is to follow up as recommended or return to ER should their symptoms worsen.  Plan has been discussed and pt is in agreement. PLAN:  1. Current Discharge Medication List      START taking these medications    Details   penicillin v potassium (VEETID) 500 mg tablet Take 1 Tab by mouth four (4) times daily for 7 days. Qty: 28 Tab, Refills: 0      benzocaine (ANBESOL, BENZOCAINE,) 20 % mucosal liquid Use as directed  Qty: 1 Bottle, Refills: 0      !! ibuprofen (MOTRIN) 800 mg tablet Take 1 Tab by mouth every six (6) hours as needed for Pain for up to 7 days. Qty: 20 Tab, Refills: 0       !! - Potential duplicate medications found. Please discuss with provider. CONTINUE these medications which have NOT CHANGED    Details   metFORMIN (GLUCOPHAGE) 500 mg tablet Take 1 Tab by mouth daily (with breakfast). Qty: 10 Tab, Refills: 0      lisinopril (PRINIVIL, ZESTRIL) 10 mg tablet Take 1 Tab by mouth daily. Qty: 10 Tab, Refills: 0      naproxen (NAPROSYN) 500 mg tablet Take 1 Tab by mouth two (2) times daily (with meals). Qty: 20 Tab, Refills: 0      HYDROcodone-acetaminophen (NORCO) 5-325 mg per tablet Take 1 Tab by mouth every six (6) hours as needed for Pain. Max Daily Amount: 4 Tabs. Qty: 5 Tab, Refills: 0    Associated Diagnoses: Dental infection      !! ibuprofen (MOTRIN) 200 mg tablet Take  by mouth. acetaminophen (TYLENOL) 325 mg tablet Take  by mouth every four (4) hours as needed for Pain. naproxen sodium (ANAPROX) 550 mg tablet Take 1 Tab by mouth two (2) times daily (with meals). Qty: 30 Tab, Refills: 0      medroxyPROGESTERone (DEPO-PROVERA) 150 mg/mL injection 150 mg by IntraMUSCular route once. !! - Potential duplicate medications found. Please discuss with provider.         2.   Follow-up Information     Follow up With Specialties Details Why Contact Info    Evelyne Grant MD Internal Medicine   [address]  [address] [place] [address] [phone]      [facility] EMERGENCY DEPT Emergency Medicine  As needed, If symptoms worsen Hakan Ye Return to ED if worse  Diagnosis     Clinical Impression:   1. Dentalgia    2. Dental infection    3. Gingivitis        Attestation:  I personally performed the services described in this documentation on this date 8/18/2019 for patient, Neva Hsieh. Greg Carranza MD    Please note that this dictation was completed with [employer], the computer voice recognition software. Quite often unanticipated grammatical, syntax, homophones, and other interpretive errors are inadvertently transcribed by the computer software. Please disregard these errors. Please excuse any errors that have escaped final proofreading. This note will not be viewable in [address]

## 2019-08-18 NOTE — DISCHARGE INSTRUCTIONS
Emergency [address] by [place] [facility]  [address], [address]  Open M, W, F: 8AM - 5PM and T, Th: 8AM-6PM  Phone: [phone], press 4  $70 for Emergency Care  $60 for first routine care, then pay by sliding scale based upon income. [facility] [address] [facility]  Phone: [phone]    [facility] [employer] [facility]  [address], [address]  Open Monday - Friday 8AM - 4:30 PM  Phone: [phone] of Dentistry Urgent [address] [facility], [address] [facility] [facility] [place] [address]   Phone: [phone] to confirm a time for emergency treatment   Pediatrics: [phone]   $75 per tooth, extractions only     [address] Dentistry, [address] [facility] [address], [address]nd Floor, [address] [place] [address] starting at 8:30 AM - 3 PM [address] [place] [address]. [place], [address] [address]   Phone [phone] or [phone]   Hours [id number] (extractions)   Simple tooth extraction: $60 per tooth, $55 per x-ray     [facility] Residents only, over the age of 25  Phone: [phone]. Leave message saying you need an appointment to register. Hours: Tuesday Evenings     [facility] (in [place])   [facility] AT [place] only   Phone: [phone], leave message saying you need an appointment to register   Hours: Wed 6-9p     Non-Urgent Roque Roe     A.D. [address] [facility] at [address] [facility], [address] , [facility]   Dental Clinic: [phone]   Oral Surgery Clinic: [phone]               Patient Education        Tooth and Gum Pain: Care Instructions  Your Care Instructions    The most common causes of dental pain are tooth decay and gum disease. Pain can also be caused by an infection of the tooth (abscess) or the gums.  Or you may have pain from a broken or cracked tooth. Other causes of pain include infection and damage to a tooth from nervous grinding of your teeth. A wisdom tooth can be painful when it is coming in but cannot break through the gum. It can also be painful when the tooth is only partway in and extra gum tissue has formed around it. The tissue can get inflamed (pericoronitis), and sometimes it gets infected. Prompt dental care can help find the cause of your toothache and keep the tooth from dying or gum disease from getting worse. Self-care at home may reduce your pain and discomfort. Follow-up care is a key part of your treatment and safety. Be sure to make and go to all appointments, and call your dentist or doctor if you are having problems. It's also a good idea to know your test results and keep a list of the medicines you take. How can you care for yourself at home? · To reduce pain and facial swelling, put an ice or cold pack on the outside of your cheek for 10 to 20 minutes at a time. Put a thin cloth between the ice and your skin. Do not use heat. · If your doctor prescribed antibiotics, take them as directed. Do not stop taking them just because you feel better. You need to take the full course of antibiotics. · Ask your doctor if you can take an over-the-counter pain medicine, such as acetaminophen (Tylenol), ibuprofen (Advil, Motrin), or naproxen (Aleve). Be safe with medicines. Read and follow all instructions on the label. · Avoid very hot, cold, or sweet foods and drinks if they increase your pain. · Rinse your mouth with warm salt water every 2 hours to help relieve pain and swelling. Mix 1 teaspoon of salt in 8 ounces of water. · Talk to your dentist about using special toothpaste for sensitive teeth. To reduce pain on contact with heat or cold or when brushing, brush with this toothpaste regularly or rub a small amount of the paste on the sensitive area with a clean finger 2 or 3 times a day.  Floss gently between your teeth. · Do not smoke or use spit tobacco. Tobacco use can make gum problems worse, decreases your ability to fight infection in your gums, and delays healing. If you need help quitting, talk to your doctor about stop-smoking programs and medicines. These can increase your chances of quitting for good. When should you call for help? Call 911 anytime you think you may need emergency care. For example, call if:    · You have trouble breathing.    Call your dentist or doctor now or seek immediate medical care if:    · You have signs of infection, such as:  ? Increased pain, swelling, warmth, or redness. ? Red streaks leading from the area. ? Pus draining from the area. ? A fever.    Watch closely for changes in your health, and be sure to contact your doctor if:    · You do not get better as expected. Where can you learn more? Go to http://phil-khari.info/. Enter [id number] [id number] in the search box to learn more about \"Tooth and Gum Pain: Care Instructions. \"  Current as of: October 3, 2018  Content Version: 12.1  © 0273-4253 [employer], [employer]. Care instructions adapted under license by [employer] (which disclaims liability or warranty for this information). If you have questions about a medical condition or this instruction, always ask your healthcare professional. [address] any warranty or liability for your use of this information.

## 2019-08-18 NOTE — LETTER
[facility] EMERGENCY DEPT 
[address] [address] 
[phone] Work/School Note Date: 8/18/2019 To Whom It May concern: 
 
Tony Mena was seen and treated today in the emergency room by the following provider(s): 
Attending Provider: Abraham Soler MD. Tony Mena may return to work on 8/20/19. Sincerely, Ashley Lutz MD

## 2019-08-18 NOTE — ED NOTES
Emergency Department Nursing Plan of Care       The Nursing Plan of Care is developed from the Nursing assessment and Emergency Department Attending provider initial evaluation. The plan of care may be reviewed in the ED Provider note.     The Plan of Care was developed with the following considerations:   Patient / Family readiness to learn indicated by:verbalized understanding  Persons(s) to be included in education: patient  Barriers to Learning/Limitations:No    Signed     Villa Days    8/18/2019   9:10 AM

## 2019-08-18 NOTE — ED NOTES
Discharge instructions were given to the patient by CAIN Gonzalez RN. .     The patient left the Emergency Department ambulatory, alert and oriented and in no acute distress with 3 prescription(s). The patient was encouraged to call or return to the ED for worsening symptoms or problems and was encouraged to schedule a follow up appointment for continuing care. Patient leaving ED accompanied by self. The patient verbalized understanding of discharge instructions and prescriptions, all questions were answered. The patient has no further concerns at this time. Patient declined wheelchair transfer upon ED discharge.

## 2019-09-17 ENCOUNTER — HOSPITAL ENCOUNTER (EMERGENCY)
Age: 29
Discharge: HOME | End: 2019-09-17
Attending: EMERGENCY MEDICINE
Payer: COMMERCIAL

## 2019-09-17 VITALS
OXYGEN SATURATION: 100 % | HEART RATE: 85 BPM | WEIGHT: 293 LBS | HEIGHT: 62 IN | SYSTOLIC BLOOD PRESSURE: 130 MMHG | TEMPERATURE: 98 F | BODY MASS INDEX: 53.92 KG/M2 | RESPIRATION RATE: 16 BRPM | DIASTOLIC BLOOD PRESSURE: 68 MMHG

## 2019-09-17 DIAGNOSIS — S39.012A STRAIN OF LUMBAR REGION, INITIAL ENCOUNTER: Primary | ICD-10-CM

## 2019-09-17 PROCEDURE — 74011250637 HC RX REV CODE- 250/637: Performed by: EMERGENCY MEDICINE

## 2019-09-17 PROCEDURE — 99283 EMERGENCY DEPT VISIT LOW MDM: CPT

## 2019-09-17 RX ORDER — IBUPROFEN 400 MG/1
800 TABLET ORAL
Status: COMPLETED | OUTPATIENT
Start: 2019-09-17 | End: 2019-09-17

## 2019-09-17 RX ORDER — METHOCARBAMOL 500 MG/1
500 TABLET, FILM COATED ORAL
Status: COMPLETED | OUTPATIENT
Start: 2019-09-17 | End: 2019-09-17

## 2019-09-17 RX ORDER — IBUPROFEN 800 MG/1
800 TABLET ORAL
Qty: 30 TAB | Refills: 0 | Status: SHIPPED | OUTPATIENT
Start: 2019-09-17 | End: 2019-10-20

## 2019-09-17 RX ORDER — METHOCARBAMOL 750 MG/1
750 TABLET, FILM COATED ORAL 4 TIMES DAILY
Qty: 20 TAB | Refills: 0 | Status: SHIPPED | OUTPATIENT
Start: 2019-09-17 | End: 2020-01-21

## 2019-09-17 RX ADMIN — METHOCARBAMOL 500 MG: 500 TABLET, FILM COATED ORAL at 10:06

## 2019-09-17 RX ADMIN — IBUPROFEN 800 MG: 400 TABLET ORAL at 10:06

## 2019-09-17 NOTE — LETTER
[facility] EMERGENCY DEPT 
[address] [address] 
[phone] Work/School Note Date: 9/17/2019 To Whom It May concern: 
 
Alvaro Young was seen and treated today in the emergency room by the following provider(s): 
Attending Provider: Maylin Patricio MD. Alvaro Young may return to work on 09/19/2019 but with no heavy lifting until 09/20/2019.  
 
Sincerely, 
 
 
 
 
Perla Contreras MD

## 2019-09-17 NOTE — DISCHARGE INSTRUCTIONS
Patient Education        Back Strain: Care Instructions  Overview    A back strain happens when you overstretch, or pull, a muscle in your back. You may hurt your back in an accident or when you exercise or lift something. Sometimes you may not know how you hurt your back. Most back pain will get better with rest and time. You can take care of yourself at home to help your back heal.  Follow-up care is a key part of your treatment and safety. Be sure to make and go to all appointments, and call your doctor if you are having problems. It's also a good idea to know your test results and keep a list of the medicines you take. How can you care for yourself at home? · Try to stay as active as you can, but stop or reduce any activity that causes pain. · Put ice or a cold pack on the sore muscle for 10 to 20 minutes at a time to stop swelling. Try this every 1 to 2 hours for 3 days (when you are awake) or until the swelling goes down. Put a thin cloth between the ice pack and your skin. · After 2 or 3 days, apply a heating pad on low or a warm cloth to your back. Some doctors suggest that you go back and forth between hot and cold treatments. · Take pain medicines exactly as directed. ? If the doctor gave you a prescription medicine for pain, take it as prescribed. ? If you are not taking a prescription pain medicine, ask your doctor if you can take an over-the-counter medicine. · Try sleeping on your side with a pillow between your legs. Or put a pillow under your knees when you lie on your back. These measures can ease pain in your lower back. · Return to your usual level of activity slowly. When should you call for help? Call 911 anytime you think you may need emergency care. For example, call if:    · You are unable to move a leg at all.   [facility] your doctor now or seek immediate medical care if:    · You have new or worse symptoms in your legs, belly, or buttocks.  Symptoms may include:  ? Numbness or tingling. ? Weakness. ? Pain.     · You lose bladder or bowel control.    Watch closely for changes in your health, and be sure to contact your doctor if:    · You have a fever, lose weight, or don't feel well.     · You are not getting better as expected. Where can you learn more? Go to http://phil-khari.info/. Enter J787 in the search box to learn more about \"Back Strain: Care Instructions. \"  Current as of: September 20, 2018  Content Version: 12.1  © 4045-7482 [employer]. Care instructions adapted under license by [employer] (which disclaims liability or warranty for this information). If you have questions about a medical condition or this instruction, always ask your healthcare professional. [address] any warranty or liability for your use of this information. Patient Education        Back Care and Preventing Injuries: Care Instructions  Your Care Instructions    You can hurt your back doing many everyday activities: lifting a heavy box, bending down to garden, exercising at the gym, and even getting out of bed. But you can keep your back strong and healthy by doing some exercises. You also can follow a few tips for sitting, sleeping, and lifting to avoid hurting your back again. Talk to your doctor before you start an exercise program. Ask for help if you want to learn more about keeping your back healthy. Follow-up care is a key part of your treatment and safety. Be sure to make and go to all appointments, and call your doctor if you are having problems. It's also a good idea to know your test results and keep a list of the medicines you take. How can you care for yourself at home? · Stay at a healthy weight to avoid strain on your lower back. · Do not smoke. Smoking increases the risk of osteoporosis, which weakens the spine. If you need help quitting, talk to your doctor about stop-smoking programs and medicines. These can increase your chances of quitting for good. · Make sure you sleep in a position that maintains your back's normal curves and on a mattress that feels comfortable. Sleep on your side with a pillow between your knees, or sleep on your back with a pillow under your knees. These positions can reduce strain on your back. · When you get out of bed, lie on your side and bend both knees. Drop your feet over the edge of the bed as you push up with both arms. Scoot to the edge of the bed. Make sure your feet are in line with your rear end (buttocks), and then stand up. · If you must stand for a long time, put one foot on a stool, ledge, or box. Exercise to strengthen your back and other muscles  · Get at least 30 minutes of exercise on most days of the week. Walking is a good choice. You also may want to do other activities, such as running, swimming, cycling, or playing tennis or team sports. · Stretch your back muscles. Here are few exercises to try:  ? Lie on your back with your knees bent and your feet flat on the floor. Gently pull one bent knee to your chest. Put that foot back on the floor, and then pull the other knee to your chest. Hold for 15 to 30 seconds. Repeat 2 to 4 times. ? Do pelvic tilts. Lie on your back with your knees bent. Tighten your stomach muscles. Pull your belly button (navel) in and up toward your ribs. You should feel like your back is pressing to the floor and your hips and pelvis are slightly lifting off the floor. Hold for 6 seconds while breathing smoothly. · Keep your core muscles strong. The muscles of your back, belly (abdomen), and buttocks support your spine. ? Pull in your belly, and imagine pulling your navel toward your spine. Hold this for 6 seconds, then relax. Remember to keep breathing normally as you tense your muscles. ? Do curl-ups. Always do them with your knees bent.  Keep your low back on the floor, and curl your shoulders toward your knees using a smooth, slow motion. Keep your arms folded across your chest. If this bothers your neck, try putting your hands behind your neck (not your head), with your elbows spread apart. ? Lie on your back with your knees bent and your feet flat on the floor. Tighten your belly muscles, and then push with your feet and raise your buttocks up a few inches. Hold this position 6 seconds as you continue to breathe normally, then lower yourself slowly to the floor. Repeat 8 to 12 times. ? If you like group exercise, try Pilates or yoga. These classes have poses that strengthen the core muscles. Protect your back when you sit  · Place a small pillow, a rolled-up towel, or a lumbar roll in the curve of your back if you need extra support. · Sit in a chair that is low enough to let you place both feet flat on the floor with both knees nearly level with your hips. If your chair or desk is too high, use a foot rest to raise your knees. · When driving, keep your knees nearly level with your hips. Sit straight, and drive with both hands on the steering wheel. Your arms should be in a slightly bent position. · Try a kneeling chair, which helps tilt your hips forward. This takes pressure off your lower back. · Try sitting on an exercise ball. It can rock from side to side, which helps keep your back loose. Lift properly  · Squat down, bending at the hips and knees only. If you need to, put one knee to the floor and extend your other knee in front of you, bent at a right angle (half kneeling). · Press your chest straight forward. This helps keep your upper back straight while keeping a slight arch in your low back. · Hold the load as close to your body as possible, at the level of your navel. · Use your feet to change direction, taking small steps. · Lead with your hips as you change direction. Keep your shoulders in line with your hips as you move. Do not twist your body.   · Set down your load carefully, squatting with your knees and hips only. When should you call for help? Watch closely for changes in your health, and be sure to contact your doctor if you have any problems. Where can you learn more? Go to http://phil-khari.info/. Enter S810 in the search box to learn more about \"Back Care and Preventing Injuries: Care Instructions. \"  Current as of: September 20, 2018  Content Version: 12.1  © 6762-5854 [employer], [employer]. Care instructions adapted under license by [employer] (which disclaims liability or warranty for this information). If you have questions about a medical condition or this instruction, always ask your healthcare professional. [address] any warranty or liability for your use of this information.

## 2019-09-17 NOTE — ED NOTES
....Discharge summary and discharge medications reviewed with patient and appropriate educational materials and side effects teaching were provided. patient  Given 0 paper prescriptions and 2 electronic prescriptions sent to pt's listed pharmacy. Patient (s) verbalized understanding of the importance of discussing medications with his or her physician or clinic they will be following up with. No si/s of acute distress prior to discharge. Patient offered wheelchair from treatment area to hospital entrance, patient refused wheelchair. C/o 10/10 lower back pain, tolerable for discharge per pt. Pt given work excuse note. Steady gait.

## 2019-09-17 NOTE — ED PROVIDER NOTES
EMERGENCY DEPARTMENT HISTORY AND PHYSICAL EXAM      Date: 2019  Patient Name: Jackie Medeiros    History of Presenting Illness     Chief Complaint   Patient presents with    Back Pain     per pt report pt pulled muscles at work x 10 mins ago on arrival.     History Provided By: Patient    HPI: Jackie Medeiros, 34 y.o. female with history significant for hypertension, diabetes, seizures, and back pain who presents via private vehicle to the ED with cc of right lower back pain that started 10 minutes prior to arrival while she was at work. 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 She believes she has pulled muscle. She denies any radiation of her pain. She denies any saddle anesthesia, bowel or bladder incontinence, or unilateral leg weakness. PMHx: Hypertension, diabetes, seizures  Social Hx: Denies alcohol, tobacco, or illegal drug use    PCP: Vinod Reddy MD    There are no other complaints, changes, or physical findings at this time. No current facility-administered medications on file prior to encounter. Current Outpatient Medications on File Prior to Encounter   Medication Sig Dispense Refill    medroxyPROGESTERone (DEPO-PROVERA) 150 mg/mL injection 150 mg by IntraMUSCular route once.  metFORMIN (GLUCOPHAGE) 500 mg tablet Take 1 Tab by mouth daily (with breakfast).  10 Tab 0     Past History     Past Medical History:  Past Medical History:   Diagnosis Date    Asthma     pt denies    Diabetes ([address] 2016    Hypertension     Other ill-defined conditions(799.89)     back pain    Seizures ([address]      Past Surgical History:  Past Surgical History:   Procedure Laterality Date    HX GYN  2014    Pt reports  in past.    HX TONSILLECTOMY       Family History:  Family History   Problem Relation Age of Onset    Diabetes Mother     Diabetes Father     Diabetes Maternal Grandmother      Social History:  Social History Tobacco Use    Smoking status: Never Smoker    Smokeless tobacco: Never Used   Substance Use Topics    Alcohol use: No    Drug use: No     Allergies:  No Known Allergies  Review of Systems   Review of Systems   Constitutional: Negative for chills and fever. HENT: Negative for congestion, rhinorrhea, sneezing and sore throat. Eyes: Negative for redness and visual disturbance. Respiratory: Negative for shortness of breath. Cardiovascular: Negative for leg swelling. Gastrointestinal: Negative for abdominal pain, nausea and vomiting. Genitourinary: Negative for difficulty urinating and frequency. Musculoskeletal: Positive for back pain. Negative for myalgias and neck stiffness. Skin: Negative for rash. Neurological: Negative for dizziness, syncope, weakness and headaches. Hematological: Negative for adenopathy. All other systems reviewed and are negative. Physical Exam   Physical Exam   Constitutional: She is oriented to person, place, and time. She appears well-developed and well-nourished. Obese   HENT:   Head: Normocephalic and atraumatic. Mouth/Throat: Oropharynx is clear and moist.   Eyes: Conjunctivae and EOM are normal.   Neck: Normal range of motion and full passive range of motion without pain. Neck supple. Cardiovascular: Normal rate, regular rhythm, S1 normal, S2 normal, normal heart sounds, intact distal pulses and normal pulses. No murmur heard. Pulmonary/Chest: Effort normal and breath sounds normal. No respiratory distress. She has no wheezes. Abdominal: Soft. Normal appearance and bowel sounds are normal. She exhibits no distension. There is no tenderness. There is no rebound. Musculoskeletal: Normal range of motion. Back:    Neurological: She is alert and oriented to person, place, and time. She has normal strength. Skin: Skin is warm, dry and intact. No rash noted. Psychiatric: She has a normal mood and affect.  Her speech is normal and behavior is normal. Judgment and thought content normal.   Nursing note and vitals reviewed. Diagnostic Study Results   Labs -   No results found for this or any previous visit (from the past 12 hour(s)). Radiologic Studies -   No orders to display     No results found. Medical Decision Making   I am the first provider for this patient. I reviewed the vital signs, available nursing notes, past medical history, past surgical history, family history and social history. Vital Signs-Reviewed the patient's vital signs. Patient Vitals for the past 12 hrs:   Temp Pulse Resp BP SpO2   09/17/19 1008  85 16  100 %   09/17/19 0937     100 %   09/17/19 0931 98 °F (36.7 °C) 93 18 130/68 100 %     Pulse Oximetry Analysis - 100% on RA    Records Reviewed: Nursing Notes    Provider Notes (Medical Decision Making):   70-year-old female presents with right lower back pain that started approximately 15 minutes prior to arrival.  She declines any work-up at this time. Will prescribe Robaxin and NSAIDs and have patient follow-up with primary care. ED Course:   Initial assessment performed. The patients presenting problems have been discussed, and they are in agreement with the care plan formulated and outlined with them. I have encouraged them to ask questions as they arise throughout their visit. Progress Note:   Updated pt on all returned results and findings. Discussed the importance of proper follow up as referred below along with return precautions. Pt in agreement with the care plan and expresses agreement with and understanding of all items discussed. Disposition:  Discharge Note:  The pt is ready for discharge. The pt's signs, symptoms, diagnosis, and discharge instructions have been discussed and pt has conveyed their understanding. The pt is to follow up as recommended or return to ER should their symptoms worsen. Plan has been discussed and pt is in agreement. PLAN:  1.    Discharge Medication List as of 9/17/2019  9:58 AM      START taking these medications    Details   ibuprofen (MOTRIN) 800 mg tablet Take 1 Tab by mouth every eight (8) hours as needed for Pain., Normal, Disp-30 Tab, R-0      methocarbamol (ROBAXIN) 750 mg tablet Take 1 Tab by mouth four (4) times daily. , Normal, Disp-20 Tab, R-0         CONTINUE these medications which have NOT CHANGED    Details   medroxyPROGESTERone (DEPO-PROVERA) 150 mg/mL injection 150 mg by IntraMUSCular route once., Historical Med      metFORMIN (GLUCOPHAGE) 500 mg tablet Take 1 Tab by mouth daily (with breakfast). , Normal, Disp-10 Tab, R-0           2. Follow-up Information     Follow up With Specialties Details Why Contact Info    Suhail Key MD Internal Medicine Schedule an appointment as soon as possible for a visit  [address] [place] [id number]  [phone]      [facility] EMERGENCY DEPT Emergency Medicine  As needed, If symptoms worsen [address]  [phone]        Return to ED if worse     Diagnosis     Clinical Impression:   1. Strain of lumbar region, initial encounter            Please note that this dictation was completed with Dragon, computer voice recognition software. Quite often unanticipated grammatical, syntax, homophones, and other interpretive errors are inadvertently transcribed by the computer software. Please disregard these errors. Additionally, please excuse any errors that have escaped final proofreading.

## 2019-09-17 NOTE — ED NOTES
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 She fell to the ground but denies hitting her head or losing consciousness. Pt states her manager informed her to go to the ER. Pt denies paraesthesia in extremities. Pt demonstrates ROM in lower back. Pt able to bend at hips and twist laterally. Emergency Department Nursing Plan of Care       The Nursing Plan of Care is developed from the Nursing assessment and Emergency Department Attending provider initial evaluation. The plan of care may be reviewed in the ED Provider note.     The Plan of Care was developed with the following considerations:   Patient / Family readiness to learn indicated by:verbalized understanding  Persons(s) to be included in education: patient  Barriers to Learning/Limitations:No    Signed     Lucita Swain RN    9/17/2019   9:48 AM

## 2019-09-18 ENCOUNTER — OFFICE VISIT (OUTPATIENT)
Dept: SURGERY | Age: 29
End: 2019-09-18

## 2019-09-18 VITALS
DIASTOLIC BLOOD PRESSURE: 91 MMHG | OXYGEN SATURATION: 97 % | BODY MASS INDEX: 53.92 KG/M2 | SYSTOLIC BLOOD PRESSURE: 149 MMHG | HEART RATE: 99 BPM | HEIGHT: 62 IN | WEIGHT: 293 LBS | TEMPERATURE: 98.9 F | RESPIRATION RATE: 18 BRPM

## 2019-09-18 DIAGNOSIS — E66.01 MORBID OBESITY (HCC): Primary | ICD-10-CM

## 2019-09-18 DIAGNOSIS — Z01.818 PRE-OP EXAM: ICD-10-CM

## 2019-09-18 DIAGNOSIS — E11.8 CONTROLLED DIABETES MELLITUS TYPE 2 WITH COMPLICATIONS, UNSPECIFIED WHETHER LONG TERM INSULIN USE (HCC): ICD-10-CM

## 2019-09-18 DIAGNOSIS — I10 ESSENTIAL HYPERTENSION: ICD-10-CM

## 2019-09-18 RX ORDER — LISINOPRIL 10 MG/1
10 TABLET ORAL
COMMUNITY
Start: 2017-02-10 | End: 2020-01-21

## 2019-09-18 NOTE — PROGRESS NOTES
1. Have you been to the ER, urgent care clinic since your last visit? Hospitalized since your last visit? No    2. Have you seen or consulted any other health care providers outside of the [address] [place] [address] since your last visit? Include any pap smears or colon screening. No     Alvaro Young  Body composition    female  34 y.o. Vitals:    09/18/19 0955   BP: (!) 149/91   Pulse: 99   Resp: 18   Temp: 98.9 °F (37.2 °C)   TempSrc: Oral   SpO2: 97%   Weight: 299 lb 8 oz (135.9 kg)   Height: 5' 2\" (1.575 m)     Body mass index is 54.78 kg/m². Arnie Adams Neck- 16.5 in  Waist-  52 in  Hips-  53 in

## 2019-09-18 NOTE — PATIENT INSTRUCTIONS
Learning About Bariatric Surgery  What is bariatric surgery? Bariatric surgery is surgery to help you lose weight. This type of surgery is only used for people who are very overweight and have not been able to lose weight with diet and exercise. This surgery makes the stomach smaller. Some types of surgery also change the connection between your stomach and intestines. How is bariatric surgery done? Bariatric surgery may be either \"open\" or \"laparoscopic. \" Open surgery is done through a large cut (incision) in the belly. Laparoscopic surgery is done through several small cuts. The doctor puts a lighted tube, or scope, and other surgical tools through small cuts in your belly. The doctor is able to see your organs with the scope. There are different types of bariatric surgery. Gastric sleeve surgery  The surgery is usually done through several small incisions in the belly. The doctor removes more than half of your stomach. This leaves a thin sleeve, or tube, that is about the size of a banana. Because part of your stomach has been removed, this can't be reversed. Martha-en-Y gastric bypass surgery  Martha-en-Y (say \"yakelin-en-why\") surgery changes the connection between the stomach and the intestines. The doctor separates a section of your stomach from the rest of your stomach. This makes a small pouch. The new pouch will hold the food you eat. The doctor connects the stomach pouch to the middle part of the small intestine. Gastric banding surgery  The surgery is usually done through several small incisions in the belly. The doctor wraps a band around the upper part of the stomach. This creates a small pouch. The small size of the pouch means that you will get full after you eat just a small amount of food. The doctor can inflate or deflate the band to adjust the size. This lets the doctor adjust how quickly food passes from the new pouch into the stomach.  It does not change the connection between the stomach and the intestines. What can you expect after the surgery? You may stay in the hospital for one or more days after the surgery. How long you stay depends on the type of surgery you had. Most people need 2 to 4 weeks before they are ready to get back to their usual routine. For the first 2 to 6 weeks after surgery, you probably will need to follow a liquid or soft diet. Bit by bit, you will be able to eat more solid foods. Your doctor may advise you to work with a dietitian. This way you'll be sure to get enough protein, vitamins, and minerals while you are losing weight. Even with a healthy diet, you may need to take vitamin and mineral supplements. After surgery, you will not be able to eat very much at one time. You will get full quickly. Try not to eat too much at one time or eat foods that are high in fat or sugar. If you do, you may vomit, get stomach pain, or have diarrhea. You probably will lose weight very quickly in the first few months after surgery. As time goes on, your weight loss will slow down. You will have regular doctor visits to check how you are doing. Think of bariatric surgery as a tool to help you lose weight. It isn't an instant fix. You will still need to eat a healthy diet and get regular exercise. This will help you reach your weight goal and avoid regaining the weight you lose. Follow-up care is a key part of your treatment and safety. Be sure to make and go to all appointments, and call your doctor if you are having problems. It's also a good idea to know your test results and keep a list of the medicines you take. Where can you learn more? Go to http://phil-khari.info/. Enter G469 in the search box to learn more about \"Learning About Bariatric Surgery. \"  Current as of: March 28, 2019  Content Version: 12.1  © 7677-7465 [employer].  Care instructions adapted under license by [employer] (which disclaims liability or warranty for this information). If you have questions about a medical condition or this instruction, always ask your healthcare professional. [place] [address] any warranty or liability for your use of this information.

## 2019-09-18 NOTE — PROGRESS NOTES
HISTORY OF PRESENT ILLNESS  Eli Carlson is new patient presents today for evaluation for weight loss surgery. Patient has been overweight since . Her weight has been around 299 lbs for the past 5 years. Her heaviest weight was 316 lbs. Graham Grief  Body composition    female  34 y.o. Vitals:    19 0955   BP: (!) 149/91   Pulse: 99   Resp: 18   Temp: 98.9 °F (37.2 °C)   TempSrc: Oral   SpO2: 97%   Weight: 299 lb 8 oz (135.9 kg)   Height: 5' 2\" (1.575 m)     Body mass index is 54.78 kg/m². Torsten Murphy Neck- 16.5 in  Waist-  52 in  Hips-  53 in      Dietary Habits:   Breakfast- skip  Lunch- boiled egg, hot dog  Dinner- chicken  Snack- wheat thins, sun chips  Liquids- water      Prior weight loss attempts: Herbalife. She was hospitalized after doing herbalife. SHe lost 10 lbs. Patient has an advanced directive:  Not on file. Ms. Garret Nageotte has a reminder for a \"due or due soon\" health maintenance. I have asked that she contact her primary care provider for follow-up on this health maintenance.       Patient Active Problem List   Diagnosis Code    Muscle spasm M62.838    Backache M54.9    Herpes labialis B00.1    Vaginal candida B37.3    Diabetes ([address] E11.9    Obesity, morbid ([address] E66.01       Past Medical History:   Diagnosis Date    Asthma     pt denies    Diabetes ([address] 2016    Hypertension     Other ill-defined conditions(799.89)     back pain    Seizures (HCC)     SOB (shortness of breath)          Past Surgical History:   Procedure Laterality Date    HX GYN  2014    Pt reports  in past.   Simona Severino MD      No Known Allergies      Family History   Problem Relation Age of Onset    Diabetes Mother     Diabetes Father     Diabetes Maternal Grandmother        Social History     Socioeconomic History    Marital status: SINGLE     Spouse name: Not on file    Number of children: Not on file    Years of education: Not on file    Highest education level: Not on file   Occupational History    Not on file   Social Needs    Financial resource strain: Not on file    Food insecurity:     Worry: Not on file     Inability: Not on file    Transportation needs:     Medical: Not on file     Non-medical: Not on file   Tobacco Use    Smoking status: Never Smoker    Smokeless tobacco: Never Used   Substance and Sexual Activity    Alcohol use: No    Drug use: No    Sexual activity: Never     Partners: Male     Birth control/protection: Injection   Lifestyle    Physical activity:     Days per week: Not on file     Minutes per session: Not on file    Stress: Not on file   Relationships    Social connections:     Talks on phone: Not on file     Gets together: Not on file     Attends Yarsanism service: Not on file     Active member of club or organization: Not on file     Attends meetings of clubs or organizations: Not on file     Relationship status: Not on file    Intimate partner violence:     Fear of current or ex partner: Not on file     Emotionally abused: Not on file     Physically abused: Not on file     Forced sexual activity: Not on file   Other Topics Concern    Not on file   Social History Narrative    Not on file     HPI    Review of Systems   Constitutional: Negative for chills, fever and malaise/fatigue. HENT: Negative for congestion, hearing loss, sinus pain, sore throat and tinnitus. Eyes: Negative for blurred vision, double vision, pain, discharge and redness. Respiratory: Positive for shortness of breath (with exertions). Negative for cough, sputum production and wheezing. Cardiovascular: Negative for chest pain, palpitations and leg swelling. Gastrointestinal: Positive for diarrhea. Negative for abdominal pain, constipation, heartburn, nausea and vomiting. Genitourinary: Negative for dysuria, frequency and urgency. Musculoskeletal: Positive for back pain (pulled her back yesterday at work. ).  Negative for joint pain and neck pain. Skin: Negative for itching and rash. Neurological: Positive for seizures. Negative for dizziness, tingling and headaches. Loss of consciousness: last seizure when she was 10-11 years. old. Psychiatric/Behavioral: Negative for depression. The patient does not have insomnia. Physical Exam   Constitutional: She is oriented to person, place, and time. She appears well-developed and well-nourished. Cardiovascular: Normal rate and regular rhythm. Exam reveals no gallop and no friction rub. No murmur heard. Pulmonary/Chest: Effort normal and breath sounds normal.   Abdominal: Soft. Bowel sounds are normal. She exhibits no distension. There is no tenderness. No masses or hernias noted. Neurological: She is alert and oriented to person, place, and time. Skin: Skin is warm and dry. Psychiatric: She has a normal mood and affect. ASSESSMENT and PLAN      Sleeve gastrectomy or vertical gastric resection involves a resection of the vast majority of the stomach usually done with a dilator pressed against the right half of the stomach of the lesser curvature. One preserves the pyloric sphincter at the lower end of the stomach and then sequentially staples and divides all the way up to the gastroesophageal junction leaving a small rim of the stomach to the left better known as the angle of His. This procedure significantly reduces the amount that the stomach can hold while removing the part of the stomach that secretes the hormone grehlin and alters the speed of food emptying from the stomach. Once food leaves the stomach, the remainder of the intestinal tract is completely intact and there is no effect on the digestion or absorption of food nutrients and calories.  The procedure is intermediate between the adjustable gastric band and the gastric bypass as far as weight loss, potential complications and resolution of comorbid diseases such as Type 2 diabetes, high blood pressure, sleep apnea, arthritic pain, cholesterol disorders to mention a few. The usual complications are that of any procedure which affects the ability of the stomach to accept food at any given time. Those are usually nausea and vomiting which either spontaneously resolve or require endoscopic dilatation. The most serious complication from this surgery is a leak from the staple line usually near the  gastroesophageal junction. The frequency of this serious complication is low and usually heals spontaneously although reoperation may be necessary. The other serious complications are those which can occur with any major surgery and include  Infection, bleeding, blood clots and/or cardiopulmonary problems. Patient meets criteria established by the NIH. Without weight reduction, co-morbidities will escalate as well as risk of early mortality. Recommendation is patient could be served with surgical weight reduction, the procedure of Sleeve gastrectomy. I explained to the patient differences between laparoscopic and open gastric bypass, laparoscopic adjustable gastric banding, and sleeve gastrectomy procedures. Patient has attended one our informational meeting and has seen our educational materials. Patient desires to have surgery with Dr. Kurt Tinajero. I have reinforced without lifestyle change and behavior modification, they would not achieve his weight loss goals. I reviewed risks and complications associated with each procedure. She will need an UGI. Other recommendations include psychological evaluation, nutritional consultation. I discussed with patient a diet high in protein, low-fat, low- sugar, limited carbohydrates, and discontinuing use of carbonated beverages. Also discussed physical activity and exercises. I have answered all questions, they wish to proceed.     ** Copy to PCP and other providers

## 2019-10-20 ENCOUNTER — HOSPITAL ENCOUNTER (EMERGENCY)
Age: 29
Discharge: HOME | End: 2019-10-20
Attending: EMERGENCY MEDICINE
Payer: COMMERCIAL

## 2019-10-20 VITALS
WEIGHT: 293 LBS | HEIGHT: 62 IN | RESPIRATION RATE: 18 BRPM | SYSTOLIC BLOOD PRESSURE: 121 MMHG | HEART RATE: 84 BPM | BODY MASS INDEX: 53.92 KG/M2 | TEMPERATURE: 98.2 F | OXYGEN SATURATION: 99 % | DIASTOLIC BLOOD PRESSURE: 71 MMHG

## 2019-10-20 DIAGNOSIS — J02.9 ACUTE PHARYNGITIS, UNSPECIFIED ETIOLOGY: ICD-10-CM

## 2019-10-20 DIAGNOSIS — H66.003 ACUTE SUPPURATIVE OTITIS MEDIA OF BOTH EARS WITHOUT SPONTANEOUS RUPTURE OF TYMPANIC MEMBRANES, RECURRENCE NOT SPECIFIED: Primary | ICD-10-CM

## 2019-10-20 PROCEDURE — 99283 EMERGENCY DEPT VISIT LOW MDM: CPT

## 2019-10-20 RX ORDER — IBUPROFEN 800 MG/1
800 TABLET ORAL
Qty: 30 TAB | Refills: 0 | Status: SHIPPED | OUTPATIENT
Start: 2019-10-20 | End: 2020-01-21

## 2019-10-20 RX ORDER — AMOXICILLIN 875 MG/1
875 TABLET, FILM COATED ORAL 2 TIMES DAILY
Qty: 14 TAB | Refills: 0 | Status: SHIPPED | OUTPATIENT
Start: 2019-10-20 | End: 2019-10-27

## 2019-10-20 NOTE — LETTER
[address] 
[facility] EMERGENCY DEPT 
[address] [address] 
[phone] Work/School Note Date: 10/20/2019 To Whom It May concern: 
 
Evelina Hendrix was seen and treated today in the emergency room by the following provider(s): 
Attending Provider: Anitha Baptiste MD 
Nurse Practitioner: Mendel Muskrat, NP. Evelina Hendrix may return to work on 10/23/19. Sincerely, Miri Joaquin NP

## 2019-10-20 NOTE — ED PROVIDER NOTES
EMERGENCY DEPARTMENT HISTORY AND PHYSICAL EXAM    Date: 10/20/2019  Patient Name: Oscar Bryan    History of Presenting Illness     Chief Complaint   Patient presents with    Sore Throat         History Provided By: Patient    HPI: Oscar Bryan is a 34 y.o. female with a PMH of Asthma, diabetes, hypertension who presents with sore throat. Onset 2 days ago. Reports pain with swallowing. Also associated with hoarseness, nasal congestion, bilateral ear pain. Denies fever, chills. Has not tried anything for symptoms. Denies exposure to other sick contacts. Reports history of strep throat in the past.    PCP: Akbar Estes MD    Current Outpatient Medications   Medication Sig Dispense Refill    amoxicillin (AMOXIL) 875 mg tablet Take 1 Tab by mouth two (2) times a day for 7 days. 14 Tab 0    ibuprofen (MOTRIN) 800 mg tablet Take 1 Tab by mouth every eight (8) hours as needed for Pain. 30 Tab 0    lisinopril (PRINIVIL, ZESTRIL) 10 mg tablet Take 10 mg by mouth.  methocarbamol (ROBAXIN) 750 mg tablet Take 1 Tab by mouth four (4) times daily. 20 Tab 0    metFORMIN (GLUCOPHAGE) 500 mg tablet Take 1 Tab by mouth daily (with breakfast). 10 Tab 0    medroxyPROGESTERone (DEPO-PROVERA) 150 mg/mL injection 150 mg by IntraMUSCular route once.          Past History     Past Medical History:  Past Medical History:   Diagnosis Date    Asthma     pt denies    Diabetes ([address] 2016    Hypertension     Other ill-defined conditions(799.89)     back pain    Seizures ([address]     last seizure was 89 years old    SOB (shortness of breath)        Past Surgical History:  Past Surgical History:   Procedure Laterality Date    HX GYN  2014    Pt reports  in past.    HX TONSILLECTOMY         Family History:  Family History   Problem Relation Age of Onset    Diabetes Mother     Diabetes Father     Diabetes Maternal Grandmother        Social History:  Social History     Tobacco Use    Smoking status: Never Smoker    Smokeless tobacco: Never Used   Substance Use Topics    Alcohol use: No    Drug use: No       Allergies:  No Known Allergies      Review of Systems   Review of Systems   Constitutional: Negative for appetite change, chills, fatigue and fever. HENT: Positive for congestion, ear pain, rhinorrhea, sore throat and voice change. Negative for tinnitus and trouble swallowing. Eyes: Negative for pain and itching. Respiratory: Negative for cough, chest tightness, shortness of breath and wheezing. Cardiovascular: Negative for chest pain. Gastrointestinal: Negative for abdominal pain, nausea and vomiting. Genitourinary: Negative for dysuria, frequency and urgency. Musculoskeletal: Negative for arthralgias, back pain and joint swelling. Skin: Negative for color change and rash. Neurological: Negative for dizziness, numbness and headaches. All other systems reviewed and are negative. Physical Exam     Vitals:    10/20/19 0937 10/20/19 0940 10/20/19 0945   BP: 121/71     Pulse: 82  84   Resp: 20  18   Temp: 98.2 °F (36.8 °C)     SpO2: 97% 100% 99%   Weight: 136.5 kg (301 lb)     Height: 5' 2\" (1.575 m)       Physical Exam   Constitutional: She is oriented to person, place, and time. She appears well-developed and well-nourished. No distress. HENT:   Head: Normocephalic and atraumatic. Right Ear: Tympanic membrane is erythematous and bulging. Left Ear: Tympanic membrane is erythematous and bulging. Nose: Nose normal. Right sinus exhibits no maxillary sinus tenderness and no frontal sinus tenderness. Left sinus exhibits no maxillary sinus tenderness and no frontal sinus tenderness. Mouth/Throat: Uvula is midline and mucous membranes are normal. Posterior oropharyngeal edema and posterior oropharyngeal erythema present. No oropharyngeal exudate. Eyes: Pupils are equal, round, and reactive to light. Conjunctivae and EOM are normal.   Neck: Normal range of motion.  Neck supple. Cardiovascular: Normal rate, regular rhythm and normal heart sounds. Pulmonary/Chest: Effort normal and breath sounds normal.   Musculoskeletal: Normal range of motion. Lymphadenopathy:     She has no cervical adenopathy. Neurological: She is alert and oriented to person, place, and time. She has normal reflexes. Skin: Skin is warm and dry. Psychiatric: She has a normal mood and affect. Nursing note and vitals reviewed. Diagnostic Study Results     Labs -   No results found for this or any previous visit (from the past 12 hour(s)). Radiologic Studies -   No orders to display     CT Results  (Last 48 hours)    None        CXR Results  (Last 48 hours)    None            Medical Decision Making   I am the first provider for this patient. I reviewed the vital signs, available nursing notes, past medical history, past surgical history, family history and social history. Vital Signs-Reviewed the patient's vital signs. Records Reviewed: Nursing Notes and Old Medical Records            Disposition:  Discharge    DISCHARGE NOTE:   9:50 AM        Care plan outlined and precautions discussed. Patient has no new complaints, changes, or physical findings. . All medications were reviewed with the patient; will d/c home with amoxicillin. All of pt's questions and concerns were addressed. Patient was instructed and agrees to follow up with PCP, as well as to return to the ED upon further deterioration. Patient is ready to go home. Follow-up Information     Follow up With Specialties Details Why Contact Info    Mercedez Hayward MD Internal Medicine In 1 week If symptoms worsen [address]  [address]  [phone]            Current Discharge Medication List      START taking these medications    Details   amoxicillin (AMOXIL) 875 mg tablet Take 1 Tab by mouth two (2) times a day for 7 days.   Qty: 14 Tab, Refills: 0         CONTINUE these medications which have CHANGED    Details   ibuprofen (MOTRIN) 800 mg tablet Take 1 Tab by mouth every eight (8) hours as needed for Pain. Qty: 30 Tab, Refills: 0         CONTINUE these medications which have NOT CHANGED    Details   lisinopril (PRINIVIL, ZESTRIL) 10 mg tablet Take 10 mg by mouth. methocarbamol (ROBAXIN) 750 mg tablet Take 1 Tab by mouth four (4) times daily. Qty: 20 Tab, Refills: 0      metFORMIN (GLUCOPHAGE) 500 mg tablet Take 1 Tab by mouth daily (with breakfast). Qty: 10 Tab, Refills: 0      medroxyPROGESTERone (DEPO-PROVERA) 150 mg/mL injection 150 mg by IntraMUSCular route once. Provider Notes (Medical Decision Making):   DDX: Viral vs. Strep Pharyngitis, Tonsillitis, Seasonal Allergies,  Postnasal Drip, URI, otitis media  Procedures:  Procedures    Please note that this dictation was completed with Dragon, computer voice recognition software. Quite often unanticipated grammatical, syntax, homophones, and other interpretive errors are inadvertently transcribed by the computer software. Please disregard these errors. Additionally, please excuse any errors that have escaped final proofreading. Diagnosis     Clinical Impression:   1. Acute suppurative otitis media of both ears without spontaneous rupture of tympanic membranes, recurrence not specified    2.  Acute pharyngitis, unspecified etiology

## 2019-10-20 NOTE — ED NOTES
Emergency Department Nursing Plan of Care       The Nursing Plan of Care is developed from the Nursing assessment and Emergency Department Attending provider initial evaluation. The plan of care may be reviewed in the ED Provider note.     The Plan of Care was developed with the following considerations:   Patient / Family readiness to learn indicated by:verbalized understanding  Persons(s) to be included in education: patient  Barriers to Learning/Limitations:No    Signed     Brittni Chan RN    10/20/2019   9:40 AM

## 2019-10-20 NOTE — DISCHARGE INSTRUCTIONS

## 2019-10-20 NOTE — ED NOTES
Triage Note:     Patient states she is here today with c/o a sore throat x 2 days. She states when she woke up this morning it was worse and she thinks she has strep throat. No lesions were noted on her throat in triage. Patient presents with a steady gait, RR are even and unlabored and they are alert and oriented x 3. Pt is in no acute distress. Safety precautions in place; call light within reach.

## 2019-11-25 ENCOUNTER — OFFICE VISIT (OUTPATIENT)
Dept: OBGYN CLINIC | Age: 29
End: 2019-11-25

## 2019-11-25 VITALS
HEART RATE: 86 BPM | OXYGEN SATURATION: 100 % | SYSTOLIC BLOOD PRESSURE: 152 MMHG | DIASTOLIC BLOOD PRESSURE: 83 MMHG | BODY MASS INDEX: 53.92 KG/M2 | RESPIRATION RATE: 18 BRPM | HEIGHT: 62 IN | WEIGHT: 293 LBS

## 2019-11-25 DIAGNOSIS — N93.9 ABNORMAL UTERINE BLEEDING (AUB): ICD-10-CM

## 2019-11-25 DIAGNOSIS — N89.8 VAGINAL DISCHARGE: ICD-10-CM

## 2019-11-25 DIAGNOSIS — Z12.4 PAP SMEAR FOR CERVICAL CANCER SCREENING: ICD-10-CM

## 2019-11-25 DIAGNOSIS — Z20.2 POSSIBLE EXPOSURE TO STD: ICD-10-CM

## 2019-11-25 DIAGNOSIS — Z01.419 ENCOUNTER FOR GYNECOLOGICAL EXAMINATION WITHOUT ABNORMAL FINDING: Primary | ICD-10-CM

## 2019-11-25 RX ORDER — MEDROXYPROGESTERONE ACETATE 10 MG/1
10 TABLET ORAL DAILY
Qty: 30 TAB | Refills: 6 | Status: SHIPPED | OUTPATIENT
Start: 2019-11-25 | End: 2020-01-21

## 2019-11-25 RX ORDER — METRONIDAZOLE 500 MG/1
500 TABLET ORAL 2 TIMES DAILY
Qty: 14 TAB | Refills: 0 | Status: SHIPPED | OUTPATIENT
Start: 2019-11-25 | End: 2019-12-02

## 2019-11-25 NOTE — PROGRESS NOTES
Chief Complaint   Patient presents with    Annual Exam     Pt presents in office for annual exam with no complaints. Pt c/o irregular heavy periods. 3 most recent PHQ Screens 11/25/2019   Little interest or pleasure in doing things Not at all   Feeling down, depressed, irritable, or hopeless Not at all   Total Score PHQ 2 0     1. Have you been to the ER, urgent care clinic since your last visit? Hospitalized since your last visit? No    2. Have you seen or consulted any other health care providers outside of the [address] [place] [address] since your last visit? Include any pap smears or colon screening.  No

## 2019-11-25 NOTE — PROGRESS NOTES
Annual exam ages 21-44     [address] is a ,  34 y.o. female [address] No LMP recorded. Patient has had an injection. .    She presents for her annual checkup. She is having irregular periods. Patient also has symptoms of BV. With regard to the Gardasil vaccine, she has not received it yet. Menstrual status:    Patient with long hx of irregular periods. Patient just got off her period for 6 months. She denies dysmenorrhea. She reports no premenstrual symptoms. Contraception:    The current method of family planning is none. Stopped Depo about 6 months ago    Sexual history:     She  reports never being sexually active. .    Medical conditions:    Since her last annual GYN exam about one year ago, she has not the following changes in her health history: none. Pap and Mammogram History:    Her most recent Pap smear was 2017 - abnormal.    The patient has never had a mammogram.  Family hx of breast cancer - grandmother later in life    Breast Cancer History/Substance Abuse: negative    Past Medical History:   Diagnosis Date    Asthma     pt denies    Diabetes ([address] 2016    Hypertension     Other ill-defined conditions(799.89)     back pain    Seizures ([address]     last seizure was 89 years old    SOB (shortness of breath)      Past Surgical History:   Procedure Laterality Date    HX GYN      Pt reports  in past.    HX TONSILLECTOMY         Current Outpatient Medications   Medication Sig Dispense Refill    ibuprofen (MOTRIN) 800 mg tablet Take 1 Tab by mouth every eight (8) hours as needed for Pain. 30 Tab 0    lisinopril (PRINIVIL, ZESTRIL) 10 mg tablet Take 10 mg by mouth.  methocarbamol (ROBAXIN) 750 mg tablet Take 1 Tab by mouth four (4) times daily. 20 Tab 0    metFORMIN (GLUCOPHAGE) 500 mg tablet Take 1 Tab by mouth daily (with breakfast).  10 Tab 0    medroxyPROGESTERone (DEPO-PROVERA) 150 mg/mL injection 150 mg by IntraMUSCular route once. Allergies: Patient has no known allergies. Tobacco History:  reports that she has never smoked. She has never used smokeless tobacco.  Alcohol Abuse:  reports no history of alcohol use. Drug Abuse:  reports no history of drug use.     Patient feels safe at home    Family Medical/Cancer History:   Family History   Problem Relation Age of Onset    Diabetes Mother     Diabetes Father     Diabetes Maternal Grandmother         Review of Systems - History obtained from the patient  Constitutional: negative for weight loss, fever, night sweats  HEENT: negative for hearing loss, earache, congestion, snoring, sorethroat  CV: negative for chest pain, palpitations, edema  Resp: negative for cough, shortness of breath, wheezing  GI: negative for change in bowel habits, abdominal pain, black or bloody stools  : negative for frequency, dysuria, hematuria, vaginal discharge  MSK: negative for back pain, joint pain, muscle pain  Breast: negative for breast lumps, nipple discharge, galactorrhea  Skin :negative for itching, rash, hives  Neuro: negative for dizziness, headache, confusion, weakness  Psych: negative for anxiety, depression, change in mood  Heme/lymph: negative for bleeding, bruising, pallor    Physical Exam    Visit Vitals  /83 (BP 1 Location: Left arm, BP Patient Position: Sitting)   Pulse 86   Resp 18   Ht 5' 2\" (1.575 m)   Wt 304 lb (137.9 kg)   SpO2 100%   BMI 55.60 kg/m²       Constitutional  · Appearance: well-nourished, well developed, alert, in no acute distress    HENT  · Head and Face: appears normal    Neck  · Inspection/Palpation: normal appearance, no masses or tenderness  · Lymph Nodes: no lymphadenopathy present  · Thyroid: gland size normal, nontender, no nodules or masses present on palpation    Chest  · Respiratory Effort: breathing unlabored  · Auscultation: normal breath sounds    Cardiovascular  · Heart:  · Auscultation: regular rate and rhythm without murmur    Breasts  · Inspection of Breasts: breasts symmetrical, no skin changes, no discharge present, nipple appearance normal, no skin retraction present  · Palpation of Breasts and Axillae: no masses present on palpation, no breast tenderness  · Axillary Lymph Nodes: no lymphadenopathy present    Gastrointestinal  · Abdominal Examination: abdomen non-tender to palpation, normal bowel sounds, no masses present  · Liver and spleen: no hepatomegaly present, spleen not palpable  · Hernias: no hernias identified    Genitourinary  · External Genitalia: normal appearance for age, no discharge present, no tenderness present, no inflammatory lesions present, no masses present, no atrophy present  · Vagina: normal vaginal vault without central or paravaginal defects, no discharge present, no inflammatory lesions present, no masses present  · Bladder: non-tender to palpation  · Urethra: appears normal  · Cervix: normal   · Uterus: normal size, shape and consistency  · Adnexa: no adnexal tenderness present, no adnexal masses present  · Perineum: perineum within normal limits, no evidence of trauma, no rashes or skin lesions present  · Anus: anus within normal limits, no hemorrhoids present  · Inguinal Lymph Nodes: no lymphadenopathy present    Skin  · General Inspection: no rash, no lesions identified    Neurologic/Psychiatric  · Mental Status:  · Orientation: grossly oriented to person, place and time  · Mood and Affect: mood normal, affect appropriate    . Assessment:  Routine gynecologic examination  Her current medical status is satisfactory with no evidence of significant gynecologic issues. Plan:  - pap smear and HPV testing today  - mammogram not indicated  -will cycle with provera  - will treat for BV. Nuswab today.   Will treat with flagyl    Counseled re: diet, exercise, healthy lifestyle  Return for yearly wellness visits  Gardisil counseling provided  Pt counseled regarding co-testing for high risk HPV with pap  Rec screening mammo at either 35 or 40

## 2019-11-27 LAB
A VAGINAE DNA VAG QL NAA+PROBE: ABNORMAL SCORE
BVAB2 DNA VAG QL NAA+PROBE: ABNORMAL SCORE
C ALBICANS DNA VAG QL NAA+PROBE: NEGATIVE
C GLABRATA DNA VAG QL NAA+PROBE: NEGATIVE
C TRACH RRNA SPEC QL NAA+PROBE: NEGATIVE
MEGA1 DNA VAG QL NAA+PROBE: ABNORMAL SCORE
N GONORRHOEA RRNA SPEC QL NAA+PROBE: NEGATIVE
T VAGINALIS RRNA SPEC QL NAA+PROBE: NEGATIVE

## 2019-12-05 LAB
CYTOLOGIST CVX/VAG CYTO: ABNORMAL
CYTOLOGY CVX/VAG DOC CYTO: ABNORMAL
CYTOLOGY CVX/VAG DOC THIN PREP: ABNORMAL
DX ICD CODE: ABNORMAL
HPV I/H RISK 1 DNA CVX QL PROBE+SIG AMP: POSITIVE
HPV16 DNA CVX QL PROBE+SIG AMP: NEGATIVE
HPV18 DNA CVX QL PROBE+SIG AMP: NEGATIVE
Lab: ABNORMAL
OTHER STN SPEC: ABNORMAL
STAT OF ADQ CVX/VAG CYTO-IMP: ABNORMAL

## 2019-12-12 ENCOUNTER — TELEPHONE (OUTPATIENT)
Dept: OBGYN CLINIC | Age: 29
End: 2019-12-12

## 2019-12-12 NOTE — TELEPHONE ENCOUNTER
Pt states she can't take Flagyl as it makes her sick. Pt is requesting a cream be sent to the [address] [place] [address] [address]

## 2019-12-17 RX ORDER — METRONIDAZOLE 7.5 MG/G
1 GEL VAGINAL
Qty: 25 G | Refills: 0 | Status: SHIPPED | OUTPATIENT
Start: 2019-12-17 | End: 2019-12-22

## 2019-12-30 ENCOUNTER — HOSPITAL ENCOUNTER (OUTPATIENT)
Dept: LAB | Age: 29
Discharge: HOME | End: 2019-12-30

## 2019-12-30 ENCOUNTER — OFFICE VISIT (OUTPATIENT)
Dept: OBGYN CLINIC | Age: 29
End: 2019-12-30

## 2019-12-30 VITALS
SYSTOLIC BLOOD PRESSURE: 144 MMHG | WEIGHT: 293 LBS | RESPIRATION RATE: 18 BRPM | HEIGHT: 62 IN | BODY MASS INDEX: 53.92 KG/M2 | HEART RATE: 87 BPM | OXYGEN SATURATION: 99 % | DIASTOLIC BLOOD PRESSURE: 83 MMHG

## 2019-12-30 DIAGNOSIS — Z13.9 SCREENING PROCEDURE: ICD-10-CM

## 2019-12-30 DIAGNOSIS — R87.618 PAP SMEAR ABNORMALITY OF CERVIX/HUMAN PAPILLOMAVIRUS (HPV) POSITIVE: Primary | ICD-10-CM

## 2019-12-30 LAB
HCG URINE, QL. (POC): NEGATIVE
VALID INTERNAL CONTROL?: YES

## 2019-12-30 NOTE — PROGRESS NOTES
34 y.o. with abnormal pap smear. Patient with NSIL HPV positive pap smear     She does  have a hx of abnormal paps in 2017    She does not smoke. Review of symptoms:  Constitutional: negative  Urinary: negative  CV: negative    Neuro: negative  Resp: negative   Psych: negative  GI: negative    Musculoskeletal: negative  GYN: per HPI    Integumentary: negative    Physical exam:    Gen: AOx3, NAD  Resp: No respiratory distress  GYN: normal external genitalia. Normal vagina. Grossly normal cervix.     A/P  34 y.o. with abnormal pap  - colposcopy today  - will call with results and plan    RTC PRN or for annual exam

## 2019-12-30 NOTE — PROGRESS NOTES
Chief Complaint   Patient presents with    Colposcopy     Pt presents in office for colpo pt's last pap was HPV postive. 1. Have you been to the ER, urgent care clinic since your last visit? Hospitalized since your last visit? No    2. Have you seen or consulted any other health care providers outside of the [address] [place] [address] since your last visit? Include any pap smears or colon screening.  No     [facility] AT [facility]  OFFICE PROCEDURE PROGRESS NOTE        Chart reviewed for the following:   Isabell BAILEY LPN, have reviewed the History, Physical and updated the Allergic reactions for Jose R [address] performed immediately prior to start of procedure:   Isabell BAILEY LPN, have performed the following reviews on [employer] prior to the start of the procedure:            * Patient was identified by name and date of birth   * Agreement on procedure being performed was verified  * Risks and Benefits explained to the patient  * Procedure site verified and marked as necessary  * Patient was positioned for comfort  * Consent was signed and verified     Time: 3:30      Date of procedure: 12/30/2019    Procedure performed by:  Paz Myles MD    Provider assisted by:  LPN    Patient assisted by: self    How tolerated by patient: tolerated the procedure well with no complications    Post Procedural Pain Scale: 8 - Hurts Whole Lot    Comments: none

## 2019-12-30 NOTE — PATIENT INSTRUCTIONS
Colposcopy: Before Your Procedure  What is a colposcopy? Colposcopy lets a doctor look at your vulva, vagina, and cervix. If the doctor sees a possible problem, he or she can take a small sample of tissue. Then another doctor studies the tissue under a microscope. This is called a biopsy. Most women have this procedure after they have abnormal results from a Pap test.  During the test, your doctor puts a lubricated tool into your vagina. This is called a speculum. It gently spreads apart the sides of your vagina. This allows your doctor to see inside your vagina and the cervix. The doctor also uses a magnifying device to help him or her see better. This device does not go inside your vagina. The doctor may put vinegar or iodine on your cervix. This can help the doctor to see any areas that are not normal. Sometimes the doctor also takes photos or videos. When the speculum goes in, it can feel a little uncomfortable. If the doctor does a biopsy, you may feel a pinch and have some cramping. Follow-up care is a key part of your treatment and safety. Be sure to make and go to all appointments, and call your doctor if you are having problems. It's also a good idea to know your test results and keep a list of the medicines you take. What happens before the procedure? Preparing for the procedure  · Tell your doctor if:  ? You are having your menstrual period. This test usually is not done during your period. This is because blood makes it harder to see your cervix. ? You are or might be pregnant. A blood or urine test may be done to see if you are pregnant. Colposcopy is safe during pregnancy. The chance of miscarriage is very small. But you may have some bleeding from a biopsy. ? You take blood thinners, such as warfarin (Coumadin), clopidogrel (Plavix), or aspirin.   · Do not douche, use tampons, have sexual intercourse, or use vaginal medicines for 24 hours before the test.  · Understand exactly what procedure is planned, along with the risks, benefits, and other options. · Tell your doctors ALL the medicines, vitamins, supplements, or herbal remedies you take. Some of these can increase the risk of bleeding. · Your doctor will tell you which medicines to take or stop before your procedure. You may need to stop taking certain medicines a week or more before the procedure. So talk to your doctor as soon as you can. · If you have an advance directive, let your doctor know. It may include a living will and a durable power of  for health care. Bring a copy to the hospital. If you don't have one, you may want to prepare one. It lets your doctor and loved ones know your health care wishes. Doctors advise that everyone prepare these papers before any type of surgery or procedure. Procedures can be stressful. This information will help you understand what you can expect. And it will help you safely prepare for your procedure. What happens on the day of the procedure?    · You may want to take a pain reliever 30 to 60 minutes before the test. This can help reduce any cramping pain from a biopsy. Ibuprofen (Advil or Motrin) is a good choice.     · Take a bath or shower before you come in for your procedure. Do not apply lotions, perfumes, deodorants, or nail polish.    At the doctor's office   · Bring a picture ID.     · The procedure will take about 15 to 30 minutes. Going home  · You will be given more specific instructions about recovering from your procedure. When should you call your doctor? · You have questions or concerns.     · You don't understand how to prepare for your procedure.     · You become ill before the procedure (such as fever, flu, or a cold).     · You need to reschedule or have changed your mind about having the procedure. Where can you learn more? Go to http://phil-khari.info/.   Enter P474 in the search box to learn more about \"Colposcopy: Before Your Procedure. \"  Current as of: December 19, 2018  Content Version: 12.2  © 5522-4597 [email or web address]. Care instructions adapted under license by [employer] (which disclaims liability or warranty for this information). If you have questions about a medical condition or this instruction, always ask your healthcare professional. [address] any warranty or liability for your use of this information. Colposcopy: What to Expect at [address] may feel some soreness in your vagina for a day or two if you had a biopsy. Some vaginal bleeding or discharge is normal for up to a week after a biopsy. The discharge may be dark-colored if a solution was put on your cervix. You can use a sanitary pad for the bleeding. It may take a week or two for you to get the test results. This care sheet gives you a general idea about how long it will take for you to recover. But each person recovers at a different pace. Follow the steps below to feel better as quickly as possible. How can you care for yourself at home? Activity    · You can return to work and most daily activities right after the test.   Exercise    · Do not exercise for 1 day after the test.   Medicines    · Your doctor will tell you if and when you can restart your medicines. He or she will also give you instructions about taking any new medicines.     · If you take blood thinners, such as warfarin (Coumadin), clopidogrel (Plavix), or aspirin, be sure to talk to your doctor. He or she will tell you if and when to start taking those medicines again. Make sure that you understand exactly what your doctor wants you to do.     · Take an over-the-counter pain medicine, such as acetaminophen (Tylenol), ibuprofen (Advil, Motrin), or naproxen (Aleve). Be safe with medicines. Read and follow all instructions on the label. Do not take two or more pain medicines at the same time unless the doctor told you to.  Many pain medicines have acetaminophen, which is Tylenol. Too much acetaminophen (Tylenol) can be harmful. Other instructions    · Use a pad if you have some bleeding.     · Do not douche, have sexual intercourse, or use tampons for 1 week if you had a biopsy. This will allow time for your cervix to heal.     · You can take a bath or shower anytime after the test.   Follow-up care is a key part of your treatment and safety. Be sure to make and go to all appointments, and call your doctor if you are having problems. It's also a good idea to know your test results and keep a list of the medicines you take. When should you call for help? Call your doctor now or seek immediate medical care if:    · You have severe vaginal bleeding. This means that you are soaking through your usual pads or tampons each hour for 2 or more hours.     · You have pain that does not get better after you take pain medicine.     · You have signs of infection, such as:  ? Increased pain. ? Bad-smelling vaginal discharge. ? A fever.    Watch closely for any changes in your health, and be sure to contact your doctor if:    · You have questions or concerns. Where can you learn more? Go to http://phil-khari.info/. Enter M523 in the search box to learn more about \"Colposcopy: What to Expect at Home. \"  Current as of: December 19, 2018  Content Version: 12.2  © 8267-4309 [employer]. Care instructions adapted under license by [employer] (which disclaims liability or warranty for this information). If you have questions about a medical condition or this instruction, always ask your healthcare professional. [place] [address] any warranty or liability for your use of this information. Abnormal Pap Test: Care Instructions  Your Care Instructions    A Pap test (also called a Pap smear) is used to look for early changes that may become cancer of the cervix.  Your Pap test was abnormal. That may mean that some cells in your cervix have changed. The cell changes are most often caused by human papillomavirus (HPV) infection. An abnormal Pap test does not mean that the abnormal cells will lead to cancer. Changes in cervical cells may go away on their own or may progress slowly. Your doctor may want to repeat the Pap test or have you take other tests to see if you have cell changes. It is very important that you have regular Pap tests after you've had an abnormal Pap test.  Follow-up care is a key part of your treatment and safety. Be sure to make and go to all appointments, and call your doctor if you are having problems. It's also a good idea to know your test results and keep a list of the medicines you take. How can you care for yourself at home? · Do not smoke. Smoking may increase your risk for cervical cell changes. If you need help quitting, talk to your doctor about stop-smoking programs and medicines. These can increase your chances of quitting for good. · Be sure to get follow-up Pap tests or other follow-up tests as recommended by your doctor. When should you call for help? Watch closely for changes in your health, and be sure to contact your doctor if you have any problems. Where can you learn more? Go to http://phil-khari.info/. Enter P925 in the search box to learn more about \"Abnormal Pap Test: Care Instructions. \"  Current as of: December 19, 2018  Content Version: 12.2  © 6836-3023 [employer]. Care instructions adapted under license by [employer] (which disclaims liability or warranty for this information). If you have questions about a medical condition or this instruction, always ask your healthcare professional. [address] any warranty or liability for your use of this information.

## 2019-12-30 NOTE — PROCEDURES
Written and verbal consent obtained. Speculum was inserted and cervix was visualized. Cervix was then coated with acetic acid. Colposcopy was performed with clear and green lenses with the following findings:    Squamocolumnar junction was visualized in its entirety. Area of acetowhite with punctation at 6 o'clock    ECC was then performed. Ectocervical biopsies were taken at 12 and 6 o'clock. Hemostasis was achieved using Monsels. Hemostasis was noted to be excellent. Speculum was removed. Patient tolerated the procedure well and there were no complications. Specimen sent to pathology.

## 2020-01-03 NOTE — PROGRESS NOTES
Please let patient know that her biopsies were benign. We will repeat the pap smear in a year. Thank you.

## 2020-01-05 ENCOUNTER — HOSPITAL ENCOUNTER (EMERGENCY)
Age: 30
Discharge: HOME | End: 2020-01-05
Attending: EMERGENCY MEDICINE
Payer: COMMERCIAL

## 2020-01-05 VITALS
HEIGHT: 64 IN | SYSTOLIC BLOOD PRESSURE: 153 MMHG | TEMPERATURE: 98.5 F | DIASTOLIC BLOOD PRESSURE: 82 MMHG | WEIGHT: 293 LBS | HEART RATE: 92 BPM | RESPIRATION RATE: 16 BRPM | BODY MASS INDEX: 50.02 KG/M2 | OXYGEN SATURATION: 100 %

## 2020-01-05 DIAGNOSIS — R10.32 ABDOMINAL PAIN, LLQ (LEFT LOWER QUADRANT): ICD-10-CM

## 2020-01-05 DIAGNOSIS — K59.00 CONSTIPATION, UNSPECIFIED CONSTIPATION TYPE: Primary | ICD-10-CM

## 2020-01-05 LAB
APPEARANCE UR: CLEAR
BACTERIA URNS QL MICRO: NEGATIVE /HPF
BILIRUB UR QL: NEGATIVE
COLOR UR: ABNORMAL
EPITH CASTS URNS QL MICRO: ABNORMAL /LPF
GLUCOSE UR STRIP.AUTO-MCNC: >1000 MG/DL
HCG UR QL: NEGATIVE
HGB UR QL STRIP: NEGATIVE
KETONES UR QL STRIP.AUTO: NEGATIVE MG/DL
LEUKOCYTE ESTERASE UR QL STRIP.AUTO: NEGATIVE
NITRITE UR QL STRIP.AUTO: NEGATIVE
PH UR STRIP: 6 [PH] (ref 5–8)
PROT UR STRIP-MCNC: NEGATIVE MG/DL
RBC #/AREA URNS HPF: ABNORMAL /HPF (ref 0–5)
SP GR UR REFRACTOMETRY: 1.03 (ref 1–1.03)
UA: UC IF INDICATED,UAUC: ABNORMAL
UROBILINOGEN UR QL STRIP.AUTO: 0.2 EU/DL (ref 0.2–1)
WBC URNS QL MICRO: ABNORMAL /HPF (ref 0–4)

## 2020-01-05 PROCEDURE — 99283 EMERGENCY DEPT VISIT LOW MDM: CPT

## 2020-01-05 PROCEDURE — 81001 URINALYSIS AUTO W/SCOPE: CPT

## 2020-01-05 PROCEDURE — 81025 URINE PREGNANCY TEST: CPT

## 2020-01-05 RX ORDER — POLYETHYLENE GLYCOL 3350 17 G/17G
17 POWDER, FOR SOLUTION ORAL DAILY
Qty: 510 G | Refills: 0 | Status: SHIPPED | OUTPATIENT
Start: 2020-01-05 | End: 2020-01-21

## 2020-01-05 NOTE — LETTER
[facility] EMERGENCY DEPT 
[address] [address] 
[phone] Work/School Note Date: 1/5/2020 To Whom It May concern: 
 
Viktor Wright was seen and treated today in the emergency room by the following provider(s): 
Attending Provider: Jennifer Prabhakar MD. Viktor Wright may return to work on 01/06/2020.  
 
Sincerely, 
 
 
 
 
Melissa Thomas MD

## 2020-01-05 NOTE — ED TRIAGE NOTES
Pt states she had a bacterial infection a few days ago  but couldn't take the flagyl and started using a \"cream\" yesterday. Reports urinary frequency x few days.

## 2020-01-05 NOTE — DISCHARGE INSTRUCTIONS
Patient Education        Abdominal Pain: Care Instructions  Your Care Instructions    Abdominal pain has many possible causes. Some aren't serious and get better on their own in a few days. Others need more testing and treatment. If your pain continues or gets worse, you need to be rechecked and may need more tests to find out what is wrong. You may need surgery to correct the problem. Don't ignore new symptoms, such as fever, nausea and vomiting, urination problems, pain that gets worse, and dizziness. These may be signs of a more serious problem. Your doctor may have recommended a follow-up visit in the next 8 to 12 hours. If you are not getting better, you may need more tests or treatment. The doctor has checked you carefully, but problems can develop later. If you notice any problems or new symptoms, get medical treatment right away. Follow-up care is a key part of your treatment and safety. Be sure to make and go to all appointments, and call your doctor if you are having problems. It's also a good idea to know your test results and keep a list of the medicines you take. How can you care for yourself at home? · Rest until you feel better. · To prevent dehydration, drink plenty of fluids, enough so that your urine is light yellow or clear like water. Choose water and other caffeine-free clear liquids until you feel better. If you have kidney, heart, or liver disease and have to limit fluids, talk with your doctor before you increase the amount of fluids you drink. · If your stomach is upset, eat mild foods, such as rice, dry toast or crackers, bananas, and applesauce. Try eating several small meals instead of two or three large ones. · Wait until 48 hours after all symptoms have gone away before you have spicy foods, alcohol, and drinks that contain caffeine. · Do not eat foods that are high in fat. · Avoid anti-inflammatory medicines such as aspirin, ibuprofen (Advil, Motrin), and naproxen (Aleve). These can cause stomach upset. Talk to your doctor if you take daily aspirin for another health problem. When should you call for help? Call 911 anytime you think you may need emergency care. For example, call if:    · You passed out (lost consciousness).     · You pass maroon or very bloody stools.     · You vomit blood or what looks like coffee grounds.     · You have new, severe belly pain.    Call your doctor now or seek immediate medical care if:    · Your pain gets worse, especially if it becomes focused in one area of your belly.     · You have a new or higher fever.     · Your stools are black and look like tar, or they have streaks of blood.     · You have unexpected vaginal bleeding.     · You have symptoms of a urinary tract infection. These may include:  ? Pain when you urinate. ? Urinating more often than usual.  ? Blood in your urine.     · You are dizzy or lightheaded, or you feel like you may faint.    Watch closely for changes in your health, and be sure to contact your doctor if:    · You are not getting better after 1 day (24 hours). Where can you learn more? Go to http://philDajiabaokhari.info/. Enter A169 in the search box to learn more about \"Abdominal Pain: Care Instructions. \"  Current as of: June 26, 2019  Content Version: 12.2  © 0666-0523 [employer]. Care instructions adapted under license by [employer] (which disclaims liability or warranty for this information). If you have questions about a medical condition or this instruction, always ask your healthcare professional. [place] [address] any warranty or liability for your use of this information. Patient Education        Constipation: Care Instructions  Your Care Instructions    Constipation means that you have a hard time passing stools (bowel movements). People pass stools from 3 times a day to once every 3 days. What is normal for you may be different.  Constipation may occur with pain in the rectum and cramping. The pain may get worse when you try to pass stools. Sometimes there are small amounts of bright red blood on toilet paper or the surface of stools. This is because of enlarged veins near the rectum (hemorrhoids). A few changes in your diet and lifestyle may help you avoid ongoing constipation. Your doctor may also prescribe medicine to help loosen your stool. Some medicines can cause constipation. These include pain medicines and antidepressants. Tell your doctor about all the medicines you take. Your doctor may want to make a medicine change to ease your symptoms. Follow-up care is a key part of your treatment and safety. Be sure to make and go to all appointments, and call your doctor if you are having problems. It's also a good idea to know your test results and keep a list of the medicines you take. How can you care for yourself at home? · Drink plenty of fluids, enough so that your urine is light yellow or clear like water. If you have kidney, heart, or liver disease and have to limit fluids, talk with your doctor before you increase the amount of fluids you drink. · Include high-fiber foods in your diet each day. These include fruits, vegetables, beans, and whole grains. · Get at least 30 minutes of exercise on most days of the week. Walking is a good choice. You also may want to do other activities, such as running, swimming, cycling, or playing tennis or team sports. · Take a fiber supplement, such as Citrucel or Metamucil, every day. Read and follow all instructions on the label. · Schedule time each day for a bowel movement. A daily routine may help. Take your time having your bowel movement. · Support your feet with a small step stool when you sit on the toilet. This helps flex your hips and places your pelvis in a squatting position. · Your doctor may recommend an over-the-counter laxative to relieve your constipation.  Examples are Milk of Magnesia and MiraLax. Read and follow all instructions on the label. Do not use laxatives on a long-term basis. When should you call for help? Call your doctor now or seek immediate medical care if:    · You have new or worse belly pain.     · You have new or worse nausea or vomiting.     · You have blood in your stools.    Watch closely for changes in your health, and be sure to contact your doctor if:    · Your constipation is getting worse.     · You do not get better as expected. Where can you learn more? Go to http://phil-khari.info/. Enter 21  in the search box to learn more about \"Constipation: Care Instructions. \"  Current as of: June 26, 2019  Content Version: 12.2  © 5341-0946 [employer]. Care instructions adapted under license by [employer] (which disclaims liability or warranty for this information). If you have questions about a medical condition or this instruction, always ask your healthcare professional. [place] [address] any warranty or liability for your use of this information.

## 2020-01-05 NOTE — ED PROVIDER NOTES
EMERGENCY DEPARTMENT HISTORY AND PHYSICAL EXAM      Date: 1/5/2020  Patient Name: Jimbo Neumann    History of Presenting Illness   No chief complaint on file. History Provided By: Patient    HPI: Jimbo Neumann, 34 y.o. female with past medical history significant for diabetes, hypertension, and seizures who presents via private vehicle to the ED with cc of left lower quadrant abdominal pain. Patient states her symptoms started yesterday. She denies any dysuria or burning with urination, but believes she has urinary tract infection. She also states that she had a hard time moving her bowels yesterday and was only able to have a small bowel movement. She has not moved her bowels today. She denies any nausea, vomiting, or diarrhea. She recently saw her OB/GYN who diagnosed her with bacterial vaginosis. She was prescribed Flagyl which she was unable to take so he switched her to a \"cream\". PMHx: Diabetes, hypertension, seizures  Social Hx: Denies alcohol, tobacco, or illegal drug use    PCP: Christina Gregory MD    There are no other complaints, changes, or physical findings at this time. No current facility-administered medications on file prior to encounter. Current Outpatient Medications on File Prior to Encounter   Medication Sig Dispense Refill    medroxyPROGESTERone (PROVERA) 10 mg tablet Take 1 Tab by mouth daily. Take daily for 30 days then 10 days/month starting in february 30 Tab 6    ibuprofen (MOTRIN) 800 mg tablet Take 1 Tab by mouth every eight (8) hours as needed for Pain. 30 Tab 0    lisinopril (PRINIVIL, ZESTRIL) 10 mg tablet Take 10 mg by mouth.  methocarbamol (ROBAXIN) 750 mg tablet Take 1 Tab by mouth four (4) times daily. 20 Tab 0    metFORMIN (GLUCOPHAGE) 500 mg tablet Take 1 Tab by mouth daily (with breakfast). 10 Tab 0    medroxyPROGESTERone (DEPO-PROVERA) 150 mg/mL injection 150 mg by IntraMUSCular route once.        Past History     Past Medical History:  Past Medical History:   Diagnosis Date    Asthma     pt denies    Diabetes ([facility] 2016    Hypertension     Other ill-defined conditions(799.89)     back pain    Seizures ([facility] [facility]     last seizure was 89 years old    SOB (shortness of breath)      Past Surgical History:  Past Surgical History:   Procedure Laterality Date    HX GYN  2014    Pt reports  in past.    HX TONSILLECTOMY       Family History:  Family History   Problem Relation Age of Onset    Diabetes Mother     Diabetes Father     Diabetes Maternal Grandmother      Social History:  Social History     Tobacco Use    Smoking status: Never Smoker    Smokeless tobacco: Never Used   Substance Use Topics    Alcohol use: No    Drug use: No     Allergies:  No Known Allergies  Review of Systems   Review of Systems   Constitutional: Negative for chills and fever. HENT: Negative for congestion, rhinorrhea, sneezing and sore throat. Eyes: Negative for redness and visual disturbance. Respiratory: Negative for shortness of breath. Cardiovascular: Negative for leg swelling. Gastrointestinal: Positive for abdominal pain and constipation. Negative for nausea and vomiting. Genitourinary: Negative for difficulty urinating and frequency. Musculoskeletal: Negative for back pain, myalgias and neck stiffness. Skin: Negative for rash. Neurological: Negative for dizziness, syncope, weakness and headaches. Hematological: Negative for adenopathy. All other systems reviewed and are negative. Physical Exam   Physical Exam  Vitals signs and nursing note reviewed. Constitutional:       Appearance: Normal appearance. She is well-developed. She is morbidly obese. HENT:      Head: Normocephalic and atraumatic. Eyes:      Conjunctiva/sclera: Conjunctivae normal.   Neck:      Musculoskeletal: Full passive range of motion without pain, normal range of motion and neck supple.    Cardiovascular:      Rate and Rhythm: Normal rate and regular rhythm. Pulses: Normal pulses. Heart sounds: Normal heart sounds, S1 normal and S2 normal. No murmur. Pulmonary:      Effort: Pulmonary effort is normal. No respiratory distress. Breath sounds: Normal breath sounds. No wheezing. Abdominal:      General: Bowel sounds are normal. There is no distension. Palpations: Abdomen is soft. Tenderness: There is no rebound. Comments: No reproducible abdominal tenderness or pelvic tenderness   Musculoskeletal: Normal range of motion. Skin:     General: Skin is warm and dry. Findings: No rash. Neurological:      Mental Status: She is alert and oriented to person, place, and time. Psychiatric:         Speech: Speech normal.         Behavior: Behavior normal.         Thought Content: Thought content normal.         Judgment: Judgment normal.       Diagnostic Study Results   Labs -     Recent Results (from the past 12 hour(s))   URINALYSIS W/ REFLEX CULTURE    Collection Time: 01/05/20  2:38 PM   Result Value Ref Range    Color YELLOW/STRAW      Appearance CLEAR CLEAR      Specific gravity 1.030 1.003 - 1.030      pH (UA) 6.0 5.0 - 8.0      Protein NEGATIVE  NEG mg/dL    Glucose >1,000 (A) NEG mg/dL    Ketone NEGATIVE  NEG mg/dL    Bilirubin NEGATIVE  NEG      Blood NEGATIVE  NEG      Urobilinogen 0.2 0.2 - 1.0 EU/dL    Nitrites NEGATIVE  NEG      Leukocyte Esterase NEGATIVE  NEG      WBC 0-4 0 - 4 /hpf    RBC 0-5 0 - 5 /hpf    Epithelial cells FEW FEW /lpf    Bacteria NEGATIVE  NEG /hpf    UA:UC IF INDICATED CULTURE NOT INDICATED BY UA RESULT CNI     HCG URINE, QL. - POC    Collection Time: 01/05/20  2:46 PM   Result Value Ref Range    Pregnancy test,urine (POC) NEGATIVE  NEG         Radiologic Studies -   No orders to display     No results found. Medical Decision Making   I am the first provider for this patient.     I reviewed the vital signs, available nursing notes, past medical history, past surgical history, family history and social history. Vital Signs-Reviewed the patient's vital signs. Patient Vitals for the past 12 hrs:   Temp Pulse Resp BP SpO2   01/05/20 1332 98.5 °F (36.9 °C) 92 16 153/82 100 %     Pulse Oximetry Analysis - 100% on RA    Records Reviewed: Nursing Notes and Old Medical Records    Provider Notes (Medical Decision Making):   77-year-old female presents with left lower quadrant abdominal pain that started last night. It is a crampy pain that does not radiate. She did have a hard time moving her bowels yesterday and has not moved them yet today. Will check a UA to rule out urinary tract infection and kidney stone. Suspect this is constipation/fecal stasis. Discussed with patient who agrees    ED Course:   Initial assessment performed. The patients presenting problems have been discussed, and they are in agreement with the care plan formulated and outlined with them. I have encouraged them to ask questions as they arise throughout their visit. UA is negative. Will discharge with a prescription for MiraLAX and have patient follow-up with primary care. Progress Note:   Updated pt on all returned results and findings. Discussed the importance of proper follow up as referred below along with return precautions. Pt in agreement with the care plan and expresses agreement with and understanding of all items discussed. Disposition:  Discharge Note:  The pt is ready for discharge. The pt's signs, symptoms, diagnosis, and discharge instructions have been discussed and pt has conveyed their understanding. The pt is to follow up as recommended or return to ER should their symptoms worsen. Plan has been discussed and pt is in agreement. PLAN:  1. Current Discharge Medication List      START taking these medications    Details   polyethylene glycol (MIRALAX) 17 gram/dose powder Take 17 g by mouth daily. 1 tablespoon with 8 oz of water daily  Qty: 510 g, Refills: 0           2.    Follow-up Information     Follow up With Specialties Details Why Contact Info    Rebecca Luke MD Internal Medicine Schedule an appointment as soon as possible for a visit  [address]  [address] [place] [address] - [place] [facility] Emergency Medicine  As needed, If symptoms worsen [address] [facility]  [phone]        Return to ED if worse     Diagnosis     Clinical Impression:   1. Constipation, unspecified constipation type    2. Abdominal pain, LLQ (left lower quadrant)            Please note that this dictation was completed with Dragon, computer voice recognition software. Quite often unanticipated grammatical, syntax, homophones, and other interpretive errors are inadvertently transcribed by the computer software. Please disregard these errors. Additionally, please excuse any errors that have escaped final proofreading.

## 2020-01-21 ENCOUNTER — APPOINTMENT (OUTPATIENT)
Dept: GENERAL RADIOLOGY | Age: 30
End: 2020-01-21
Attending: PHYSICIAN ASSISTANT
Payer: COMMERCIAL

## 2020-01-21 ENCOUNTER — HOSPITAL ENCOUNTER (EMERGENCY)
Age: 30
Discharge: HOME | End: 2020-01-21
Attending: EMERGENCY MEDICINE
Payer: COMMERCIAL

## 2020-01-21 VITALS
WEIGHT: 293 LBS | RESPIRATION RATE: 18 BRPM | SYSTOLIC BLOOD PRESSURE: 147 MMHG | OXYGEN SATURATION: 100 % | BODY MASS INDEX: 50.02 KG/M2 | DIASTOLIC BLOOD PRESSURE: 88 MMHG | HEIGHT: 64 IN | TEMPERATURE: 98.2 F | HEART RATE: 92 BPM

## 2020-01-21 DIAGNOSIS — R73.9 HYPERGLYCEMIA: ICD-10-CM

## 2020-01-21 DIAGNOSIS — R07.89 OTHER CHEST PAIN: Primary | ICD-10-CM

## 2020-01-21 DIAGNOSIS — K21.9 GASTROESOPHAGEAL REFLUX DISEASE, ESOPHAGITIS PRESENCE NOT SPECIFIED: ICD-10-CM

## 2020-01-21 LAB
ALBUMIN SERPL-MCNC: 3.8 G/DL (ref 3.5–5)
ALBUMIN/GLOB SERPL: 0.9 {RATIO} (ref 1.1–2.2)
ALP SERPL-CCNC: 80 U/L (ref 45–117)
ALT SERPL-CCNC: 25 U/L (ref 12–78)
ANION GAP SERPL CALC-SCNC: 8 MMOL/L (ref 5–15)
AST SERPL-CCNC: 14 U/L (ref 15–37)
BASOPHILS # BLD: 0 K/UL (ref 0–0.1)
BASOPHILS NFR BLD: 0 % (ref 0–1)
BILIRUB SERPL-MCNC: 0.6 MG/DL (ref 0.2–1)
BUN SERPL-MCNC: 10 MG/DL (ref 6–20)
BUN/CREAT SERPL: 10 (ref 12–20)
CALCIUM SERPL-MCNC: 9.5 MG/DL (ref 8.5–10.1)
CHLORIDE SERPL-SCNC: 100 MMOL/L (ref 97–108)
CO2 SERPL-SCNC: 27 MMOL/L (ref 21–32)
CREAT SERPL-MCNC: 0.98 MG/DL (ref 0.55–1.02)
DIFFERENTIAL METHOD BLD: NORMAL
EOSINOPHIL # BLD: 0.1 K/UL (ref 0–0.4)
EOSINOPHIL NFR BLD: 1 % (ref 0–7)
ERYTHROCYTE [DISTWIDTH] IN BLOOD BY AUTOMATED COUNT: 12.6 % (ref 11.5–14.5)
GLOBULIN SER CALC-MCNC: 4.3 G/DL (ref 2–4)
GLUCOSE SERPL-MCNC: 231 MG/DL (ref 65–100)
HCT VFR BLD AUTO: 39.5 % (ref 35–47)
HGB BLD-MCNC: 13.2 G/DL (ref 11.5–16)
IMM GRANULOCYTES # BLD AUTO: 0 K/UL (ref 0–0.04)
IMM GRANULOCYTES NFR BLD AUTO: 0 % (ref 0–0.5)
LIPASE SERPL-CCNC: 106 U/L (ref 73–393)
LYMPHOCYTES # BLD: 2.6 K/UL (ref 0.8–3.5)
LYMPHOCYTES NFR BLD: 35 % (ref 12–49)
MCH RBC QN AUTO: 26.8 PG (ref 26–34)
MCHC RBC AUTO-ENTMCNC: 33.4 G/DL (ref 30–36.5)
MCV RBC AUTO: 80.3 FL (ref 80–99)
MONOCYTES # BLD: 0.5 K/UL (ref 0–1)
MONOCYTES NFR BLD: 7 % (ref 5–13)
NEUTS SEG # BLD: 4.3 K/UL (ref 1.8–8)
NEUTS SEG NFR BLD: 57 % (ref 32–75)
NRBC # BLD: 0 K/UL (ref 0–0.01)
NRBC BLD-RTO: 0 PER 100 WBC
PLATELET # BLD AUTO: 301 K/UL (ref 150–400)
PMV BLD AUTO: 10.2 FL (ref 8.9–12.9)
POTASSIUM SERPL-SCNC: 4.1 MMOL/L (ref 3.5–5.1)
PROT SERPL-MCNC: 8.1 G/DL (ref 6.4–8.2)
RBC # BLD AUTO: 4.92 M/UL (ref 3.8–5.2)
SODIUM SERPL-SCNC: 135 MMOL/L (ref 136–145)
TROPONIN I SERPL-MCNC: <0.05 NG/ML
WBC # BLD AUTO: 7.5 K/UL (ref 3.6–11)

## 2020-01-21 PROCEDURE — 83690 ASSAY OF LIPASE: CPT

## 2020-01-21 PROCEDURE — 84484 ASSAY OF TROPONIN QUANT: CPT

## 2020-01-21 PROCEDURE — 93005 ELECTROCARDIOGRAM TRACING: CPT

## 2020-01-21 PROCEDURE — 71045 X-RAY EXAM CHEST 1 VIEW: CPT

## 2020-01-21 PROCEDURE — 36415 COLL VENOUS BLD VENIPUNCTURE: CPT

## 2020-01-21 PROCEDURE — 99283 EMERGENCY DEPT VISIT LOW MDM: CPT

## 2020-01-21 PROCEDURE — 80053 COMPREHEN METABOLIC PANEL: CPT

## 2020-01-21 PROCEDURE — 85025 COMPLETE CBC W/AUTO DIFF WBC: CPT

## 2020-01-21 PROCEDURE — 74011250637 HC RX REV CODE- 250/637: Performed by: PHYSICIAN ASSISTANT

## 2020-01-21 RX ORDER — SODIUM CHLORIDE 0.9 % (FLUSH) 0.9 %
5-40 SYRINGE (ML) INJECTION EVERY 8 HOURS
Status: DISCONTINUED | OUTPATIENT
Start: 2020-01-21 | End: 2020-01-21 | Stop reason: HOSPADM

## 2020-01-21 RX ORDER — SODIUM CHLORIDE 0.9 % (FLUSH) 0.9 %
5-40 SYRINGE (ML) INJECTION AS NEEDED
Status: DISCONTINUED | OUTPATIENT
Start: 2020-01-21 | End: 2020-01-21 | Stop reason: HOSPADM

## 2020-01-21 RX ORDER — CALCIUM CARBONATE 200(500)MG
1 TABLET,CHEWABLE ORAL DAILY
Qty: 30 TAB | Refills: 0 | Status: SHIPPED | OUTPATIENT
Start: 2020-01-21 | End: 2020-03-09

## 2020-01-21 RX ADMIN — ALUMINUM HYDROXIDE AND MAGNESIUM HYDROXIDE 30 ML: 200; 200 SUSPENSION ORAL at 18:36

## 2020-01-21 NOTE — PROGRESS NOTES
CM opened case for assessment of D/C planning needs, CM reviewed chart. Patient 5 ER visit in 6 month. Unable to speak with patient at this time. RN staff in room CM to follow-up at a later time.     [address] [place] [address]  [phone]

## 2020-01-21 NOTE — ED PROVIDER NOTES
EMERGENCY DEPARTMENT HISTORY AND PHYSICAL EXAM      Date: 1/21/2020  Patient Name: Sandy Fisher    History of Presenting Illness     Chief Complaint   Patient presents with    Chest Pain     History Provided By: Patient    HPI: Sandy Fisher, 34 y.o. female with obesity, Diabetes, HTN, asthma, tonsillectomy who presents ambulatory to the ED with cc of acute moderate aching substernal CP x 2 days. Moderate relief w/ Tums yesterday. Pain worse when sitting down. No relief with leaning forward. No exacerbation with deep breath, eating, activity. No other medications or modifying factors. No cardiac disease. Denies fever, chills, n/v, lightheadedness, dizziness, sob, hemoptysis, cough, leg pain/swelling, palpitations, abd pain, back pain, radiating pain, numbness/tingling. Pt currently on her MP and denies chance of pregnancy. PCP: Marlen Lamb MD    There are no other complaints, changes, or physical findings at this time. No current facility-administered medications on file prior to encounter. Current Outpatient Medications on File Prior to Encounter   Medication Sig Dispense Refill    calcium carbonate (TUMS PO) Take  by mouth.  [DISCONTINUED] polyethylene glycol (MIRALAX) 17 gram/dose powder Take 17 g by mouth daily. 1 tablespoon with 8 oz of water daily 510 g 0    [DISCONTINUED] medroxyPROGESTERone (PROVERA) 10 mg tablet Take 1 Tab by mouth daily. Take daily for 30 days then 10 days/month starting in february 30 Tab 6    [DISCONTINUED] ibuprofen (MOTRIN) 800 mg tablet Take 1 Tab by mouth every eight (8) hours as needed for Pain. 30 Tab 0    [DISCONTINUED] lisinopril (PRINIVIL, ZESTRIL) 10 mg tablet Take 10 mg by mouth.  [DISCONTINUED] methocarbamol (ROBAXIN) 750 mg tablet Take 1 Tab by mouth four (4) times daily. 20 Tab 0    [DISCONTINUED] metFORMIN (GLUCOPHAGE) 500 mg tablet Take 1 Tab by mouth daily (with breakfast).  10 Tab 0    [DISCONTINUED] medroxyPROGESTERone (DEPO-PROVERA) 150 mg/mL injection 150 mg by IntraMUSCular route once. Past History     Past Medical History:  Past Medical History:   Diagnosis Date    Asthma     pt denies    Diabetes ([address] 2016    Hypertension     Other ill-defined conditions(799.89)     back pain    Seizures ([facility] [address]     last seizure was 89 years old    SOB (shortness of breath)      Past Surgical History:  Past Surgical History:   Procedure Laterality Date    HX GYN  2014    Pt reports  in past.    HX TONSILLECTOMY       Family History:  Family History   Problem Relation Age of Onset    Diabetes Mother     Diabetes Father     Diabetes Maternal Grandmother      Social History:  Social History     Tobacco Use    Smoking status: Never Smoker    Smokeless tobacco: Never Used   Substance Use Topics    Alcohol use: No    Drug use: No     Allergies:  No Known Allergies  Review of Systems   Review of Systems   Constitutional: Negative for activity change, chills, fatigue and fever. HENT: Negative for congestion, ear discharge, ear pain, postnasal drip, rhinorrhea, sneezing, sore throat and trouble swallowing. Eyes: Negative for pain and redness. Respiratory: Negative for cough, chest tightness, shortness of breath, wheezing and stridor. Cardiovascular: Positive for chest pain. Negative for palpitations and leg swelling. Gastrointestinal: Negative for abdominal distention, abdominal pain, anal bleeding, blood in stool, constipation, diarrhea, nausea, rectal pain and vomiting. Genitourinary: Negative. Negative for decreased urine volume and dysuria. Musculoskeletal: Negative for arthralgias and myalgias. Skin: Negative. Negative for rash. Allergic/Immunologic: Negative for environmental allergies. Neurological: Negative for dizziness, seizures, syncope, weakness, light-headedness and headaches. Psychiatric/Behavioral: Negative.       Physical Exam   Physical Exam  Vitals signs and nursing note reviewed. Constitutional:       General: She is not in acute distress. Appearance: She is well-developed. She is obese. She is not diaphoretic. Comments: Well appearing obese AAF sitting upright in NAD. Speaking in clear complete sentences. HENT:      Head: Normocephalic and atraumatic. Right Ear: Hearing and external ear normal.      Left Ear: Hearing and external ear normal.      Nose: Nose normal.   Eyes:      Conjunctiva/sclera: Conjunctivae normal.      Pupils: Pupils are equal, round, and reactive to light. Neck:      Musculoskeletal: Normal range of motion. Cardiovascular:      Rate and Rhythm: Normal rate and regular rhythm. Pulmonary:      Effort: Pulmonary effort is normal. No respiratory distress. Breath sounds: No decreased breath sounds, wheezing, rhonchi or rales. Chest:      Chest wall: Tenderness present. No mass, lacerations, deformity, swelling, crepitus or edema. There is no dullness to percussion. Abdominal:      General: Bowel sounds are normal.      Tenderness: There is no tenderness. Musculoskeletal: Normal range of motion. Skin:     General: Skin is warm and dry. Neurological:      General: No focal deficit present. Mental Status: She is alert and oriented to person, place, and time. Psychiatric:         Mood and Affect: Mood normal.         Behavior: Behavior normal.         Thought Content:  Thought content normal.         Judgment: Judgment normal.       Diagnostic Study Results   Labs -     Recent Results (from the past 12 hour(s))   CBC WITH AUTOMATED DIFF    Collection Time: 01/21/20  6:53 PM   Result Value Ref Range    WBC 7.5 3.6 - 11.0 K/uL    RBC 4.92 3.80 - 5.20 M/uL    HGB 13.2 11.5 - 16.0 g/dL    HCT 39.5 35.0 - 47.0 %    MCV 80.3 80.0 - 99.0 FL    MCH 26.8 26.0 - 34.0 PG    MCHC 33.4 30.0 - 36.5 g/dL    RDW 12.6 11.5 - 14.5 %    PLATELET 857 813 - 247 K/uL    MPV 10.2 8.9 - 12.9 FL    NRBC 0.0 0  WBC    ABSOLUTE NRBC 0.00 0.00 - 0.01 K/uL    NEUTROPHILS 57 32 - 75 %    LYMPHOCYTES 35 12 - 49 %    MONOCYTES 7 5 - 13 %    EOSINOPHILS 1 0 - 7 %    BASOPHILS 0 0 - 1 %    IMMATURE GRANULOCYTES 0 0.0 - 0.5 %    ABS. NEUTROPHILS 4.3 1.8 - 8.0 K/UL    ABS. LYMPHOCYTES 2.6 0.8 - 3.5 K/UL    ABS. MONOCYTES 0.5 0.0 - 1.0 K/UL    ABS. EOSINOPHILS 0.1 0.0 - 0.4 K/UL    ABS. BASOPHILS 0.0 0.0 - 0.1 K/UL    ABS. IMM. GRANS. 0.0 0.00 - 0.04 K/UL    DF AUTOMATED     LIPASE    Collection Time: 01/21/20  6:53 PM   Result Value Ref Range    Lipase 106 73 - 393 U/L   TROPONIN I    Collection Time: 01/21/20  6:53 PM   Result Value Ref Range    Troponin-I, Qt. <0.05 <8.26 ng/mL   METABOLIC PANEL, COMPREHENSIVE    Collection Time: 01/21/20  6:53 PM   Result Value Ref Range    Sodium 135 (L) 136 - 145 mmol/L    Potassium 4.1 3.5 - 5.1 mmol/L    Chloride 100 97 - 108 mmol/L    CO2 27 21 - 32 mmol/L    Anion gap 8 5 - 15 mmol/L    Glucose 231 (H) 65 - 100 mg/dL    BUN 10 6 - 20 MG/DL    Creatinine 0.98 0.55 - 1.02 MG/DL    BUN/Creatinine ratio 10 (L) 12 - 20      GFR est AA >60 >60 ml/min/1.73m2    GFR est non-AA >60 >60 ml/min/1.73m2    Calcium 9.5 8.5 - 10.1 MG/DL    Bilirubin, total 0.6 0.2 - 1.0 MG/DL    ALT (SGPT) 25 12 - 78 U/L    AST (SGOT) 14 (L) 15 - 37 U/L    Alk. phosphatase 80 45 - 117 U/L    Protein, total 8.1 6.4 - 8.2 g/dL    Albumin 3.8 3.5 - 5.0 g/dL    Globulin 4.3 (H) 2.0 - 4.0 g/dL    A-G Ratio 0.9 (L) 1.1 - 2.2         Radiologic Studies -   XR CHEST PORT   Final Result   IMPRESSION: No Acute Disease. Xr Chest Port    Result Date: 1/21/2020  IMPRESSION: No Acute Disease. Medical Decision Making   I am the first provider for this patient. I reviewed the vital signs, available nursing notes, past medical history, past surgical history, family history and social history. Vital Signs-Reviewed the patient's vital signs.   Patient Vitals for the past 12 hrs:   Temp Pulse Resp BP SpO2   01/21/20 1913     100 %   01/21/20 1912    139/84    01/21/20 1740 98.2 °F (36.8 °C) 92 18 136/86 100 %     Pulse Oximetry Analysis - 100% on RA    EKG interpretation: (Preliminary)  Rhythm: normal sinus rhythm; and regular . Rate (approx.): 96; Axis: normal; SD interval: normal; QRS interval: normal ; ST/T wave: normal; Other findings: normal.      Records Reviewed: Nursing Notes, Old Medical Records, Previous Radiology Studies and Previous Laboratory Studies    Provider Notes (Medical Decision Making):   Patient presents with CP. DDx:  ACS, Aortic dissection, PNA, PE, PTX, pericarditis, myocarditis, GERD, costochondritis, anxiety. Concerned for GERD given the HPI and Physical exam. Will obtain labs, CXR, EKG and get Cardiology Consult PRN. - I have re-examined the patient and the patient denies chest pain on re-examination. The patient has had onset of chest pain greater than 8 hours and one negative set of cardiac enzymes or 2 negative sets of cardiac enzymes in the ER during this visit. The diagnosis, follow up, return instructions, test results, x-rays and medications have been discussed and reviewed with the patient. The patient has been given the opportunity to ask questions. The patient  expresses understanding of the diagnosis, follow-up and return instructions. The patient agrees to follow up with primary care or cardiology as directed and to return immediately if the chest pain worsens. The patient expresses understanding that although cardiac testing at this time is negative, a cardiac problem could still be present and that a follow-up appointment for further evaluation and risk factor modification is necessary to complete the evaluation of this complaint. ED Course:   Initial assessment performed. The patients presenting problems have been discussed, and they are in agreement with the care plan formulated and outlined with them. I have encouraged them to ask questions as they arise throughout their visit.     ED Course as of Jan 21 1925   Tue Jan 21, 2020   1906 Pt resting comfortably in room in NAD. No new symptoms or complaints at this time. Endorses CP subsided with GI cocktail. Requesting crackers. Available labs/ results reviewed with pt.      [SM]      ED Course User Index  [SM] Chikis Medina PA-C       Progress Note:   Updated pt on all returned results and findings. Discussed the importance of proper follow up as referred below along with return precautions. Pt in agreement with the care plan and expresses agreement with and understanding of all items discussed. Disposition:  7:25 PM  I have discussed with patient their diagnosis, treatment, and follow up plan. The patient agrees to follow up as outlined in discharge paperwork and also to return to the ED with any worsening. Kacy Diaz PA-C      PLAN:  1. Current Discharge Medication List      START taking these medications    Details   !! calcium carbonate (TUMS) 200 mg calcium (500 mg) chew Take 1 Tab by mouth daily. Qty: 30 Tab, Refills: 0       !! - Potential duplicate medications found. Please discuss with provider. CONTINUE these medications which have NOT CHANGED    Details   !! calcium carbonate (TUMS PO) Take  by mouth. !! - Potential duplicate medications found. Please discuss with provider. 2.   Follow-up Information     Follow up With Specialties Details Why Contact Info    Natanael Khalil MD Internal Medicine Schedule an appointment as soon as possible for a visit in 2 days As needed [address] [place] [address] [place]  [phone]          Return to ED if worse     Diagnosis     Clinical Impression:   1. Other chest pain    2. Gastroesophageal reflux disease, esophagitis presence not specified    3. Hyperglycemia            Please note that this dictation was completed with Dragon, computer voice recognition software.   Quite often unanticipated grammatical, syntax, homophones, and other interpretive errors are inadvertently transcribed by the computer software. Please disregard these errors. Additionally, please excuse any errors that have escaped final proofreading.

## 2020-01-21 NOTE — ED TRIAGE NOTES
Patient presents to the ED with c/o middle chest pain intermittent she decribes as sharp x2 days. Pt reports lifting. Pt report tenderness with palpation. Pt denies any alleviating or aggravating factors. Pt report taking a delfin yesterday with relief.

## 2020-01-21 NOTE — ED NOTES
Patient presents to ED with reports of chest pain beginning yesterday afternoon. Pt treated with OTC Tums. Reports tums caused total relief of symptoms. Patient reports Hx of hypertension and pre-diabetes. Denies taking medications. Patient appear irritated, asks the same questions multiple times before allowing care. Has asked multiple times for food. Requires reinforcement regaurding NPO status. Lung sounds JOS CTA. Denies abdominal tenderness. Patient is morbidly obese. LMP 1-. Emergency Department Nursing Plan of Care       The Nursing Plan of Care is developed from the Nursing assessment and Emergency Department Attending provider initial evaluation. The plan of care may be reviewed in the ED Provider note.     The Plan of Care was developed with the following considerations:   Patient / Family readiness to learn indicated by:verbalized understanding  Persons(s) to be included in education: patient  Barriers to Learning/Limitations:No    Signed     Lupe Ulloa    1/21/2020   7:03 PM

## 2020-01-22 LAB
ATRIAL RATE: 96 BPM
CALCULATED P AXIS, ECG09: 30 DEGREES
CALCULATED R AXIS, ECG10: 31 DEGREES
CALCULATED T AXIS, ECG11: -7 DEGREES
DIAGNOSIS, 93000: NORMAL
P-R INTERVAL, ECG05: 142 MS
Q-T INTERVAL, ECG07: 352 MS
QRS DURATION, ECG06: 72 MS
QTC CALCULATION (BEZET), ECG08: 444 MS
VENTRICULAR RATE, ECG03: 96 BPM

## 2020-01-22 NOTE — ED NOTES
Pt. Now complaining about eating. Pt. States, \"I haven't eaten all day, are you gonna feed me? \" Pt. Then states, \"I should have eaten before I came\". Provider in room and told her she would have to wait till test results back. Pt. Continues to complain.

## 2020-01-22 NOTE — DISCHARGE INSTRUCTIONS
Patient Education   Patient Education        Learning About High Blood Sugar  What is high blood sugar? Your body turns the food you eat into glucose (sugar), which it uses for energy. But if your body isn't able to use the sugar right away, it can build up in your blood and lead to high blood sugar. When the amount of sugar in your blood stays too high for too much of the time, you may have diabetes. Diabetes is a disease that can cause serious health problems. The good news is that lifestyle changes may help you get your blood sugar back to normal and avoid or delay diabetes. What causes high blood sugar? Sugar (glucose) can build up in your blood if you:  · Are overweight. · Have a family history of diabetes. · Take certain medicines, such as steroids. What are the symptoms? Having high blood sugar may not cause any symptoms at all. Or it may make you feel very thirsty or very hungry. You may also urinate more often than usual, have blurry vision, or lose weight without trying. How is high blood sugar treated? You can take steps to lower your blood sugar level if you understand what makes it get higher. Your doctor may want you to learn how to test your blood sugar level at home. Then you can see how illness, stress, or different kinds of food or medicine raise or lower your blood sugar level. Other tests may be needed to see if you have diabetes. How can you prevent high blood sugar? · Watch your weight. If you're overweight, losing just a small amount of weight may help. Reducing fat around your waist is most important. · Limit the amount of calories, sweets, and unhealthy fat you eat. Ask your doctor if a dietitian can help you. A registered dietitian can help you create meal plans that fit your lifestyle. · Get at least 30 minutes of exercise on most days of the week. Exercise helps control your blood sugar. It also helps you maintain a healthy weight. Walking is a good choice.  You also may want to do other activities, such as running, swimming, cycling, or playing tennis or team sports. · If your doctor prescribed medicines, take them exactly as prescribed. Call your doctor if you think you are having a problem with your medicine. You will get more details on the specific medicines your doctor prescribes. Follow-up care is a key part of your treatment and safety. Be sure to make and go to all appointments, and call your doctor if you are having problems. It's also a good idea to know your test results and keep a list of the medicines you take. Where can you learn more? Go to http://philPatient Feedkhari.info/. Enter O108 in the search box to learn more about \"Learning About High Blood Sugar. \"  Current as of: April 16, 2019  Content Version: 12.2  © 6391-3816 [employer]. Care instructions adapted under license by [employer] (which disclaims liability or warranty for this information). If you have questions about a medical condition or this instruction, always ask your healthcare professional. [place] [address] any warranty or liability for your use of this information. Gastroesophageal Reflux Disease (GERD): Care Instructions  Your Care Instructions    Gastroesophageal reflux disease (GERD) is the backward flow of stomach acid into the esophagus. The esophagus is the tube that leads from your throat to your stomach. A one-way valve prevents the stomach acid from moving up into this tube. When you have GERD, this valve does not close tightly enough. If you have mild GERD symptoms including heartburn, you may be able to control the problem with antacids or over-the-counter medicine. Changing your diet, losing weight, and making other lifestyle changes can also help reduce symptoms. Follow-up care is a key part of your treatment and safety. Be sure to make and go to all appointments, and call your doctor if you are having problems. It's also a good idea to know your test results and keep a list of the medicines you take. How can you care for yourself at home? · Take your medicines exactly as prescribed. Call your doctor if you think you are having a problem with your medicine. · Your doctor may recommend over-the-counter medicine. For mild or occasional indigestion, antacids, such as Tums, Gaviscon, Mylanta, or Maalox, may help. Your doctor also may recommend over-the-counter acid reducers, such as Pepcid AC, Tagamet HB, Zantac 75, or Prilosec. Read and follow all instructions on the label. If you use these medicines often, talk with your doctor. · Change your eating habits. ? It's best to eat several small meals instead of two or three large meals. ? After you eat, wait 2 to 3 hours before you lie down. ? Chocolate, mint, and alcohol can make GERD worse. ? Spicy foods, foods that have a lot of acid (like tomatoes and oranges), and coffee can make GERD symptoms worse in some people. If your symptoms are worse after you eat a certain food, you may want to stop eating that food to see if your symptoms get better. · Do not smoke or chew tobacco. Smoking can make GERD worse. If you need help quitting, talk to your doctor about stop-smoking programs and medicines. These can increase your chances of quitting for good. · If you have GERD symptoms at night, raise the head of your bed 6 to 8 inches by putting the frame on blocks or placing a foam wedge under the head of your mattress. (Adding extra pillows does not work.)  · Do not wear tight clothing around your middle. · Lose weight if you need to. Losing just 5 to 10 pounds can help. When should you call for help?   Call your doctor now or seek immediate medical care if:    · You have new or different belly pain.     · Your stools are black and tarlike or have streaks of blood.    Watch closely for changes in your health, and be sure to contact your doctor if:    · Your symptoms have not improved after 2 days.     · Food seems to catch in your throat or chest.   Where can you learn more? Go to http://phil-khari.info/. Enter N187 in the search box to learn more about \"Gastroesophageal Reflux Disease (GERD): Care Instructions. \"  Current as of: November 7, 2018  Content Version: 12.2  © 3203-5940 [employer]. Care instructions adapted under license by [employer] (which disclaims liability or warranty for this information). If you have questions about a medical condition or this instruction, always ask your healthcare professional. [address] any warranty or liability for your use of this information.

## 2020-01-22 NOTE — ED NOTES
Introduced self to patient at this time. Bedside shift change report given to Rody Navarrete RN (oncoming nurse) by Marshall rainey/GORAN Huffman (offgoing nurse). Report included the following information SBAR, ED Summary, Procedure Summary, MAR and Recent Results.

## 2020-01-22 NOTE — ED NOTES
Bedside and Verbal shift change report given to Judah Bhatia RN (oncoming nurse) by Sonya Lemos RN (offgoing nurse). Report included the following information SBAR.

## 2020-01-22 NOTE — ED NOTES
Patient (s)  given copy of dc instructions and 1 script(s). Patient (s)  verbalized understanding of instructions and script (s). Patient given a current medication reconciliation form and verbalized understanding of their medications. Patient (s) verbalized understanding of the importance of discussing medications with  his or her physician or clinic they will be following up with. Patient alert and oriented and in no acute distress. Patient discharged home ambulatory with self/significant other.

## 2020-03-09 ENCOUNTER — HOSPITAL ENCOUNTER (EMERGENCY)
Age: 30
Discharge: HOME | End: 2020-03-09
Attending: EMERGENCY MEDICINE
Payer: COMMERCIAL

## 2020-03-09 VITALS
TEMPERATURE: 98.6 F | HEIGHT: 66 IN | WEIGHT: 293 LBS | SYSTOLIC BLOOD PRESSURE: 147 MMHG | HEART RATE: 94 BPM | DIASTOLIC BLOOD PRESSURE: 90 MMHG | OXYGEN SATURATION: 100 % | RESPIRATION RATE: 16 BRPM | BODY MASS INDEX: 47.09 KG/M2

## 2020-03-09 DIAGNOSIS — R73.9 HYPERGLYCEMIA: ICD-10-CM

## 2020-03-09 DIAGNOSIS — K52.9 GASTROENTERITIS, ACUTE: Primary | ICD-10-CM

## 2020-03-09 LAB
ALBUMIN SERPL-MCNC: 3.4 G/DL (ref 3.5–5)
ALBUMIN/GLOB SERPL: 0.9 {RATIO} (ref 1.1–2.2)
ALP SERPL-CCNC: 62 U/L (ref 45–117)
ALT SERPL-CCNC: 22 U/L (ref 12–78)
ANION GAP SERPL CALC-SCNC: 8 MMOL/L (ref 5–15)
APPEARANCE UR: ABNORMAL
AST SERPL-CCNC: 13 U/L (ref 15–37)
BACTERIA URNS QL MICRO: NEGATIVE /HPF
BASOPHILS # BLD: 0 K/UL (ref 0–0.1)
BASOPHILS NFR BLD: 1 % (ref 0–1)
BILIRUB SERPL-MCNC: 0.4 MG/DL (ref 0.2–1)
BILIRUB UR QL: NEGATIVE
BUN SERPL-MCNC: 10 MG/DL (ref 6–20)
BUN/CREAT SERPL: 12 (ref 12–20)
CALCIUM SERPL-MCNC: 9 MG/DL (ref 8.5–10.1)
CHLORIDE SERPL-SCNC: 101 MMOL/L (ref 97–108)
CO2 SERPL-SCNC: 29 MMOL/L (ref 21–32)
COLOR UR: ABNORMAL
CREAT SERPL-MCNC: 0.83 MG/DL (ref 0.55–1.02)
DIFFERENTIAL METHOD BLD: ABNORMAL
EOSINOPHIL # BLD: 0 K/UL (ref 0–0.4)
EOSINOPHIL NFR BLD: 1 % (ref 0–7)
EPITH CASTS URNS QL MICRO: ABNORMAL /LPF
ERYTHROCYTE [DISTWIDTH] IN BLOOD BY AUTOMATED COUNT: 13 % (ref 11.5–14.5)
GLOBULIN SER CALC-MCNC: 3.9 G/DL (ref 2–4)
GLUCOSE BLD STRIP.AUTO-MCNC: 235 MG/DL (ref 65–100)
GLUCOSE SERPL-MCNC: 315 MG/DL (ref 65–100)
GLUCOSE UR STRIP.AUTO-MCNC: >1000 MG/DL
HCT VFR BLD AUTO: 36.1 % (ref 35–47)
HGB BLD-MCNC: 12 G/DL (ref 11.5–16)
HGB UR QL STRIP: ABNORMAL
IMM GRANULOCYTES # BLD AUTO: 0.1 K/UL (ref 0–0.04)
IMM GRANULOCYTES NFR BLD AUTO: 1 % (ref 0–0.5)
KETONES UR QL STRIP.AUTO: NEGATIVE MG/DL
LEUKOCYTE ESTERASE UR QL STRIP.AUTO: NEGATIVE
LIPASE SERPL-CCNC: 119 U/L (ref 73–393)
LYMPHOCYTES # BLD: 1.9 K/UL (ref 0.8–3.5)
LYMPHOCYTES NFR BLD: 30 % (ref 12–49)
MAGNESIUM SERPL-MCNC: 1.4 MG/DL (ref 1.6–2.4)
MCH RBC QN AUTO: 26.7 PG (ref 26–34)
MCHC RBC AUTO-ENTMCNC: 33.2 G/DL (ref 30–36.5)
MCV RBC AUTO: 80.4 FL (ref 80–99)
MONOCYTES # BLD: 0.5 K/UL (ref 0–1)
MONOCYTES NFR BLD: 7 % (ref 5–13)
NEUTS SEG # BLD: 4 K/UL (ref 1.8–8)
NEUTS SEG NFR BLD: 60 % (ref 32–75)
NITRITE UR QL STRIP.AUTO: NEGATIVE
NRBC # BLD: 0 K/UL (ref 0–0.01)
NRBC BLD-RTO: 0 PER 100 WBC
PH UR STRIP: 6 [PH] (ref 5–8)
PLATELET # BLD AUTO: 259 K/UL (ref 150–400)
PMV BLD AUTO: 9.7 FL (ref 8.9–12.9)
POTASSIUM SERPL-SCNC: 3.7 MMOL/L (ref 3.5–5.1)
PROT SERPL-MCNC: 7.3 G/DL (ref 6.4–8.2)
PROT UR STRIP-MCNC: 30 MG/DL
RBC # BLD AUTO: 4.49 M/UL (ref 3.8–5.2)
RBC #/AREA URNS HPF: ABNORMAL /HPF (ref 0–5)
SERVICE CMNT-IMP: ABNORMAL
SODIUM SERPL-SCNC: 138 MMOL/L (ref 136–145)
SP GR UR REFRACTOMETRY: 1.02 (ref 1–1.03)
UA: UC IF INDICATED,UAUC: ABNORMAL
UROBILINOGEN UR QL STRIP.AUTO: 0.2 EU/DL (ref 0.2–1)
WBC # BLD AUTO: 6.5 K/UL (ref 3.6–11)
WBC URNS QL MICRO: ABNORMAL /HPF (ref 0–4)

## 2020-03-09 PROCEDURE — 85025 COMPLETE CBC W/AUTO DIFF WBC: CPT

## 2020-03-09 PROCEDURE — 80053 COMPREHEN METABOLIC PANEL: CPT

## 2020-03-09 PROCEDURE — 82962 GLUCOSE BLOOD TEST: CPT

## 2020-03-09 PROCEDURE — 96375 TX/PRO/DX INJ NEW DRUG ADDON: CPT

## 2020-03-09 PROCEDURE — 83735 ASSAY OF MAGNESIUM: CPT

## 2020-03-09 PROCEDURE — 36415 COLL VENOUS BLD VENIPUNCTURE: CPT

## 2020-03-09 PROCEDURE — 99283 EMERGENCY DEPT VISIT LOW MDM: CPT

## 2020-03-09 PROCEDURE — 83690 ASSAY OF LIPASE: CPT

## 2020-03-09 PROCEDURE — 74011250636 HC RX REV CODE- 250/636: Performed by: EMERGENCY MEDICINE

## 2020-03-09 PROCEDURE — 81001 URINALYSIS AUTO W/SCOPE: CPT

## 2020-03-09 PROCEDURE — 74011636637 HC RX REV CODE- 636/637: Performed by: EMERGENCY MEDICINE

## 2020-03-09 PROCEDURE — 96374 THER/PROPH/DIAG INJ IV PUSH: CPT

## 2020-03-09 RX ORDER — ONDANSETRON 2 MG/ML
4 INJECTION INTRAMUSCULAR; INTRAVENOUS
Status: COMPLETED | OUTPATIENT
Start: 2020-03-09 | End: 2020-03-09

## 2020-03-09 RX ORDER — ONDANSETRON 4 MG/1
4 TABLET, ORALLY DISINTEGRATING ORAL
Qty: 10 TAB | Refills: 0 | Status: SHIPPED | OUTPATIENT
Start: 2020-03-09 | End: 2020-07-29

## 2020-03-09 RX ORDER — METFORMIN HYDROCHLORIDE 500 MG/1
500 TABLET ORAL
Qty: 14 TAB | Refills: 0 | Status: SHIPPED | OUTPATIENT
Start: 2020-03-09 | End: 2020-07-29 | Stop reason: SDUPTHER

## 2020-03-09 RX ADMIN — ONDANSETRON 4 MG: 2 SOLUTION INTRAMUSCULAR; INTRAVENOUS at 20:38

## 2020-03-09 RX ADMIN — HUMAN INSULIN 5 UNITS: 100 INJECTION, SOLUTION SUBCUTANEOUS at 21:42

## 2020-03-09 NOTE — LETTER
[facility] EMERGENCY DEPT 
[address] [address] 
[phone] Work/School Note Date: 3/9/2020 To Whom It May concern: 
 
Caleb Carmona was seen and treated today in the emergency room by the following provider(s): 
Attending Provider: Christina Whittington MD. Caleb Carmona may return to work on 3/12/20. Sincerely, Christina Whittington MD

## 2020-03-10 NOTE — DISCHARGE INSTRUCTIONS
Patient Education        Gastroenteritis: Care Instructions  Your Care Instructions    Gastroenteritis is an illness that may cause nausea, vomiting, and diarrhea. It is sometimes called \"stomach flu. \" It can be caused by bacteria or a virus. You will probably begin to feel better in 1 to 2 days. In the meantime, get plenty of rest and make sure you do not become dehydrated. Dehydration occurs when your body loses too much fluid. Follow-up care is a key part of your treatment and safety. Be sure to make and go to all appointments, and call your doctor if you are having problems. It's also a good idea to know your test results and keep a list of the medicines you take. How can you care for yourself at home? · If your doctor prescribed antibiotics, take them as directed. Do not stop taking them just because you feel better. You need to take the full course of antibiotics. · Drink plenty of fluids to prevent dehydration, enough so that your urine is light yellow or clear like water. Choose water and other caffeine-free clear liquids until you feel better. If you have kidney, heart, or liver disease and have to limit fluids, talk with your doctor before you increase your fluid intake. · Drink fluids slowly, in frequent, small amounts, because drinking too much too fast can cause vomiting. · Begin eating mild foods, such as dry toast, yogurt, applesauce, bananas, and rice. Avoid spicy, hot, or high-fat foods, and do not drink alcohol or caffeine for a day or two. Do not drink milk or eat ice cream until you are feeling better. How to prevent gastroenteritis  · Keep hot foods hot and cold foods cold. · Do not eat meats, dressings, salads, or other foods that have been kept at room temperature for more than 2 hours. · Use a thermometer to check your refrigerator. It should be between 34°F and 40°F.  · Defrost meats in the refrigerator or microwave, not on the kitchen counter.   · Keep your hands and your kitchen clean. Wash your hands, cutting boards, and countertops with hot soapy water frequently. · Cook meat until it is well done. · Do not eat raw eggs or uncooked sauces made with raw eggs. · Do not take chances. If food looks or tastes spoiled, throw it out. When should you call for help? Call 911 anytime you think you may need emergency care. For example, call if:    · You vomit blood or what looks like coffee grounds.     · You passed out (lost consciousness).     · You pass maroon or very bloody stools.    Call your doctor now or seek immediate medical care if:    · You have severe belly pain.     · You have signs of needing more fluids. You have sunken eyes, a dry mouth, and pass only a little dark urine.     · You feel like you are going to faint.     · You have increased belly pain that does not go away in 1 to 2 days.     · You have new or increased nausea, or you are vomiting.     · You have a new or higher fever.     · Your stools are black and tarlike or have streaks of blood.    Watch closely for changes in your health, and be sure to contact your doctor if:    · You are dizzy or lightheaded.     · You urinate less than usual, or your urine is dark yellow or brown.     · You do not feel better with each day that goes by. Where can you learn more? Go to http://phil-khari.info/. Enter N142 in the search box to learn more about \"Gastroenteritis: Care Instructions. \"  Current as of: June 9, 2019  Content Version: 12.2  © 4031-8098 [employer]. Care instructions adapted under license by [employer] (which disclaims liability or warranty for this information). If you have questions about a medical condition or this instruction, always ask your healthcare professional. [place] [address] any warranty or liability for your use of this information. Patient Education        Learning About High Blood Sugar  What is high blood sugar?     Your body turns the food you eat into glucose (sugar), which it uses for energy. But if your body isn't able to use the sugar right away, it can build up in your blood and lead to high blood sugar. When the amount of sugar in your blood stays too high for too much of the time, you may have diabetes. Diabetes is a disease that can cause serious health problems. The good news is that lifestyle changes may help you get your blood sugar back to normal and avoid or delay diabetes. What causes high blood sugar? Sugar (glucose) can build up in your blood if you:  · Are overweight. · Have a family history of diabetes. · Take certain medicines, such as steroids. What are the symptoms? Having high blood sugar may not cause any symptoms at all. Or it may make you feel very thirsty or very hungry. You may also urinate more often than usual, have blurry vision, or lose weight without trying. How is high blood sugar treated? You can take steps to lower your blood sugar level if you understand what makes it get higher. Your doctor may want you to learn how to test your blood sugar level at home. Then you can see how illness, stress, or different kinds of food or medicine raise or lower your blood sugar level. Other tests may be needed to see if you have diabetes. How can you prevent high blood sugar? · Watch your weight. If you're overweight, losing just a small amount of weight may help. Reducing fat around your waist is most important. · Limit the amount of calories, sweets, and unhealthy fat you eat. Ask your doctor if a dietitian can help you. A registered dietitian can help you create meal plans that fit your lifestyle. · Get at least 30 minutes of exercise on most days of the week. Exercise helps control your blood sugar. It also helps you maintain a healthy weight. Walking is a good choice. You also may want to do other activities, such as running, swimming, cycling, or playing tennis or team sports.   · If your doctor prescribed medicines, take them exactly as prescribed. Call your doctor if you think you are having a problem with your medicine. You will get more details on the specific medicines your doctor prescribes. Follow-up care is a key part of your treatment and safety. Be sure to make and go to all appointments, and call your doctor if you are having problems. It's also a good idea to know your test results and keep a list of the medicines you take. Where can you learn more? Go to http://phil-khari.info/. Enter O108 in the search box to learn more about \"Learning About High Blood Sugar. \"  Current as of: April 16, 2019  Content Version: 12.2  © 4755-9764 [employer]. Care instructions adapted under license by [employer] (which disclaims liability or warranty for this information). If you have questions about a medical condition or this instruction, always ask your healthcare professional. [address] any warranty or liability for your use of this information.

## 2020-03-10 NOTE — ED PROVIDER NOTES
EMERGENCY DEPARTMENT HISTORY AND PHYSICAL EXAM      Date: 3/9/2020  Patient Name: Stefano Lama    History of Presenting Illness     Chief Complaint   Patient presents with    Abdominal Pain       History Provided By: Patient    HPI: Stefano Lama, 34 y.o. female with PMHx significant for hypertension and diabetes, off her medications for about 2 months who presents ambulatory to the ED with cc of nausea, vomiting, diarrhea since yesterday. 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 She is had vomiting and diarrhea all day. She is nauseated but has no real abdominal pain. She rates her nausea 7 of 10. Denies urinary symptoms. Started her menses yesterday and states she took a pregnancy test a few days ago which was negative. She has had no fever, chest pain, shortness of breath. She is not taking any medications. PMHx: Hypertension and diabetes  PSHx: None  Social Hx: No EtOH; no smoker; no illicit Drugs    PCP: Conor Rios MD    There are no other complaints, changes, or physical findings at this time. Current Facility-Administered Medications   Medication Dose Route Frequency Provider Last Rate Last Dose    ondansetron (ZOFRAN) injection 4 mg  4 mg IntraVENous NOW Call, Elana White MD         Current Outpatient Medications   Medication Sig Dispense Refill    calcium carbonate (TUMS PO) Take  by mouth.  calcium carbonate (TUMS) 200 mg calcium (500 mg) chew Take 1 Tab by mouth daily.  30 Tab 0     Past History     Past Medical History:  Past Medical History:   Diagnosis Date    Asthma     pt denies    Diabetes ([facility] [address] 12/28/2016    Hypertension     Other ill-defined conditions(799.89)     back pain    Seizures ([address]     last seizure was 89 years old    SOB (shortness of breath)      Past Surgical History:  Past Surgical History:   Procedure Laterality Date    HX GYN  2014    Pt reports  in past.    HX TONSILLECTOMY       Family History:  Family History   Problem Relation Age of Onset    Diabetes Mother     Diabetes Father     Diabetes Maternal Grandmother      Social History:  Social History     Tobacco Use    Smoking status: Never Smoker    Smokeless tobacco: Never Used   Substance Use Topics    Alcohol use: No    Drug use: No     Allergies:  No Known Allergies  Review of Systems   Review of Systems   Constitutional: Negative for activity change, chills, diaphoresis, fatigue and fever. HENT: Negative for sore throat. Respiratory: Negative for shortness of breath. Cardiovascular: Negative for chest pain. Gastrointestinal: Positive for diarrhea, nausea and vomiting. Negative for anal bleeding, blood in stool and constipation. Endocrine: Negative for polydipsia, polyphagia and polyuria. Genitourinary: Negative for dysuria and menstrual problem. Musculoskeletal: Negative for back pain. Neurological: Negative for dizziness and weakness. All other systems reviewed and are negative. Physical Exam   Physical Exam  Vitals signs and nursing note reviewed. Constitutional:       General: She is not in acute distress. Appearance: She is well-developed. She is not ill-appearing, toxic-appearing or diaphoretic. HENT:      Head: Normocephalic and atraumatic. Mouth/Throat:      Mouth: Mucous membranes are moist.   Eyes:      Extraocular Movements: Extraocular movements intact. Cardiovascular:      Rate and Rhythm: Normal rate and regular rhythm. Heart sounds: Normal heart sounds. Pulmonary:      Effort: Pulmonary effort is normal.      Breath sounds: Normal breath sounds. Abdominal:      General: Abdomen is flat. Bowel sounds are normal. There is no distension. There are no signs of injury. Palpations: Abdomen is soft. There is no fluid wave, mass or pulsatile mass. Tenderness: There is no abdominal tenderness. There is no right CVA tenderness, left CVA tenderness, guarding or rebound. Skin:     General: Skin is warm and dry. Neurological:      General: No focal deficit present. Mental Status: She is alert and oriented to person, place, and time. Psychiatric:         Mood and Affect: Mood normal.       Diagnostic Study Results   Labs -     Recent Results (from the past 12 hour(s))   CBC WITH AUTOMATED DIFF    Collection Time: 03/09/20  8:28 PM   Result Value Ref Range    WBC 6.5 3.6 - 11.0 K/uL    RBC 4.49 3.80 - 5.20 M/uL    HGB 12.0 11.5 - 16.0 g/dL    HCT 36.1 35.0 - 47.0 %    MCV 80.4 80.0 - 99.0 FL    MCH 26.7 26.0 - 34.0 PG    MCHC 33.2 30.0 - 36.5 g/dL    RDW 13.0 11.5 - 14.5 %    PLATELET 294 728 - 588 K/uL    MPV 9.7 8.9 - 12.9 FL    NRBC 0.0 0  WBC    ABSOLUTE NRBC 0.00 0.00 - 0.01 K/uL    NEUTROPHILS 60 32 - 75 %    LYMPHOCYTES 30 12 - 49 %    MONOCYTES 7 5 - 13 %    EOSINOPHILS 1 0 - 7 %    BASOPHILS 1 0 - 1 %    IMMATURE GRANULOCYTES 1 (H) 0.0 - 0.5 %    ABS. NEUTROPHILS 4.0 1.8 - 8.0 K/UL    ABS. LYMPHOCYTES 1.9 0.8 - 3.5 K/UL    ABS. MONOCYTES 0.5 0.0 - 1.0 K/UL    ABS. EOSINOPHILS 0.0 0.0 - 0.4 K/UL    ABS. BASOPHILS 0.0 0.0 - 0.1 K/UL    ABS. IMM. GRANS. 0.1 (H) 0.00 - 0.04 K/UL    DF AUTOMATED     METABOLIC PANEL, COMPREHENSIVE    Collection Time: 03/09/20  8:28 PM   Result Value Ref Range    Sodium 138 136 - 145 mmol/L    Potassium 3.7 3.5 - 5.1 mmol/L    Chloride 101 97 - 108 mmol/L    CO2 29 21 - 32 mmol/L    Anion gap 8 5 - 15 mmol/L    Glucose 315 (H) 65 - 100 mg/dL    BUN 10 6 - 20 MG/DL    Creatinine 0.83 0.55 - 1.02 MG/DL    BUN/Creatinine ratio 12 12 - 20      GFR est AA >60 >60 ml/min/1.73m2    GFR est non-AA >60 >60 ml/min/1.73m2    Calcium 9.0 8.5 - 10.1 MG/DL    Bilirubin, total 0.4 0.2 - 1.0 MG/DL    ALT (SGPT) 22 12 - 78 U/L    AST (SGOT) 13 (L) 15 - 37 U/L    Alk.  phosphatase 62 45 - 117 U/L    Protein, total 7.3 6.4 - 8.2 g/dL    Albumin 3.4 (L) 3.5 - 5.0 g/dL Globulin 3.9 2.0 - 4.0 g/dL    A-G Ratio 0.9 (L) 1.1 - 2.2     LIPASE    Collection Time: 03/09/20  8:28 PM   Result Value Ref Range    Lipase 119 73 - 393 U/L   MAGNESIUM    Collection Time: 03/09/20  8:28 PM   Result Value Ref Range    Magnesium 1.4 (L) 1.6 - 2.4 mg/dL   URINALYSIS W/ REFLEX CULTURE    Collection Time: 03/09/20  8:39 PM   Result Value Ref Range    Color YELLOW/STRAW      Appearance CLOUDY (A) CLEAR      Specific gravity 1.020 1.003 - 1.030      pH (UA) 6.0 5.0 - 8.0      Protein 30 (A) NEG mg/dL    Glucose >1,000 (A) NEG mg/dL    Ketone NEGATIVE  NEG mg/dL    Bilirubin NEGATIVE  NEG      Blood LARGE (A) NEG      Urobilinogen 0.2 0.2 - 1.0 EU/dL    Nitrites NEGATIVE  NEG      Leukocyte Esterase NEGATIVE  NEG      WBC 0-4 0 - 4 /hpf    RBC  0 - 5 /hpf    Epithelial cells FEW FEW /lpf    Bacteria NEGATIVE  NEG /hpf    UA:UC IF INDICATED CULTURE NOT INDICATED BY UA RESULT CNI     GLUCOSE, POC    Collection Time: 03/09/20 10:19 PM   Result Value Ref Range    Glucose (POC) 235 (H) 65 - 100 mg/dL    Performed by Soila Melo (PCT)        Radiologic Studies -   No orders to display     No results found. Medical Decision Making   I am the first provider for this patient. I reviewed the vital signs, available nursing notes, past medical history, past surgical history, family history and social history. Vital Signs-Reviewed the patient's vital signs. Patient Vitals for the past 12 hrs:   Temp Pulse Resp BP SpO2   03/09/20 1936 98.6 °F (37 °C) 94 16 147/90 100 %       Pulse Oximetry Analysis -100 % on room air    Cardiac Monitor:   Rate: 94 bpm  Rhythm: Normal Sinus Rhythm      Records Reviewed: Nursing Notes and Old Medical Records    Provider Notes (Medical Decision Making):   Gastroenteritis, pregnancy, UTI, pyelonephritis, electrolyte abnormality, viral illness, dka    ED Course:   Initial assessment performed.  The patients presenting problems have been discussed, and they are in agreement with the care plan formulated and outlined with them. I have encouraged them to ask questions as they arise throughout their visit. ED Course as of Mar 09 2231   Mon Mar 09, 2020   2228 Feeling better. Will DC to fu with pcp. Will refill metformin. [TC]      ED Course User Index  [TC] Call, Cory Resendiz MD       Progress Note:   10:32 PM    Updated pt on all returned results and findings. Discussed the importance of proper follow up as referred below along with return precautions. Pt in agreement with the care plan and expresses agreement with and understanding of all items discussed. Disposition:  discharge    PLAN:  1. Current Discharge Medication List      START taking these medications    Details   ondansetron (ZOFRAN ODT) 4 mg disintegrating tablet Take 1 Tab by mouth every eight (8) hours as needed for Nausea. Qty: 10 Tab, Refills: 0      metFORMIN (GLUCOPHAGE) 500 mg tablet Take 1 Tab by mouth daily (with breakfast). Qty: 14 Tab, Refills: 0           2. Follow-up Information     Follow up With Specialties Details Why Contact Info    Trinity Garay MD Internal Medicine In 2 days  [address] [place] [address]  [phone]      [facility] EMERGENCY DEPT Emergency Medicine  As needed, If symptoms worsen [address] [facility]  [phone]        Return to ED if worse     Diagnosis     Clinical Impression:   1. Gastroenteritis, acute    2.  Hyperglycemia

## 2020-03-10 NOTE — ED NOTES
Pt presents to ED ambulatory complaining of diffuse abdominal pain with N/V/D x2 days. 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 \" Pt is alert and oriented x 4, RR even and unlabored, skin is warm and dry. Assessment completed and pt updated on plan of care. Emergency Department Nursing Plan of Care       The Nursing Plan of Care is developed from the Nursing assessment and Emergency Department Attending provider initial evaluation. The plan of care may be reviewed in the ED Provider note.     The Plan of Care was developed with the following considerations:   Patient / Family readiness to learn indicated by:verbalized understanding  Persons(s) to be included in education: patient  Barriers to Learning/Limitations:No    Signed     Danielito Hopper RN    3/9/2020   11:11 PM

## 2020-03-10 NOTE — ED NOTES
Discharge instructions were given to the patient by Mindi Rob RN. The patient left the Emergency Department ambulatory, alert and oriented and in no acute distress with 2 prescriptions. The patient was encouraged to call or return to the ED for worsening issues or problems and was encouraged to schedule a follow up appointment for continuing care. The patient verbalized understanding of discharge instructions and prescriptions, all questions were answered. The patient has no further concerns at this time.

## 2020-07-17 ENCOUNTER — HOSPITAL ENCOUNTER (EMERGENCY)
Age: 30
Discharge: HOME | End: 2020-07-17
Attending: EMERGENCY MEDICINE
Payer: COMMERCIAL

## 2020-07-17 VITALS
HEIGHT: 61 IN | SYSTOLIC BLOOD PRESSURE: 139 MMHG | OXYGEN SATURATION: 99 % | RESPIRATION RATE: 16 BRPM | BODY MASS INDEX: 54.75 KG/M2 | TEMPERATURE: 98.8 F | WEIGHT: 290 LBS | HEART RATE: 88 BPM | DIASTOLIC BLOOD PRESSURE: 79 MMHG

## 2020-07-17 DIAGNOSIS — R10.13 DYSPEPSIA: Primary | ICD-10-CM

## 2020-07-17 LAB
ALBUMIN SERPL-MCNC: 3.3 G/DL (ref 3.5–5)
ALBUMIN/GLOB SERPL: 0.8 {RATIO} (ref 1.1–2.2)
ALP SERPL-CCNC: 62 U/L (ref 45–117)
ALT SERPL-CCNC: 22 U/L (ref 12–78)
ANION GAP SERPL CALC-SCNC: 8 MMOL/L (ref 5–15)
APPEARANCE UR: CLEAR
AST SERPL-CCNC: 13 U/L (ref 15–37)
BACTERIA URNS QL MICRO: NEGATIVE /HPF
BASOPHILS # BLD: 0 K/UL (ref 0–0.1)
BASOPHILS NFR BLD: 1 % (ref 0–1)
BILIRUB SERPL-MCNC: 0.7 MG/DL (ref 0.2–1)
BILIRUB UR QL: NEGATIVE
BUN SERPL-MCNC: 7 MG/DL (ref 6–20)
BUN/CREAT SERPL: 9 (ref 12–20)
CALCIUM SERPL-MCNC: 8.8 MG/DL (ref 8.5–10.1)
CHLORIDE SERPL-SCNC: 102 MMOL/L (ref 97–108)
CO2 SERPL-SCNC: 27 MMOL/L (ref 21–32)
COLOR UR: ABNORMAL
CREAT SERPL-MCNC: 0.75 MG/DL (ref 0.55–1.02)
DIFFERENTIAL METHOD BLD: ABNORMAL
EOSINOPHIL # BLD: 0 K/UL (ref 0–0.4)
EOSINOPHIL NFR BLD: 1 % (ref 0–7)
EPITH CASTS URNS QL MICRO: ABNORMAL /LPF
ERYTHROCYTE [DISTWIDTH] IN BLOOD BY AUTOMATED COUNT: 13.1 % (ref 11.5–14.5)
GLOBULIN SER CALC-MCNC: 4 G/DL (ref 2–4)
GLUCOSE SERPL-MCNC: 187 MG/DL (ref 65–100)
GLUCOSE UR STRIP.AUTO-MCNC: NEGATIVE MG/DL
HCG UR QL: NEGATIVE
HCT VFR BLD AUTO: 35.5 % (ref 35–47)
HGB BLD-MCNC: 12 G/DL (ref 11.5–16)
HGB UR QL STRIP: NEGATIVE
IMM GRANULOCYTES # BLD AUTO: 0 K/UL (ref 0–0.04)
IMM GRANULOCYTES NFR BLD AUTO: 1 % (ref 0–0.5)
KETONES UR QL STRIP.AUTO: NEGATIVE MG/DL
LEUKOCYTE ESTERASE UR QL STRIP.AUTO: NEGATIVE
LIPASE SERPL-CCNC: 81 U/L (ref 73–393)
LYMPHOCYTES # BLD: 1.9 K/UL (ref 0.8–3.5)
LYMPHOCYTES NFR BLD: 32 % (ref 12–49)
MCH RBC QN AUTO: 26.8 PG (ref 26–34)
MCHC RBC AUTO-ENTMCNC: 33.8 G/DL (ref 30–36.5)
MCV RBC AUTO: 79.2 FL (ref 80–99)
MONOCYTES # BLD: 0.5 K/UL (ref 0–1)
MONOCYTES NFR BLD: 8 % (ref 5–13)
NEUTS SEG # BLD: 3.5 K/UL (ref 1.8–8)
NEUTS SEG NFR BLD: 57 % (ref 32–75)
NITRITE UR QL STRIP.AUTO: NEGATIVE
NRBC # BLD: 0 K/UL (ref 0–0.01)
NRBC BLD-RTO: 0 PER 100 WBC
PH UR STRIP: 6 [PH] (ref 5–8)
PLATELET # BLD AUTO: 253 K/UL (ref 150–400)
PMV BLD AUTO: 9.9 FL (ref 8.9–12.9)
POTASSIUM SERPL-SCNC: 3.6 MMOL/L (ref 3.5–5.1)
PROT SERPL-MCNC: 7.3 G/DL (ref 6.4–8.2)
PROT UR STRIP-MCNC: ABNORMAL MG/DL
RBC # BLD AUTO: 4.48 M/UL (ref 3.8–5.2)
RBC #/AREA URNS HPF: ABNORMAL /HPF (ref 0–5)
SODIUM SERPL-SCNC: 137 MMOL/L (ref 136–145)
SP GR UR REFRACTOMETRY: 1.02 (ref 1–1.03)
UA: UC IF INDICATED,UAUC: ABNORMAL
UROBILINOGEN UR QL STRIP.AUTO: 0.2 EU/DL (ref 0.2–1)
WBC # BLD AUTO: 6.1 K/UL (ref 3.6–11)
WBC URNS QL MICRO: ABNORMAL /HPF (ref 0–4)

## 2020-07-17 PROCEDURE — 83690 ASSAY OF LIPASE: CPT

## 2020-07-17 PROCEDURE — 99283 EMERGENCY DEPT VISIT LOW MDM: CPT

## 2020-07-17 PROCEDURE — 74011250637 HC RX REV CODE- 250/637: Performed by: NURSE PRACTITIONER

## 2020-07-17 PROCEDURE — 36415 COLL VENOUS BLD VENIPUNCTURE: CPT

## 2020-07-17 PROCEDURE — 81001 URINALYSIS AUTO W/SCOPE: CPT

## 2020-07-17 PROCEDURE — 85025 COMPLETE CBC W/AUTO DIFF WBC: CPT

## 2020-07-17 PROCEDURE — 80053 COMPREHEN METABOLIC PANEL: CPT

## 2020-07-17 PROCEDURE — 81025 URINE PREGNANCY TEST: CPT

## 2020-07-17 RX ORDER — FAMOTIDINE 20 MG/1
20 TABLET, FILM COATED ORAL 2 TIMES DAILY
Qty: 20 TAB | Refills: 0 | Status: SHIPPED | OUTPATIENT
Start: 2020-07-17 | End: 2020-07-27

## 2020-07-17 RX ORDER — FAMOTIDINE 20 MG/1
20 TABLET, FILM COATED ORAL
Status: COMPLETED | OUTPATIENT
Start: 2020-07-17 | End: 2020-07-17

## 2020-07-17 RX ADMIN — FAMOTIDINE 20 MG: 20 TABLET, FILM COATED ORAL at 12:11

## 2020-07-17 NOTE — ED NOTES
Bedside and Verbal shift change report given to Lyle Lerma RN (oncoming nurse) by Demario Espinosa RN (offgoing nurse). Report included the following information SBAR and ED Summary. Assumed pt care at this time. Pt resting in position of comfort with call bell in reach in NAD at this time.

## 2020-07-17 NOTE — ED NOTES
Discharge instructions were given to the patient by me. The patient left the Emergency Department ambulatory, alert and oriented and in no acute distress with 0 prescriptions. The patient was encouraged to call or return to the ED for worsening issues or problems and was encouraged to schedule a follow up appointment for continuing care. The patient verbalized understanding of discharge instructions and prescriptions, all questions were answered. The patient has no further concerns at this time.

## 2020-07-17 NOTE — ED PROVIDER NOTES
EMERGENCY DEPARTMENT HISTORY AND PHYSICAL EXAM    Date: 2020  Patient Name: Leia Spivey    History of Presenting Illness     Chief Complaint   Patient presents with    Abdominal Pain         History Provided By: Patient    Chief Complaint: abdominal pain      HPI: Leia Spivey is a 27 y.o. female with a PMH of diabetes, hypertension and asthma who presents with dental pain acute onset 2 days ago. Patient reports pain in epigastric area described as sharp 10 out of 10 in severity worse when eating. Patient also endorses she has had a small amount of diarrhea patient states she is able to eat and drink. States nothing relieves her pain. She specifically denies chest pain shortness of breath wheezing headache numbness or tingling. PCP: Janna Polo MD    Current Outpatient Medications   Medication Sig Dispense Refill    famotidine (Pepcid) 20 mg tablet Take 1 Tab by mouth two (2) times a day for 10 days. 20 Tab 0    ondansetron (ZOFRAN ODT) 4 mg disintegrating tablet Take 1 Tab by mouth every eight (8) hours as needed for Nausea. 10 Tab 0    metFORMIN (GLUCOPHAGE) 500 mg tablet Take 1 Tab by mouth daily (with breakfast).  14 Tab 0       Past History     Past Medical History:  Past Medical History:   Diagnosis Date    Asthma     pt denies    Diabetes ([address] 2016    Hypertension     Other ill-defined conditions(799.89)     back pain    Seizures ([address]     last seizure was 89 years old    SOB (shortness of breath)        Past Surgical History:  Past Surgical History:   Procedure Laterality Date    HX GYN  2014    Pt reports  in past.    HX TONSILLECTOMY         Family History:  Family History   Problem Relation Age of Onset    Diabetes Mother     Diabetes Father     Diabetes Maternal Grandmother        Social History:  Social History     Tobacco Use    Smoking status: Never Smoker    Smokeless tobacco: Never Used   Substance Use Topics    Alcohol use: No    Drug use: No       Allergies:  No Known Allergies      Review of Systems   Review of Systems   Constitutional: Negative for fatigue and fever. Respiratory: Negative for shortness of breath and wheezing. Cardiovascular: Negative for chest pain and palpitations. Gastrointestinal: Positive for abdominal pain. Musculoskeletal: Negative for arthralgias, myalgias, neck pain and neck stiffness. Skin: Negative for pallor and rash. Neurological: Negative for dizziness, tremors, weakness and headaches. All other systems reviewed and are negative. Physical Exam     Vitals:    07/17/20 1018 07/17/20 1136   BP: (!) 156/94 139/79   Pulse: 88    Resp: 16    Temp: 98.8 °F (37.1 °C)    SpO2: 99%    Weight: 131.5 kg (290 lb)    Height: 5' 1\" (1.549 m)      Physical Exam  Vitals signs and nursing note reviewed. Constitutional:       General: She is not in acute distress. Appearance: She is well-developed. HENT:      Head: Normocephalic and atraumatic. Right Ear: External ear normal.      Left Ear: External ear normal.      Nose: Nose normal.   Eyes:      Conjunctiva/sclera: Conjunctivae normal.   Neck:      Musculoskeletal: Normal range of motion and neck supple. Cardiovascular:      Rate and Rhythm: Normal rate and regular rhythm. Heart sounds: Normal heart sounds. Pulmonary:      Effort: Pulmonary effort is normal. No respiratory distress. Breath sounds: Normal breath sounds. No wheezing. Abdominal:      General: Bowel sounds are normal.      Palpations: Abdomen is soft. Tenderness: There is abdominal tenderness in the epigastric area. Musculoskeletal: Normal range of motion. Lymphadenopathy:      Cervical: No cervical adenopathy. Skin:     General: Skin is warm and dry. Findings: No rash. Neurological:      Mental Status: She is alert and oriented to person, place, and time. Cranial Nerves: No cranial nerve deficit.       Coordination: Coordination normal. Psychiatric:         Behavior: Behavior normal.         Thought Content: Thought content normal.         Judgment: Judgment normal.           Diagnostic Study Results     Labs -   No results found for this or any previous visit (from the past 12 hour(s)). Radiologic Studies -   No orders to display     CT Results  (Last 48 hours)    None        CXR Results  (Last 48 hours)    None            Medical Decision Making   I am the first provider for this patient. I reviewed the vital signs, available nursing notes, past medical history, past surgical history, family history and social history. Vital Signs-Reviewed the patient's vital signs. Records Reviewed: Nursing Notes            Disposition:  Home  Patient tolerating fluids. Patient states she is feeling better    DISCHARGE NOTE:         Care plan outlined and precautions discussed. Patient has no new complaints, changes, or physical findings. Results of tests were reviewed with the patient. All medications were reviewed with the patient; will d/c home with pepcid. All of pt's questions and concerns were addressed. Patient was instructed and agrees to follow up with PCP, as well as to return to the ED upon further deterioration. Patient is ready to go home. Follow-up Information     Follow up With Specialties Details Why Contact Info    Justo Davenport MD Internal Medicine In 1 week  [address] [place] [address] Aaron [place]  [phone]            Discharge Medication List as of 7/17/2020 11:57 AM      START taking these medications    Details   famotidine (Pepcid) 20 mg tablet Take 1 Tab by mouth two (2) times a day for 10 days. , Normal, Disp-20 Tab,R-0         CONTINUE these medications which have NOT CHANGED    Details   ondansetron (ZOFRAN ODT) 4 mg disintegrating tablet Take 1 Tab by mouth every eight (8) hours as needed for Nausea., Normal, Disp-10 Tab, R-0      metFORMIN (GLUCOPHAGE) 500 mg tablet Take 1 Tab by mouth daily (with breakfast). , Normal, Disp-14 Tab, R-0             Provider Notes (Medical Decision Making):   DDX Stratus dyspepsia peptic ulcer disease GERD  Procedures:  Procedures    Please note that this dictation was completed with Dragon, computer voice recognition software. Quite often unanticipated grammatical, syntax, homophones, and other interpretive errors are inadvertently transcribed by the computer software. Please disregard these errors. Additionally, please excuse any errors that have escaped final proofreading. Diagnosis     Clinical Impression:   1.  Dyspepsia

## 2020-07-17 NOTE — DISCHARGE INSTRUCTIONS
Indigestion (Dyspepsia or Heartburn): Care Instructions  Your Care Instructions  Sometimes it can be hard to pinpoint the cause of indigestion. (It is also called dyspepsia or heartburn.) Most cases of an upset stomach with bloating, burning, burping, and nausea are minor and go away within several hours. Home treatment and over-the-counter medicine often are able to control symptoms. But if you take medicine to relieve your indigestion without making diet and lifestyle changes, your symptoms are likely to return again and again. If you get indigestion often, it may be a sign of a more serious medical problem. Be sure to follow up with your doctor, who may want to do tests to be sure of the cause of your indigestion. Follow-up care is a key part of your treatment and safety. Be sure to make and go to all appointments, and call your doctor if you are having problems. It's also a good idea to know your test results and keep a list of the medicines you take. How can you care for yourself at home? · Your doctor may recommend over-the-counter medicine. For mild or occasional indigestion, antacids such as Gaviscon, Mylanta, Maalox, or Tums, may help. Be safe with medicines. Be careful when you take over-the-counter antacid medicines. Many of these medicines have aspirin in them. Read the label to make sure that you are not taking more than the recommended dose. Too much aspirin can be harmful. · Your doctor also may recommend over-the-counter acid reducers, such as Pepcid AC (famotidine), Tagamet HB (cimetidine), or Prilosec (omeprazole). Read and follow all instructions on the label. If you use these medicines often, talk with your doctor. · Change your eating habits. ? It's best to eat several small meals instead of two or three large meals. ? After you eat, wait 2 to 3 hours before you lie down. ? Chocolate, mint, and alcohol can make GERD worse. ?  Spicy foods, foods that have a lot of acid (like tomatoes and oranges), and coffee can make GERD symptoms worse in some people. If your symptoms are worse after you eat a certain food, you may want to stop eating that food to see if your symptoms get better. · Do not smoke or chew tobacco. Smoking can make GERD worse. If you need help quitting, talk to your doctor about stop-smoking programs and medicines. These can increase your chances of quitting for good. · If you have GERD symptoms at night, raise the head of your bed 6 to 8 inches. You can do this by putting the frame on blocks or placing a foam wedge under the head of your mattress. (Adding extra pillows does not work.)  · Do not wear tight clothing around your middle. · Lose weight if you need to. Losing just 5 to 10 pounds can help. · Do not take anti-inflammatory medicines, such as aspirin, ibuprofen (Advil, Motrin), or naproxen (Aleve). These can irritate the stomach. If you need a pain medicine, try acetaminophen (Tylenol), which does not cause stomach upset. When should you call for help? Call your doctor now or seek immediate medical care if:  · You have new or worse belly pain. · You are vomiting. Watch closely for changes in your health, and be sure to contact your doctor if:  · You have new or worse symptoms of indigestion. · You have trouble or pain swallowing. · You are losing weight. · You do not get better as expected. Where can you learn more? Go to http://www.gray.com/  Enter [id number] in the search box to learn more about \"Indigestion (Dyspepsia or Heartburn): Care Instructions. \"  Current as of: August 12, 2019               Content Version: 12.5  © 4585-4790 Healthwise, Incorporated. Care instructions adapted under license by [employer] (which disclaims liability or warranty for this information).  If you have questions about a medical condition or this instruction, always ask your healthcare professional. Luis Acosta any warranty or liability for your use of this information. Patient Education        Abdominal Pain: Care Instructions  Your Care Instructions     Abdominal pain has many possible causes. Some aren't serious and get better on their own in a few days. Others need more testing and treatment. If your pain continues or gets worse, you need to be rechecked and may need more tests to find out what is wrong. You may need surgery to correct the problem. Don't ignore new symptoms, such as fever, nausea and vomiting, urination problems, pain that gets worse, and dizziness. These may be signs of a more serious problem. Your doctor may have recommended a follow-up visit in the next 8 to 12 hours. If you are not getting better, you may need more tests or treatment. The doctor has checked you carefully, but problems can develop later. If you notice any problems or new symptoms, get medical treatment right away. Follow-up care is a key part of your treatment and safety. Be sure to make and go to all appointments, and call your doctor if you are having problems. It's also a good idea to know your test results and keep a list of the medicines you take. How can you care for yourself at home? · Rest until you feel better. · To prevent dehydration, drink plenty of fluids, enough so that your urine is light yellow or clear like water. Choose water and other caffeine-free clear liquids until you feel better. If you have kidney, heart, or liver disease and have to limit fluids, talk with your doctor before you increase the amount of fluids you drink. · If your stomach is upset, eat mild foods, such as rice, dry toast or crackers, bananas, and applesauce. Try eating several small meals instead of two or three large ones. · Wait until 48 hours after all symptoms have gone away before you have spicy foods, alcohol, and drinks that contain caffeine. · Do not eat foods that are high in fat.   · Avoid anti-inflammatory medicines such as aspirin, ibuprofen (Advil, Motrin), and naproxen (Aleve). These can cause stomach upset. Talk to your doctor if you take daily aspirin for another health problem. When should you call for help? [id number] anytime you think you may need emergency care. For example, call if:  · You passed out (lost consciousness). · You pass maroon or very bloody stools. · You vomit blood or what looks like coffee grounds. · You have new, severe belly pain. Call your doctor now or seek immediate medical care if:  · Your pain gets worse, especially if it becomes focused in one area of your belly. · You have a new or higher fever. · Your stools are black and look like tar, or they have streaks of blood. · You have unexpected vaginal bleeding. · You have symptoms of a urinary tract infection. These may include:  ? Pain when you urinate. ? Urinating more often than usual.  ? Blood in your urine. · You are dizzy or lightheaded, or you feel like you may faint. Watch closely for changes in your health, and be sure to contact your doctor if:  · You are not getting better after 1 day (24 hours). Where can you learn more? Go to http://www.gray.com/  Enter K932 in the search box to learn more about \"Abdominal Pain: Care Instructions. \"  Current as of: June 26, 2019               Content Version: 12.5  © 3256-0020 Healthwise, Incorporated. Care instructions adapted under license by [employer] (which disclaims liability or warranty for this information). If you have questions about a medical condition or this instruction, always ask your healthcare professional. [address] any warranty or liability for your use of this information.

## 2020-07-29 ENCOUNTER — OFFICE VISIT (OUTPATIENT)
Dept: INTERNAL MEDICINE CLINIC | Age: 30
End: 2020-07-29

## 2020-07-29 VITALS
BODY MASS INDEX: 55.32 KG/M2 | SYSTOLIC BLOOD PRESSURE: 147 MMHG | HEART RATE: 80 BPM | WEIGHT: 293 LBS | HEIGHT: 61 IN | DIASTOLIC BLOOD PRESSURE: 90 MMHG | RESPIRATION RATE: 19 BRPM | TEMPERATURE: 98.6 F | OXYGEN SATURATION: 100 %

## 2020-07-29 DIAGNOSIS — E66.01 OBESITY, MORBID (HCC): ICD-10-CM

## 2020-07-29 DIAGNOSIS — E11.9 TYPE 2 DIABETES MELLITUS WITHOUT COMPLICATION, WITHOUT LONG-TERM CURRENT USE OF INSULIN (HCC): Primary | ICD-10-CM

## 2020-07-29 DIAGNOSIS — Z91.199 MEDICALLY NONCOMPLIANT: ICD-10-CM

## 2020-07-29 DIAGNOSIS — I10 ESSENTIAL HYPERTENSION: ICD-10-CM

## 2020-07-29 LAB
GLUCOSE POC: 206 MG/DL
HBA1C MFR BLD HPLC: 10.5 %

## 2020-07-29 RX ORDER — METFORMIN HYDROCHLORIDE 500 MG/1
500 TABLET ORAL 2 TIMES DAILY WITH MEALS
Qty: 60 TAB | Refills: 5 | Status: SHIPPED | OUTPATIENT
Start: 2020-07-29 | End: 2021-06-21 | Stop reason: SDUPTHER

## 2020-07-29 NOTE — PROGRESS NOTES
Chief Complaint   Patient presents with    Diabetes     1. Have you been to the ER, urgent care clinic since your last visit? Hospitalized since your last visit? Yes When: [facility] for food poisoning    2. Have you seen or consulted any other health care providers outside of the [address] [place] [address] since your last visit? Include any pap smears or colon screening.  No

## 2020-07-29 NOTE — PROGRESS NOTES
Adams Ocampo is a 27 y.o. female and presents with Diabetes  . Subjective:    Last appt w me 2018    Pt is upset bc I did not refill her metformin at the latest request.    Type 2 DM-on metformin 500mg BID- pt relays she has been out of her medication  Lab Results   Component Value Date/Time    Glucose 187 (H) 2020 10:33 AM    Glucose (POC) 235 (H) 2020 10:19 PM    Glucose  2020 11:25 AM     Lab Results   Component Value Date/Time    Hemoglobin A1c (POC) 10.5 2020 11:25 AM     Lab Results   Component Value Date/Time    Cholesterol, total 131 10/01/2010 02:00 PM    Cholesterol (POC) 115 02/10/2017 12:36 PM    HDL Cholesterol 42 10/01/2010 02:00 PM    HDL Cholesterol (POC) 32 02/10/2017 12:36 PM    LDL Cholesterol (POC) 33 02/10/2017 12:36 PM    LDL, calculated 51.8 10/01/2010 02:00 PM    VLDL, calculated 37.2 10/01/2010 02:00 PM    Triglyceride 186 (H) 10/01/2010 02:00 PM    Triglycerides (POC) 251 02/10/2017 12:36 PM    CHOL/HDL Ratio 3.1 10/01/2010 02:00 PM         HTN-pt stopped her meds bc her \"blood pressure is normal.   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     Past Medical History:   Diagnosis Date    Asthma     pt denies    Diabetes ([address] 2016    Hypertension     Other ill-defined conditions(799.89)     back pain    Seizures ([address]     last seizure was 89 years old    SOB (shortness of breath)      Past Surgical History:   Procedure Laterality Date    HX GYN      Pt reports  in past.    HX TONSILLECTOMY       Social History     Socioeconomic History    Marital status: SINGLE     Spouse name: Not on file  Number of children: Not on file    Years of education: Not on file    Highest education level: Not on file   Tobacco Use    Smoking status: Never Smoker    Smokeless tobacco: Never Used   Substance and Sexual Activity    Alcohol use: No    Drug use: No    Sexual activity: Yes     Partners: Male     Birth control/protection: None     Family History   Problem Relation Age of Onset    Diabetes Mother     Diabetes Father     Diabetes Maternal Grandmother      Current Outpatient Medications   Medication Sig Dispense Refill    metFORMIN (GLUCOPHAGE) 500 mg tablet Take 1 Tab by mouth two (2) times daily (with meals). 60 Tab 5     No Known Allergies    Objective:  Visit Vitals  /90 (BP 1 Location: Left arm, BP Patient Position: Sitting)   Pulse 80   Temp 98.6 °F (37 °C) (Oral)   Resp 19   Ht 5' 1\" (1.549 m)   Wt 305 lb (138.3 kg)   SpO2 100%   BMI 57.63 kg/m²     Physical Exam:   General appearance - alert, morbidly obese young lady in NAD  Mental status - alert, oriented to person, place, and time  EYE-EOMI   Chest - clear to auscultation, no wheezes, rales or rhonchi, symmetric air entry   Heart - normal rate, regular rhythm, normal S1, S2  Abdomen - sobese  Ext-peripheral pulses normal, no pedal edema, no clubbing or cyanosis  Skin-Warm and dry. no hyperpigmentation, vitiligo, or suspicious lesions  Neuro -alert, oriented, normal speech, no focal findings or movement disorder noted      Results for orders placed or performed in visit on 07/29/20   AMB POC GLUCOSE BLOOD, BY GLUCOSE MONITORING DEVICE   Result Value Ref Range    Glucose  mg/dL   AMB POC HEMOGLOBIN A1C   Result Value Ref Range    Hemoglobin A1c (POC) 10.5 %       Assessment/Plan:    ICD-10-CM ICD-9-CM    1.  Type 2 diabetes mellitus without complication, without long-term current use of insulin (HCC)  E11.9 250.00 AMB POC GLUCOSE BLOOD, BY GLUCOSE MONITORING DEVICE      AMB POC HEMOGLOBIN A1C      metFORMIN (GLUCOPHAGE) 500 mg tablet   2. Essential hypertension  I10 401.9    3. Obesity, morbid ([facility]  E66.01 278.01      Orders Placed This Encounter    AMB POC GLUCOSE BLOOD, BY GLUCOSE MONITORING DEVICE    AMB POC HEMOGLOBIN A1C    metFORMIN (GLUCOPHAGE) 500 mg tablet     Sig: Take 1 Tab by mouth two (2) times daily (with meals). Dispense:  60 Tab     Refill:  5     1. Type 2 diabetes mellitus without complication, without long-term current use of insulin ([facility])  Pt refuses diabetes education  - AMB POC GLUCOSE BLOOD, BY GLUCOSE MONITORING DEVICE  - AMB POC HEMOGLOBIN A1C  - metFORMIN (GLUCOPHAGE) 500 mg tablet; Take 1 Tab by mouth two (2) times daily (with meals). Dispense: 60 Tab; Refill: 5  - REFERRAL TO BARIATRIC SURGERY    2. Essential hypertension  Pt refuses to restart her medication  - REFERRAL TO BARIATRIC SURGERY    3. Obesity, morbid ([facility] [place]    - REFERRAL TO BARIATRIC SURGERY    There are no Patient Instructions on file for this visit. I have reviewed with the patient details of the assessment and plan and all questions were answered. Relevent patient education was performed. The most recent lab findings were reviewed with the patient. An After Visit Summary was printed and given to the patient.

## 2020-12-22 ENCOUNTER — HOSPITAL ENCOUNTER (EMERGENCY)
Age: 30
Discharge: HOME | End: 2020-12-22
Attending: EMERGENCY MEDICINE
Payer: COMMERCIAL

## 2020-12-22 VITALS
HEART RATE: 93 BPM | SYSTOLIC BLOOD PRESSURE: 165 MMHG | WEIGHT: 293 LBS | HEIGHT: 61 IN | TEMPERATURE: 98.1 F | DIASTOLIC BLOOD PRESSURE: 90 MMHG | BODY MASS INDEX: 55.32 KG/M2 | OXYGEN SATURATION: 100 % | RESPIRATION RATE: 18 BRPM

## 2020-12-22 DIAGNOSIS — B34.9 VIRAL INFECTION: Primary | ICD-10-CM

## 2020-12-22 PROCEDURE — 99283 EMERGENCY DEPT VISIT LOW MDM: CPT

## 2020-12-22 RX ORDER — CODEINE PHOSPHATE AND GUAIFENESIN 10; 100 MG/5ML; MG/5ML
5 SOLUTION ORAL
Qty: 120 ML | Refills: 0 | Status: SHIPPED | OUTPATIENT
Start: 2020-12-22 | End: 2020-12-27

## 2020-12-22 RX ORDER — PSEUDOEPHEDRINE HCL 30 MG
1 TABLET ORAL 2 TIMES DAILY
Qty: 20 TAB | Refills: 0 | Status: SHIPPED | OUTPATIENT
Start: 2020-12-22 | End: 2022-05-18

## 2020-12-22 RX ORDER — FLUTICASONE PROPIONATE 50 MCG
2 SPRAY, SUSPENSION (ML) NASAL DAILY
Qty: 1 BOTTLE | Refills: 0 | Status: SHIPPED | OUTPATIENT
Start: 2020-12-22

## 2020-12-22 RX ORDER — LISINOPRIL 10 MG/1
10 TABLET ORAL DAILY
Qty: 30 TAB | Refills: 0 | Status: SHIPPED | OUTPATIENT
Start: 2020-12-22 | End: 2021-02-25 | Stop reason: SDUPTHER

## 2020-12-22 RX ORDER — LISINOPRIL 10 MG/1
10 TABLET ORAL DAILY
COMMUNITY
End: 2020-12-22 | Stop reason: SDUPTHER

## 2020-12-22 NOTE — ED TRIAGE NOTES
Pt presents to ED with c/o nasal congestion and dry cough beginning yesterday. Pt states she was prescribed lisinopril but has run out of medication. 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 Pt denies taking medication for symptoms.

## 2020-12-22 NOTE — Clinical Note
[facility] EMERGENCY DEPT 
[address]  [id number] 
[phone] Work/School Note Date: 12/22/2020 To Whom It May concern: 
 
 
Radha Jacobson was seen and treated today in the emergency room by the following provider(s): 
Attending Provider: Angela Campa MD. Radha Jacobson is excused from work/school on 12/22/20. She is clear to return to work/school on 12/23/20.    
 
 
Sincerely, 
 
 
 
 
Romayne Neve, MD

## 2020-12-22 NOTE — Clinical Note
[facility] EMERGENCY DEPT 
[address] [facility] [place] [id number] 
[phone] Work/School Note Date: 12/22/2020 To Whom It May concern: 
 
 
Nik Vee was seen and treated today in the emergency room by the following provider(s): 
No providers found. Nik Vee is excused from work/school on 12/22/20. She is clear to return to work/school on 12/23/20.    
 
 
Sincerely, 
 
 
 
 
Enio Richards MD

## 2020-12-22 NOTE — ED PROVIDER NOTES
EMERGENCY DEPARTMENT HISTORY AND PHYSICAL EXAM      Date: 2020  Patient Name: Leeann Gibbs    History of Presenting Illness     Chief Complaint   Patient presents with    Nasal Congestion     beginning yesterday     History Provided By: Patient    HPI: Leeann Gibbs, 27 y.o. female with past medical history significant for diabetes, hypertension, and seizures who presents via private vehicle to the ED with cc of nasal congestion and cough that started yesterday. 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 She denies any sick contacts. She took multiple doses of Mucinex DM last night with no improvement of her cough. She is here because the cough is getting worse and she is now having sore throat. She did have a rapid Covid swab done this morning while at work that was negative and had a second Covid swab done as a confirmatory test which is pending. PMHx: Diabetes, hypertension, seizures  Social Hx: Denies alcohol, tobacco, or illegal drug use    PCP: Myke Ordaz MD    There are no other complaints, changes, or physical findings at this time. No current facility-administered medications on file prior to encounter. Current Outpatient Medications on File Prior to Encounter   Medication Sig Dispense Refill    [DISCONTINUED] lisinopriL (PRINIVIL, ZESTRIL) 10 mg tablet Take 10 mg by mouth daily.  metFORMIN (GLUCOPHAGE) 500 mg tablet Take 1 Tab by mouth two (2) times daily (with meals).  61 Tab 5     Past History     Past Medical History:  Past Medical History:   Diagnosis Date    Asthma     pt denies    Diabetes ([address] 2016    Hypertension     Other ill-defined conditions(799.89)     back pain    Seizures ([address]     last seizure was 89 years old    SOB (shortness of breath)      Past Surgical History:  Past Surgical History:   Procedure Laterality Date    HX GYN  2014    Pt reports  in past.    HX TONSILLECTOMY       Family History:  Family History   Problem Relation Age of Onset    Diabetes Mother     Diabetes Father     Diabetes Maternal Grandmother      Social History:  Social History     Tobacco Use    Smoking status: Never Smoker    Smokeless tobacco: Never Used   Substance Use Topics    Alcohol use: No    Drug use: No     Allergies:  No Known Allergies  Review of Systems   Review of Systems   Constitutional: Negative for chills and fever. HENT: Positive for congestion. Negative for rhinorrhea, sneezing and sore throat. Eyes: Negative for redness and visual disturbance. Respiratory: Positive for cough. Negative for shortness of breath. Cardiovascular: Negative for leg swelling. Gastrointestinal: Negative for abdominal pain, nausea and vomiting. Genitourinary: Negative for difficulty urinating and frequency. Musculoskeletal: Negative for back pain, myalgias and neck stiffness. Skin: Negative for rash. Neurological: Negative for dizziness, syncope, weakness and headaches. Hematological: Negative for adenopathy. All other systems reviewed and are negative. Physical Exam   Physical Exam  Vitals signs and nursing note reviewed. Constitutional:       Appearance: Normal appearance. She is well-developed. HENT:      Head: Normocephalic and atraumatic. Eyes:      Conjunctiva/sclera: Conjunctivae normal.   Neck:      Musculoskeletal: Full passive range of motion without pain, normal range of motion and neck supple. Cardiovascular:      Rate and Rhythm: Normal rate and regular rhythm. Pulses: Normal pulses. Heart sounds: Normal heart sounds, S1 normal and S2 normal. No murmur. Pulmonary:      Effort: Pulmonary effort is normal. No respiratory distress. Breath sounds: Normal breath sounds. No wheezing. Abdominal:      General: Bowel sounds are normal. There is no distension. Palpations: Abdomen is soft. Tenderness:  There is no abdominal tenderness. There is no rebound. Musculoskeletal: Normal range of motion. Skin:     General: Skin is warm and dry. Findings: No rash. Neurological:      Mental Status: She is alert and oriented to person, place, and time. Psychiatric:         Speech: Speech normal.         Behavior: Behavior normal.         Thought Content: Thought content normal.         Judgment: Judgment normal.       Diagnostic Study Results   Labs -   No results found for this or any previous visit (from the past 12 hour(s)). Radiologic Studies -   No orders to display     No results found. Medical Decision Making   I am the first provider for this patient. I reviewed the vital signs, available nursing notes, past medical history, past surgical history, family history and social history. Vital Signs-Reviewed the patient's vital signs. Patient Vitals for the past 24 hrs:   Temp Pulse Resp BP SpO2   12/22/20 0815  93 18 (!) 165/90 100 %   12/22/20 0703 98.1 °F (36.7 °C) 96 18 (!) 171/98 100 %     Pulse Oximetry Analysis - 100% on RA    Records Reviewed: Nursing Notes    Provider Notes (Medical Decision Making):   20-year-old female presents with nasal congestion and dry cough that started yesterday. She has taken Mucinex DM multiple times last night with no improvement of her cough. She denies any fevers, chills, or known COVID-19 exposure. She had a negative Covid swab done this morning at work. She has a normal exam at this time. Will defer a chest x-ray and prescribe a decongestant, Flonase, and a different cough medication and have her follow-up with outpatient primary care as needed. ED Course:   Initial assessment performed. The patients presenting problems have been discussed, and they are in agreement with the care plan formulated and outlined with them. I have encouraged them to ask questions as they arise throughout their visit. Progress Note:   Updated pt on all returned results and findings. Discussed the importance of proper follow up as referred below along with return precautions. Pt in agreement with the care plan and expresses agreement with and understanding of all items discussed. Disposition:  Discharge Note:  The pt is ready for discharge. The pt's signs, symptoms, diagnosis, and discharge instructions have been discussed and pt has conveyed their understanding. The pt is to follow up as recommended or return to ER should their symptoms worsen. Plan has been discussed and pt is in agreement. PLAN:  1. Discharge Medication List as of 12/22/2020  8:12 AM      START taking these medications    Details   fluticasone propionate (FLONASE) 50 mcg/actuation nasal spray 2 Sprays by Both Nostrils route daily. , Normal, Disp-1 Bottle, R-0      loratadine-pseudoephedrine (Loratadine-D) 5-120 mg per tablet Take 1 Tab by mouth two (2) times a day., Normal, Disp-20 Tab, R-0      guaiFENesin-codeine (ROBITUSSIN AC) 100-10 mg/5 mL solution Take 5 mL by mouth three (3) times daily as needed for Cough for up to 5 days. Max Daily Amount: 15 mL., Normal, Disp-120 mL, R-0         CONTINUE these medications which have CHANGED    Details   lisinopriL (PRINIVIL, ZESTRIL) 10 mg tablet Take 1 Tab by mouth daily. , Normal, Disp-30 Tab, R-0         CONTINUE these medications which have NOT CHANGED    Details   metFORMIN (GLUCOPHAGE) 500 mg tablet Take 1 Tab by mouth two (2) times daily (with meals). , Normal, Disp-60 Tab,R-5           2. Follow-up Information     Follow up With Specialties Details Why Contact Info    Gavin Dietz MD Internal Medicine Schedule an appointment as soon as possible for a visit   [address]  [address]  [phone]          Return to ED if worse     Diagnosis     Clinical Impression:   1. Viral infection            Please note that this dictation was completed with Dragon, computer voice recognition software.   Quite often unanticipated grammatical, syntax, homophones, and other interpretive errors are inadvertently transcribed by the computer software. Please disregard these errors. Additionally, please excuse any errors that have escaped final proofreading.

## 2020-12-22 NOTE — ED NOTES
Pt requesting to speak with provider prior discharge. Dr. Catherine Mckenzie notified. 08:15 - Discharge instructions provided. Pt was given copy of discharge instructions with 0 paper script(s) and 4 electronic script(s). Pt verbalized understanding of the medication instructions, and the importance of following up as recommended by EDP. Pt has no further questions at this time. Wheelchair offered from treatment area to hospital entrance, pt declined. Pt leaving ED ambulatory and in stable condition.

## 2020-12-22 NOTE — ED NOTES
Pt arrives to the ED AAOX4 with a c/c of nasal congestion and dry cough since yesterday. 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 Pt is noted in stable condition, now in ED room with side rail up, bed to lowest position and call light within reach. Will continue to monitor. Emergency Department Nursing Plan of Care       The Nursing Plan of Care is developed from the Nursing assessment and Emergency Department Attending provider initial evaluation. The plan of care may be reviewed in the ED Provider note.     The Plan of Care was developed with the following considerations:   Patient / Family readiness to learn indicated by:verbalized understanding  Persons(s) to be included in education: patient  Barriers to Learning/Limitations:No    Signed     Quintin Sheets    12/22/2020   7:32 AM EMS Walk in

## 2020-12-23 ENCOUNTER — PATIENT OUTREACH (OUTPATIENT)
Dept: CASE MANAGEMENT | Age: 30
End: 2020-12-23

## 2020-12-23 NOTE — PROGRESS NOTES
ED 12/22/2020:  Chest Congestion/Cough   Patient on Cov19 D/C MELI Enrollment Report/fu Contact. Unable to Leave message on voice mail with my contact information for return call. Need to assess for d/c needs post ED.

## 2020-12-28 ENCOUNTER — PATIENT OUTREACH (OUTPATIENT)
Dept: CASE MANAGEMENT | Age: 30
End: 2020-12-28

## 2020-12-28 NOTE — PROGRESS NOTES
Education Provided due to At Risk Condition: Chest Congestion/Cough  Patient contacted regarding recent discharge and COVID-19 risk. Discussed COVID-19 related testing which was not done at this time. Test results were not done. Patient informed of results, if available? No.  Patient states she was tested at Urgent Care and is reported to be Negative. Outreach made within 2 business days of discharge: Yes    Care Transition Nurse/ Ambulatory Care Manager/ LPN Care Coordinator contacted the patient by telephone to perform post discharge assessment. Verified name and  with patient as identifiers. Patient has following risk factors of: HTN. CTN/ACM/LPN reviewed discharge instructions, medical action plan and red flags related to discharge diagnosis. Reviewed and educated them on any new and changed medications related to discharge diagnosis. Advised obtaining a 90-day supply of all daily and as-needed medications. Advance Care Planning:   Does patient have an Advance Directive: currently not on file; education provided    No changes reported re ACP:  Remains Mother Porfirio Garcia. Education provided regarding infection prevention, and signs and symptoms of COVID-19 and when to seek medical attention with patient who verbalized understanding. Discussed exposure protocols and quarantine from [address] you at higher risk for severe illness 2019 and given an opportunity for questions and concerns. The patient agrees to contact the COVID-19 hotline [phone] or PCP office for questions related to their healthcare. CTN/ACM/LPN provided contact information for future reference. From CDC: Are you at higher risk for severe illness?  Wash your hands often.  Avoid close contact (6 feet, which is about two arm lengths) with people who are sick.  Put distance between yourself and other people if COVID-19 is spreading in your community.    Clean and disinfect frequently touched surfaces.  Avoid all cruise travel and non-essential air travel.  Call your healthcare professional if you have concerns about COVID-19 and your underlying condition or if you are sick. For more information on steps you can take to protect yourself, see CDC's How to Protect Yourself      Patient/family/caregiver given information for [employer] [facility] and agrees to enroll no  Plan for follow-up call in 7-14 days based on severity of symptoms and risk factors.

## 2020-12-28 NOTE — ACP (ADVANCE CARE PLANNING)
Advance Care Planning:   Does patient have an Advance Directive: currently not on file; education provided    No changes reported re ACP:  Remains Mother Ilia Lawrence.

## 2021-01-06 ENCOUNTER — HOSPITAL ENCOUNTER (EMERGENCY)
Age: 31
Discharge: HOME | End: 2021-01-06
Attending: EMERGENCY MEDICINE
Payer: COMMERCIAL

## 2021-01-06 VITALS
SYSTOLIC BLOOD PRESSURE: 148 MMHG | HEART RATE: 100 BPM | WEIGHT: 293 LBS | TEMPERATURE: 98.4 F | BODY MASS INDEX: 57.65 KG/M2 | DIASTOLIC BLOOD PRESSURE: 104 MMHG | OXYGEN SATURATION: 98 % | RESPIRATION RATE: 16 BRPM

## 2021-01-06 DIAGNOSIS — N39.0 URINARY TRACT INFECTION WITHOUT HEMATURIA, SITE UNSPECIFIED: Primary | ICD-10-CM

## 2021-01-06 LAB
APPEARANCE UR: ABNORMAL
BACTERIA URNS QL MICRO: ABNORMAL /HPF
BILIRUB UR QL: NEGATIVE
COLOR UR: ABNORMAL
EPITH CASTS URNS QL MICRO: ABNORMAL /LPF
GLUCOSE UR STRIP.AUTO-MCNC: >1000 MG/DL
HCG UR QL: NEGATIVE
HGB UR QL STRIP: ABNORMAL
KETONES UR QL STRIP.AUTO: NEGATIVE MG/DL
LEUKOCYTE ESTERASE UR QL STRIP.AUTO: NEGATIVE
NITRITE UR QL STRIP.AUTO: POSITIVE
PH UR STRIP: 6 [PH] (ref 5–8)
PROT UR STRIP-MCNC: 30 MG/DL
RBC #/AREA URNS HPF: ABNORMAL /HPF (ref 0–5)
SP GR UR REFRACTOMETRY: 1.02 (ref 1–1.03)
UR CULT HOLD, URHOLD: NORMAL
UROBILINOGEN UR QL STRIP.AUTO: 0.2 EU/DL (ref 0.2–1)
WBC URNS QL MICRO: ABNORMAL /HPF (ref 0–4)

## 2021-01-06 PROCEDURE — 81001 URINALYSIS AUTO W/SCOPE: CPT

## 2021-01-06 PROCEDURE — 74011250637 HC RX REV CODE- 250/637: Performed by: EMERGENCY MEDICINE

## 2021-01-06 PROCEDURE — 99283 EMERGENCY DEPT VISIT LOW MDM: CPT

## 2021-01-06 PROCEDURE — 87186 SC STD MICRODIL/AGAR DIL: CPT

## 2021-01-06 PROCEDURE — 87086 URINE CULTURE/COLONY COUNT: CPT

## 2021-01-06 PROCEDURE — 87077 CULTURE AEROBIC IDENTIFY: CPT

## 2021-01-06 PROCEDURE — 81025 URINE PREGNANCY TEST: CPT

## 2021-01-06 RX ORDER — CEPHALEXIN 500 MG/1
500 CAPSULE ORAL 2 TIMES DAILY
Qty: 14 CAP | Refills: 0 | Status: SHIPPED | OUTPATIENT
Start: 2021-01-06 | End: 2021-01-13

## 2021-01-06 RX ORDER — ACETAMINOPHEN 325 MG/1
650 TABLET ORAL
Status: COMPLETED | OUTPATIENT
Start: 2021-01-06 | End: 2021-01-06

## 2021-01-06 RX ORDER — IBUPROFEN 600 MG/1
600 TABLET ORAL
Qty: 20 TAB | Refills: 0 | Status: SHIPPED | OUTPATIENT
Start: 2021-01-06 | End: 2022-05-18

## 2021-01-06 RX ADMIN — ACETAMINOPHEN 650 MG: 325 TABLET ORAL at 15:49

## 2021-01-06 NOTE — ED PROVIDER NOTES
80-year-old female who presents with right flank pain. 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 Pain located along the right lower back with radiation around to the front. Pain worsened with movement. Patient also reports increased urinary frequency over the past couple of days. She denies any fever, vomiting, diarrhea. She is not take any medications for the symptoms. No other complaints at this time.            Past Medical History:   Diagnosis Date    Asthma     pt denies    Diabetes ([facility] [address] 2016    Hypertension     Other ill-defined conditions(799.89)     back pain    Seizures ([facility] [address]     last seizure was 89 years old    SOB (shortness of breath)        Past Surgical History:   Procedure Laterality Date    HX GYN      Pt reports  in past.    HX TONSILLECTOMY           Family History:   Problem Relation Age of Onset    Diabetes Mother     Diabetes Father     Diabetes Maternal Grandmother        Social History     Socioeconomic History    Marital status: SINGLE     Spouse name: Not on file    Number of children: Not on file    Years of education: Not on file    Highest education level: Not on file   Occupational History    Not on file   Social Needs    Financial resource strain: Not on file    Food insecurity     Worry: Not on file     Inability: Not on file    Transportation needs     Medical: Not on file     Non-medical: Not on file   Tobacco Use    Smoking status: Never Smoker    Smokeless tobacco: Never Used   Substance and Sexual Activity    Alcohol use: No    Drug use: No    Sexual activity: Yes     Partners: Male     Birth control/protection: None   Lifestyle    Physical activity     Days per week: Not on file     Minutes per session: Not on file    Stress: Not on file   Relationships    Social connections     Talks on phone: Not on file     Gets together: Not on file     Attends Jew service: Not on file     Active member of club or organization: Not on file     Attends meetings of clubs or organizations: Not on file     Relationship status: Not on file    Intimate partner violence     Fear of current or ex partner: Not on file     Emotionally abused: Not on file     Physically abused: Not on file     Forced sexual activity: Not on file   Other Topics Concern    Not on file   Social History Narrative    Not on file         ALLERGIES: Patient has no known allergies. Review of Systems   Constitutional: Negative for fever. HENT: Negative for facial swelling. Eyes: Negative for visual disturbance. Respiratory: Negative for chest tightness. Cardiovascular: Negative for chest pain. Gastrointestinal: Negative for abdominal pain. Genitourinary: Negative for difficulty urinating and dysuria. Musculoskeletal: Negative for arthralgias. Skin: Negative for rash. Neurological: Negative for headaches. Hematological: Negative for adenopathy. Psychiatric/Behavioral: Negative for suicidal ideas. Vitals:    01/06/21 1533 01/06/21 1533   BP: (!) 148/104 (!) 148/104   Pulse: (!) 103 100   Resp: 18 16   Temp: 98.4 °F (36.9 °C) 98.4 °F (36.9 °C)   SpO2: 99% 98%   Weight: 138.4 kg (305 lb 1.9 oz)             Physical Exam  Vitals signs and nursing note reviewed. Constitutional:       General: She is not in acute distress. Appearance: She is well-developed. HENT:      Head: Normocephalic and atraumatic. Eyes:      General: No scleral icterus. Conjunctiva/sclera: Conjunctivae normal.      Pupils: Pupils are equal, round, and reactive to light. Neck:      Musculoskeletal: Normal range of motion and neck supple. Cardiovascular:      Rate and Rhythm: Normal rate. Heart sounds: No murmur. Pulmonary:      Effort: Pulmonary effort is normal. No respiratory distress. Abdominal:      General: There is no distension.    Musculoskeletal: Normal range of motion. Skin:     General: Skin is warm and dry. Findings: No rash. Neurological:      Mental Status: She is alert and oriented to person, place, and time.           MDM  Number of Diagnoses or Management Options     Amount and/or Complexity of Data Reviewed  Clinical lab tests: reviewed           Procedures

## 2021-01-07 ENCOUNTER — PATIENT OUTREACH (OUTPATIENT)
Dept: CASE MANAGEMENT | Age: 31
End: 2021-01-07

## 2021-01-09 LAB
BACTERIA SPEC CULT: ABNORMAL
CC UR VC: ABNORMAL
SERVICE CMNT-IMP: ABNORMAL

## 2021-01-22 ENCOUNTER — APPOINTMENT (OUTPATIENT)
Dept: GENERAL RADIOLOGY | Age: 31
End: 2021-01-22
Attending: NURSE PRACTITIONER
Payer: COMMERCIAL

## 2021-01-22 ENCOUNTER — HOSPITAL ENCOUNTER (EMERGENCY)
Age: 31
Discharge: HOME | End: 2021-01-22
Attending: EMERGENCY MEDICINE | Admitting: EMERGENCY MEDICINE
Payer: COMMERCIAL

## 2021-01-22 VITALS
RESPIRATION RATE: 18 BRPM | WEIGHT: 293 LBS | SYSTOLIC BLOOD PRESSURE: 160 MMHG | HEART RATE: 83 BPM | DIASTOLIC BLOOD PRESSURE: 83 MMHG | BODY MASS INDEX: 55.74 KG/M2 | OXYGEN SATURATION: 100 %

## 2021-01-22 DIAGNOSIS — R05.9 COUGH: ICD-10-CM

## 2021-01-22 DIAGNOSIS — U07.1 COVID-19 VIRUS INFECTION: Primary | ICD-10-CM

## 2021-01-22 DIAGNOSIS — R51.9 ACUTE NONINTRACTABLE HEADACHE, UNSPECIFIED HEADACHE TYPE: ICD-10-CM

## 2021-01-22 PROCEDURE — 71045 X-RAY EXAM CHEST 1 VIEW: CPT

## 2021-01-22 PROCEDURE — 99283 EMERGENCY DEPT VISIT LOW MDM: CPT

## 2021-01-22 RX ORDER — BENZONATATE 100 MG/1
100 CAPSULE ORAL
Qty: 30 CAP | Refills: 0 | Status: SHIPPED | OUTPATIENT
Start: 2021-01-22 | End: 2021-01-29

## 2021-01-22 RX ORDER — ALBUTEROL SULFATE 90 UG/1
2 AEROSOL, METERED RESPIRATORY (INHALATION)
Qty: 1 INHALER | Refills: 0 | Status: SHIPPED | OUTPATIENT
Start: 2021-01-22

## 2021-01-22 NOTE — PROGRESS NOTES
[employer] Measure patient. CM opened case for assessment of D/C planning needs, CM reviewed chart. Patient 4 ED visit in 6 month. States she was tested for COVID+ on Tuesday. Per  Patient  her employer wants her back to work next week. Pt is requesting a COVID note so she can stay at home since she is positive. Pt is noted in stable condition, now in ED room with side rail up, bed to     Primary care associate 1/29/21 @ 09[facility].     [address] [place] [address]  [phone]

## 2021-01-22 NOTE — ED TRIAGE NOTES
Pt tested positive for Covid. Pt found out yesterday and reports prod cough (thick and white), reports CP with cough and denies other symptoms.

## 2021-01-22 NOTE — Clinical Note
[facility] EMERGENCY DEPT 
[address] [facility] [address] 
[phone] Work/School Note Date: 1/22/2021 To Whom It May concern: 
 
Alexi Sow was evaulated by the following provider(s): 
Attending Provider: Abdulkadir Rizvi MD 
Nurse Practitioner: Miya Bunn NP.   COVID19 virus is suspected. Per the CDC guidelines we recommend home isolation until the following conditions are all met: 1. At least 10 days have passed since symptoms first appeared and 2. At least 24 hours have passed since last fever without the use of fever-reducing medications and 
3. Symptoms (e.g., cough, shortness of breath) have improved Sincerely, Haleigh Gee NP

## 2021-01-22 NOTE — ED NOTES
Pt arrives to the ED AAOX4 with a c/c of HA and CP due to a persistent productive cough onset a few days ago. Pt states she tested positive for COVID on Tuesday and her employer wants her back to work next week. Pt is requesting a COVID note so she can stay at home since she is positive. Pt is noted in stable condition, now in ED room with side rail up, bed to lowest position and call light within reach. Will continue to monitor and wait for ED provider evaluation. Emergency Department Nursing Plan of Care       The Nursing Plan of Care is developed from the Nursing assessment and Emergency Department Attending provider initial evaluation. The plan of care may be reviewed in the ED Provider note.     The Plan of Care was developed with the following considerations:   Patient / Family readiness to learn indicated by:verbalized understanding  Persons(s) to be included in education: patient  Barriers to Learning/Limitations:No    Signed     Terry Gavin    1/22/2021   3:38 PM

## 2021-01-23 ENCOUNTER — PATIENT OUTREACH (OUTPATIENT)
Dept: CASE MANAGEMENT | Age: 31
End: 2021-01-23

## 2021-01-23 NOTE — ACP (ADVANCE CARE PLANNING)
Does patient have an Advance Directive:  not on file, education provided and patient is not interested in completing an Advance Medical Directive at this time. Brissa Medel

## 2021-01-24 NOTE — ED PROVIDER NOTES
EMERGENCY DEPARTMENT HISTORY AND PHYSICAL EXAM    Date: 1/22/2021  Patient Name: Abraham Roe    History of Presenting Illness     Chief Complaint   Patient presents with    Positive For Covid-19         History Provided By: Patient    Chief Complaint: cough  Duration: onset yesterday   Timing:  Acute  Quality: productive thick white sputum  Severity: Moderate  Modifying Factors: none  Associated Symptoms: headache      HPI: Abraham Roe is a 27 y.o. female with a PMH of No significant past medical history who presents with Productive cough acute onset yesterday. Patient states on Tuesday she tested positive for Covid 19 at work. 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 Patient states there were several employees who tested positive. Patient states that she needs a note to return to work with a date on it. Patient states her employer wants her back to work next week. PCP: Sara Stuart MD    Current Outpatient Medications   Medication Sig Dispense Refill    benzonatate (Tessalon Perles) 100 mg capsule Take 1 Cap by mouth three (3) times daily as needed for Cough for up to 7 days. 30 Cap 0    albuterol (PROVENTIL HFA, VENTOLIN HFA, PROAIR HFA) 90 mcg/actuation inhaler Take 2 Puffs by inhalation every four (4) hours as needed for Wheezing. 1 Inhaler 0    ibuprofen (MOTRIN) 600 mg tablet Take 1 Tab by mouth every six (6) hours as needed for Pain. 20 Tab 0    lisinopriL (PRINIVIL, ZESTRIL) 10 mg tablet Take 1 Tab by mouth daily. 30 Tab 0    fluticasone propionate (FLONASE) 50 mcg/actuation nasal spray 2 Sprays by Both Nostrils route daily. 1 Bottle 0    loratadine-pseudoephedrine (Loratadine-D) 5-120 mg per tablet Take 1 Tab by mouth two (2) times a day. 20 Tab 0    metFORMIN (GLUCOPHAGE) 500 mg tablet Take 1 Tab by mouth two (2) times daily (with meals).  60 Tab 5       Past History     Past Medical History:  Past Medical History:   Diagnosis Date    Asthma     pt denies    Diabetes ([facility] 2016    Hypertension     Other ill-defined conditions(799.89)     back pain    Seizures ([facility]     last seizure was 89 years old    SOB (shortness of breath)        Past Surgical History:  Past Surgical History:   Procedure Laterality Date    HX GYN  2014    Pt reports  in past.    HX TONSILLECTOMY         Family History:  Family History   Problem Relation Age of Onset    Diabetes Mother     Diabetes Father     Diabetes Maternal Grandmother        Social History:  Social History     Tobacco Use    Smoking status: Never Smoker    Smokeless tobacco: Never Used   Substance Use Topics    Alcohol use: No    Drug use: No       Allergies:  No Known Allergies      Review of Systems   Review of Systems   Constitutional: Negative for fatigue and fever. Respiratory: Positive for cough. Negative for shortness of breath and wheezing. Cardiovascular: Negative for chest pain and palpitations. Gastrointestinal: Negative for abdominal pain. Musculoskeletal: Negative for arthralgias, myalgias, neck pain and neck stiffness. Skin: Negative for pallor and rash. Neurological: Positive for headaches. Negative for dizziness, tremors and weakness. All other systems reviewed and are negative. Physical Exam     Vitals:    21 1456   BP: (!) 160/83   Pulse: 83   Resp: 18   SpO2: 100%   Weight: 133.8 kg (295 lb)     Physical Exam  Vitals signs and nursing note reviewed. Constitutional:       General: She is not in acute distress. Appearance: She is well-developed. HENT:      Head: Normocephalic and atraumatic. Right Ear: External ear normal.      Left Ear: External ear normal.      Nose: Nose normal.   Eyes:      Conjunctiva/sclera: Conjunctivae normal.   Neck:      Musculoskeletal: Normal range of motion and neck supple. Cardiovascular:      Rate and Rhythm: Normal rate and regular rhythm. Heart sounds: Normal heart sounds.    Pulmonary:      Effort: Pulmonary effort is normal. No respiratory distress. Breath sounds: Normal breath sounds. No wheezing. Abdominal:      General: Bowel sounds are normal.      Palpations: Abdomen is soft. Tenderness: There is no abdominal tenderness. Musculoskeletal: Normal range of motion. Lymphadenopathy:      Cervical: No cervical adenopathy. Skin:     General: Skin is warm and dry. Findings: No rash. Neurological:      Mental Status: She is alert and oriented to person, place, and time. Cranial Nerves: No cranial nerve deficit. Coordination: Coordination normal.   Psychiatric:         Behavior: Behavior normal.         Thought Content: Thought content normal.         Judgment: Judgment normal.           Diagnostic Study Results     Labs -   No results found for this or any previous visit (from the past 12 hour(s)). Radiologic Studies -   XR CHEST PORT   Final Result   Impression: No acute process. CT Results  (Last 48 hours)    None        CXR Results  (Last 48 hours)               01/22/21 1623  XR CHEST PORT Final result    Impression:  Impression: No acute process. Narrative: Indication: Chest pain, Covid positive       Comparison: 1/21/2020       Portable exam of the chest obtained at 1549 demonstrates normal heart size. There is no acute process in the lung fields. The osseous structures are   unremarkable. Medical Decision Making   I am the first provider for this patient. I reviewed the vital signs, available nursing notes, past medical history, past surgical history, family history and social history. Vital Signs-Reviewed the patient's vital signs. Records Reviewed: Nursing Notes            Disposition:  home  DISCHARGE NOTE:           Care plan outlined and precautions discussed. Patient has no new complaints, changes, or physical findings. Results of xray were reviewed with the patient.  All medications were reviewed with the patient; will d/c home with tessalon perrles albuterol hfa. All of pt's questions and concerns were addressed. Patient was instructed and agrees to follow up with PCP, as well as to return to the ED upon further deterioration. Patient is ready to go home. Follow-up Information     Follow up With Specialties Details Why Contact Info    Guille Sinclair MD Internal Medicine On 1/29/2021 Your appointment time is 0915, OFFICE WILL CALL 15 MIN PRIOR TO JAYLEN., THIS IS A VIRTUAL APPOINTMENT. [address] [place] [address]  [phone]            Discharge Medication List as of 1/22/2021  4:48 PM      START taking these medications    Details   benzonatate (Tessalon Perles) 100 mg capsule Take 1 Cap by mouth three (3) times daily as needed for Cough for up to 7 days. , Normal, Disp-30 Cap, R-0      albuterol (PROVENTIL HFA, VENTOLIN HFA, PROAIR HFA) 90 mcg/actuation inhaler Take 2 Puffs by inhalation every four (4) hours as needed for Wheezing., Normal, Disp-1 Inhaler, R-0         CONTINUE these medications which have NOT CHANGED    Details   ibuprofen (MOTRIN) 600 mg tablet Take 1 Tab by mouth every six (6) hours as needed for Pain., Normal, Disp-20 Tab, R-0      lisinopriL (PRINIVIL, ZESTRIL) 10 mg tablet Take 1 Tab by mouth daily. , Normal, Disp-30 Tab, R-0      fluticasone propionate (FLONASE) 50 mcg/actuation nasal spray 2 Sprays by Both Nostrils route daily. , Normal, Disp-1 Bottle, R-0      loratadine-pseudoephedrine (Loratadine-D) 5-120 mg per tablet Take 1 Tab by mouth two (2) times a day., Normal, Disp-20 Tab, R-0      metFORMIN (GLUCOPHAGE) 500 mg tablet Take 1 Tab by mouth two (2) times daily (with meals). , Normal, Disp-60 Tab,R-5             Provider Notes (Medical Decision Making):   DDX pneumonia upper respiratory infection COVID-19  Procedures:  Procedures    Please note that this dictation was completed with Dragon, computer voice recognition software.   Quite often unanticipated grammatical, syntax, homophones, and other interpretive errors are inadvertently transcribed by the computer software. Please disregard these errors. Additionally, please excuse any errors that have escaped final proofreading. Diagnosis     Clinical Impression:   1. COVID-19 virus infection    2. Acute nonintractable headache, unspecified headache type    3.  Cough

## 2021-01-27 ENCOUNTER — PATIENT OUTREACH (OUTPATIENT)
Dept: CASE MANAGEMENT | Age: 31
End: 2021-01-27

## 2021-01-27 NOTE — PROGRESS NOTES
ACM outreach to patient today in order to perform follow-up assessment. Patient requests to return ACM phone call at a later time; reports that she is currently on the telephone with her doctor.

## 2021-01-28 NOTE — PROGRESS NOTES
2021  3:39 PM    Patient contacted regarding COVID-19 diagnosis. Discussed COVID-19 related testing which was not done at this time. Test results were not done. Patient informed of results, if available? N/A     Per chart review, patient reported testing positive for COVID-19 on 21; patient reports test completed on  and notified of result on .       Care Transition Nurse/ Ambulatory Care Manager/ LPN Care Coordinator contacted the patient by telephone to perform follow-up assessment. Verified name and  with patient as identifiers. Symptoms reviewed with patient who verbalized the following symptoms: fatigue, pain or aching joints, cough, loss or taste or smell, sweating, diarrhea, no new symptoms and no worsening symptoms, fever. Patient reports presence of chest discomfort with coughing; denies persistent pain or pressure in her chest.      Due to no new or worsening symptoms encounter was not routed to provider for escalation. Future Appointments:  Future Appointments   Date Time Provider Joanne Meza   2021  9:15 AM [facility]        ACM reinforced patient education for CDC guidelines for isolation and safe discontinuation of isolation; pt verbalizes understanding. Education provided regarding infection prevention, and signs and symptoms of COVID-19 and when to seek medical attention with patient who verbalized understanding. Discussed exposure protocols and quarantine from [address] you at higher risk for severe illness  and given an opportunity for questions and concerns. The patient agrees to contact the COVID-19 hotline [phone] or PCP office for questions related to their healthcare. CTN/ACM/LPN provided contact information for future reference. From CDC: Are you at higher risk for severe illness?  Wash your hands often.    Avoid close contact (6 feet, which is about two arm lengths) with people who are sick.  [facility] Put distance between yourself and other people if COVID-19 is spreading in your community.  Clean and disinfect frequently touched surfaces.  Avoid all cruise travel and non-essential air travel.  Call your healthcare professional if you have concerns about COVID-19 and your underlying condition or if you are sick.     For more information on steps you can take to protect yourself, see CDC's How to Rodríguez for follow-up call in 5-7 days based on severity of symptoms and risk factors.

## 2021-01-29 ENCOUNTER — VIRTUAL VISIT (OUTPATIENT)
Dept: INTERNAL MEDICINE CLINIC | Age: 31
End: 2021-01-29
Payer: COMMERCIAL

## 2021-01-29 DIAGNOSIS — R05.9 COUGH: ICD-10-CM

## 2021-01-29 DIAGNOSIS — R53.81 MALAISE: ICD-10-CM

## 2021-01-29 DIAGNOSIS — R51.9 HEADACHE DUE TO VIRAL INFECTION: ICD-10-CM

## 2021-01-29 DIAGNOSIS — U07.1 COVID-19: Primary | ICD-10-CM

## 2021-01-29 DIAGNOSIS — B34.9 HEADACHE DUE TO VIRAL INFECTION: ICD-10-CM

## 2021-01-29 PROCEDURE — 99214 OFFICE O/P EST MOD 30 MIN: CPT | Performed by: INTERNAL MEDICINE

## 2021-01-29 NOTE — LETTER
NOTIFICATION RETURN TO WORK / SCHOOL 
 
1/29/2021 9:21 AM 
 
Ms. Josr Lewis [facility] [place] [id number] To Whom It May Concern: 
 
Josr Lewis [facility] the [facility] She will return to work/school on: 2/8/2021. If there are questions or concerns please have the patient contact our office. Sincerely, Linda Bolaños MD

## 2021-01-29 NOTE — PROGRESS NOTES
Chief Complaint   Patient presents with    Positive For Covid-19     [id number]     1. Have you been to the ER, urgent care clinic since your last visit? Hospitalized since your last visit? No    2. Have you seen or consulted any other health care providers outside of the [address] [place] [address] since your last visit? Include any pap smears or colon screening.  No     Pain level 9-Generalized

## 2021-02-03 ENCOUNTER — PATIENT OUTREACH (OUTPATIENT)
Dept: CASE MANAGEMENT | Age: 31
End: 2021-02-03

## 2021-02-03 NOTE — PROGRESS NOTES
ACM outreach to patient today in order to perform follow-up assessment; lvm requesting a return phone call to this ACM.

## 2021-02-04 ENCOUNTER — HOSPITAL ENCOUNTER (EMERGENCY)
Age: 31
Discharge: HOME | End: 2021-02-04
Attending: EMERGENCY MEDICINE
Payer: COMMERCIAL

## 2021-02-04 VITALS
OXYGEN SATURATION: 100 % | SYSTOLIC BLOOD PRESSURE: 165 MMHG | DIASTOLIC BLOOD PRESSURE: 85 MMHG | TEMPERATURE: 98.3 F | WEIGHT: 293 LBS | HEIGHT: 61 IN | HEART RATE: 89 BPM | BODY MASS INDEX: 55.32 KG/M2 | RESPIRATION RATE: 18 BRPM

## 2021-02-04 DIAGNOSIS — Z86.16 HISTORY OF COVID-19: ICD-10-CM

## 2021-02-04 DIAGNOSIS — R73.9 HYPERGLYCEMIA: Primary | ICD-10-CM

## 2021-02-04 LAB
GLUCOSE BLD STRIP.AUTO-MCNC: 240 MG/DL (ref 65–100)
SERVICE CMNT-IMP: ABNORMAL

## 2021-02-04 PROCEDURE — 82962 GLUCOSE BLOOD TEST: CPT

## 2021-02-04 PROCEDURE — 99283 EMERGENCY DEPT VISIT LOW MDM: CPT

## 2021-02-04 RX ORDER — METFORMIN HYDROCHLORIDE 500 MG/1
500 TABLET ORAL 2 TIMES DAILY WITH MEALS
Qty: 30 TAB | Refills: 0 | Status: SHIPPED | OUTPATIENT
Start: 2021-02-04 | End: 2021-05-12 | Stop reason: SDUPTHER

## 2021-02-04 NOTE — ED PROVIDER NOTES
EMERGENCY DEPARTMENT HISTORY AND PHYSICAL EXAM    Date: 2/4/2021  Patient Name: Abraham Roe    History of Presenting Illness     Chief Complaint   Patient presents with    Positive For Covid-19         History Provided By: Patient    Chief Complaint: high blood sugar        HPI: Abraham Roe is a 27 y.o. female with a PMH of Diabetes hypertension asthma history of COVID-19 who presents with high blood sugar. Patient states she was nauseous this morning and did not take her Metformin. Patient states she had tested +2 weeks ago for COVID-19 and had a negative test on Sunday. Patient states she was concerned about the nausea and was evaluated at [facility]. Patient states her blood sugar there was 380 and advised to come to the emergency room for treatment. Patient denies vomiting states she did not take her Metformin this morning due to the nausea. Patient request a refill for her Metformin. .  She denies shortness of breath or wheezing headache. She states she still has loss of taste. Because there are no symptoms or complaints of pain, there is no reported quality, severity, modifying factors, or associated signs and symptoms reported. PCP: Sara Stuart MD    Current Outpatient Medications   Medication Sig Dispense Refill    metFORMIN (GLUCOPHAGE) 500 mg tablet Take 1 Tab by mouth two (2) times daily (with meals). 30 Tab 0    metFORMIN (GLUCOPHAGE) 500 mg tablet Take 1 Tab by mouth two (2) times daily (with meals). 60 Tab 5    albuterol (PROVENTIL HFA, VENTOLIN HFA, PROAIR HFA) 90 mcg/actuation inhaler Take 2 Puffs by inhalation every four (4) hours as needed for Wheezing. 1 Inhaler 0    ibuprofen (MOTRIN) 600 mg tablet Take 1 Tab by mouth every six (6) hours as needed for Pain. 20 Tab 0    lisinopriL (PRINIVIL, ZESTRIL) 10 mg tablet Take 1 Tab by mouth daily. 30 Tab 0    fluticasone propionate (FLONASE) 50 mcg/actuation nasal spray 2 Sprays by Both Nostrils route daily.  1 Bottle 0    loratadine-pseudoephedrine (Loratadine-D) 5-120 mg per tablet Take 1 Tab by mouth two (2) times a day. 20 Tab 0       Past History     Past Medical History:  Past Medical History:   Diagnosis Date    Asthma     pt denies    Diabetes ([address] 2016    Hypertension     Other ill-defined conditions(799.89)     back pain    Seizures ([address]     last seizure was 89 years old    SOB (shortness of breath)        Past Surgical History:  Past Surgical History:   Procedure Laterality Date    HX GYN  2014    Pt reports  in past.    HX TONSILLECTOMY         Family History:  Family History   Problem Relation Age of Onset    Diabetes Mother     Diabetes Father     Diabetes Maternal Grandmother        Social History:  Social History     Tobacco Use    Smoking status: Never Smoker    Smokeless tobacco: Never Used   Substance Use Topics    Alcohol use: No    Drug use: No       Allergies:  No Known Allergies      Review of Systems   Review of Systems   Constitutional: Positive for appetite change. Negative for fatigue and fever. Respiratory: Negative for shortness of breath and wheezing. Cardiovascular: Negative for chest pain. Gastrointestinal: Positive for nausea. Negative for abdominal pain. Musculoskeletal: Negative for arthralgias. Skin: Negative for pallor and rash. Neurological: Negative for dizziness and headaches. All other systems reviewed and are negative. Physical Exam     Vitals:    21 1535 21 1725   BP: (!) 170/87 (!) 165/85   Pulse: 91 89   Resp: 20 18   Temp: 98.3 °F (36.8 °C)    SpO2: 100% 100%   Weight: 137.7 kg (303 lb 8 oz)    Height: 5' 1\" (1.549 m)      Physical Exam  Vitals signs and nursing note reviewed. Constitutional:       General: She is not in acute distress. Appearance: She is well-developed. HENT:      Head: Normocephalic and atraumatic.       Right Ear: External ear normal.      Left Ear: External ear normal.      Nose: Nose normal. Mouth/Throat:      Mouth: Mucous membranes are moist.   Eyes:      Conjunctiva/sclera: Conjunctivae normal.   Neck:      Musculoskeletal: Normal range of motion and neck supple. Cardiovascular:      Rate and Rhythm: Normal rate and regular rhythm. Heart sounds: Normal heart sounds. Pulmonary:      Effort: Pulmonary effort is normal. No respiratory distress. Breath sounds: Normal breath sounds. No wheezing. Abdominal:      General: Bowel sounds are normal.      Palpations: Abdomen is soft. Tenderness: There is no abdominal tenderness. Musculoskeletal: Normal range of motion. Lymphadenopathy:      Cervical: No cervical adenopathy. Skin:     General: Skin is warm and dry. Findings: No rash. Neurological:      Mental Status: She is alert and oriented to person, place, and time. Cranial Nerves: No cranial nerve deficit. Coordination: Coordination normal.   Psychiatric:         Behavior: Behavior normal.         Thought Content: Thought content normal.         Judgment: Judgment normal.           Diagnostic Study Results     Labs -     Recent Results (from the past 12 hour(s))   GLUCOSE, POC    Collection Time: 02/04/21  4:35 PM   Result Value Ref Range    Glucose (POC) 240 (H) 65 - 100 mg/dL    Performed by Martínez Springer        Radiologic Studies -   No orders to display     CT Results  (Last 48 hours)    None        CXR Results  (Last 48 hours)    None            Medical Decision Making   I am the first provider for this patient. I reviewed the vital signs, available nursing notes, past medical history, past surgical history, family history and social history. Vital Signs-Reviewed the patient's vital signs. Records Reviewed: Nursing Notes            Disposition:  Home    DISCHARGE NOTE:           Care plan outlined and precautions discussed. Patient has no new complaints, changes, or physical findings. Results of test were reviewed with the patient.  All medications were reviewed with the patient; will d/c home with metformin. All of pt's questions and concerns were addressed. Patient was instructed and agrees to follow up with PCP, as well as to return to the ED upon further deterioration. Patient is ready to go home. Follow-up Information     Follow up With Specialties Details Why Contact Info    Devika Day MD Internal Medicine In 1 week  [address]  [address]  [phone]            Discharge Medication List as of 2/4/2021  5:21 PM          Provider Notes (Medical Decision Making):   DDX hyperglycemia HX Covid 19 viral illness  Procedures:  Procedures    Please note that this dictation was completed with Dragon, computer voice recognition software. Quite often unanticipated grammatical, syntax, homophones, and other interpretive errors are inadvertently transcribed by the computer software. Please disregard these errors. Additionally, please excuse any errors that have escaped final proofreading. Diagnosis     Clinical Impression:   1. Hyperglycemia    2.  History of COVID-19

## 2021-02-04 NOTE — ED TRIAGE NOTES
Pt sent for [facility] for continuing COVID symptoms of headache and nausea after testing positive two weeks ago.  Pt sent from [facility] to have blood work done.

## 2021-02-04 NOTE — ED NOTES
Pt presents to ED ambulatory complaining of elevated blood sugar. Pt reports she was still having COVID-19 symptoms, went to [facility] and they told her to come to the ER. Pt reports she just wants a refill of her Metformin and go home. Pt reports she did not take it this AM because she was feeling nauseous. Pt is alert and oriented x 4, RR even and unlabored, skin is warm and dry. Assessment completed and pt updated on plan of care. Call bell in reach. Emergency Department Nursing Plan of Care       The Nursing Plan of Care is developed from the Nursing assessment and Emergency Department Attending provider initial evaluation. The plan of care may be reviewed in the ED Provider note.     The Plan of Care was developed with the following considerations:   Patient / Family readiness to learn indicated by:verbalized understanding  Persons(s) to be included in education: patient  Barriers to Learning/Limitations:No    Signed     Lucie Lazcano RN    2/4/2021   4:30 PM

## 2021-02-05 ENCOUNTER — PATIENT OUTREACH (OUTPATIENT)
Dept: CASE MANAGEMENT | Age: 31
End: 2021-02-05

## 2021-02-05 NOTE — PROGRESS NOTES
ED 21:  Hyperglycemia/History of COVID-19     Patient contacted regarding recent discharge and COVID-19 risk. Discussed COVID-19 related testing which was not done at this time. Test results were not done. Patient informed of results, if available? no    Outreach made within 2 business days of discharge: Yes    Care Transition Nurse/ Ambulatory Care Manager/ LPN Care Coordinator contacted the patient by telephone to perform post discharge assessment. Verified name and  with patient as identifiers. Patient has following risk factors of: diabetes. CTN/ACM/LPN reviewed discharge instructions, medical action plan and red flags related to discharge diagnosis. Reviewed and educated them on any new and changed medications related to discharge diagnosis. Advised obtaining a 90-day supply of all daily and as-needed medications. Advance Care Planning:   Does patient have an Advance Directive: currently not on file; education provided    Michael Toth remains as Healthcare Decision Maker. Education provided regarding infection prevention, and signs and symptoms of COVID-19 and when to seek medical attention with patient who verbalized understanding. Discussed exposure protocols and quarantine from [address] you at higher risk for severe illness  and given an opportunity for questions and concerns. The patient agrees to contact the COVID-19 hotline [phone] or PCP office for questions related to their healthcare. CTN/ACM/LPN provided contact information for future reference. From CDC: Are you at higher risk for severe illness?  Wash your hands often.  Avoid close contact (6 feet, which is about two arm lengths) with people who are sick.  Put distance between yourself and other people if COVID-19 is spreading in your community.  Clean and disinfect frequently touched surfaces.  Avoid all cruise travel and non-essential air travel.    Call your healthcare professional if you have concerns about COVID-19 and your underlying condition or if you are sick. For more information on steps you can take to protect yourself, see CDC's How to Protect Yourself      Patient/family/caregiver given information for [employer] and agrees to enroll no      Plan for follow-up call in 7-14 days based on severity of symptoms and risk factors.

## 2021-02-09 ENCOUNTER — PATIENT OUTREACH (OUTPATIENT)
Dept: CASE MANAGEMENT | Age: 31
End: 2021-02-09

## 2021-02-09 NOTE — PROGRESS NOTES
Per chart review, patient is currently followed by Laurie Solis RN for [address] Transitions due to subsequent ED visit to [facility] ED on 2/4/21. No further outreach and/or follow-up by this ACM is scheduled at this time.

## 2021-02-19 NOTE — PROGRESS NOTES
----- Message from Lexus Huerta sent at 2021  7:59 AM CST -----  Regardin PRE CERT  Faxed clinicals to [facility] fax [phone] and case number is [id number]     PT DAILY TREATMENT NOTE - [facility] 2-15    Patient Name: Lina Garcia  Date:2018  : 1990  [x]  Patient  Verified  Payor: SELF PAY / Plan: [employer] SELF PAY / Product Type: Self Pay /    In time:1:00pm Out time: 1:55pm  Total Treatment Time (min): 55  Total Timed Codes (min): 55  1:1 Treatment Time (MC only): --   Visit #: 6    Treatment Area: Pain in thoracic spine [M54.6]    SUBJECTIVE  Pain Level (0-10 scale): 0/10  Any medication changes, allergies to medications, adverse drug reactions, diagnosis change, or new procedure performed?: [x] No    [] Yes (see summary sheet for update)  Subjective functional status/changes:   [] No changes reported  Pt returns to PT after 2 weeks because she had follow-up with her MD on 2018 and was instructed to continue PT. Pt denies pain but said it is only because she took ibuprofen. OBJECTIVE    55 min Therapeutic Exercise:  [x] See flow sheet :   Rationale: increase ROM, increase strength and improve coordination to improve the patients ability to perform functional activities with increased ease              With   [] TE   [] TA   [] neuro   [] other: Patient Education: [x] Review HEP  [] Progressed/Changed HEP based on:   [] positioning   [] body mechanics   [] transfers   [] heat/ice application    [x] other:      Other Objective/Functional Measures: --     Pain Level (0-10 scale) post treatment: 0/10    ASSESSMENT/Changes in Function:    Pt denied modalities at end of session and denied increase in pain with exercises.   Plan to continue outpatient PT 2x/week x 2 weeks to maximize rehab potential.  Patient will continue to benefit from skilled PT services to modify and progress therapeutic interventions, address functional mobility deficits, address ROM deficits, address strength deficits, analyze and address soft tissue restrictions, analyze and cue movement patterns, analyze and modify body mechanics/ergonomics and assess and modify postural abnormalities to attain remaining goals. []  See Plan of Care  []  See progress note/recertification  []  See Discharge Summary         Progress towards goals / Updated goals:  Short Term Goals: To be accomplished in 3 weeks:  1) Pt will be Independent with HEP. MET  2) Pt will be able to stand greater than 15 minutes without increase of pain to perform household chores and meal preparation. MET  3) Pt will be able to ambulate greater than 1 block without increase of pain. MET     Long Term Goals: To be accomplished in 6 weeks:  1) Pt will be able to sit greater than 60 minutes without pain. MET  2) Pt will be able to stand greater than 30 minutes without increase of pain. MET  3) Pt will be able to ambulate greater than 2 blocks without increase of pain. MET  4) Pt will be able to carry >/= 20 lbs without pain. MET  5) Pt will report improvement in overall functional mobility, as measured by FOTO, with an increased score of at least 26 points, from 37 to 63.  NOT ASSESSED    PLAN  [x]  Upgrade activities as tolerated     [x]  Continue plan of care  []  Update interventions per flow sheet       []  Discharge due to:_  []  Other:_    Demarcus Decker, PT, DPT   1/25/2018  7:34 AM

## 2021-02-23 ENCOUNTER — PATIENT OUTREACH (OUTPATIENT)
Dept: CASE MANAGEMENT | Age: 31
End: 2021-02-23

## 2021-02-23 NOTE — PROGRESS NOTES
Education Provided due to At Risk Condition: ED 2/4/21:  Hyperglycemia/History of COVID-19 f/u     Patient resolved from [address] episode on 2/23/2021  Discussed COVID-19 related testing which was not done at this time. Test results were not done. Patient informed of results, if available? no     Patient/family has been provided the following resources and education related to COVID-19:                         Signs, symptoms and red flags related to COVID-19            CDC exposure and quarantine guidelines            [employer] exposure contact - [phone]            Contact for their local Department of Health                 Patient currently reports that the following symptoms have improved:  no worsening symptoms. No further outreach scheduled with this CTN/ACM/LPN/HC/ MA. Episode of Care resolved. Patient has this CTN/ACM/LPN/HC/MA contact information if future needs arise.

## 2021-04-30 ENCOUNTER — TELEPHONE (OUTPATIENT)
Dept: SURGERY | Age: 31
End: 2021-04-30

## 2021-04-30 NOTE — TELEPHONE ENCOUNTER
Dr Latasha De Anda wanted to see patient Wednesday morning @ 11:30 due to a case added on for Wednesday afternoon

## 2021-05-12 ENCOUNTER — VIRTUAL VISIT (OUTPATIENT)
Dept: SURGERY | Age: 31
End: 2021-05-12
Payer: COMMERCIAL

## 2021-05-12 VITALS — BODY MASS INDEX: 53.37 KG/M2 | HEIGHT: 62 IN | WEIGHT: 290 LBS

## 2021-05-12 DIAGNOSIS — E66.01 MORBID OBESITY (HCC): Primary | ICD-10-CM

## 2021-05-12 PROCEDURE — 99215 OFFICE O/P EST HI 40 MIN: CPT | Performed by: SURGERY

## 2021-05-12 NOTE — PROGRESS NOTES
Bariatric Surgery Consultation    CC: Obesity  Subjective: The patient is a 27 y.o. obese  female with a Body mass index is 53.04 kg/m². The patient has been overweight since [facility] with the highest weight being current weight. They have been considering surgery for some time now. The patient presents to the clinic today to discuss surgical weight loss options. They have made multiple attempts at weight loss over the years without success. They have tried diet and exercise and pills. Unfortunately, none of these have lead to meaningful, sustained weight loss. The patient now suffers from multiple medical conditions related to their obesity including HTN, DM. Relevant previous surgical history includes: None. They have attended the bariatric seminar before the visit. The patient's goals for the surgery include lose weight and decrease medication. Tobacco use: no  GERD/hiatal hernia: no  Sleep Apnea assessment:    STOPBANG questionnaire     Do you Snore loudly? no  Do you often feel Tired, fatigued, or sleepy during the daytime? yes  Has anyone Observed you stop breathing during your sleep? no  Are you being treated for high blood Pressure? yes  BMI more than 35 kg/m2? yes  Age over 48years old? no  Neck Circumference >16 inches? no  Gender male? no  ______________________________________     SCORE: 3     If YES to 0 - 2, low risk of sleep apnea  If YES to 3 - 4 intermediate risk of having sleep apnea  If YES to 5 - 8 high risk of having sleep apnea (or 2 + BMI 35 or Neck > 17\" or Male)     Consent:  The patient and/or their healthcare decision maker is aware that this patient-initiated Telehealth encounter is a billable service, with coverage as determined by the patient's insurance carrier. They are aware that they may receive a bill and has provided verbal consent to proceed: Yes     This virtual visit was conducted via [employer].   Pursuant to the emergency declaration under the [facility] waiver authority and the [employer] Act, this Virtual  Visit was conducted to reduce the patient's risk of exposure to COVID-19 and provide continuity of care for an established patient. Services were provided through a video synchronous discussion virtually to substitute for in-person clinic visit. Due to this being a TeleHealth evaluation, many elements of the physical examination are unable to be assessed. Patient Active Problem List    Diagnosis Date Noted    Essential hypertension 2020    Medically noncompliant 2020    Obesity, morbid ([address] 2017    Diabetes ([address] 2016    Vaginal candida 2011    Herpes labialis 2010    Muscle spasm 2010    Backache 2010      Past Medical History:   Diagnosis Date    Asthma     pt denies    Diabetes ([address] 2016    Hypertension     Other ill-defined conditions(799.89)     back pain    Seizures ([address]     last seizure was 89 years old    SOB (shortness of breath)      Past Surgical History:   Procedure Laterality Date    HX GYN  2014    Pt reports  in past.    HX TONSILLECTOMY        Social History     Tobacco Use    Smoking status: Never Smoker    Smokeless tobacco: Never Used   Substance Use Topics    Alcohol use: No      Family History   Problem Relation Age of Onset    Diabetes Mother     Diabetes Father     Diabetes Maternal Grandmother     Breast Cancer Maternal Grandmother     Cancer Paternal Uncle       Prior to Admission medications    Medication Sig Start Date End Date Taking? Authorizing Provider   lisinopriL (PRINIVIL, ZESTRIL) 10 mg tablet Take 1 Tab by mouth daily. 21  Yes Alexi Fonseca MD   metFORMIN (GLUCOPHAGE) 500 mg tablet Take 1 Tab by mouth two (2) times daily (with meals).  20  Yes Alexi Fonseca MD   albuterol (PROVENTIL HFA, VENTOLIN HFA, PROAIR HFA) 90 mcg/actuation inhaler Take 2 Puffs by inhalation every four (4) hours as needed for Wheezing. 1/22/21   Milton Calvillo NP   ibuprofen (MOTRIN) 600 mg tablet Take 1 Tab by mouth every six (6) hours as needed for Pain. 1/6/21   Abdifatah Maher MD   fluticasone propionate (FLONASE) 50 mcg/actuation nasal spray 2 Sprays by Both Nostrils route daily. 12/22/20   Soledad Leiva MD   loratadine-pseudoephedrine (Loratadine-D) 5-120 mg per tablet Take 1 Tab by mouth two (2) times a day. 12/22/20   Soledad Leiva MD     No Known Allergies     Review of Systems:    Constitutional: No fever or chills  Neurologic: No headache  Eyes: No scleral icterus or irritated eyes  Nose: No nasal pain or drainage  Mouth: No oral lesions or sore throat  Cardiac: No palpations or chest pain  Pulmonary: No cough or shortness or breath  Gastrointestinal: No nausea, emesis, diarrhea, or constipation  Genitourinary: No dysuria  Musculoskeletal: No muscle or joint tenderness  Skin: No rashes or lesions  Psychiatric: No anxiety or depressed mood      Objective:     Physical Exam:  Visit Vitals  Ht 5' 2\" (1.575 m)   Wt 290 lb (131.5 kg)   BMI 53.04 kg/m²     General: No acute distress, conversant  HENT: Normocephalic  Neck: Trachea midline without LAD  Pulmonary: Symmetric chest rise with normal effort  GI: Obese  Psych: Depressed mood, limited interaction, short answers    Assessment:     Morbid obesity with comorbidities  Plan:   The patient presents today with multiple complications relating to their obesity as detailed above. The patient has made multiple unsuccessful attempts at weight loss as detailed above. The patient desires surgical weight loss for a better chance of lifelong weight control and control of co-morbidities. They have attended the bariatric seminar and meet the qualifications for surgery based on the NIH criteria.  We have discussed the various surgical options including the laparoscopic sleeve gastrectomy and gastric bypass. We also discussed none operative alternatives. The patient is currently interested in the gastric sleeve but I have highly suggested the gastric bypass which she thinks is reasonable. They will need to a/an UGI to evaluate their anatomy pre operatively. H pylori antigen ordered as well. The patient was short with the nurse on the phone and was laying in her bed with her head on the pillow for the majority of the visit. 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 Her answers were very short and she appeared to be fairly testy when I was talking to her. I do have concerns regarding her mental fitness for surgery and whether or not she has appropriate commitment to the process. She would definitely to have a very thorough psychological and dietary evaluation. Unless she passes the next 6 months with flying colors I would be very hesitant to perform surgery on her and she likely needs medication for possibly depression before surgery. We have discussed the possible complications of bariatric surgery which include but are not limited to failed weight loss, weight regain, malnutrition, leak, bleed, stricture, gastric ulcer, gastric fistula, gastric bleed, gallstones, new or worsening gastric reflux, nausea, emesis, internal hernia, abdominal wall hernia, gastric perforation, need for revision / conversion / or reversal, pregnancy complications and loss, intestinal ischemia, post operative skin complications, possible thinning of their hair, bowel obstruction, dumping syndrome, wound infection, blood clots (DVT, mesenteric thrombus, pulmonary embolism), increased addictive tendency, risk of anesthesia, and death. The patient understands this is a life altering decision and will require compliance to the program for the remainder of their life in order to be monitored to avoid complication and ensure successful, sustained weight control.  They will be placed on a lifelong low carbohydrate and low sugar diet, exercise, and vitamin regimen and will require frequent blood draws and office visits to ensure adequate nutrition and program compliance. Visits and follow up will be in compliance with the guidelines set forth by [facility]. I have specifically mentioned the need to avoid all personal and second-hand tobacco exposure, systemic steroids, and NSAIDS after any bariatric surgery to help avoid the above listed complications. The patient has expressed understanding of the above and would like to enroll in the program. The patient will be submitted for medical and psychological clearance along with establishing with out dietician and joining the pre / post operative support group. They will be screened for depression and sleep apnea and treated pre operatively if needed. The patient will have to demonstrate cessation of tobacco if relevant for at least 3 months preoperatively along with having a controlled HbA1c less than 8. After successful completion of the preoperative regimen the patient will be submitted for insurance approval and pending this will be scheduled for surgery. All questions from the patient have been answered and they have demonstrated appropriate understanding of the process. Existing past medical problems are listed below along with how they are or will be addressed:    Patient Active Problem List    Diagnosis Date Noted    Essential hypertension 07/29/2020    Medically noncompliant 07/29/2020    Obesity, morbid ([address] 12/07/2017    Diabetes [address] 12/28/2016    Vaginal candida 02/24/2011    Herpes labialis 12/23/2010    Muscle spasm 04/12/2010    Backache 04/12/2010         Total time involved with this patient's care was: 45 minutes. This involved reviewing patient record, talking with patient, and charting on patient.     Signed By: Yohan Oakley MD  Bariatric and General Surgeon  [facility]    May 12, 2021

## 2021-05-12 NOTE — Clinical Note
UGI and H pylori. She wanted a sleeve originally. Recommended a bypass. She appears to be open to this. She was rude to Hussein Strong and very short with me with her answers. She was laying in bed with her head on the pillow the whole visit. I have concerns regarding her mental fitness for the surgery. Being that a very thorough psych evaluation and possibly started on antidepressant before considering surgery.

## 2021-05-12 NOTE — PROGRESS NOTES
1. Have you been to the ER, urgent care clinic since your last visit? Hospitalized since your last visit? no    2. Have you seen or consulted any other health care providers outside of the [address] [place] [address] since your last visit? Include any pap smears or colon screening.  no

## 2021-05-26 ENCOUNTER — TELEPHONE (OUTPATIENT)
Dept: SURGERY | Age: 31
End: 2021-05-26

## 2021-05-26 NOTE — TELEPHONE ENCOUNTER
Spoke with patient who wants to know. What is UGI. X ray of your stomach before deciding whether you can have surgery.

## 2021-05-26 NOTE — TELEPHONE ENCOUNTER
Pt. Called and would like more information on what an Upper GI is and why she is having one. Please call pt.

## 2021-06-21 ENCOUNTER — PATIENT MESSAGE (OUTPATIENT)
Dept: INTERNAL MEDICINE CLINIC | Age: 31
End: 2021-06-21

## 2021-06-21 DIAGNOSIS — E11.9 TYPE 2 DIABETES MELLITUS WITHOUT COMPLICATION, WITHOUT LONG-TERM CURRENT USE OF INSULIN (HCC): ICD-10-CM

## 2021-06-23 RX ORDER — LISINOPRIL 10 MG/1
10 TABLET ORAL DAILY
Qty: 30 TABLET | Refills: 5 | Status: SHIPPED | OUTPATIENT
Start: 2021-06-23 | End: 2021-06-23 | Stop reason: SDUPTHER

## 2021-06-23 RX ORDER — METFORMIN HYDROCHLORIDE 500 MG/1
500 TABLET ORAL 2 TIMES DAILY WITH MEALS
Qty: 60 TABLET | Refills: 5 | Status: SHIPPED | OUTPATIENT
Start: 2021-06-23 | End: 2021-06-23 | Stop reason: SDUPTHER

## 2021-06-24 RX ORDER — LISINOPRIL 10 MG/1
10 TABLET ORAL DAILY
Qty: 30 TABLET | Refills: 5 | Status: SHIPPED | OUTPATIENT
Start: 2021-06-24 | End: 2022-08-24

## 2021-06-24 RX ORDER — METFORMIN HYDROCHLORIDE 500 MG/1
500 TABLET ORAL 2 TIMES DAILY WITH MEALS
Qty: 60 TABLET | Refills: 5 | Status: SHIPPED | OUTPATIENT
Start: 2021-06-24 | End: 2022-08-24

## 2021-06-29 ENCOUNTER — HOSPITAL ENCOUNTER (OUTPATIENT)
Dept: GENERAL RADIOLOGY | Age: 31
Discharge: HOME | End: 2021-06-29
Attending: SURGERY
Payer: COMMERCIAL

## 2021-06-29 DIAGNOSIS — E66.01 MORBID OBESITY (HCC): ICD-10-CM

## 2021-06-29 PROCEDURE — 74240 X-RAY XM UPR GI TRC 1CNTRST: CPT

## 2021-07-08 ENCOUNTER — CLINICAL SUPPORT (OUTPATIENT)
Dept: SURGERY | Age: 31
End: 2021-07-08

## 2021-07-08 DIAGNOSIS — E66.01 MORBID OBESITY (HCC): Primary | ICD-10-CM

## 2021-07-08 NOTE — PROGRESS NOTES
Pre-operative Bariatric Nutrition Evaluation ()    Date: 2021   Physician/Surgeon: Dr. Yumiko Blackwell  Name: Rhonda Gillette  :  1990  Age: 27  Gender: Female  Type of Surgery: []           Gastric Bypass   [x]   Sleeve Gastrectomy    ASSESSMENT:    Past Medical History:  DM, HTN    Medications/Supplements:   Prior to Admission medications    Medication Sig Start Date End Date Taking? Authorizing Provider   metFORMIN (GLUCOPHAGE) 500 mg tablet Take 1 Tablet by mouth two (2) times daily (with meals). 21   Brian Whiting MD   lisinopriL (PRINIVIL, ZESTRIL) 10 mg tablet Take 1 Tablet by mouth daily. 21   Brian Whiting MD   albuterol (PROVENTIL HFA, VENTOLIN HFA, PROAIR HFA) 90 mcg/actuation inhaler Take 2 Puffs by inhalation every four (4) hours as needed for Wheezing. 21   Simona Gould NP   ibuprofen (MOTRIN) 600 mg tablet Take 1 Tab by mouth every six (6) hours as needed for Pain. 21   Susan Rubio MD   fluticasone propionate (FLONASE) 50 mcg/actuation nasal spray 2 Sprays by Both Nostrils route daily. 20   Jhon Livingston MD   loratadine-pseudoephedrine (Loratadine-D) 5-120 mg per tablet Take 1 Tab by mouth two (2) times a day. 20   Jhon Livingston MD       Food Allergies/Intolerances: None    Anthropometrics:    Ht:62 inches   Wt: 290 lbs   IBW: 110 lbs    %IBW: 264     BMI: 53   Category: Obesity class III    Reported wt history: Pt presents today for pre-op nutrition evaluation for wt loss surgery. Reports lowest adult BW of 240 lbs and highest adult BW of 305 lbs. Attributes wt gain over the years r/t poor eating habits, birth control. Has attempted wt loss through various methods with most successful wt loss of 40 lbs. Has been unable to maintain long term or significant wt loss and is now seeking approval for weight loss surgery. Pt will need to complete 6 months of supervised weight loss for insurance requirements. Exercise/Physical Activity: walks at work and goes to gym    Reported Diet History: Herbal life, Toya, protein shakes     24 Hour Diet Recall  Breakfast  Boiled egg, toast   Lunch  skips   Dinner  skips   Snacks  Wheat thins   Beverages  Water- 2 gallons       Environment/Psychosocial/Support: Pt states having OCD and anxiety- \"I'm always moving around; I can't sit still\". 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 Ranks support 10 of 10- ; pt states extended family not supportive. NUTRITION DIAGNOSIS:  Self-monitoring deficit r/t lack of perceived value for consistent meals evidenced by pt skipping meals on daily basis (1 meal per day); consuming large amounts of water to \"fill the void\". Food and nutrition related knowledge deficit r/t lack of prior exposure to information evidenced by pt demonstrates need for nutrition education for sleeve gastrectomy. NUTRITION INTERVENTION:  Pt educated on nutrition recommendations for weight loss surgery, specifically sleeve gastrectomy . Instructed on consuming 3 meals per day starting now. Use the balanced plate method to plan meals, include 3 oz of lean source of protein, 1/2 cup whole grains, unlimited non-starchy vegetables, 1/2 cup fruit and 1 serving of low fat dairy. Utilize handouts listing healthy snack and meal ideas to limit restaurant meals. After surgery measure all meals to 1/2 cup. Each meal will contain a 1/4 cup lean protein and 1/4 cup fruit, non-starchy vegetable or starch (limiting to once per day). Aim for 60 g protein per day. Sip on 48-64 oz of sugar free, calorie free, non-carbonated beverages each day. Do not use a straw. Do not consume beverages 30 minutes before, during or 30 minutes after meals. Read all nutrition labels. Demonstrated and emphasized identifying serving size, total fat, sugar and protein content. Defined low fat as </= 3 g per serving. Discussed lean and extra lean sources of protein.  Provided list of low fat cooking methods. Avoid foods with sugar listed in the first 3 ingredients and >/15 g sugar per serving. Excess sugar/fat intake may lead to dumping syndrome. Discussed signs and symptoms of dumping syndrome. Practice mindful eating habits; take small bites, chew thoroughly, avoid distractions, utilize hunger/fullness scale. Consume meals over 20-30 minutes. Attend Bariatric Support Group and increase physical activity (approved per MD) for long term weight maintenance. NUTRITION MONITORING AND EVALUATION:    The following goals were established with patient;  1. Decrease water from 2 gallons a day to 1 gallon a day  2. Eat 2 meals a day to include protein  3. Review nutrition education materials. Follow up next month for continue nutrition education. Specific tips and techniques to facilitate compliance with above recommendations were provided and discussed. Nutrition evaluation reveals important lifestyle changes are indicated. Pt upset and questioning value of monthly visits with dietitian- states knowing about bariatric surgery already. Goals set and recommendations made. Will follow up next month for further diet education. If further details are desired please feel free to contact me at [phone]. This phone number was also provided to the patient for any further questions or concerns.            Flavia Sánchez RD

## 2021-07-23 ENCOUNTER — HOSPITAL ENCOUNTER (EMERGENCY)
Age: 31
Discharge: HOME | End: 2021-07-23
Attending: EMERGENCY MEDICINE
Payer: COMMERCIAL

## 2021-07-23 VITALS
HEART RATE: 80 BPM | SYSTOLIC BLOOD PRESSURE: 136 MMHG | TEMPERATURE: 98.4 F | OXYGEN SATURATION: 100 % | BODY MASS INDEX: 53.37 KG/M2 | WEIGHT: 290 LBS | DIASTOLIC BLOOD PRESSURE: 74 MMHG | HEIGHT: 62 IN | RESPIRATION RATE: 16 BRPM

## 2021-07-23 DIAGNOSIS — K59.00 CONSTIPATION, UNSPECIFIED CONSTIPATION TYPE: Primary | ICD-10-CM

## 2021-07-23 DIAGNOSIS — R82.90 ABNORMAL URINALYSIS: ICD-10-CM

## 2021-07-23 DIAGNOSIS — R10.9 RIGHT FLANK PAIN: ICD-10-CM

## 2021-07-23 LAB
APPEARANCE UR: ABNORMAL
BACTERIA URNS QL MICRO: ABNORMAL /HPF
BILIRUB UR QL: NEGATIVE
COLOR UR: ABNORMAL
EPITH CASTS URNS QL MICRO: ABNORMAL /LPF
GLUCOSE UR STRIP.AUTO-MCNC: NEGATIVE MG/DL
HCG UR QL: NEGATIVE
HGB UR QL STRIP: NEGATIVE
KETONES UR QL STRIP.AUTO: NEGATIVE MG/DL
LEUKOCYTE ESTERASE UR QL STRIP.AUTO: ABNORMAL
NITRITE UR QL STRIP.AUTO: NEGATIVE
PH UR STRIP: 6 [PH] (ref 5–8)
PROT UR STRIP-MCNC: 30 MG/DL
RBC #/AREA URNS HPF: ABNORMAL /HPF (ref 0–5)
SP GR UR REFRACTOMETRY: 1.01 (ref 1–1.03)
UROBILINOGEN UR QL STRIP.AUTO: 0.2 EU/DL (ref 0.2–1)
WBC URNS QL MICRO: ABNORMAL /HPF (ref 0–4)

## 2021-07-23 PROCEDURE — 99284 EMERGENCY DEPT VISIT MOD MDM: CPT

## 2021-07-23 PROCEDURE — 74011250637 HC RX REV CODE- 250/637: Performed by: EMERGENCY MEDICINE

## 2021-07-23 PROCEDURE — 81001 URINALYSIS AUTO W/SCOPE: CPT

## 2021-07-23 PROCEDURE — 81025 URINE PREGNANCY TEST: CPT

## 2021-07-23 RX ORDER — IBUPROFEN 400 MG/1
800 TABLET ORAL
Status: COMPLETED | OUTPATIENT
Start: 2021-07-23 | End: 2021-07-23

## 2021-07-23 RX ORDER — MAGNESIUM CITRATE
148 SOLUTION, ORAL ORAL
Status: COMPLETED | OUTPATIENT
Start: 2021-07-23 | End: 2021-07-23

## 2021-07-23 RX ORDER — MAGNESIUM CITRATE
SOLUTION, ORAL ORAL
Qty: 1 BOTTLE | Refills: 0 | Status: SHIPPED | OUTPATIENT
Start: 2021-07-23 | End: 2022-10-04

## 2021-07-23 RX ORDER — CEPHALEXIN 500 MG/1
500 CAPSULE ORAL 4 TIMES DAILY
Qty: 28 CAPSULE | Refills: 0 | Status: SHIPPED | OUTPATIENT
Start: 2021-07-23 | End: 2021-07-30

## 2021-07-23 RX ORDER — DOCUSATE SODIUM 100 MG/1
100 CAPSULE, LIQUID FILLED ORAL 2 TIMES DAILY
Qty: 60 CAPSULE | Refills: 2 | Status: SHIPPED | OUTPATIENT
Start: 2021-07-23 | End: 2021-10-21

## 2021-07-23 RX ADMIN — Medication 148 ML: at 02:17

## 2021-07-23 RX ADMIN — IBUPROFEN 800 MG: 400 TABLET, FILM COATED ORAL at 03:26

## 2021-07-23 NOTE — ED PROVIDER NOTES
Female with a history of diabetes, hypertension, asthma, constipation, morbid obesity presents with chief complaint of \"I am constipated. \"  Patient describes right flank pain and right-sided abdominal pain which began suddenly while she was sitting on the toilet trying to defecate. Patient had a similar presentation, the left-sided, in 2020. She was seen here and got MiraLAX and had a bowel movement and feel better. Requesting medication to make her have a bowel movement. She denies dysuria, hematuria, diarrhea, vomiting. Believes she is constipated because she ate multiple hamburgers and hotdogs at a cookout tonight.            Past Medical History:   Diagnosis Date    Asthma     pt denies    Diabetes ([facility] [address] 2016    Hypertension     Other ill-defined conditions(799.89)     back pain    Seizures ([facility] [address]     last seizure was 89 years old    SOB (shortness of breath)        Past Surgical History:   Procedure Laterality Date    HX GYN  2014    Pt reports  in past.    HX TONSILLECTOMY           Family History:   Problem Relation Age of Onset    Diabetes Mother     Diabetes Father     Diabetes Maternal Grandmother     Breast Cancer Maternal Grandmother     Cancer Paternal Uncle        Social History     Socioeconomic History    Marital status: SINGLE     Spouse name: Not on file    Number of children: Not on file    Years of education: Not on file    Highest education level: Not on file   Occupational History    Not on file   Tobacco Use    Smoking status: Never Smoker    Smokeless tobacco: Never Used   Substance and Sexual Activity    Alcohol use: No    Drug use: No    Sexual activity: Yes     Partners: Male     Birth control/protection: None   Other Topics Concern    Not on file   Social History Narrative    Not on file     Social Determinants of Health     Financial Resource Strain:     Difficulty of Paying Living Expenses:    Food Insecurity:     Worried About Running Out of Food in the Last Year:    [address] in the Last Year:    Transportation Needs:     Lack of Transportation (Medical):  Lack of Transportation (Non-Medical):    Physical Activity:     Days of Exercise per Week:     Minutes of Exercise per Session:    Stress:     Feeling of Stress :    Social Connections:     Frequency of Communication with Friends and Family:     Frequency of Social Gatherings with Friends and Family:     Attends [employer] Services:     Active Member of Clubs or Organizations:     Attends Club or Organization Meetings:     Marital Status:    Intimate Partner Violence:     Fear of Current or Ex-Partner:     Emotionally Abused:     Physically Abused:     Sexually Abused: ALLERGIES: Patient has no known allergies. Review of Systems   Constitutional: Negative. Negative for chills, fever and unexpected weight change. HENT: Negative. Negative for congestion and trouble swallowing. Eyes: Negative for discharge. Respiratory: Negative. Negative for cough, chest tightness and shortness of breath. Cardiovascular: Negative. Negative for chest pain. Gastrointestinal: Positive for abdominal pain. Negative for abdominal distention, constipation, diarrhea and nausea. Endocrine: Negative. Genitourinary: Positive for flank pain. Negative for difficulty urinating, dysuria, frequency and urgency. Musculoskeletal: Negative for arthralgias and myalgias. Skin: Negative. Negative for color change. Allergic/Immunologic: Negative. Neurological: Negative. Negative for dizziness, speech difficulty and headaches. Hematological: Negative. Psychiatric/Behavioral: Negative. Negative for agitation and confusion. All other systems reviewed and are negative.       Vitals:    07/23/21 0131   BP: (!) 140/89   Pulse: 78   Resp: 18   Temp: 98.4 °F (36.9 °C)   SpO2: 100%   Weight: 131.5 kg (290 lb)   Height: 5' 2\" (1.575 m)            Physical Exam  Vitals and nursing note reviewed. Constitutional:       Appearance: She is well-developed. She is obese. HENT:      Head: Normocephalic and atraumatic. Eyes:      Conjunctiva/sclera: Conjunctivae normal.   Cardiovascular:      Rate and Rhythm: Normal rate and regular rhythm. Pulmonary:      Effort: Pulmonary effort is normal. No respiratory distress. Abdominal:      Palpations: Abdomen is soft. Tenderness: There is no abdominal tenderness. Comments: No reproducible abdominal or flank or back tenderness   Musculoskeletal:         General: No deformity. Normal range of motion. Cervical back: Neck supple. Skin:     General: Skin is warm and dry. Neurological:      Mental Status: She is alert and oriented to person, place, and time. Psychiatric:         Behavior: Behavior normal.         Thought Content: Thought content normal.          MDM  Number of Diagnoses or Management Options  Diagnosis management comments: Given flank pain and radiation, will check urine to rule out kidney stone or urinary tract infection. Will give aggressive treatment for constipation and if stooling relieves her symptoms, will DC    ED Course as of Jul 28 2349 Fri Jul 23, 2021   0310 Patient had a bowel movement after mag citrate and soapsuds enema and she is feeling significantly better. Has some residual abdominal pain, though again, significantly less than on arrival, and is asking for some Motrin.    [SS]   0435 Pain localized to right flank. Discussed abnormal urinalysis with patient and she is requesting treatment for UTI. Counseled to return for worsening pain. [SS]      ED Course User Index  [SS] Srinivasa Murray MD       Procedures      LABORATORY TESTS:  No results found for this or any previous visit (from the past 12 hour(s)).     IMAGING RESULTS:  No orders to display       MEDICATIONS GIVEN:  Medications   magnesium citrate solution 148 mL (148 mL Oral Given 7/23/21 0217)   ibuprofen (MOTRIN) tablet 800 mg (800 mg Oral Given 7/23/21 0326)       IMPRESSION:  1. Constipation, unspecified constipation type    2. Right flank pain    3. Abnormal urinalysis        PLAN:  1. Discharge Medication List as of 7/23/2021  4:37 AM      START taking these medications    Details   magnesium citrate solution 150ml (1/2 bottle) as needed for constipation. May repeat once if no results. , Normal, Disp-1 Bottle, R-0      docusate sodium (COLACE) 100 mg capsule Take 1 Capsule by mouth two (2) times a day for 90 days. , Normal, Disp-60 Capsule, R-2      cephALEXin (Keflex) 500 mg capsule Take 1 Capsule by mouth four (4) times daily for 7 days. , Normal, Disp-28 Capsule, R-0         CONTINUE these medications which have NOT CHANGED    Details   metFORMIN (GLUCOPHAGE) 500 mg tablet Take 1 Tablet by mouth two (2) times daily (with meals). , Normal, Disp-60 Tablet, R-5      lisinopriL (PRINIVIL, ZESTRIL) 10 mg tablet Take 1 Tablet by mouth daily. , Normal, Disp-30 Tablet, R-5      albuterol (PROVENTIL HFA, VENTOLIN HFA, PROAIR HFA) 90 mcg/actuation inhaler Take 2 Puffs by inhalation every four (4) hours as needed for Wheezing., Normal, Disp-1 Inhaler, R-0      ibuprofen (MOTRIN) 600 mg tablet Take 1 Tab by mouth every six (6) hours as needed for Pain., Normal, Disp-20 Tab, R-0      fluticasone propionate (FLONASE) 50 mcg/actuation nasal spray 2 Sprays by Both Nostrils route daily. , Normal, Disp-1 Bottle, R-0      loratadine-pseudoephedrine (Loratadine-D) 5-120 mg per tablet Take 1 Tab by mouth two (2) times a day., Normal, Disp-20 Tab, R-0           2.    Follow-up Information     Follow up With Specialties Details Why Contact Info    Caroline Ann MD Internal Medicine   [address] [place] [address] [address] [place]  [phone]          Return to ED if worse

## 2021-07-23 NOTE — ED TRIAGE NOTES
Pt arrives with c/o constipation and R sided abd pain. LBM 2 days ago. Pt states she tried to go tonight but couldn't . Pt tried Miralax and took her Metformin and could not go to the bathroom.

## 2021-07-23 NOTE — ED NOTES
Pt medicated per MD orders for abdominal pain. Pt able to produce a moderate BM. Pt reports improvement in LUQ abdominal pain post BM. Pt resting upright on the stretcher in a position of comfort and in no acute distress. Pt A and O x 4. RR even and unlabored. Skin warm and dry. Call bell in reach.

## 2021-07-23 NOTE — ED NOTES
Pt medicated per MD orders. Pt instructed and prepared for soap suds enema. Large absorbant pads placed and bedside commode provided in close proximity at bedside. Pt able to tolerate entire soap suds enema. Pt instructed to hold solution for 10-15 min for best results. Pt able to verbalize understanding. Call bell in reach.

## 2021-07-23 NOTE — ED NOTES
Emergency Department Nursing Plan of Care       The Nursing Plan of Care is developed from the Nursing assessment and Emergency Department Attending provider initial evaluation. The plan of care may be reviewed in the ED Provider note.     The Plan of Care was developed with the following considerations:   Patient / Family readiness to learn indicated by:verbalized understanding  Persons(s) to be included in education: patient  Barriers to Learning/Limitations:No    Signed     Melquiades De    7/23/2021   2:34 AM

## 2021-07-23 NOTE — Clinical Note
[facility] EMERGENCY DEPT  [address] [facility] [id number] [phone]    Work/School Note    Date: 7/23/2021    To Whom It May concern:      Nguyen Simms was seen and treated today in the emergency room by the following provider(s):  Attending Provider: Allison Riley MD.      Nguyen Simms is excused from work/school on 07/23/21. She is clear to return to work/school on 07/24/21.         Sincerely,          Haris Mike MD

## 2021-09-02 ENCOUNTER — HOSPITAL ENCOUNTER (EMERGENCY)
Age: 31
Discharge: HOME | End: 2021-09-02
Attending: EMERGENCY MEDICINE
Payer: COMMERCIAL

## 2021-09-02 VITALS
HEART RATE: 89 BPM | DIASTOLIC BLOOD PRESSURE: 81 MMHG | WEIGHT: 293 LBS | HEIGHT: 63 IN | SYSTOLIC BLOOD PRESSURE: 135 MMHG | OXYGEN SATURATION: 98 % | TEMPERATURE: 98.4 F | RESPIRATION RATE: 16 BRPM | BODY MASS INDEX: 51.91 KG/M2

## 2021-09-02 DIAGNOSIS — N73.0 PID (ACUTE PELVIC INFLAMMATORY DISEASE): Primary | ICD-10-CM

## 2021-09-02 LAB
APPEARANCE UR: ABNORMAL
BACTERIA URNS QL MICRO: ABNORMAL /HPF
BILIRUB UR QL: NEGATIVE
CLUE CELLS VAG QL WET PREP: NORMAL
COLOR UR: ABNORMAL
EPITH CASTS URNS QL MICRO: ABNORMAL /LPF
GLUCOSE UR STRIP.AUTO-MCNC: >1000 MG/DL
HGB UR QL STRIP: NEGATIVE
KETONES UR QL STRIP.AUTO: NEGATIVE MG/DL
KOH PREP SPEC: NORMAL
LEUKOCYTE ESTERASE UR QL STRIP.AUTO: NEGATIVE
NITRITE UR QL STRIP.AUTO: NEGATIVE
PH UR STRIP: 6 [PH] (ref 5–8)
PROT UR STRIP-MCNC: ABNORMAL MG/DL
RBC #/AREA URNS HPF: ABNORMAL /HPF (ref 0–5)
SERVICE CMNT-IMP: NORMAL
SP GR UR REFRACTOMETRY: >1.03 (ref 1–1.03)
T VAGINALIS VAG QL WET PREP: NORMAL
UROBILINOGEN UR QL STRIP.AUTO: 0.2 EU/DL (ref 0.2–1)
WBC URNS QL MICRO: ABNORMAL /HPF (ref 0–4)

## 2021-09-02 PROCEDURE — 87210 SMEAR WET MOUNT SALINE/INK: CPT

## 2021-09-02 PROCEDURE — 81001 URINALYSIS AUTO W/SCOPE: CPT

## 2021-09-02 PROCEDURE — 87086 URINE CULTURE/COLONY COUNT: CPT

## 2021-09-02 PROCEDURE — 87077 CULTURE AEROBIC IDENTIFY: CPT

## 2021-09-02 PROCEDURE — 99282 EMERGENCY DEPT VISIT SF MDM: CPT

## 2021-09-02 PROCEDURE — 87491 CHLMYD TRACH DNA AMP PROBE: CPT

## 2021-09-02 PROCEDURE — 87186 SC STD MICRODIL/AGAR DIL: CPT

## 2021-09-02 RX ORDER — DOXYCYCLINE HYCLATE 100 MG
100 TABLET ORAL 2 TIMES DAILY
Qty: 28 TABLET | Refills: 0 | Status: SHIPPED | OUTPATIENT
Start: 2021-09-02 | End: 2021-09-16

## 2021-09-02 RX ORDER — NAPROXEN 500 MG/1
500 TABLET ORAL 2 TIMES DAILY WITH MEALS
Qty: 20 TABLET | Refills: 0 | Status: SHIPPED | OUTPATIENT
Start: 2021-09-02 | End: 2022-05-18

## 2021-09-02 RX ORDER — FLUCONAZOLE 150 MG/1
150 TABLET ORAL
Qty: 1 TABLET | Refills: 0 | Status: SHIPPED | OUTPATIENT
Start: 2021-09-02 | End: 2021-09-02

## 2021-09-02 NOTE — Clinical Note
[facility] EMERGENCY CTR  [address]  [address]  [phone]    Work/School Note    Date: 9/2/2021    To Whom It May concern:    Nayeli Miller was seen and treated today in the emergency room by the following provider(s):  Attending Provider: Van Reeves MD.      Nayeli Miller is excused from work/school on 09/02/21 and 09/03/21. She is medically clear to return to work/school on 9/4/2021.        Sincerely,          Dang Zamora MD

## 2021-09-02 NOTE — ED TRIAGE NOTES
Pt reports that she has been having \"vagina irritation\" x2 days. Pt states Lisset Wynn thinks she used the wrong soap. \" Pt reports she \"has a yeast infection about a month ago and she does not know if it ever went away. \"    Pt reports itching and white discharge x2 days.

## 2021-09-03 NOTE — ED PROVIDER NOTES
49-year-old male female with a past medical history significant for asthma, diabetes, hypertension, seizure disorder, who presents to the ED with a complaint of vaginal discharge x2 days with pelvic pain. The patient has tried over-the-counter medication without any relief of symptoms discomfort. She is also admits to having unprotected intercourse. She has been pregnant twice with 2 miscarriages. She denies any fever chills, headache, nausea, vomiting, diarrhea, constipation, dysuria.            Past Medical History:   Diagnosis Date    Asthma     pt denies    Diabetes ([address] 2016    Hypertension     Other ill-defined conditions(799.89)     back pain    Seizures ([facility] [address]     last seizure was 89 years old    SOB (shortness of breath)        Past Surgical History:   Procedure Laterality Date    HX GYN      Pt reports  in past.    HX TONSILLECTOMY           Family History:   Problem Relation Age of Onset    Diabetes Mother     Diabetes Father     Diabetes Maternal Grandmother     Breast Cancer Maternal Grandmother     Cancer Paternal Uncle        Social History     Socioeconomic History    Marital status: SINGLE     Spouse name: Not on file    Number of children: Not on file    Years of education: Not on file    Highest education level: Not on file   Occupational History    Not on file   Tobacco Use    Smoking status: Never Smoker    Smokeless tobacco: Never Used   Substance and Sexual Activity    Alcohol use: No    Drug use: No    Sexual activity: Yes     Partners: Male     Birth control/protection: None   Other Topics Concern    Not on file   Social History Narrative    Not on file     Social Determinants of Health     Financial Resource Strain:     Difficulty of Paying Living Expenses:    Food Insecurity:     Worried About Running Out of Food in the Last Year:     [address] in the Last Year:    Transportation Needs:     Lack of Transportation (Medical):    Armida Brunner Lack of Transportation (Non-Medical):    Physical Activity:     Days of Exercise per Week:     Minutes of Exercise per Session:    Stress:     Feeling of Stress :    Social Connections:     Frequency of Communication with Friends and Family:     Frequency of Social Gatherings with Friends and Family:     Attends [employer] Services:     Active Member of Clubs or Organizations:     Attends Club or Organization Meetings:     Marital Status:    Intimate Partner Violence:     Fear of Current or Ex-Partner:     Emotionally Abused:     Physically Abused:     Sexually Abused: ALLERGIES: Patient has no known allergies. Review of Systems   All other systems reviewed and are negative. Vitals:    09/02/21 1601 09/02/21 2149   BP: 135/81    Pulse: (!) 101 89   Resp: 16    Temp: 98.4 °F (36.9 °C)    SpO2: 98% 98%   Weight: 136.7 kg (301 lb 5.9 oz)    Height: 5' 3\" (1.6 m)             Physical Exam  Vitals and nursing note reviewed. Exam conducted with a chaperone present. CONSTITUTIONAL: Well-appearing; well-nourished; in no apparent distress  HEAD: Normocephalic; atraumatic  EYES: PERRL; EOM intact; conjunctiva and sclera are clear bilaterally. ENT: No rhinorrhea; normal pharynx with no tonsillar hypertrophy; mucous membranes pink/moist, no erythema, no exudate. NECK: Supple; non-tender; no cervical lymphadenopathy  CARD: Normal S1, S2; no murmurs, rubs, or gallops. Regular rate and rhythm. RESP: Normal respiratory effort; breath sounds clear and equal bilaterally; no wheezes, rhonchi, or rales. ABD: Normal bowel sounds; non-distended; non-tender; no palpable organomegaly, no masses, no bruits. Back Exam: Normal inspection; no vertebral point tenderness, no CVA tenderness. Normal range of motion. EXT: Normal ROM in all four extremities; non-tender to palpation; no swelling or deformity; distal pulses are normal, no edema. SKIN: Warm; dry; no rash.   NEURO:Alert and oriented x 3, coherent, JAMA-XII grossly intact, sensory and motor are non-focal.   EXAM:  External genitalia normal.  Pelvic exam: Purulent vaginal discharge with cervical motion tenderness and bilateral adnexal tenderness and fullness. No discharge. MDM  Number of Diagnoses or Management Options  PID (acute pelvic inflammatory disease)  Diagnosis management comments: Assessment: PID/cervicitis rule out yeast infection. Plan: Education, reassurance, symptomatic treatment/ Monitor and Reevaluate. Amount and/or Complexity of Data Reviewed  Clinical lab tests: ordered and reviewed  Tests in the radiology section of CPT®: ordered and reviewed  Tests in the medicine section of CPT®: ordered and reviewed  Discussion of test results with the performing providers: yes  Decide to obtain previous medical records or to obtain history from someone other than the patient: yes  Obtain history from someone other than the patient: yes  Review and summarize past medical records: yes  Discuss the patient with other providers: yes    Risk of Complications, Morbidity, and/or Mortality  Presenting problems: moderate  Diagnostic procedures: moderate  Management options: moderate           Procedures      Progress Note:   Pt has been reexamined by Jez Gorman MD. Pt is feeling much better. Symptoms have improved. All available results have been reviewed with pt and any available family. Pt understands sx, dx, and tx in ED. Care plan has been outlined and questions have been answered. Pt is ready to go home. Will send home on twice daily instruction. Prescription doxycycline, naproxen and Diflucan. Outpatient referral with PCP as needed. Written by Jez Gorman MD,5:13 AM    .   .

## 2021-09-05 LAB
BACTERIA SPEC CULT: ABNORMAL
CC UR VC: ABNORMAL
SERVICE CMNT-IMP: ABNORMAL

## 2021-09-06 LAB
C TRACH RRNA SPEC QL NAA+PROBE: NEGATIVE
N GONORRHOEA RRNA SPEC QL NAA+PROBE: NEGATIVE
SPECIMEN SOURCE: NORMAL

## 2021-09-17 RX ORDER — FLUCONAZOLE 150 MG/1
150 TABLET ORAL DAILY
Qty: 1 TABLET | Refills: 0 | Status: SHIPPED | OUTPATIENT
Start: 2021-09-17 | End: 2021-09-18

## 2021-10-04 NOTE — PROGRESS NOTES
Vaginitis evaluation    Chief Complaint   New Patient and Vaginitis      HPI  32 y.o. female complains of clear, fishy-smelling vaginal discharge for 14 days. No LMP recorded. (Menstrual status: Medically Induced). She denies additional symptoms at this time. She also went to the ER on 21 for vag discharge and pelvic pain. Positive for trich. UC had 100K of klebsiella pneu. She was rx'd doxycycline and Flagyl. The patient denies aggravating factors. She is not concerned about possible STI exposure at this time. She denies exposure to new chemicals ot hygenic agents  Previous treatment included: doxycycline, Flagyl and Diflucan  Has Mirena IUD placed ~ 1 yr ago at [facility]  Also had abnormal Pap , Colpo with YURIDIA Pastor     Past Medical History:   Diagnosis Date    Asthma     pt denies    Diabetes ([address] 2016    Hypertension     Other ill-defined conditions(799.89)     back pain    Seizures ([address]     last seizure was 89 years old    SOB (shortness of breath)      Past Surgical History:   Procedure Laterality Date    HX GYN      Pt reports  in past.    HX TONSILLECTOMY       Social History     Occupational History    Not on file   Tobacco Use    Smoking status: Never Smoker    Smokeless tobacco: Never Used   Substance and Sexual Activity    Alcohol use: No    Drug use: No    Sexual activity: Yes     Partners: Male     Birth control/protection: None     Family History   Problem Relation Age of Onset    Diabetes Mother     Diabetes Father     Diabetes Maternal Grandmother     Breast Cancer Maternal Grandmother     Cancer Paternal Uncle         No Known Allergies  Prior to Admission medications    Medication Sig Start Date End Date Taking? Authorizing Provider   metFORMIN (GLUCOPHAGE) 500 mg tablet Take 1 Tablet by mouth two (2) times daily (with meals). 21  Yes Irving Esteban MD   lisinopriL (PRINIVIL, ZESTRIL) 10 mg tablet Take 1 Tablet by mouth daily.  21 Yes Wes Manzo MD   naproxen (NAPROSYN) 500 mg tablet Take 1 Tablet by mouth two (2) times daily (with meals). Patient not taking: Reported on 10/5/2021 9/2/21   Tanisha Tellez MD   magnesium citrate solution 150ml (1/2 bottle) as needed for constipation. May repeat once if no results. Patient not taking: Reported on 10/5/2021 7/23/21   Linda Robertson MD   docusate sodium (COLACE) 100 mg capsule Take 1 Capsule by mouth two (2) times a day for 90 days. Patient not taking: Reported on 10/5/2021 7/23/21 10/21/21  Linda Robertson MD   albuterol (PROVENTIL HFA, VENTOLIN HFA, PROAIR HFA) 90 mcg/actuation inhaler Take 2 Puffs by inhalation every four (4) hours as needed for Wheezing. Patient not taking: Reported on 10/5/2021 1/22/21   Estella Li NP   ibuprofen (MOTRIN) 600 mg tablet Take 1 Tab by mouth every six (6) hours as needed for Pain. Patient not taking: Reported on 10/5/2021 1/6/21   Monica Cullen MD   fluticasone propionate [facility]) 50 mcg/actuation nasal spray 2 Sprays by Both Nostrils route daily. Patient not taking: Reported on 10/5/2021 12/22/20   Jorge Byrne MD   loratadine-pseudoephedrine (Loratadine-D) 5-120 mg per tablet Take 1 Tab by mouth two (2) times a day.   Patient not taking: Reported on 10/5/2021 12/22/20   Jorge Byrne MD                      Review of Systems - History obtained from the patient  Constitutional: negative for weight loss, fever, night sweats  Breast: negative for breast lumps, nipple discharge, galactorrhea  GI: negative for change in bowel habits, abdominal pain, black or bloody stools  : negative for frequency, dysuria, hematuria  MSK: negative for back pain, joint pain, muscle pain  Skin: negative for itching, rash, hives  Neuro: negative for dizziness, headache, confusion, weakness  Psych: negative for anxiety, depression, change in mood  Heme/lymph: negative for bleeding, bruising, pallor       Objective:    Visit Vitals  BP (!) 168/90   Ht 5' 3\" (1.6 m)   Wt 303 lb (137.4 kg)   BMI 53.67 kg/m²       Physical Exam:   PHYSICAL EXAMINATION    Constitutional  · Appearance: well-nourished, well developed, alert, in no acute distress    HENT  · Head and Face: appears normal    Genitourinary  · External Genitalia: normal appearance for age, Thickish white discharge present, no tenderness present, no inflammatory lesions present, no masses present, no atrophy present  · Vagina: Thickish white discharge present, otherwise normal vaginal vault without central or paravaginal defects, no inflammatory lesions present, no masses present  · Bladder: non-tender to palpation  · Urethra: appears normal  · Cervix: normal, IUD strings present @ os   · Uterus: normal size, shape and consistency  · Adnexa: no adnexal tenderness present, no adnexal masses present  · Perineum: perineum within normal limits, no evidence of trauma, no rashes or skin lesions present  · Anus: anus within normal limits, no hemorrhoids present  · Inguinal Lymph Nodes: no lymphadenopathy present    Skin  · General Inspection: no rash, no lesions identified    Neurologic/Psychiatric  · Mental Status:  · Orientation: grossly oriented to person, place and time  · Mood and Affect: mood normal, affect appropriate      Assessment:   Recent hx Trich and Klebsiella      Plan:   Newswab collected and sent  Did not treat empirically  RTO 3 mos for repeat pap  Patient declines presence of Chaperone during today's visit  ROV prn if symptoms persist or worsen. Anum Davis.  PEPE Burnette/REINALDO

## 2021-10-05 ENCOUNTER — OFFICE VISIT (OUTPATIENT)
Dept: OBGYN CLINIC | Age: 31
End: 2021-10-05
Payer: COMMERCIAL

## 2021-10-05 VITALS
BODY MASS INDEX: 51.91 KG/M2 | WEIGHT: 293 LBS | HEIGHT: 63 IN | SYSTOLIC BLOOD PRESSURE: 168 MMHG | DIASTOLIC BLOOD PRESSURE: 90 MMHG

## 2021-10-05 DIAGNOSIS — N89.8 VAGINAL DISCHARGE: Primary | ICD-10-CM

## 2021-10-05 PROCEDURE — 99213 OFFICE O/P EST LOW 20 MIN: CPT | Performed by: ADVANCED PRACTICE MIDWIFE

## 2021-10-07 ENCOUNTER — TELEPHONE (OUTPATIENT)
Dept: OBGYN CLINIC | Age: 31
End: 2021-10-07

## 2021-10-07 NOTE — TELEPHONE ENCOUNTER
Call received at 310PM    32year old patient seen in the office on 10/5/2021    Patient calling to check on her recent lab results. This nurse advised the labs are not yet back for provider to review    Patient verbalized understanding.

## 2021-10-08 LAB
A VAGINAE DNA VAG QL NAA+PROBE: ABNORMAL SCORE
BVAB2 DNA VAG QL NAA+PROBE: ABNORMAL SCORE
C ALBICANS DNA VAG QL NAA+PROBE: NEGATIVE
C GLABRATA DNA VAG QL NAA+PROBE: NEGATIVE
C TRACH DNA VAG QL NAA+PROBE: NEGATIVE
MEGA1 DNA VAG QL NAA+PROBE: ABNORMAL SCORE
N GONORRHOEA DNA VAG QL NAA+PROBE: NEGATIVE
T VAGINALIS DNA VAG QL NAA+PROBE: POSITIVE

## 2021-10-08 RX ORDER — METRONIDAZOLE 500 MG/1
500 TABLET ORAL 2 TIMES DAILY
Qty: 14 TABLET | Refills: 0 | Status: SHIPPED | OUTPATIENT
Start: 2021-10-08 | End: 2021-10-15

## 2021-10-14 ENCOUNTER — TELEPHONE (OUTPATIENT)
Dept: OBGYN CLINIC | Age: 31
End: 2021-10-14

## 2021-10-14 RX ORDER — NITROFURANTOIN 25; 75 MG/1; MG/1
100 CAPSULE ORAL 2 TIMES DAILY
Qty: 14 CAPSULE | Refills: 0 | Status: SHIPPED | OUTPATIENT
Start: 2021-10-14 | End: 2022-05-18

## 2021-10-14 NOTE — TELEPHONE ENCOUNTER
Message left at 10:55am      32year old patient last seen in the office on 10/5/2021      Patient calling back to say that she has left side pain and frequent urination that started last night    Patient denies burning with urination, and temperature. Patient states she has a uti      Patient reports she was never treated for uti back in September 2021    Patient has been recently treated for BV and trichomonas. Pharmacy confirmed    ? Ov ?  Bring in urine for culture      Please advise    Thank you

## 2021-10-14 NOTE — TELEPHONE ENCOUNTER
This nurse attempted to reach the patient and left a detailed message for the patient regarding CNM recommendations and prescription sent by CNM to her pharmacy

## 2021-10-20 ENCOUNTER — TELEPHONE (OUTPATIENT)
Dept: OBGYN CLINIC | Age: 31
End: 2021-10-20

## 2021-10-20 RX ORDER — FLUCONAZOLE 150 MG/1
150 TABLET ORAL DAILY
Qty: 1 TABLET | Refills: 0 | Status: SHIPPED | OUTPATIENT
Start: 2021-10-20 | End: 2021-10-21

## 2021-10-20 NOTE — TELEPHONE ENCOUNTER
I sent in a Diflucan for her. She was treated recently for both Trich and BV, so it could just be from us treating those and now she has yeast. If this ONE Diflucan doesn't clear it up please tell her she will need an OV.   Thanks, Ericka Carnes

## 2021-10-20 NOTE — TELEPHONE ENCOUNTER
This nurse attempted to reach the patient and left a detailed message about CNM sent prescription and recommendations.

## 2021-10-20 NOTE — TELEPHONE ENCOUNTER
Call received at 11:05am      32year old patient last seen in the office on 10/5/2021 and was prescribed on 10/14/2021 and patient is calling to say that she has symptoms of a yeast infection of irration and vaginal itching .       Patient was advised to use monistat over the counter and states that it is irritation and she is requesting the one time pill      Pharmacy confirmed    Please advise      Thank you

## 2021-10-29 ENCOUNTER — TELEPHONE (OUTPATIENT)
Dept: INTERNAL MEDICINE CLINIC | Age: 31
End: 2021-10-29

## 2021-11-16 NOTE — PATIENT INSTRUCTIONS
Follow Up Visit Note       Active Problems      1. Mass of neck          Chief Complaint   Patient presents with:  Post-Op: PO 11/1 EXCISION SEBACEOUS CYST- POSTERIOR NECK-      Allergies  Ke has No Known Allergies.     Past Medical / Surgical / Social / Well Visit, Ages 25 to 48: Care Instructions  Your Care Instructions    Physical exams can help you stay healthy. Your doctor has checked your overall health and may have suggested ways to take good care of yourself. He or she also may have recommended tests. At home, you can help prevent illness with healthy eating, regular exercise, and other steps. Follow-up care is a key part of your treatment and safety. Be sure to make and go to all appointments, and call your doctor if you are having problems. It's also a good idea to know your test results and keep a list of the medicines you take. How can you care for yourself at home? · Reach and stay at a healthy weight. This will lower your risk for many problems, such as obesity, diabetes, heart disease, and high blood pressure. · Get at least 30 minutes of physical activity on most days of the week. Walking is a good choice. You also may want to do other activities, such as running, swimming, cycling, or playing tennis or team sports. Discuss any changes in your exercise program with your doctor. · Do not smoke or allow others to smoke around you. If you need help quitting, talk to your doctor about stop-smoking programs and medicines. These can increase your chances of quitting for good. · Talk to your doctor about whether you have any risk factors for sexually transmitted infections (STIs). Having one sex partner (who does not have STIs and does not have sex with anyone else) is a good way to avoid these infections. · Use birth control if you do not want to have children at this time. Talk with your doctor about the choices available and what might be best for you. · Protect your skin from too much sun. When you're outdoors from 10 a.m. to 4 p.m., stay in the shade or cover up with clothing and a hat with a wide brim. Wear sunglasses that block UV rays. Even when it's cloudy, put broad-spectrum sunscreen (SPF 30 or higher) on any exposed skin.   · See a dentist one or two times a year for checkups and to have your teeth cleaned. · Wear a seat belt in the car. Follow your doctor's advice about when to have certain tests. These tests can spot problems early. For everyone  · Cholesterol. Have the fat (cholesterol) in your blood tested after age 21. Your doctor will tell you how often to have this done based on your age, family history, or other things that can increase your risk for heart disease. · Blood pressure. Have your blood pressure checked during a routine doctor visit. Your doctor will tell you how often to check your blood pressure based on your age, your blood pressure results, and other factors. · Vision. Talk with your doctor about how often to have a glaucoma test.  · Diabetes. Ask your doctor whether you should have tests for diabetes. · Colon cancer. Your risk for colorectal cancer gets higher as you get older. Some experts say that adults should start regular screening at age 48 and stop at age 76. Others say to start before age 48 or continue after age 76. Talk with your doctor about your risk and when to start and stop screening. For women  · Breast exam and mammogram. Talk to your doctor about when you should have a clinical breast exam and a mammogram. Medical experts differ on whether and how often women under 50 should have these tests. Your doctor can help you decide what is right for you. · Cervical cancer screening test and pelvic exam. Begin with a Pap test at age 24. The test often is part of a pelvic exam. Starting at age 27, you may choose to have a Pap test, an HPV test, or both tests at the same time (called co-testing). Talk with your doctor about how often to have testing. · Tests for sexually transmitted infections (STIs). Ask whether you should have tests for STIs. You may be at risk if you have sex with more than one person, especially if your partners do not wear condoms.   For men  · Tests for sexually transmitted lesion. The pathology is consistent with ruptured epidermal inclusion cyst.  The pathology was discussed with the patient and they do not have any questions at this time.   The patient is doing well after surgery and has resumed most of their normal daily infections (STIs). Ask whether you should have tests for STIs. You may be at risk if you have sex with more than one person, especially if you do not wear a condom. · Testicular cancer exam. Ask your doctor whether you should check your testicles regularly. · Prostate exam. Talk to your doctor about whether you should have a blood test (called a PSA test) for prostate cancer. Experts differ on whether and when men should have this test. Some experts suggest it if you are older than 39 and are -American or have a father or brother who got prostate cancer when he was younger than 72. When should you call for help? Watch closely for changes in your health, and be sure to contact your doctor if you have any problems or symptoms that concern you. Where can you learn more? Go to http://phil-khari.info/. Enter P072 in the search box to learn more about \"Well Visit, Ages 25 to 48: Care Instructions. \"  Current as of: December 13, 2018  Content Version: 12.2  © 7441-7379 [employer]. Care instructions adapted under license by [employer] (which disclaims liability or warranty for this information). If you have questions about a medical condition or this instruction, always ask your healthcare professional. [place] [address] any warranty or liability for your use of this information. Learning About Cervical Cancer Screening  What is a cervical cancer screening test?    The cervix is the lower part of the uterus that opens into the vagina. Cervical cancer screening tests check the cells on the cervix for changes that could lead to cancer. Two tests can be used to screen for cervical cancer. They may be used alone or together. A Pap test.   This test looks for changes in the cells of the cervix. Some of these cell changes could lead to cancer. A human papillomavirus (HPV) test.   This test looks for the HPV virus.  Some high-risk types of HPV can cause cell changes that could lead to cervical cancer. During either test, the doctor or nurse will insert a tool called a speculum into your vagina. The speculum gently opens the vaginal walls. It allows your doctor to see inside the vagina and the cervix. He or she uses a small swab or brush to collect cell samples from your cervix. Try to schedule the test when you're not having your period. To get ready for the test, your doctor may ask you to use a condom if you have sex before the test. Your doctor may also say to avoid douches, tampons, vaginal medicines, sprays, and powders for at least a day before you have the test.  When should you have a screening test?  Talk with your doctor about cervical cancer screening. If you are a woman with an average risk for cervical cancer, your doctor will likely suggest starting screening at age 24. Women 21 to 34  If you are in this age group, your doctor will likely suggest that you get a Pap test. If your Pap test results are normal, you can wait 3 years to have another test.  Women 27 to 59  Screening options for women in this age group may include:  · A Pap test. If your results are normal, you can wait 3 years to have another test.  · An HPV test. If your results are normal, you can wait 5 years to have another test.  · A Pap test and an HPV test. Having both tests is called co-testing. If your results are normal, you can wait 5 years to be tested again. Women 72 and older  · If you are age 72 or older, talk with your doctor about what's right for you. Women ages 72 and older may no longer need to be screened for cervical cancer. When to stop having screening tests depends on your medical history, your overall health, and your risk of cervical cell changes or cervical cancer. What happens after the test?  The sample of cells taken during your test will be sent to a lab so that an expert can look at the cells.  It usually takes a week or two to get the results back.  Pap tests  · A normal result means that the test didn't find any abnormal cells in the sample. · An abnormal result can mean many things. Most of these aren't cancer. The results of your test may be abnormal because:  ? You have an infection of the vagina or cervix, such as a yeast infection. ? You have low estrogen levels after menopause that are causing the cells to change. ? You have cell changes that may be a sign of precancer or cancer. The results are ranked based on how serious the changes might be. HPV tests  · A negative result means that the test didn't find any high-risk HPV in the sample. · A positive HPV test means that at least one type of high-risk HPV was found. It doesn't mean that you have cervical cancer, but it increases your chance of having cell changes that could lead to cancer. Follow-up care is a key part of your treatment and safety. Be sure to make and go to all appointments, and call your doctor if you are having problems. It's also a good idea to know your test results and keep a list of the medicines you take. Where can you learn more? Go to http://phil-khari.info/. Enter P919 in the search box to learn more about \"Learning About Cervical Cancer Screening. \"  Current as of: December 19, 2018  Content Version: 12.2  © 0908-6068 [employer]. Care instructions adapted under license by [employer] (which disclaims liability or warranty for this information). If you have questions about a medical condition or this instruction, always ask your healthcare professional. [place] [address] any warranty or liability for your use of this information. Human Papillomavirus (HPV): Care Instructions  Your Care Instructions  The human papillomavirus (HPV) is a very common virus. There are many types of HPV. Some types cause the common skin wart. Other types cause genital warts, which can be spread by sexual contact. Some types can increase the risk for cervical and anal cancer. Having one type of HPV does not lead to having another type. Many women who have HPV may not know that they are infected until it is found with a Pap test. Your doctor uses this test to look for abnormal cells on your cervix. If you have had an abnormal Pap test, your doctor may recommend that you have an HPV test.  Like a Pap test, an HPV test is done on a sample of cells collected from the cervix. If the test finds that you have the types of HPV that might lead to cancer, your doctor may suggest more tests. This does not mean that you will develop cancer; it means that you may have an increased risk. Abnormal cell changes caused by HPV often go away on their own. If the changes do not go away, they can be treated. But because HPV can stay inside the body, the abnormal cervical cells sometimes come back. This is why it is important to follow up with your doctor and have regular Pap tests. Follow-up care is a key part of your treatment and safety. Be sure to make and go to all appointments, and call your doctor if you are having problems. It's also a good idea to know your test results and keep a list of the medicines you take. How can you care for yourself at home? · If you are going to have a Pap or HPV test, do not douche or use tampons or vaginal creams in the 24 hours before the test.  · Do not smoke. Smoking increases the risk for cervical problems and abnormal Pap tests. If you need help quitting, talk to your doctor about stop-smoking programs and medicines. These can increase your chances of quitting for good. · Use latex condoms every time you have sex. Use them from the beginning to the end of sexual contact. · Be sure to tell your sexual partner or partners that you have HPV. Even if you do not have symptoms, you can still pass HPV to others.   · Having one sex partner (who does not have STIs and does not have sex with anyone else) is a good way to avoid STIs. When should you call for help? Watch closely for changes in your health, and be sure to contact your doctor if:    · You have vaginal pain during or after sex.     · You have vaginal bleeding when you are not in your menstrual period. Where can you learn more? Go to http://phil-khari.info/. Enter F690 in the search box to learn more about \"Human Papillomavirus (HPV): Care Instructions. \"  Current as of: September 11, 2018  Content Version: 12.2  © 9252-6166 [employer]. Care instructions adapted under license by [employer] (which disclaims liability or warranty for this information). If you have questions about a medical condition or this instruction, always ask your healthcare professional. [address] any warranty or liability for your use of this information. Bacterial Vaginosis: Care Instructions  Your Care Instructions    Bacterial vaginosis is a type of vaginal infection. It is caused by excess growth of certain bacteria that are normally found in the vagina. Symptoms can include itching, swelling, pain when you urinate or have sex, and a gray or yellow discharge with a \"fishy\" odor. It is not considered an infection that is spread through sexual contact. Although symptoms can be annoying and uncomfortable, bacterial vaginosis does not usually cause other health problems. However, if you have it while you are pregnant, it can cause complications. While the infection may go away on its own, most doctors use antibiotics to treat it. You may have been prescribed pills or vaginal cream. With treatment, bacterial vaginosis usually clears up in 5 to 7 days. Follow-up care is a key part of your treatment and safety. Be sure to make and go to all appointments, and call your doctor if you are having problems. It's also a good idea to know your test results and keep a list of the medicines you take.   How can you care for yourself at home? · Take your antibiotics as directed. Do not stop taking them just because you feel better. You need to take the full course of antibiotics. · Do not eat or drink anything that contains alcohol if you are taking metronidazole (Flagyl). · Keep using your medicine if you start your period. Use pads instead of tampons while using a vaginal cream or suppository. Tampons can absorb the medicine. · Wear loose cotton clothing. Do not wear nylon and other materials that hold body heat and moisture close to the skin. · Do not scratch. Relieve itching with a cold pack or a cool bath. · Do not wash your vaginal area more than once a day. Use plain water or a mild, unscented soap. Do not douche. When should you call for help? Watch closely for changes in your health, and be sure to contact your doctor if:    · You have unexpected vaginal bleeding.     · You have a fever.     · You have new or increased pain in your vagina or pelvis.     · You are not getting better after 1 week.     · Your symptoms return after you finish the course of your medicine. Where can you learn more? Go to http://philVistaarkhari.info/. Kathleen Fu in the search box to learn more about \"Bacterial Vaginosis: Care Instructions. \"  Current as of: February 19, 2019  Content Version: 12.2  © 9790-0323 [employer]. Care instructions adapted under license by [employer] (which disclaims liability or warranty for this information). If you have questions about a medical condition or this instruction, always ask your healthcare professional. [place] [address] any warranty or liability for your use of this information. Medroxyprogesterone (By mouth)   Medroxyprogesterone ([id number]-[id number]-RYAN-ter-one)  Treats menstruation problems caused by a hormone imbalance. Prevents overgrowth of the uterine lining in women who are taking estrogen.    Brand Name(s): Provera   There may be other brand names for this medicine. When This Medicine Should Not Be Used: This medicine is not right for everyone. Do not use it if you had an allergic reaction to medroxyprogesterone, if you are pregnant, or if you have liver disease, unusual vaginal bleeding that has not been checked by a doctor, or a history of breast cancer or blood clots. How to Use This Medicine:   Tablet  · Take your medicine as directed. Your dose may need to be changed several times to find what works best for you. · Read and follow the patient instructions that come with this medicine. Talk to your doctor or pharmacist if you have any questions. · Missed dose: Take a dose as soon as you remember. If it is almost time for your next dose, wait until then and take a regular dose. Do not take extra medicine to make up for a missed dose. · Store the medicine in a closed container at room temperature, away from heat, moisture, and direct light. Drugs and Foods to Avoid:      Ask your doctor or pharmacist before using any other medicine, including over-the-counter medicines, vitamins, and herbal products. Warnings While Using This Medicine:   · It is not safe to take this medicine during pregnancy. It could harm an unborn baby. Tell your doctor right away if you become pregnant. · Tell your doctor if you are breastfeeding or if you have kidney disease, heart disease, asthma, diabetes, endometriosis, high blood pressure, high cholesterol, lupus, porphyria, migraines, thyroid problems, or a history of seizures or cancer. Tell your doctor if you smoke. · This medicine may increase your risk for the following:   ¨ Blood clots, which could lead to stroke or heart attack  ¨ Dementia (when used with estrogen in women older than 65)  ¨ Breast or endometrial cancer (when used with estrogen)  · Tell any doctor who treats you that you use this medicine.  You may need to stop taking it before you have surgery or if you need to be on bedrest.  · Tell any doctor or dentist who treats you that you are using this medicine. This medicine may affect certain medical test results. · Your doctor will check your progress and the effects of this medicine at regular visits. Keep all appointments. · Keep all medicine out of the reach of children. Never share your medicine with anyone. Possible Side Effects While Using This Medicine:   Call your doctor right away if you notice any of these side effects:  · Allergic reaction: Itching or hives, swelling in your face or hands, swelling or tingling in your mouth or throat, chest tightness, trouble breathing  · Breast lump, pain, or tenderness  · Chest pain, trouble breathing, coughing up blood  · Loss of vision, blurred vision  · Numbness or weakness on one side of your body, sudden or severe headache, problems with speech or walking, pain in your lower leg (calf)  · Rapid weight gain, swelling in your hands, ankles, or feet  · Severe or unusual vaginal bleeding  · Sudden and severe stomach pain, nausea, vomiting, fever, lightheadedness  · Yellow skin or eyes  If you notice these less serious side effects, talk with your doctor:   · Depression, headache, dizziness, trouble sleeping  · Light vaginal bleeding or spotting  · Mild stomach pain or cramps  If you notice other side effects that you think are caused by this medicine, tell your doctor. Call your doctor for medical advice about side effects. You may report side effects to FDA at 2-140-FDA-6527  © 2017 [facility] Information is for End User's use only and may not be sold, redistributed or otherwise used for commercial purposes. The above information is an  only. It is not intended as medical advice for individual conditions or treatments. Talk to your doctor, nurse or pharmacist before following any medical regimen to see if it is safe and effective for you.

## 2021-11-18 NOTE — PATIENT INSTRUCTIONS
Pelvic Exam: Care Instructions  Overview     When your doctor examines your pelvic organs, it's called a pelvic exam. This exam is done to evaluate symptoms, such as pelvic pain or abnormal vaginal bleeding and discharge. It may also be done to collect samples of cells for cervical cancer screening. Before your exam, it's important to share some information with your doctor. You can talk about any concerns you may have. Your doctor will also want to know if you are pregnant or use birth control. And your doctor will want to hear about any problems, surgeries, or procedures you have had in your pelvic area. You will also need to tell your doctor when your last period was. Follow-up care is a key part of your treatment and safety. Be sure to make and go to all appointments, and call your doctor if you are having problems. It's also a good idea to know your test results and keep a list of the medicines you take. How is a pelvic exam done? · During a pelvic exam, you will:  ? Take off your clothes below the waist. You will get a paper or cloth cover to put over the lower half of your body. ? Lie on your back on an exam table with your feet and legs supported by footrests. · The doctor may:  ? Ask you to relax your knees. Your knees need to lean out, toward the walls. ? Check the opening of your vagina for sores or swelling. ? Gently put a tool called a speculum into your vagina. It opens the vagina a little bit. You may feel some pressure. The speculum lets your doctor see inside the vagina. ? Use a small brush, spatula, or swab to get a sample for testing. The doctor then removes the speculum. ? Put on gloves and put one or two fingers of one hand into your vagina. The other hand goes on your lower belly. This lets your doctor feel your pelvic organs. You will probably feel some pressure. ? Put one gloved finger into your rectum and one into your vagina, if needed.  This can also help check your pelvic organs. You may have a small amount of vaginal discharge or bleeding after the exam.  Why is a pelvic exam done? A pelvic exam may be done:  · To collect samples of cells for cervical cancer screening. · To check for vaginal infection. · To check for sexually transmitted infections, such as chlamydia or herpes. · To help find the cause of abnormal uterine bleeding. · To look for problems like uterine fibroids, ovarian cysts, or uterine prolapse. · To help find the cause of pelvic or belly pain. · Before inserting an intrauterine device (IUD). · To collect evidence if you've been sexually assaulted. What are the risks of a pelvic exam?  There is a small chance that the doctor will find something on a pelvic exam that would not have caused a problem. This is called overdiagnosis. It could lead to tests or treatment you don't need. When should you call for help? Watch closely for changes in your health, and be sure to contact your doctor if you have any problems. Where can you learn more? Go to http://www.gray.com/  Enter M421 in the search box to learn more about \"Pelvic Exam: Care Instructions. \"  Current as of: February 11, 2021               Content Version: 13.0  © 0856-6563 [employer]. Care instructions adapted under license by [employer] (which disclaims liability or warranty for this information). If you have questions about a medical condition or this instruction, always ask your healthcare professional. [place] [address] any warranty or liability for your use of this information. Bacterial Vaginosis: Care Instructions  Overview     Bacterial vaginosis is a type of vaginal infection. It is caused by excess growth of certain bacteria that are normally found in the vagina. Symptoms can include itching, swelling, pain when you urinate or have sex, and a gray or yellow discharge with a \"fishy\" odor.  It is not considered an infection that is spread through sexual contact. Symptoms can be annoying and uncomfortable. But bacterial vaginosis does not usually cause other health problems. However, if you have it while you are pregnant, it can cause complications. While the infection may go away on its own, most doctors use antibiotics to treat it. You may have been prescribed pills or vaginal cream. With treatment, bacterial vaginosis usually clears up in 5 to 7 days. Follow-up care is a key part of your treatment and safety. Be sure to make and go to all appointments, and call your doctor if you are having problems. It's also a good idea to know your test results and keep a list of the medicines you take. How can you care for yourself at home? · Take your antibiotics as directed. Do not stop taking them just because you feel better. You need to take the full course of antibiotics. · Do not eat or drink anything that contains alcohol if you are taking metronidazole or tinidazole. · Keep using your medicine if you start your period. Use pads instead of tampons while using a vaginal cream or suppository. Tampons can absorb the medicine. · Wear loose cotton clothing. Do not wear nylon and other materials that hold body heat and moisture close to the skin. · Do not scratch. Relieve itching with a cold pack or a cool bath. · Do not wash your vaginal area more than once a day. Use plain water or a mild, unscented soap. Do not douche. When should you call for help? Watch closely for changes in your health, and be sure to contact your doctor if:    · You have unexpected vaginal bleeding.     · You have a fever.     · You have new or increased pain in your vagina or pelvis.     · You are not getting better after 1 week.     · Your symptoms return after you finish the course of your medicine. Where can you learn more?   Go to http://www.gray.com/  Enter X360 in the search box to learn more about \"Bacterial Vaginosis: Care Instructions. \"  Current as of: February 11, 2021               Content Version: 13.0  © 7283-2309 [employer]. Care instructions adapted under license by [employer] (which disclaims liability or warranty for this information). If you have questions about a medical condition or this instruction, always ask your healthcare professional. [place] [address] any warranty or liability for your use of this information.

## 2021-11-19 ENCOUNTER — OFFICE VISIT (OUTPATIENT)
Dept: OBGYN CLINIC | Age: 31
End: 2021-11-19
Payer: COMMERCIAL

## 2021-11-19 VITALS
DIASTOLIC BLOOD PRESSURE: 86 MMHG | HEIGHT: 63 IN | BODY MASS INDEX: 51.91 KG/M2 | WEIGHT: 293 LBS | SYSTOLIC BLOOD PRESSURE: 158 MMHG

## 2021-11-19 DIAGNOSIS — Z87.440 HISTORY OF UTI: ICD-10-CM

## 2021-11-19 DIAGNOSIS — N89.8 VAGINAL DISCHARGE: Primary | ICD-10-CM

## 2021-11-19 PROCEDURE — 99213 OFFICE O/P EST LOW 20 MIN: CPT | Performed by: OBSTETRICS & GYNECOLOGY

## 2021-11-19 NOTE — PROGRESS NOTES
Vaginitis evaluation    Chief Complaint   Vaginal Discharge      HPI  32 y.o. female complains of clear, fishy-smelling vaginal discharge. She denies additional symptoms at this time. She was seen for this problem by Yogesh Ledezma CNM on 10/5/21, nuswab done and positive for BV, trich. Sent prescription for Flagyl, states she may have missed one dose. She was subsequently sent rx for diflucan for complaint of presumed yeast infection. Susanaity Romberg to have MELI    She also went to the ER on 21 for vag discharge and pelvic pain. Positive for trich. UC had 100K of klebsiella pneu. She was rx'd doxycycline and Flagyl. The patient denies aggravating factors. She is not concerned about possible STI exposure at this time. She denies exposure to new chemicals ot hygenic agents. If sent prescription for Flagyl, states she will also need Diflucan for yeast.    Has Mirena IUD placed ~ 1 yr ago at [facility]  Also had abnormal Pap , Colpo with YURIDIA Pastor     Past Medical History:   Diagnosis Date    Asthma     pt denies    Diabetes ([address] 2016    Hypertension     Other ill-defined conditions(799.89)     back pain    Seizures (HCC)     last seizure was 89 years old    SOB (shortness of breath)      Past Surgical History:   Procedure Laterality Date    HX GYN      Pt reports  in past.    HX TONSILLECTOMY       Social History     Occupational History    Not on file   Tobacco Use    Smoking status: Never Smoker    Smokeless tobacco: Never Used   Substance and Sexual Activity    Alcohol use: No    Drug use: No    Sexual activity: Yes     Partners: Male     Birth control/protection: None     Family History   Problem Relation Age of Onset    Diabetes Mother     Diabetes Father     Diabetes Maternal Grandmother     Breast Cancer Maternal Grandmother     Cancer Paternal Uncle         No Known Allergies  Prior to Admission medications    Medication Sig Start Date End Date Taking?  Authorizing Provider   metFORMIN (GLUCOPHAGE) 500 mg tablet Take 1 Tablet by mouth two (2) times daily (with meals). 6/24/21  Yes Kylie Celaya MD   lisinopriL (PRINIVIL, ZESTRIL) 10 mg tablet Take 1 Tablet by mouth daily. 6/24/21  Yes Kylie Celaya MD   nitrofurantoin, macrocrystal-monohydrate, (MACROBID) 100 mg capsule Take 1 Capsule by mouth two (2) times a day. Indications: bacterial urinary tract infection  Patient not taking: Reported on 11/19/2021 10/14/21   Pari Greene NP   naproxen (NAPROSYN) 500 mg tablet Take 1 Tablet by mouth two (2) times daily (with meals). Patient not taking: Reported on 10/5/2021 9/2/21   Mary Jo Alvarado MD   magnesium citrate solution 150ml (1/2 bottle) as needed for constipation. May repeat once if no results. Patient not taking: Reported on 10/5/2021 7/23/21   Harika Live MD   albuterol (PROVENTIL HFA, VENTOLIN HFA, PROAIR HFA) 90 mcg/actuation inhaler Take 2 Puffs by inhalation every four (4) hours as needed for Wheezing. Patient not taking: Reported on 10/5/2021 1/22/21   Tillman Schlatter, NP   ibuprofen (MOTRIN) 600 mg tablet Take 1 Tab by mouth every six (6) hours as needed for Pain. Patient not taking: Reported on 10/5/2021 1/6/21   Alissa Morse MD   fluticasone propionate [facility]) 50 mcg/actuation nasal spray 2 Sprays by Both Nostrils route daily. Patient not taking: Reported on 10/5/2021 12/22/20   Shaila Rubio MD   loratadine-pseudoephedrine (Loratadine-D) 5-120 mg per tablet Take 1 Tab by mouth two (2) times a day.   Patient not taking: Reported on 10/5/2021 12/22/20   Shaila Rubio MD                      Review of Systems - History obtained from the patient  Constitutional: negative for weight loss, fever, night sweats  Breast: negative for breast lumps, nipple discharge, galactorrhea  GI: negative for change in bowel habits, abdominal pain, black or bloody stools  : negative for frequency, dysuria, hematuria  MSK: negative for back pain, joint pain, muscle pain  Skin: negative for itching, rash, hives  Neuro: negative for dizziness, headache, confusion, weakness  Psych: negative for anxiety, depression, change in mood  Heme/lymph: negative for bleeding, bruising, pallor       Objective:    Visit Vitals  BP (!) 158/86 (BP 1 Location: Right arm, BP Patient Position: Sitting)   Ht 5' 3\" (1.6 m)   Wt 303 lb (137.4 kg)   BMI 53.67 kg/m²       Physical Exam:   PHYSICAL EXAMINATION    Constitutional  · Appearance: well-nourished, well developed, alert, in no acute distress    HENT  · Head and Face: appears normal    Genitourinary  · External Genitalia: normal appearance for age, Thickish white discharge present, no tenderness present, no inflammatory lesions present, no masses present, no atrophy present  · Vagina: Thickish white discharge present, otherwise normal vaginal vault without central or paravaginal defects, no inflammatory lesions present, no masses present  · Bladder: non-tender to palpation  · Urethra: appears normal  · Cervix: normal, IUD strings present @ os   · Uterus: normal size, shape and consistency  · Adnexa: no adnexal tenderness present, no adnexal masses present  · Perineum: perineum within normal limits, no evidence of trauma, no rashes or skin lesions present  · Anus: anus within normal limits, no hemorrhoids present  · Inguinal Lymph Nodes: no lymphadenopathy present    Skin  · General Inspection: no rash, no lesions identified    Neurologic/Psychiatric  · Mental Status:  · Orientation: grossly oriented to person, place and time  · Mood and Affect: mood normal, affect appropriate      Assessment:   Recent hx Trich and Klebsiella      Plan:   Nuswab collected and sent  Did not treat empirically  RTO 3 mos for repeat pap  Patient declines presence of Chaperone during today's visit  ROV prn if symptoms persist or worsen.

## 2021-11-21 LAB
A VAGINAE DNA VAG QL NAA+PROBE: ABNORMAL SCORE
BVAB2 DNA VAG QL NAA+PROBE: ABNORMAL SCORE
C ALBICANS DNA VAG QL NAA+PROBE: NEGATIVE
C GLABRATA DNA VAG QL NAA+PROBE: NEGATIVE
C TRACH DNA VAG QL NAA+PROBE: NEGATIVE
MEGA1 DNA VAG QL NAA+PROBE: ABNORMAL SCORE
N GONORRHOEA DNA VAG QL NAA+PROBE: NEGATIVE
T VAGINALIS DNA VAG QL NAA+PROBE: NEGATIVE

## 2021-11-22 RX ORDER — METRONIDAZOLE 7.5 MG/G
GEL VAGINAL
Qty: 70 G | Refills: 0 | Status: SHIPPED | OUTPATIENT
Start: 2021-11-22 | End: 2022-07-19

## 2022-02-04 ENCOUNTER — TELEPHONE (OUTPATIENT)
Dept: SURGERY | Age: 32
End: 2022-02-04

## 2022-02-04 NOTE — TELEPHONE ENCOUNTER
Returned pts call. Pt aware she needs to complete list below. Once completed then we can schedule appt with Dr Riley Bolton. 1. Complete Nutritional Visits   2. H. [employer]  3. Support Form w 3 year weight history    Pt aware and understood.

## 2022-02-25 ENCOUNTER — CLINICAL SUPPORT (OUTPATIENT)
Dept: SURGERY | Age: 32
End: 2022-02-25

## 2022-02-25 DIAGNOSIS — E66.01 MORBID OBESITY (HCC): Primary | ICD-10-CM

## 2022-02-25 NOTE — PROGRESS NOTES
Pre-operative Bariatric Nutrition Evaluation    Date: 2022              Session 1 of 6    Insurance: [facility]           Physician/Surgeon: Dr. Rebecca Ruano      Name: Cm Sanchez  :  1990  Age:  32  Gender: Female  Type of Surgery: []   Gastric Bypass   [x]    Sleeve Gastrectomy    ASSESSMENT:    Past Medical History: DM, HTN     Medications/Supplements:   Prior to Admission medications    Medication Sig Start Date End Date Taking? Authorizing Provider   metroNIDAZOLE (METROGEL) 0.75 % gel Place one applicator nightly into the vagina for 7 days and then twice weekly at night for 3 weeks    Lou Cartwright MD   nitrofurantoin, macrocrystal-monohydrate, (MACROBID) 100 mg capsule Take 1 Capsule by mouth two (2) times a day. Indications: bacterial urinary tract infection  Patient not taking: Reported on 2021 10/14/21   Mamie Romero CNM   naproxen (NAPROSYN) 500 mg tablet Take 1 Tablet by mouth two (2) times daily (with meals). Patient not taking: Reported on 10/5/2021 9/2/21   Cheyenne Sorensen MD   magnesium citrate solution 150ml (1/2 bottle) as needed for constipation. May repeat once if no results. Patient not taking: Reported on 10/5/2021 7/23/21   Steff Field MD   metFORMIN (GLUCOPHAGE) 500 mg tablet Take 1 Tablet by mouth two (2) times daily (with meals). 21   Lizabeth Leventhal, MD   lisinopriL (PRINIVIL, ZESTRIL) 10 mg tablet Take 1 Tablet by mouth daily. 21   Lizabeth Leventhal, MD   albuterol (PROVENTIL HFA, VENTOLIN HFA, PROAIR HFA) 90 mcg/actuation inhaler Take 2 Puffs by inhalation every four (4) hours as needed for Wheezing. Patient not taking: Reported on 10/5/2021 1/22/21   Birgit Wood NP   ibuprofen (MOTRIN) 600 mg tablet Take 1 Tab by mouth every six (6) hours as needed for Pain.   Patient not taking: Reported on 10/5/2021 1/6/21   Petty Dial MD   fluticasone propionate (FLONASE) 50 mcg/actuation nasal spray 2 Sprays by Both Nostrils route daily. Patient not taking: Reported on 10/5/2021 12/22/20   Trina Bob MD   loratadine-pseudoephedrine (Loratadine-D) 5-120 mg per tablet Take 1 Tab by mouth two (2) times a day. Patient not taking: Reported on 10/5/2021 12/22/20   Trina Bob MD       Smoking: None  Alcohol: None    Food Allergies/Intolerances:None    Anthropometrics:    Ht: 63 inches   Wt: 290 lbs (pt stated)   IBW: 115 lbs    %IBW:  252    BMI: 51   Category: Obesity class III    Reported wt history: Pt presents today for pre-op nutrition evaluation for wt loss surgery. Reports lowest adult BW of 240 lbs and highest adult BW of 305 lbs. Attributes wt gain over the years r/t poor eating habits, birth control. Has attempted wt loss through various methods with most successful wt loss of 40 lbs. Has been unable to maintain long term or significant wt loss and is now seeking approval for weight loss surgery. Pt will need to complete 6 months of supervised weight loss for insurance requirements. Exercise/Physical Activity: goes to gym while at work; walks residents in parking lot    Reported Diet History:Herbal life, Toay, protein shakes     24 Hour Diet Recall  Breakfast  skips   Snacks     Lunch  Multi grain chips   Snacks     Dinner  Salad    Snacks     Beverages  Water, cranberry juice   When asked about emotional eating- \"I eat fruit to make me feel ok if I'm upset; it gets me back into reality. These past couple of weeks I've been depressed. 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 Pt cancelled appt earlier in the week because upset about having to start over with nutrition appts- first and only nutrition appt was July 2021. Pt unhappy with having to space out nutrition visits monthly- states will call insurance to see if 2 visits can be done per month.      NUTRITION DIAGNOSIS:  Food and nutrition related knowledge deficit r/t lack of prior exposure to information evidenced by pt demonstrates need for nutrition education for sleeve gastrectomy.          NUTRITION INTERVENTION:  Pt educated on nutrition recommendations for weight loss surgery, specifically sleeve gastrectomy . Instructed on consuming 3 meals per day starting now. Use the balanced plate method to plan meals, include 3 oz of lean source of protein, 1/2 cup whole grains, unlimited non-starchy vegetables, 1/2 cup fruit and 1 serving of low fat dairy. Utilize handouts listing healthy snack and meal ideas to limit restaurant meals. After surgery measure all meals to 1/2 cup. Each meal will contain a 1/4 cup lean protein and 1/4 cup fruit, non-starchy vegetable or starch (limiting to once per day). Aim for 60 g protein per day. Sip on 48-64 oz of sugar free, calorie free, non-carbonated beverages each day. Do not use a straw. Do not consume beverages 30 minutes before, during or 30 minutes after meals. Read all nutrition labels. Demonstrated and emphasized identifying serving size, total fat, sugar and protein content. Defined low fat as </= 3 g per serving. Discussed lean and extra lean sources of protein. Provided list of low fat cooking methods. Avoid foods with sugar listed in the first 3 ingredients and >/15 g sugar per serving. Excess sugar/fat intake may lead to dumping syndrome. Discussed signs and symptoms of dumping syndrome. Practice mindful eating habits; take small bites, chew thoroughly, avoid distractions, utilize hunger/fullness scale. Consume meals over 20-30 minutes. Attend Bariatric Support Group and increase physical activity (approved per MD) for long term weight maintenance. NUTRITION MONITORING AND EVALUATION:    The following goals were established with patient;  1. Eat 3 meals a day- protein at each meal  2. Review nutrition education materials.  Follow up next month for continue nutrition education.         Specific tips and techniques to facilitate compliance with above recommendations were provided and discussed. Nutrition evaluation reveals important lifestyle changes are indicated. Goals set and recommendations made. Will follow up next month for further diet education. If further details are desired please feel free to contact me at [phone]. This phone number was also provided to the patient for any further questions or concerns.            Kashif Baca RD

## 2022-03-19 PROBLEM — E66.01 OBESITY, MORBID (HCC): Status: ACTIVE | Noted: 2017-12-07

## 2022-03-19 PROBLEM — I10 ESSENTIAL HYPERTENSION: Status: ACTIVE | Noted: 2020-07-29

## 2022-03-20 PROBLEM — Z91.199 MEDICALLY NONCOMPLIANT: Status: ACTIVE | Noted: 2020-07-29

## 2022-03-24 ENCOUNTER — CLINICAL SUPPORT (OUTPATIENT)
Dept: SURGERY | Age: 32
End: 2022-03-24

## 2022-03-24 DIAGNOSIS — E66.01 MORBID OBESITY (HCC): Primary | ICD-10-CM

## 2022-03-24 NOTE — PROGRESS NOTES
[employer] [facility]  Supervised Weight Loss     Date:   3/24/2022    Patient's Name: Yannick Castillo  : 1990    Insurance:   [employer] [facility]   Session: 2 of  6  Surgery: Sleeve gastrectomy  Surgeon: Dr. Norris Ghotra    Height: 63 inches Weight:   287     Lbs. BMI: 50   Pounds Lost since last month: 3 lbs              Pounds Gained since last month: 0    Starting Weight: 290 lbs   Previous Months Weight: 290 lbs  Overall Pounds Lost: 3 lbs Overall Pounds Gained: 0    Other Pertinent Information: Today's appointment was completed in a virtual setting d/t COVID-19. Smoking Status:  None  Alcohol Intake: None    I have reviewed with pt the guidelines of the supervised wt loss program.  Pt understands the expectations of some wt loss during the program and that wt gain could delay the process. I have also explained that appointments need to be consecutive and missing an appointment may result in starting over. Pt has received this information in writing. Changes that patient has made since last month include: going to gym 3x week, eating 3 meals a day, less starches      Eating Habits and Behaviors  General healthy eating guidelines were discussed. A nutrition lesson was presented on portion control. Patients were instructed implement portion control now using the balanced plate method (1/2 plate non-starchy vegetables, 1/4 plate lean meat, and 1/4 plate whole grains and to include fruit and/or milk at meals or snack). We discussed measuring meals to 1/2 cup total per meal after surgery and appropriate portion progression long term. Patient's current diet habits include: Pt is eating 3 meals per day (B: boiled eggs, turkey ascencio, fruit, yogurt; L: tuna sandwich; Dinner: fish, veggies. Snack choices include fruit. Pt is eating refined carbohydrate foods (bread, pasta, rice, potatoes) occasionally. Pt is eating sweets/desserts occasionally. Pt is using baking, boiling cooking methods. Pt is not eating meals prepared outside of the home. Pt is drinking water, cranberry juice, veggie smoothie. Pt reports no emotional eating. Physical Activity/Exercise  We talked about the importance of increasing daily physical activity and beginning to develop an exercise regimen/routine. We talked about exercise as being an important part of long term weight loss after surgery. Comments:  During class, I discussed with patient the importance of getting into an exercise routine. Pt is currently going to gym 3x week ([employer], [employer] steps) for activity. Pt has been encouraged to continue with current exercise. Behavior Modification     We talked about how to eat more mindfully. Tips and recommendations for how to make these changes were provided. Pt was encouraged to keep a food journal and record what they were taking in daily. Review vitamins at next appt    Overall Assessment: Nutrition evaluation reveals important lifestyle changes are being made. Goals set and recommendations made. Will follow up next month for further diet education.      Patient-Set Goals:   1. Nutrition - continue with 3 meals a day-protein at each meal  2. Exercise - continue going to gym 3x week  3.  Behavior - continue with mindful eating; try protein shakes from list    Marie Morrell, RD  3/24/2022

## 2022-04-28 ENCOUNTER — CLINICAL SUPPORT (OUTPATIENT)
Dept: SURGERY | Age: 32
End: 2022-04-28

## 2022-04-28 DIAGNOSIS — E66.01 MORBID OBESITY (HCC): Primary | ICD-10-CM

## 2022-04-28 NOTE — PROGRESS NOTES
[employer] [facility]  Supervised Weight Loss     Date:   2022    Patient's Name: Hermelinda Delgadillo  : 1990    Insurance:   [employer] [facility] Better [employer]   Session: 3 of  6  Surgery: Sleeve gastrectomy                      Surgeon: Dr. Nika Rizo     Height: 63 inches       Weight:  293      Lbs. BMI: 52             Pounds Lost since last month:  0              Pounds Gained since last month: 6 lbs     Starting Weight: 290 lbs                   Previous Months Weight: 287 lbs  Overall Pounds Lost: 0   Overall Pounds Gained: 3 lbs     Other Pertinent Information: Today's appointment was completed in a virtual setting d/t COVID-19. Smoking Status:  None  Alcohol Intake: None    I have reviewed with pt the guidelines of the supervised wt loss program.  Pt understands the expectations of some wt loss during the program and that wt gain could delay the process. I have also explained that appointments need to be consecutive and missing an appointment may result in starting over. Pt has received this information in writing. Changes that patient has made since last month include:  Eating consistent meals, exercising consistently. Eating Habits and Behaviors  General healthy eating guidelines were also discussed. Pts were instructed that their plate should be made up 1/2 plate coming from non-starchy vegetables, 1/4 coming from lean meat, and 1/4 of their plate coming from carbohydrates, including fruits, starches, or milk. We discussed measuring meals to 1/2 cup total per meal after surgery. Drinking only calorie-free, sugar-free and non-carbonated beverages. We discussed the importance of drinking 64 ounces of fluid per day to prevent dehydration post-operatively.                        Patient's current diet habits include: Pt is eating 3 meals per day (B: protein shake- Premier or Slimfast: L: tuna, crackers or yogurt, banana, Nutrigrain bars; D: baked chix or fish, veggies. Snack choices include Nutrigrain bars. Pt is eating refined carbohydrate foods (bread, pasta, rice, potatoes) occasionally. Pt is eating sweets/desserts rarely. Pt is using baking cooking methods. Pt is not eating meals prepared outside of the home. Pt is drinking water, juice. Pt reports no emotional eating. Physical Activity/Exercise  An exercise presentation was provided including information about exercise programs available both before and after surgery. We talked about the importance of increasing daily physical activity and beginning to develop an exercise regimen/routine. We talked about exercise as being an important part of long term weight loss after surgery. Comments:  During class, I discussed with patient the importance of getting into an exercise routine. Pt is currently going to gym 4x week- stepper, sit ups/push ups for activity. Pt has been encouraged to continue with current exercise. Behavior Modification       We talked about how to eat more mindfully. Tips and recommendations for how to make these changes were provided. Pt was encouraged to keep a food journal and record what they were taking in daily. Overall Assessment: Nutrition evaluation reveals small lifestyle changes are being made. Goals set and recommendations made. Will follow up next month for further diet education.      Patient-Set Goals:   1. Nutrition - continue with 3 meals a day  2. Exercise - continue with current exercise  3.  Behavior - add DEENA Bright, PAUL  4/28/2022

## 2022-05-03 LAB
H PYLORI IGA SER-ACNC: 10.8 UNITS (ref 0–8.9)
H PYLORI IGG SER IA-ACNC: 0.25 INDEX VALUE (ref 0–0.79)

## 2022-05-04 ENCOUNTER — TELEPHONE (OUTPATIENT)
Dept: SURGERY | Age: 32
End: 2022-05-04

## 2022-05-04 DIAGNOSIS — R76.8 HELICOBACTER PYLORI AB+: Primary | ICD-10-CM

## 2022-05-04 NOTE — TELEPHONE ENCOUNTER
Patient with positive H. pylori screening test.  I have ordered her the breath test.  I called her to inform her of the plan. My nurse will mail her the breath test to be completed near her.

## 2022-05-18 ENCOUNTER — OFFICE VISIT (OUTPATIENT)
Dept: INTERNAL MEDICINE CLINIC | Age: 32
End: 2022-05-18
Payer: COMMERCIAL

## 2022-05-18 VITALS
TEMPERATURE: 98.5 F | WEIGHT: 293 LBS | OXYGEN SATURATION: 97 % | HEART RATE: 91 BPM | BODY MASS INDEX: 51.91 KG/M2 | RESPIRATION RATE: 17 BRPM | HEIGHT: 63 IN | SYSTOLIC BLOOD PRESSURE: 156 MMHG | DIASTOLIC BLOOD PRESSURE: 92 MMHG

## 2022-05-18 DIAGNOSIS — I10 ESSENTIAL HYPERTENSION: ICD-10-CM

## 2022-05-18 DIAGNOSIS — E66.01 OBESITY, MORBID (HCC): ICD-10-CM

## 2022-05-18 DIAGNOSIS — E11.65 TYPE 2 DIABETES MELLITUS WITH HYPERGLYCEMIA, WITHOUT LONG-TERM CURRENT USE OF INSULIN (HCC): Primary | ICD-10-CM

## 2022-05-18 DIAGNOSIS — Z91.199 MEDICALLY NONCOMPLIANT: ICD-10-CM

## 2022-05-18 LAB — UREA BREATH TEST QL: NEGATIVE

## 2022-05-18 PROCEDURE — 99214 OFFICE O/P EST MOD 30 MIN: CPT | Performed by: INTERNAL MEDICINE

## 2022-05-18 NOTE — PROGRESS NOTES
Nasir Zamora is a 32 y.o. female and presents with Weight Management and Diabetes  . Subjective:    Last appt w me 2020    Pt had IUD removed yesterday by [facility] bc she was told it may contributing to her weight gain. Pt comes-in for form to be filled for bariatric surgery to be scheduled in the near future    Type 2 DM-on metformin 500mg BID   -pt believes med may be too strong due to low sugar- low meaning 79  Lab Results   Component Value Date/Time    Glucose 187 (H) 2020 10:33 AM    Glucose (POC) 240 (H) 2021 04:35 PM     Lab Results   Component Value Date/Time    Hemoglobin A1c (POC) 10.5 2020 11:25 AM     Lab Results   Component Value Date/Time    Cholesterol, total 131 10/01/2010 02:00 PM    Cholesterol (POC) 115 02/10/2017 12:36 PM    HDL Cholesterol 42 10/01/2010 02:00 PM    HDL Cholesterol (POC) 32 02/10/2017 12:36 PM    LDL Cholesterol (POC) 33 02/10/2017 12:36 PM    LDL, calculated 51.8 10/01/2010 02:00 PM    VLDL, calculated 37.2 10/01/2010 02:00 PM    Triglyceride 186 (H) 10/01/2010 02:00 PM    Triglycerides (POC) 251 02/10/2017 12:36 PM    CHOL/HDL Ratio 3.1 10/01/2010 02:00 PM         HTN-?compliant w mdication   -  BP Readings from Last 3 Encounters:   22 (!) 156/92   21 (!) 158/86   10/05/21 (!) 168/90     Morbid obesity-  Wt Readings from Last 3 Encounters:   22 307 lb 3.2 oz (139.3 kg)   21 303 lb (137.4 kg)   10/05/21 303 lb (137.4 kg)         Review of Systems  Review of systems (12) negative, except noted above.     Past Medical History:   Diagnosis Date    Asthma     pt denies    Diabetes ([address] 2016    Hypertension     Other ill-defined conditions(799.89)     back pain    Seizures ([address]     last seizure was 89 years old    SOB (shortness of breath)      Past Surgical History:   Procedure Laterality Date    HX GYN      Pt reports  in past.    HX TONSILLECTOMY       Social History     Socioeconomic History    Marital status: SINGLE   Tobacco Use    Smoking status: Never Smoker    Smokeless tobacco: Never Used   Substance and Sexual Activity    Alcohol use: No    Drug use: No    Sexual activity: Yes     Partners: Male     Birth control/protection: None     Family History   Problem Relation Age of Onset    Diabetes Mother     Diabetes Father     Diabetes Maternal Grandmother     Breast Cancer Maternal Grandmother     Cancer Paternal Uncle      Current Outpatient Medications   Medication Sig Dispense Refill    metFORMIN (GLUCOPHAGE) 500 mg tablet Take 1 Tablet by mouth two (2) times daily (with meals). 60 Tablet 5    lisinopriL (PRINIVIL, ZESTRIL) 10 mg tablet Take 1 Tablet by mouth daily. 30 Tablet 5    metroNIDAZOLE (METROGEL) 0.75 % gel Place one applicator nightly into the vagina for 7 days and then twice weekly at night for 3 weeks (Patient not taking: Reported on 5/18/2022) 70 g 0    magnesium citrate solution 150ml (1/2 bottle) as needed for constipation. May repeat once if no results. (Patient not taking: Reported on 10/5/2021) 1 Bottle 0    albuterol (PROVENTIL HFA, VENTOLIN HFA, PROAIR HFA) 90 mcg/actuation inhaler Take 2 Puffs by inhalation every four (4) hours as needed for Wheezing. (Patient not taking: Reported on 10/5/2021) 1 Inhaler 0    fluticasone propionate (FLONASE) 50 mcg/actuation nasal spray 2 Sprays by Both Nostrils route daily.  (Patient not taking: Reported on 10/5/2021) 1 Bottle 0     No Known Allergies    Objective:  Visit Vitals  BP (!) 156/92 (BP 1 Location: Right arm, BP Patient Position: Sitting, BP Cuff Size: Adult) Comment: pt did not take medication   Pulse 91   Temp 98.5 °F (36.9 °C) (Temporal)   Resp 17   Ht 5' 3\" (1.6 m)   Wt 307 lb 3.2 oz (139.3 kg)   SpO2 97%   BMI 54.42 kg/m²     Physical Exam:   General appearance - alert, morbidly obese young lady in NAD  Mental status - alert, oriented to person, place, and time  EYE-EOMI   Chest - clear to auscultation, no wheezes, rales or rhonchi, symmetric air entry   Heart - normal rate, regular rhythm, normal S1, S2  Abdomen - sobese  Ext-peripheral pulses normal, no pedal edema, no clubbing or cyanosis  Skin-Warm and dry. no hyperpigmentation, vitiligo, or suspicious lesions  Neuro -alert, oriented, normal speech, no focal findings or movement disorder noted      Results for orders placed or performed in visit on 04/28/22   H PYLORI ABS, IGA AND IGG   Result Value Ref Range    H. pylori Ab, IgA 10.8 (H) 0.0 - 8.9 units    H pylori Ab IgG 0.25 0.00 - 0.79 Index Value       Assessment/Plan:    ICD-10-CM ICD-9-CM    1. Type 2 diabetes mellitus with hyperglycemia, without long-term current use of insulin (HCC)  E11.65 250.00 HEMOGLOBIN A1C WITH EAG     790.29 LIPID PANEL      METABOLIC PANEL, COMPREHENSIVE      MICROALBUMIN, UR, RAND W/ MICROALB/CREAT RATIO      CANCELED: MICROALBUMIN, UR, RAND W/ MICROALB/CREAT RATIO      CANCELED: HEMOGLOBIN A1C WITH EAG      CANCELED: LIPID PANEL      CANCELED: METABOLIC PANEL, COMPREHENSIVE   2. Essential hypertension  I10 401.9    3. Obesity, morbid ([facility] [address]  E66.01 278.01    4. Medically noncompliant  Z91.19 V15.81      Orders Placed This Encounter    HEMOGLOBIN A1C WITH EAG     Standing Status:   Future     Number of Occurrences:   1     Standing Expiration Date:   5/18/2023    LIPID PANEL     Standing Status:   Future     Number of Occurrences:   1     Standing Expiration Date:   2/60/3094    METABOLIC PANEL, COMPREHENSIVE     Standing Status:   Future     Number of Occurrences:   1     Standing Expiration Date:   5/18/2023    MICROALBUMIN, UR, RAND W/ MICROALB/CREAT RATIO     Standing Status:   Future     Number of Occurrences:   1     Standing Expiration Date:   5/18/2023     1. Type 2 diabetes mellitus with hyperglycemia, without long-term current use of insulin (HCC)  Doubt dietary compliance  - HEMOGLOBIN A1C WITH EAG; Future  - LIPID PANEL;  Future  - METABOLIC PANEL, COMPREHENSIVE; Future  - MICROALBUMIN, UR, RAND W/ MICROALB/CREAT RATIO; Future    2. Essential hypertension  As above    3. Obesity, morbid ([address]  Noted    4. Medically noncompliant  Noted      There are no Patient Instructions on file for this visit. Follow-up and Dispositions    · Return in about 3 months (around 8/18/2022) for post op diabetes and bp f/u. I have reviewed with the patient details of the assessment and plan and all questions were answered. Relevent patient education was performed. The most recent lab findings were reviewed with the patient. An After Visit Summary was printed and given to the patient.

## 2022-05-18 NOTE — PROGRESS NOTES
Room 1     Identified pt with two pt identifiers(name and ). Reviewed record in preparation for visit and have obtained necessary documentation. All patient medications has been reviewed. Chief Complaint   Patient presents with    Weight Management    Diabetes       3 most recent PHQ Screens 2022   Little interest or pleasure in doing things Not at all   Feeling down, depressed, irritable, or hopeless Not at all   Total Score PHQ 2 0     No flowsheet data found. Health Maintenance Due   Topic    Hepatitis C Screening     Depression Screen     COVID-19 Vaccine (1)    Pneumococcal 0-64 years (1 - PCV)    Foot Exam Q1     MICROALBUMIN Q1     Eye Exam Retinal or Dilated     DTaP/Tdap/Td series (1 - Tdap)    Lipid Screen     A1C test (Diabetic or Prediabetic)          Health Maintenance Review: Patient reminded of \"due or due soon\" health maintenance. I have asked the patient to contact his/her primary care provider (PCP) for follow-up on his/her health maintenance. Vitals:    22 1021 22 1028   BP: (!) 158/82 (!) 156/92   Pulse: 91    Resp: 17    Temp: 98.5 °F (36.9 °C)    TempSrc: Temporal    SpO2: 97%    Weight: 307 lb 3.2 oz (139.3 kg)    Height: 5' 3\" (1.6 m)    PainSc:   0 - No pain        Wt Readings from Last 3 Encounters:   22 307 lb 3.2 oz (139.3 kg)   21 303 lb (137.4 kg)   10/05/21 303 lb (137.4 kg)     Temp Readings from Last 3 Encounters:   22 98.5 °F (36.9 °C) (Temporal)   21 98.4 °F (36.9 °C)   21 98.4 °F (36.9 °C)     BP Readings from Last 3 Encounters:   22 (!) 156/92   21 (!) 158/86   10/05/21 (!) 168/90     Pulse Readings from Last 3 Encounters:   22 91   21 89   21 80       Coordination of Care Questionnaire:   1) Have you been to an emergency room, urgent care, or hospitalized since your last visit?   no       2. Have seen or consulted any other health care provider since your last visit?  NO    Patient is accompanied by self I have received verbal consent from [facility]  to discuss any/all medical information while they are present in the room.

## 2022-06-09 ENCOUNTER — CLINICAL SUPPORT (OUTPATIENT)
Dept: SURGERY | Age: 32
End: 2022-06-09

## 2022-06-09 DIAGNOSIS — E66.01 MORBID OBESITY (HCC): Primary | ICD-10-CM

## 2022-06-09 NOTE — PROGRESS NOTES
[facility]  Supervised Weight Loss     Date:   2022    Patient's Name: Henry Flores  : 1990    Insurance:   [facility] [employer]   Session:   Surgery: Sleeve gastrectomy                      Surgeon: Dr. Juan Diego Cates: 679      OVW (pt reported)                         BMI: 20             Pounds Lost since last month:  0              RCAWJY Gained since last month: 7 lbs     Starting Weight: 290 lbs                   Previous Months Weight: 293 lbs  Overall Pounds Lost: 0   Overall Pounds Gained: 10 lbs     Other Pertinent Information: Today's appointment was completed in a virtual setting d/t COVID-19. Pt with recent A1c of 11.     Smoking Status:  None  Alcohol Intake: None    I have reviewed with pt the guidelines of the supervised wt loss program.  Pt understands the expectations of some wt loss during the program and that wt gain could delay the process. I have also explained that classes need to be consecutive. Missing a class may result in starting over. Pt has received this information in writing. Changes that patient has made since last month include: None reported. Eating Habits and Behaviors  General healthy eating guidelines were discussed. A nutrition lesson specific to the importance of protein intake after surgery was provided. We discussed food sources of protein, protein supplements and multiple reasons as to why protein is important after bariatric surgery. Pts were instructed to focus on including protein at every meal and practice eating protein first at the meal. Pts were encouraged to sample a protein shake for tolerance. Patients were also instructed to use the balanced plate method for help with portion control and general healthy eating prior to surgery. We discussed measuring meals to 1/2 cup total per meal after surgery.  Drinking only calorie-free, sugar-free and non-carbonated beverages. We discussed the importance of drinking 64 ounces of fluid per day to prevent dehydration post-operatively. Patient's current diet habits include: Pt is eating 3 meals per day (B: protein shake- Premier or Slimfast: L: tuna, crackers or yogurt, banana, Nutrigrain bars; D: baked chix or fish, veggies. Snack choices include Nutrigrain bars. Pt is eating refined carbohydrate foods (bread, pasta, rice, potatoes) occasionally. Pt is eating sweets/desserts rarely. Pt is using baking cooking methods. Pt is not eating meals prepared outside of the home. Pt is drinking water. Pt reports no emotional eating. Physical Activity/Exercise  We talked about the importance of increasing daily physical activity and beginning to develop an exercise regimen/routine. We talked about exercise as being an important part of long term weight loss after surgery. Comments:  During class, I discussed with patient the importance of getting into an exercise routine. Pt is currently going to gym 4x week- stepper, sit ups/push ups for activity. Pt has been encouraged to continue with current exercise.        Behavior Modification     We talked about how to eat more mindfully. Tips and recommendations for how to make these changes were provided. Pt was encouraged to keep a food journal and record what they were taking in daily. Overall Assessment: Nutrition evaluation reveals no new lifestyle changes; pt states food/fluid intake the same. Goals set and recommendations made. Will follow up next month for further diet education.      Patient-Set Goals:   1. Nutrition - continue with 3 meals a day-protein at each meal  2. Exercise - continue with current exercise  3.  Behavior - consult with physician regarding blood sugar control    Jorden Osgood, PAUL  6/9/2022

## 2022-07-08 ENCOUNTER — CLINICAL SUPPORT (OUTPATIENT)
Dept: SURGERY | Age: 32
End: 2022-07-08

## 2022-07-08 DIAGNOSIS — E66.01 MORBID OBESITY (HCC): Primary | ICD-10-CM

## 2022-07-08 NOTE — PROGRESS NOTES
[facility]  Supervised Weight Loss     Date:   2022    Patient's Name: Teresa Speaker  : 1990    Insurance:   [employer]   Session: 5 of  6  Surgery: Sleeve gastrectomy                      Surgeon: Dr. Kelly Napier (pt reported)                         BMI: 21             Pounds Lost since last month:  7 lbs              Pounds Gained since last month: 0     Starting Weight: 290 lbs                   Previous Months Weight: 300 lbs  Overall Pounds Lost: 0   Overall Pounds Gained:3 lbs     Other Pertinent Information: Today's appointment was completed in a virtual setting d/t COVID-19. Pt with recent A1c of 11. Will be seeing physician soon to discuss treatment.      Smoking Status:  None  Alcohol Intake: None    I have reviewed with pt the guidelines of the supervised wt loss program.  Pt understands the expectations of some wt loss during the program and that wt gain could delay the process. I have also explained that appointments need to be consecutive and missing an appointment may result in starting over. Pt has received this information in writing. Changes that patient has made since last month include: states \"I'm eating healthy and exercising\"      Eating Habits and Behaviors  A nutrition lesson specific to vitamins was provided. We discussed the various reasons for needing vitamins and different types and doses. General healthy eating guidelines were also discussed. Pts were instructed that their plate should be made up 1/2 plate coming from non-starchy vegetables, 1/4 coming from lean meat, and 1/4 of their plate coming from carbohydrates, including fruits, starches, or milk. We discussed measuring meals to 1/2 cup total per meal after surgery. Drinking only calorie-free, sugar-free and non-carbonated beverages.  We discussed the importance of drinking 64 ounces of fluid per day to prevent dehydration post-operatively. Patient's current diet habits include:B: protein shake- banana, yogurt, Herbal life shake; L: salad; D: protein shakes. Snack choices include none. Pt is eating refined carbohydrate foods (bread, pasta, rice, potatoes) occasionally. Pt is eating sweets/desserts rarely. Pt is using baking cooking methods. Pt is not eating meals prepared outside of the home. Pt is drinking water. Pt reports no emotional eating. Physical Activity/Exercise  We talked about the importance of increasing daily physical activity and beginning to develop an exercise regimen/routine. We talked about exercise as being an important part of long term weight loss after surgery. Comments:  During class, I discussed with patient the importance of getting into an exercise routine. Pt is currently going to gym 4x week- stepper, sit ups/push ups for activity.  Pt has been encouraged to continue with current exercise.      Behavior Modification     We talked about how to eat more mindfully and identify emotional eating triggers. Tips and recommendations for how to make these changes were provided. Pt was encouraged to keep a food journal and record what they were taking in daily. Overall Assessment: Nutrition evaluation reveals no new lifestyle changes; pt states food/fluid intake the same- weight has decreased. Goals set and recommendations made. Will follow up next month.         Patient-Set Goals:   1. Nutrition - lean protein with each meal  2. Exercise - continue with current exercise  3.  Behavior -see physician regarding elevated A1c    Homero Oliver  7/8/2022

## 2022-07-19 ENCOUNTER — VIRTUAL VISIT (OUTPATIENT)
Dept: INTERNAL MEDICINE CLINIC | Age: 32
End: 2022-07-19
Payer: COMMERCIAL

## 2022-07-19 DIAGNOSIS — E11.65 TYPE 2 DIABETES MELLITUS WITH HYPERGLYCEMIA, WITHOUT LONG-TERM CURRENT USE OF INSULIN (HCC): Primary | ICD-10-CM

## 2022-07-19 PROCEDURE — 99213 OFFICE O/P EST LOW 20 MIN: CPT | Performed by: INTERNAL MEDICINE

## 2022-07-19 NOTE — PROGRESS NOTES
Kacy Olmstead is a 28 y.o. female  HIPAA verified by two patient identifiers. Chief Complaint   Patient presents with    Diabetes     FOLLOW UP     Health Maintenance Due   Topic    Hepatitis C Screening     COVID-19 Vaccine (1)    Pneumococcal 0-64 years (1 - PCV)    Foot Exam Q1     Eye Exam Retinal or Dilated     DTaP/Tdap/Td series (1 - Tdap)     Chief Complaint   Patient presents with    Diabetes     FOLLOW UP       Patient-Reported Vitals 7/19/2022   Patient-Reported Weight 298LB   Patient-Reported Temperature 97.1       Pain Scale: 0/10  Pain Location:               1. Have you been to the ER, urgent care clinic since your last visit? Hospitalized since your last visit? No    2. Have you seen or consulted any other health care providers outside of the [address] [place] [address] since your last visit? Include any pap smears or colon screening.  No

## 2022-07-19 NOTE — PROGRESS NOTES
Pt was unable to come-in for her appt bc she recently returned from [address] [place], [place] [address] and has URI sxs. Pt reports she is tlerating the metformin and taking it daily. She has lost weight.  ??Fsg

## 2022-07-30 ENCOUNTER — HOSPITAL ENCOUNTER (EMERGENCY)
Age: 32
Discharge: HOME | End: 2022-07-30
Attending: EMERGENCY MEDICINE
Payer: COMMERCIAL

## 2022-07-30 VITALS
DIASTOLIC BLOOD PRESSURE: 93 MMHG | TEMPERATURE: 98.1 F | HEIGHT: 60 IN | RESPIRATION RATE: 20 BRPM | HEART RATE: 86 BPM | BODY MASS INDEX: 56.93 KG/M2 | SYSTOLIC BLOOD PRESSURE: 148 MMHG | OXYGEN SATURATION: 96 % | WEIGHT: 290 LBS

## 2022-07-30 DIAGNOSIS — K59.00 CONSTIPATION, UNSPECIFIED CONSTIPATION TYPE: Primary | ICD-10-CM

## 2022-07-30 PROCEDURE — 99283 EMERGENCY DEPT VISIT LOW MDM: CPT

## 2022-07-30 RX ORDER — MAGNESIUM CITRATE
296 SOLUTION, ORAL ORAL
Status: DISCONTINUED | OUTPATIENT
Start: 2022-07-30 | End: 2022-07-30 | Stop reason: HOSPADM

## 2022-07-30 NOTE — Clinical Note
[facility] EMERGENCY DEPT  [address] [facility] [address] [phone]    Work/School Note    Date: 7/30/2022    To Whom It May concern:      Sheila Baker was seen and treated today in the emergency room by the following provider(s):  Attending Provider: Caty Jay MD.      Sheila Baker is excused from work/school on 07/30/22. She is clear to return to work/school on 07/31/22.         Sincerely,          Julieta Coleman MD

## 2022-07-30 NOTE — ED PROVIDER NOTES
EMERGENCY DEPARTMENT HISTORY AND PHYSICAL EXAM      Date: 7/30/2022  Patient Name: Clemon Castleman    History of Presenting Illness     Chief Complaint   Patient presents with    Constipation       History Provided By: Patient    HPI: Clemon Castleman, 28 y.o. female presents to the ED with cc of the patient. Patient states she has not had a well formed stool for 1 week. She has passed gas and had small pebble-like stools over the last 2 to 3 days. She took a red pill that she had taken in the past for constipation states it did not work. She went to the pharmacy to get magnesium citrate and they were out. She was at work this morning so came here to get relief. She denies any prior abdominal surgery. She denies associated abdominal pain. She is not on any chronic pain medications. She denies any other new medications. Does take MiraLAX. There are no other complaints, changes, or physical findings at this time. PCP: Constanza Burgess MD    No current facility-administered medications on file prior to encounter. Current Outpatient Medications on File Prior to Encounter   Medication Sig Dispense Refill    magnesium citrate solution 150ml (1/2 bottle) as needed for constipation. May repeat once if no results. (Patient not taking: No sig reported) 1 Bottle 0    metFORMIN (GLUCOPHAGE) 500 mg tablet Take 1 Tablet by mouth two (2) times daily (with meals). 60 Tablet 5    lisinopriL (PRINIVIL, ZESTRIL) 10 mg tablet Take 1 Tablet by mouth daily. (Patient not taking: Reported on 7/30/2022) 30 Tablet 5    albuterol (PROVENTIL HFA, VENTOLIN HFA, PROAIR HFA) 90 mcg/actuation inhaler Take 2 Puffs by inhalation every four (4) hours as needed for Wheezing. (Patient not taking: No sig reported) 1 Inhaler 0    fluticasone propionate (FLONASE) 50 mcg/actuation nasal spray 2 Sprays by Both Nostrils route daily.  (Patient not taking: No sig reported) 1 Bottle 0       Past History     Past Medical History:  Past Medical History:   Diagnosis Date    Asthma     pt denies    Diabetes ([facility] [address] 2016    Hypertension     Other ill-defined conditions(799.89)     back pain    Seizures ([facility])     last seizure was 89 years old    SOB (shortness of breath)        Past Surgical History:  Past Surgical History:   Procedure Laterality Date    HX GYN  2014    Pt reports  in past.    HX TONSILLECTOMY         Family History:  Family History   Problem Relation Age of Onset    Diabetes Mother     Diabetes Father     Diabetes Maternal Grandmother     Breast Cancer Maternal Grandmother     Cancer Paternal Uncle        Social History:  Social History     Tobacco Use    Smoking status: Never    Smokeless tobacco: Never   Substance Use Topics    Alcohol use: No    Drug use: No       Allergies:  No Known Allergies      Review of Systems   Review of Systems   Constitutional: Negative. Negative for chills and fever. HENT: Negative. Negative for congestion and rhinorrhea. Respiratory: Negative. Negative for cough, chest tightness and wheezing. Cardiovascular: Negative. Negative for chest pain and palpitations. Gastrointestinal:  Positive for constipation. Negative for abdominal distention, abdominal pain, diarrhea, nausea and vomiting. Endocrine: Negative. Genitourinary: Negative. Negative for decreased urine volume, flank pain, hematuria and pelvic pain. Musculoskeletal: Negative. Negative for back pain and neck pain. Skin: Negative. Negative for color change, pallor and rash. Neurological: Negative. Negative for dizziness, seizures, weakness, numbness and headaches. Hematological: Negative. Negative for adenopathy. Psychiatric/Behavioral: Negative. All other systems reviewed and are negative. Physical Exam   Physical Exam  Vitals reviewed. Constitutional:       General: She is not in acute distress. Appearance: She is well-developed. She is not diaphoretic.    HENT:      Head: Normocephalic and atraumatic. Mouth/Throat:      Pharynx: No oropharyngeal exudate. Eyes:      General: No scleral icterus. Right eye: No discharge. Left eye: No discharge. Conjunctiva/sclera: Conjunctivae normal.      Pupils: Pupils are equal, round, and reactive to light. Neck:      Vascular: No JVD. Cardiovascular:      Rate and Rhythm: Normal rate and regular rhythm. Heart sounds: Normal heart sounds. No murmur heard. No friction rub. No gallop. Pulmonary:      Effort: Pulmonary effort is normal. No respiratory distress. Breath sounds: Normal breath sounds. No stridor. No wheezing or rales. Chest:      Chest wall: No tenderness. Abdominal:      General: Bowel sounds are normal. There is no distension. Palpations: Abdomen is soft. There is no mass. Tenderness: There is no abdominal tenderness. There is no guarding or rebound. Comments: All sounds are present. There is no guarding or rebound. Musculoskeletal:      Cervical back: Normal range of motion and neck supple. Skin:     General: Skin is warm. Coloration: Skin is not pale. Findings: No rash. Neurological:      Mental Status: She is alert and oriented to person, place, and time. Cranial Nerves: No cranial nerve deficit. Motor: No abnormal muscle tone. Coordination: Coordination normal.      Deep Tendon Reflexes: Reflexes normal.       Diagnostic Study Results     Labs -   No results found for this or any previous visit (from the past 12 hour(s)). Radiologic Studies -   No orders to display     CT Results  (Last 48 hours)      None          CXR Results  (Last 48 hours)      None            Medical Decision Making   I am the first provider for this patient. I reviewed the vital signs, available nursing notes, past medical history, past surgical history, family history and social history. Vital Signs-Reviewed the patient's vital signs.   Patient Vitals for the past 12 hrs:   Temp Pulse Resp BP SpO2   07/30/22 0806 98.1 °F (36.7 °C) 86 20 (!) 148/93 96 %         Records Reviewed: Nursing Notes and Old Medical Records    Provider Notes (Medical Decision Making):   Differential diagnosis-idiopathic constipation, slow transit constipation, bowel obstruction, adverse drug reaction    Impression/plan-28year-old obese female to the ER with symptoms of constipation. Exam unremarkable. She has bowel sounds and is passing gas. There is no obstructive symptoms including nausea or vomiting. We will give her magnesium citrate to take at home. She will continue her typical bowel regimen to maintain normal stools. I will follow her up to gastroenterology has this has been a recurrent problem. ED Course:   Initial assessment performed. The patients presenting problems have been discussed, and they are in agreement with the care plan formulated and outlined with them. I have encouraged them to ask questions as they arise throughout their visit. Mulu Mckinley MD      Disposition:    DC- Adult Discharges: All of the diagnostic tests were reviewed and questions answered. Diagnosis, care plan and treatment options were discussed. The patient understands the instructions and will follow up as directed. The patients results have been reviewed with them. They have been counseled regarding their diagnosis. The patient verbally convey understanding and agreement of the signs, symptoms, diagnosis, treatment and prognosis and additionally agrees to follow up as recommended with their PCP in 24 - 48 hours. They also agree with the care-plan and convey that all of their questions have been answered.   I have also put together some discharge instructions for them that include: 1) educational information regarding their diagnosis, 2) how to care for their diagnosis at home, as well a 3) list of reasons why they would want to return to the ED prior to their follow-up appointment, should their condition change. DISCHARGE PLAN:  1. Current Discharge Medication List        START taking these medications    Details   sodium phosphates (FLEET'S) 9.5-3.5 gram/59 mL enema Insert 66 mL into rectum now for 1 dose. Qty: 66 mL, Refills: 0  Start date: 7/30/2022, End date: 7/30/2022           2. Follow-up Information       Follow up With Specialties Details Why Contact Info    Kylie Celaya MD Internal Medicine Physician Schedule an appointment as soon as possible for a visit in 2 days As needed [address] [place] [address] [address] [place] [address]      Jarrett Bella MD Gastroenterology Schedule an appointment as soon as possible for a visit in 1 week As needed [id number]  [phone]      [facility] [address] [place] [address] DEPT Emergency Medicine  If symptoms worsen [facility]  [phone]          3. Return to ED if worse     Diagnosis     Clinical Impression:   1. Constipation, unspecified constipation type        Attestations:    Bethany Hinkle MD        Please note that this dictation was completed with [employer], the computer voice recognition software. Quite often unanticipated grammatical, syntax, homophones, and other interpretive errors are inadvertently transcribed by the computer software. Please disregard these errors. Please excuse any errors that have escaped final proofreading. Thank you.

## 2022-07-30 NOTE — ED TRIAGE NOTES
Patient presents to ED with c/o constipation. Patient states that last bowel movement was 1 week ago.

## 2022-07-30 NOTE — ED NOTES
Emergency Department Nursing Plan of Care       The Nursing Plan of Care is developed from the Nursing assessment and Emergency Department Attending provider initial evaluation. The plan of care may be reviewed in the ED Provider note.     The Plan of Care was developed with the following considerations:   Patient / Family readiness to learn indicated by:verbalized understanding  Persons(s) to be included in education: patient  Barriers to Learning/Limitations:No    Signed     Matt Calvillo RN    7/30/2022   8:11 AM

## 2022-07-30 NOTE — Clinical Note
[address] EMERGENCY DEPT  [address] [facility] [id number] [phone]    Work/School Note    Date: 7/30/2022    To Whom It May concern:      Leticia Candelario was seen and treated today in the emergency room by the following provider(s):  Attending Provider: Uzma Pond MD.      Leticia Candelario is excused from work/school on 07/30/22. She is clear to return to work/school on 07/31/22.         Sincerely,          Bethany Hinkle MD

## 2022-09-07 ENCOUNTER — OFFICE VISIT (OUTPATIENT)
Dept: OBGYN CLINIC | Age: 32
End: 2022-09-07
Payer: COMMERCIAL

## 2022-09-07 VITALS
HEIGHT: 60 IN | WEIGHT: 293 LBS | BODY MASS INDEX: 57.52 KG/M2 | SYSTOLIC BLOOD PRESSURE: 142 MMHG | DIASTOLIC BLOOD PRESSURE: 84 MMHG

## 2022-09-07 DIAGNOSIS — Z11.3 SCREEN FOR STD (SEXUALLY TRANSMITTED DISEASE): ICD-10-CM

## 2022-09-07 DIAGNOSIS — Z01.419 WELL WOMAN EXAM WITH ROUTINE GYNECOLOGICAL EXAM: Primary | ICD-10-CM

## 2022-09-07 LAB
HBV SURFACE AG SER QL: <0.1 INDEX
HBV SURFACE AG SER QL: NEGATIVE
HCV AB SERPL QL IA: NONREACTIVE
HIV 1+2 AB+HIV1 P24 AG SERPL QL IA: NONREACTIVE
HIV12 RESULT COMMENT, HHIVC: NORMAL

## 2022-09-07 PROCEDURE — 99395 PREV VISIT EST AGE 18-39: CPT | Performed by: ADVANCED PRACTICE MIDWIFE

## 2022-09-07 NOTE — PROGRESS NOTES
Annual exam ages 21-44     [address] [place] is a ,  28 y.o. female   No LMP recorded. (Menstrual status: IUD). She presents for her annual checkup. She is having no significant problems. With regard to the Gardasil vaccine, she has not received it yet. 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 She is using three to ten pads or tampons per day, very light to non existent due to contraceptive hormones. She does not have dysmenorrhea. She reports no premenstrual symptoms. Contraception:    The current method of family planning is Hormonal IUS. Sexual history:    She  reports being sexually active and has had partner(s) who are male. She reports using the following method of birth control/protection: None. Medical conditions:    Since her last annual GYN exam about one year ago, she has had the following changes in her health history: none. Surgical history confirmed with patient. has a past surgical history that includes hx tonsillectomy and hx gyn (). Pap and Mammogram History:    Her most recent Pap smear was abnormal, obtained 2 year(s) ago. The patient has never had a mammogram.    Breast Cancer History/Substance Abuse:  Maternal Grandmother    Past Medical History:   Diagnosis Date    Asthma     pt denies    Diabetes ([address] 2016    Hypertension     Other ill-defined conditions(799.89)     back pain    Seizures ([facility] [address]     last seizure was 89 years old    SOB (shortness of breath)      Past Surgical History:   Procedure Laterality Date    HX GYN      Pt reports  in past.    HX TONSILLECTOMY         Current Outpatient Medications   Medication Sig Dispense Refill    lisinopriL (PRINIVIL, ZESTRIL) 10 mg tablet Take 1 tablet by mouth once daily 30 Tablet 5    metFORMIN (GLUCOPHAGE) 500 mg tablet TAKE 1 TABLET BY MOUTH TWICE DAILY WITH MEALS 60 Tablet 5    magnesium citrate solution 150ml (1/2 bottle) as needed for constipation. May repeat once if no results. (Patient not taking: No sig reported) 1 Bottle 0    albuterol (PROVENTIL HFA, VENTOLIN HFA, PROAIR HFA) 90 mcg/actuation inhaler Take 2 Puffs by inhalation every four (4) hours as needed for Wheezing. (Patient not taking: No sig reported) 1 Inhaler 0    fluticasone propionate (FLONASE) 50 mcg/actuation nasal spray 2 Sprays by Both Nostrils route daily. (Patient not taking: No sig reported) 1 Bottle 0     Allergies: Patient has no known allergies. Tobacco History:  reports that she has never smoked. She has never used smokeless tobacco.  Alcohol Abuse:  reports no history of alcohol use. Drug Abuse:  reports no history of drug use.     Family Medical/Cancer History:   Family History   Problem Relation Age of Onset    Diabetes Mother     Diabetes Father     Diabetes Maternal Grandmother     Breast Cancer Maternal Grandmother     Cancer Paternal Uncle         Review of Systems - History obtained from the patient  Constitutional: negative for weight loss, fever, night sweats  HEENT: negative for hearing loss, earache, congestion, snoring, sorethroat  CV: negative for chest pain, palpitations, edema  Resp: negative for cough, shortness of breath, wheezing  GI: negative for change in bowel habits, abdominal pain, black or bloody stools  : negative for frequency, dysuria, hematuria, vaginal discharge  MSK: negative for back pain, joint pain, muscle pain  Breast: negative for breast lumps, nipple discharge, galactorrhea  Skin :negative for itching, rash, hives  Neuro: negative for dizziness, headache, confusion, weakness  Psych: negative for anxiety, depression, change in mood  Heme/lymph: negative for bleeding, bruising, pallor    Physical Exam    Visit Vitals  BP (!) 142/84 (BP 1 Location: Left upper arm, BP Patient Position: Sitting, BP Cuff Size: Adult)   Ht 5' (1.524 m)   Wt 297 lb 12.8 oz (135.1 kg)   BMI 58.16 kg/m²       Constitutional  Appearance: well-nourished, well developed, alert, in no acute distress    HENT  Head and Face: appears normal,   Eyes: Sclera clear. Conjunctiva pink, no drainage. PEARLA      Neck  Inspection/Palpation: normal appearance, no masses or tenderness  Lymph Nodes: no lymphadenopathy present  Thyroid: NSSC, NT    Chest  Respiratory Effort: breathing unlabored  Auscultation: normal breath sounds, LCTAB    Cardiovascular  Heart: Auscultation: regular rate and rhythm without murmur    Breasts  Inspection of Breasts: breasts symmetrical, no skin changes, no discharge present, nipple appearance normal, no skin retraction present  Palpation of Breasts and Axillae: no masses present on palpation, no breast tenderness  Axillary Lymph Nodes: no lymphadenopathy present    Gastrointestinal  Abdominal Examination: abdomen non-tender to palpation, no masses present  Hernias: no hernias identified  CVA: Neg/NT Bilat    Genitourinary  External Genitalia: EGBUS normal appearance for age,  no tenderness, inflammatory lesions, masses or atrophy present  Vagina: normal vaginal vault without central or paravaginal defects, Physiologic discharge present, no inflammatory lesions or masses noted. Bladder: non-tender to palpation  Urethra: appears normal  Cervix: normal, No CMT   Uterus: normal size, shape and consistency  Adnexa: no adnexal tenderness or masses present bilat  Perineum: perineum normal, no evidence of trauma, rashes or skin lesions present  Anus: normal appearance without fissures, lesions or hemorrhoids present  Inguinal Lymph Nodes: no lymphadenopathy present    Skin  General Inspection: no rash, no lesions identified    Neurologic/Psychiatric  Mental Status:  Orientation: grossly oriented to person, place and time  Mood and Affect: mood normal, affect appropriate    .   Assessment:  Routine gynecologic examination  Her current medical status is satisfactory with no evidence of significant gynecologic issues. Plan:  STD screening on pap  STD serum screeing  Counseled re: diet, exercise, healthy lifestyle, safe sex practices and STI prevention  Offered LARC for Contraception  Return for yearly wellness visits or prn  Gardisil counseling provided  Pt counseled regarding co-testing for high risk HPV with pap  Mammogram Screening recommendation starting age 36   Colorectal Screening recommendation starting age 39    [address], [place]

## 2022-09-09 LAB — T PALLIDUM AB SER QL IA: NON REACTIVE

## 2022-09-10 LAB
C TRACH RRNA CVX QL NAA+PROBE: NEGATIVE
CYTOLOGIST CVX/VAG CYTO: NORMAL
CYTOLOGY CVX/VAG DOC CYTO: NORMAL
CYTOLOGY CVX/VAG DOC THIN PREP: NORMAL
DX ICD CODE: NORMAL
HPV I/H RISK 4 DNA CVX QL PROBE+SIG AMP: NEGATIVE
Lab: NORMAL
N GONORRHOEA RRNA CVX QL NAA+PROBE: NEGATIVE
OTHER STN SPEC: NORMAL
STAT OF ADQ CVX/VAG CYTO-IMP: NORMAL
T VAGINALIS RRNA SPEC QL NAA+PROBE: NEGATIVE

## 2022-09-20 ENCOUNTER — HOSPITAL ENCOUNTER (EMERGENCY)
Age: 32
Discharge: HOME | End: 2022-09-20
Attending: EMERGENCY MEDICINE
Payer: COMMERCIAL

## 2022-09-20 VITALS
OXYGEN SATURATION: 99 % | WEIGHT: 293 LBS | TEMPERATURE: 97.8 F | HEART RATE: 79 BPM | BODY MASS INDEX: 47.09 KG/M2 | DIASTOLIC BLOOD PRESSURE: 98 MMHG | RESPIRATION RATE: 16 BRPM | SYSTOLIC BLOOD PRESSURE: 128 MMHG | HEIGHT: 66 IN

## 2022-09-20 DIAGNOSIS — N30.00 ACUTE CYSTITIS WITHOUT HEMATURIA: Primary | ICD-10-CM

## 2022-09-20 DIAGNOSIS — T14.8XXA MUSCLE STRAIN: ICD-10-CM

## 2022-09-20 LAB
APPEARANCE UR: CLEAR
BACTERIA URNS QL MICRO: NEGATIVE /HPF
BILIRUB UR QL: NEGATIVE
COLOR UR: ABNORMAL
EPITH CASTS URNS QL MICRO: ABNORMAL /LPF
GLUCOSE UR STRIP.AUTO-MCNC: NEGATIVE MG/DL
HCG UR QL: NEGATIVE
HGB UR QL STRIP: NEGATIVE
KETONES UR QL STRIP.AUTO: NEGATIVE MG/DL
LEUKOCYTE ESTERASE UR QL STRIP.AUTO: ABNORMAL
NITRITE UR QL STRIP.AUTO: NEGATIVE
PH UR STRIP: 6 [PH] (ref 5–8)
PROT UR STRIP-MCNC: NEGATIVE MG/DL
RBC #/AREA URNS HPF: ABNORMAL /HPF (ref 0–5)
SP GR UR REFRACTOMETRY: 1.01
UA: UC IF INDICATED,UAUC: ABNORMAL
UROBILINOGEN UR QL STRIP.AUTO: 0.2 EU/DL (ref 0.2–1)
WBC URNS QL MICRO: ABNORMAL /HPF (ref 0–4)

## 2022-09-20 PROCEDURE — 81025 URINE PREGNANCY TEST: CPT

## 2022-09-20 PROCEDURE — 81001 URINALYSIS AUTO W/SCOPE: CPT

## 2022-09-20 PROCEDURE — 87086 URINE CULTURE/COLONY COUNT: CPT

## 2022-09-20 PROCEDURE — 99283 EMERGENCY DEPT VISIT LOW MDM: CPT

## 2022-09-20 PROCEDURE — 74011250637 HC RX REV CODE- 250/637: Performed by: PHYSICIAN ASSISTANT

## 2022-09-20 RX ORDER — IBUPROFEN 400 MG/1
800 TABLET ORAL
Status: COMPLETED | OUTPATIENT
Start: 2022-09-20 | End: 2022-09-20

## 2022-09-20 RX ORDER — ONDANSETRON 4 MG/1
4 TABLET, ORALLY DISINTEGRATING ORAL
Qty: 20 TABLET | Refills: 0 | Status: SHIPPED | OUTPATIENT
Start: 2022-09-20 | End: 2022-10-04

## 2022-09-20 RX ORDER — CEPHALEXIN 500 MG/1
500 CAPSULE ORAL 2 TIMES DAILY
Qty: 14 CAPSULE | Refills: 0 | Status: SHIPPED | OUTPATIENT
Start: 2022-09-20 | End: 2022-09-27

## 2022-09-20 RX ORDER — ONDANSETRON 4 MG/1
4 TABLET, ORALLY DISINTEGRATING ORAL
Status: COMPLETED | OUTPATIENT
Start: 2022-09-20 | End: 2022-09-20

## 2022-09-20 RX ORDER — IBUPROFEN 800 MG/1
800 TABLET ORAL
Qty: 20 TABLET | Refills: 0 | Status: SHIPPED | OUTPATIENT
Start: 2022-09-20 | End: 2022-09-27

## 2022-09-20 RX ADMIN — ONDANSETRON 4 MG: 4 TABLET, ORALLY DISINTEGRATING ORAL at 10:29

## 2022-09-20 RX ADMIN — IBUPROFEN 800 MG: 400 TABLET, FILM COATED ORAL at 10:29

## 2022-09-20 NOTE — ED TRIAGE NOTES
Pt arrived ambulatory to ED complaining of urinary frequency  x yesterday. Pt also reports left flank pain. Pt states \"I think I have a UTI\". Pt states she had similar symptoms when she last had a UTI. Denies discharge.

## 2022-09-20 NOTE — ED PROVIDER NOTES
EMERGENCY DEPARTMENT HISTORY AND PHYSICAL EXAM      Date: 9/20/2022  Patient Name: Edgar Harris    History of Presenting Illness     Chief Complaint   Patient presents with    Urinary Frequency       History Provided By: Patient    HPI: Edgar Harris, 28 y.o. female with PMHx significant for diabetes, hypertension, presents to the ED with cc of left flank pain. Patient reports that she was woken from sleep with pain to her left upper flank area yesterday. The pain has been intermittent, worse when she lays down. Pain is currently a 6 out of 10 in severity. She has associated urinary frequency. She reports that she had this pain before in the past and was told that it was due to a urinary tract infection. She has had some associated nausea. She denies fevers, constipation, diarrhea, dysuria, hematuria, abdominal pain. She reports she had her annual checkup at her gynecologist this week and tested negative for STDs. There are no other complaints, changes, or physical findings at this time. PCP: Wes Manzo MD    No current facility-administered medications on file prior to encounter. Current Outpatient Medications on File Prior to Encounter   Medication Sig Dispense Refill    lisinopriL (PRINIVIL, ZESTRIL) 10 mg tablet Take 1 tablet by mouth once daily 30 Tablet 5    metFORMIN (GLUCOPHAGE) 500 mg tablet TAKE 1 TABLET BY MOUTH TWICE DAILY WITH MEALS 60 Tablet 5    magnesium citrate solution 150ml (1/2 bottle) as needed for constipation. May repeat once if no results. (Patient not taking: No sig reported) 1 Bottle 0    albuterol (PROVENTIL HFA, VENTOLIN HFA, PROAIR HFA) 90 mcg/actuation inhaler Take 2 Puffs by inhalation every four (4) hours as needed for Wheezing. (Patient not taking: No sig reported) 1 Inhaler 0    fluticasone propionate (FLONASE) 50 mcg/actuation nasal spray 2 Sprays by Both Nostrils route daily.  (Patient not taking: No sig reported) 1 Bottle 0       Past History Past Medical History:  Past Medical History:   Diagnosis Date    Asthma     pt denies    Diabetes ([facility] [address] 2016    Hypertension     Other ill-defined conditions(799.89)     back pain    Seizures (HCC)     last seizure was 89 years old    SOB (shortness of breath)        Past Surgical History:  Past Surgical History:   Procedure Laterality Date    HX GYN  2014    Pt reports  in past.    HX TONSILLECTOMY         Family History:  Family History   Problem Relation Age of Onset    Diabetes Mother     Diabetes Father     Diabetes Maternal Grandmother     Breast Cancer Maternal Grandmother     Cancer Paternal Uncle        Social History:  Social History     Tobacco Use    Smoking status: Never    Smokeless tobacco: Never   Substance Use Topics    Alcohol use: No    Drug use: No       Allergies:  No Known Allergies      Review of Systems   Review of Systems   Constitutional:  Negative for chills and fever. HENT:  Negative for ear pain and sore throat. Eyes:  Negative for redness and visual disturbance. Respiratory:  Negative for cough and shortness of breath. Cardiovascular:  Negative for chest pain and palpitations. Gastrointestinal:  Positive for nausea. Negative for abdominal pain and vomiting. Genitourinary:  Positive for flank pain and frequency. Negative for dysuria and hematuria. Musculoskeletal:  Negative for back pain and gait problem. Skin:  Negative for rash and wound. Neurological:  Negative for dizziness and headaches. Psychiatric/Behavioral:  Negative for behavioral problems and confusion. All other systems reviewed and are negative. Physical Exam   Physical Exam  Vitals and nursing note reviewed. Constitutional:       Appearance: She is not toxic-appearing. Comments: Interactive female who is looking at her cell phone during exam and appears to be in no acute distress. HENT:      Head: Normocephalic and atraumatic.       Mouth/Throat:      Mouth: Mucous membranes are moist.   Eyes:      Extraocular Movements: Extraocular movements intact. Pupils: Pupils are equal, round, and reactive to light. Cardiovascular:      Rate and Rhythm: Normal rate and regular rhythm. Pulmonary:      Effort: Pulmonary effort is normal. No respiratory distress. Abdominal:      Comments: Abdomen is soft. No tenderness to palpation. No rebound or guarding. No CVA tenderness. Musculoskeletal:         General: No deformity. Normal range of motion. Cervical back: Normal range of motion and neck supple. Comments: Tenderness of the left lateral thoracic muscles. No overlying rashes or lesions. Skin:     General: Skin is warm and dry. Neurological:      General: No focal deficit present. Mental Status: She is alert and oriented to person, place, and time. Psychiatric:         Behavior: Behavior normal.         Diagnostic Study Results     Labs -   No results found for this or any previous visit (from the past 12 hour(s)). Radiologic Studies -   No orders to display     CT Results  (Last 48 hours)      None          CXR Results  (Last 48 hours)      None              Medical Decision Making   I am the first provider for this patient. I reviewed the vital signs, available nursing notes, past medical history, past surgical history, family history and social history. Vital Signs-Reviewed the patient's vital signs. No data found. Records Reviewed: Nursing Notes and Old Medical Records      Provider Notes (Medical Decision Making):   DDx: Muscle strain/spasm, urinary tract infection, pyelonephritis, kidney stone    This is a 72-year-old female who presents with left flank pain and urinary frequency. The pain in her left flank is reproducible with palpation of the muscles and does seem to be musculoskeletal in nature. She has no CVA tenderness to percussion to suggest Hugo nephritis or kidney stone.   Plan to check urinalysis, urine pregnancy, and treat symptomatically with analgesia. ED Course:   Initial assessment performed. The patients presenting problems have been discussed, and they are in agreement with the care plan formulated and outlined with them. I have encouraged them to ask questions as they arise throughout their visit. Disposition:  11:10 AM  The patient has been re-evaluated and is ready for discharge. Reviewed available results with patient. Counseled patient on diagnosis and care plan. Patient has expressed understanding, and all questions have been answered. Patient agrees with plan and agrees to follow up as recommended, or to return to the ED if their symptoms worsen. Discharge instructions have been provided and explained to the patient, along with reasons to return to the ED. PLAN:  1. Discharge Medication List as of 9/20/2022 11:10 AM        START taking these medications    Details   ibuprofen (MOTRIN) 800 mg tablet Take 1 Tablet by mouth every six (6) hours as needed for Pain for up to 7 days. , Normal, Disp-20 Tablet, R-0      ondansetron (ZOFRAN ODT) 4 mg disintegrating tablet Take 1 Tablet by mouth every eight (8) hours as needed for Nausea or Vomiting., Normal, Disp-20 Tablet, R-0      cephALEXin (Keflex) 500 mg capsule Take 1 Capsule by mouth two (2) times a day for 7 days. , Normal, Disp-14 Capsule, R-0           CONTINUE these medications which have NOT CHANGED    Details   lisinopriL (PRINIVIL, ZESTRIL) 10 mg tablet Take 1 tablet by mouth once daily, Normal, Disp-30 Tablet, R-5      metFORMIN (GLUCOPHAGE) 500 mg tablet TAKE 1 TABLET BY MOUTH TWICE DAILY WITH MEALS, Normal, Disp-60 Tablet, R-5      magnesium citrate solution 150ml (1/2 bottle) as needed for constipation. May repeat once if no results. , Normal, Disp-1 Bottle, R-0      albuterol (PROVENTIL HFA, VENTOLIN HFA, PROAIR HFA) 90 mcg/actuation inhaler Take 2 Puffs by inhalation every four (4) hours as needed for Wheezing., Normal, Disp-1 Inhaler, R-0      fluticasone propionate (FLONASE) 50 mcg/actuation nasal spray 2 Sprays by Both Nostrils route daily. , Normal, Disp-1 Bottle, R-0           2. Follow-up Information       Follow up With Specialties Details Why Contact Info    Shakira Vincent MD Internal Medicine Physician Schedule an appointment as soon as possible for a visit in 1 week  [address] [place] [address]  [address] [facility] [address] [place] [address]  [phone]      [address] [place] [address] DEPT Emergency Medicine Go to  If symptoms worsen [address] [facility]  [phone]          Return to ED if worse     Diagnosis     Clinical Impression:   1. Acute cystitis without hematuria    2. Muscle strain            [place] Cass[place].  JERSON Redd  09/20/22 10:37 PM

## 2022-09-20 NOTE — ED NOTES
Emergency Department Nursing Plan of Care       The Nursing Plan of Care is developed from the Nursing assessment and Emergency Department Attending provider initial evaluation. The plan of care may be reviewed in the ED Provider note.     The Plan of Care was developed with the following considerations:   Patient / Family readiness to learn indicated by:verbalized understanding  Persons(s) to be included in education: patient  Barriers to Learning/Limitations:No    Signed     Jacob Hall RN    9/20/2022   11:02 AM

## 2022-09-21 LAB
BACTERIA SPEC CULT: NORMAL
SERVICE CMNT-IMP: NORMAL

## 2022-10-04 ENCOUNTER — OFFICE VISIT (OUTPATIENT)
Dept: INTERNAL MEDICINE CLINIC | Age: 32
End: 2022-10-04
Payer: COMMERCIAL

## 2022-10-04 ENCOUNTER — TELEPHONE (OUTPATIENT)
Dept: SURGERY | Age: 32
End: 2022-10-04

## 2022-10-04 ENCOUNTER — TRANSCRIBE ORDER (OUTPATIENT)
Dept: SCHEDULING | Age: 32
End: 2022-10-04

## 2022-10-04 VITALS
HEIGHT: 66 IN | HEART RATE: 77 BPM | TEMPERATURE: 98.2 F | DIASTOLIC BLOOD PRESSURE: 81 MMHG | WEIGHT: 293 LBS | OXYGEN SATURATION: 99 % | RESPIRATION RATE: 16 BRPM | BODY MASS INDEX: 47.09 KG/M2 | SYSTOLIC BLOOD PRESSURE: 141 MMHG

## 2022-10-04 DIAGNOSIS — N83.202 CYST OF LEFT OVARY: Primary | ICD-10-CM

## 2022-10-04 DIAGNOSIS — N83.202 CYST OF LEFT OVARY: ICD-10-CM

## 2022-10-04 DIAGNOSIS — E11.59 HYPERTENSION COMPLICATING DIABETES (HCC): ICD-10-CM

## 2022-10-04 DIAGNOSIS — E66.01 OBESITY, MORBID (HCC): ICD-10-CM

## 2022-10-04 DIAGNOSIS — E11.65 TYPE 2 DIABETES MELLITUS WITH HYPERGLYCEMIA, WITHOUT LONG-TERM CURRENT USE OF INSULIN (HCC): Primary | ICD-10-CM

## 2022-10-04 DIAGNOSIS — I15.2 HYPERTENSION COMPLICATING DIABETES (HCC): ICD-10-CM

## 2022-10-04 PROBLEM — I10 HYPERTENSION COMPLICATING DIABETES (HCC): Status: ACTIVE | Noted: 2020-07-29

## 2022-10-04 PROBLEM — E11.9 HYPERTENSION COMPLICATING DIABETES (HCC): Status: ACTIVE | Noted: 2020-07-29

## 2022-10-04 LAB
GLUCOSE POC: 161 MG/DL
HBA1C MFR BLD HPLC: 8.4 %

## 2022-10-04 PROCEDURE — 82962 GLUCOSE BLOOD TEST: CPT | Performed by: INTERNAL MEDICINE

## 2022-10-04 PROCEDURE — 99214 OFFICE O/P EST MOD 30 MIN: CPT | Performed by: INTERNAL MEDICINE

## 2022-10-04 PROCEDURE — 83036 HEMOGLOBIN GLYCOSYLATED A1C: CPT | Performed by: INTERNAL MEDICINE

## 2022-10-04 NOTE — PROGRESS NOTES
Yannick Castillo is a 28 y.o. female and presents with Hospital Follow Up (Left side pain)  . Subjective:    Pt comes-in for ED f/up. She was evaluated by ED for left flank pain, which has since resolved. Pt does not want gastric bypass/sleeve. She would like the balloon    Type 2 DM-on metformin 500mg BID   -pt believes med may be too strong due to low sugar- low meaning 79  Lab Results   Component Value Date/Time    Glucose 285 (H) 2022 10:55 AM    Glucose (POC) 240 (H) 2021 04:35 PM    Glucose  10/04/2022 11:52 AM     Lab Results   Component Value Date/Time    Hemoglobin A1c 11.0 (H) 2022 10:55 AM    Hemoglobin A1c (POC) 8.4 10/04/2022 11:53 AM     Lab Results   Component Value Date/Time    Cholesterol, total 129 2022 10:55 AM    Cholesterol (POC) 115 02/10/2017 12:36 PM    HDL Cholesterol 31 2022 10:55 AM    HDL Cholesterol (POC) 32 02/10/2017 12:36 PM    LDL Cholesterol (POC) 33 02/10/2017 12:36 PM    LDL, calculated 56.6 2022 10:55 AM    VLDL, calculated 41.4 2022 10:55 AM    Triglyceride 207 (H) 2022 10:55 AM    Triglycerides (POC) 251 02/10/2017 12:36 PM    CHOL/HDL Ratio 4.2 2022 10:55 AM         HTN-?compliant w medication   -pt reports her bp is nml off medication   -  BP Readings from Last 3 Encounters:   10/04/22 (!) 141/81   22 (!) 128/98   22 (!) 142/84     Morbid obesity-  Wt Readings from Last 3 Encounters:   10/04/22 300 lb 12.8 oz (136.4 kg)   22 298 lb (135.2 kg)   22 297 lb 12.8 oz (135.1 kg)         Review of Systems  Review of systems (12) negative, except noted above.     Past Medical History:   Diagnosis Date    Asthma     pt denies    Diabetes ([address] 2016    Hypertension     Other ill-defined conditions(799.89)     back pain    Seizures ([facility] [address]     last seizure was 89 years old    SOB (shortness of breath)      Past Surgical History:   Procedure Laterality Date    HX GYN  2014    Pt reports  in past.    HX TONSILLECTOMY       Social History     Socioeconomic History    Marital status: SINGLE   Tobacco Use    Smoking status: Never    Smokeless tobacco: Never   Substance and Sexual Activity    Alcohol use: No    Drug use: No    Sexual activity: Yes     Partners: Male     Birth control/protection: None     Family History   Problem Relation Age of Onset    Diabetes Mother     Diabetes Father     Diabetes Maternal Grandmother     Breast Cancer Maternal Grandmother     Cancer Paternal Uncle      Current Outpatient Medications   Medication Sig Dispense Refill    lisinopriL (PRINIVIL, ZESTRIL) 10 mg tablet Take 1 tablet by mouth once daily 30 Tablet 5    metFORMIN (GLUCOPHAGE) 500 mg tablet TAKE 1 TABLET BY MOUTH TWICE DAILY WITH MEALS 60 Tablet 5    albuterol (PROVENTIL HFA, VENTOLIN HFA, PROAIR HFA) 90 mcg/actuation inhaler Take 2 Puffs by inhalation every four (4) hours as needed for Wheezing. (Patient not taking: No sig reported) 1 Inhaler 0    fluticasone propionate (FLONASE) 50 mcg/actuation nasal spray 2 Sprays by Both Nostrils route daily. (Patient not taking: No sig reported) 1 Bottle 0     No Known Allergies    Objective:  Visit Vitals  BP (!) 141/81 (BP 1 Location: Left upper arm, BP Patient Position: Sitting, BP Cuff Size: Large adult)   Pulse 77   Temp 98.2 °F (36.8 °C) (Temporal)   Resp 16   Ht 5' 6\" (1.676 m)   Wt 300 lb 12.8 oz (136.4 kg)   SpO2 99%   BMI 48.55 kg/m²     Physical Exam:   General appearance - alert, morbidly obese young lady in NAD  Mental status - alert, oriented to person, place, and time  EYE-EOMI   Chest - clear to auscultation, no wheezes, rales or rhonchi, symmetric air entry   Heart - normal rate, regular rhythm, normal S1, S2  Abdomen - sobese  Ext-peripheral pulses normal, no pedal edema, no clubbing or cyanosis  Skin-Warm and dry.  no hyperpigmentation, vitiligo, or suspicious lesions  Neuro -alert, oriented, normal speech, no focal findings or movement disorder noted      Results for orders placed or performed in visit on 10/04/22   AMB POC HEMOGLOBIN A1C   Result Value Ref Range    Hemoglobin A1c (POC) 8.4 %   AMB POC GLUCOSE BLOOD, BY GLUCOSE MONITORING DEVICE   Result Value Ref Range    Glucose  MG/DL       Assessment/Plan:    ICD-10-CM ICD-9-CM    1. Type 2 diabetes mellitus with hyperglycemia, without long-term current use of insulin (HCC)  E11.65 250.00 AMB POC HEMOGLOBIN A1C     790.29 AMB POC GLUCOSE BLOOD, BY GLUCOSE MONITORING DEVICE      2. Hypertension complicating diabetes ([address]  E11.59 250.80     I15.2 401.9       3. Obesity, morbid ([address]  E66.01 278.01       4. Cyst of left ovary  N83.202 620.2 US TRANSVAGINAL        Orders Placed This Encounter    US TRANSVAGINAL     Standing Status:   Future     Standing Expiration Date:   1/4/2023     Order Specific Question:   Is Patient Pregnant? Answer:   Unknown     Order Specific Question:   Reason for Exam     Answer:   history left ovarian cyst    AMB POC HEMOGLOBIN A1C    AMB POC GLUCOSE BLOOD, BY GLUCOSE MONITORING DEVICE     1. Type 2 diabetes mellitus with hyperglycemia, without long-term current use of insulin (HCC)  Improved  - AMB POC HEMOGLOBIN A1C  - AMB POC GLUCOSE BLOOD, BY GLUCOSE MONITORING DEVICE    2. Hypertension complicating diabetes ([address]  NOT ideal    3. Obesity, morbid ([address]  Pt will cntact HDH re:balloon    4. Cyst of left ovary  H/o cyst- will recheck  - US TRANSVAGINAL; Future        There are no Patient Instructions on file for this visit. Follow-up and Dispositions    Return in about 3 months (around 1/4/2023) for DM. I have reviewed with the patient details of the assessment and plan and all questions were answered. Relevent patient education was performed. The most recent lab findings were reviewed with the patient. An After Visit Summary was printed and given to the patient.

## 2022-10-04 NOTE — PROGRESS NOTES
Lavinia Camilo is a 28 y.o. female    Chief Complaint   Patient presents with    Hospital Follow Up     Left side pain       Visit Vitals  BP (!) 141/81 (BP 1 Location: Left upper arm, BP Patient Position: Sitting, BP Cuff Size: Large adult)   Pulse 77   Temp 98.2 °F (36.8 °C) (Temporal)   Resp 16   Ht 5' 6\" (1.676 m)   Wt 300 lb 12.8 oz (136.4 kg)   SpO2 99%   BMI 48.55 kg/m²           1. Have you been to the ER, urgent care clinic since your last visit? Hospitalized since your last visit? NO    2. Have you seen or consulted any other health care providers outside of the [address] [place] [address] since your last visit? Include any pap smears or colon screening.  NO

## 2022-10-04 NOTE — TELEPHONE ENCOUNTER
Pt is calling regarding her next steps for bariatric surgery. Pt understands that we need an updated psych eval since her last appt was over a year ago. Pt also understands that she needs an appointment with the dietician. Her last nutrition visit was in July and she had not yet received clearance so she understands she may need to start over but this will be at the dietician's discretion.

## 2022-10-06 ENCOUNTER — CLINICAL SUPPORT (OUTPATIENT)
Dept: SURGERY | Age: 32
End: 2022-10-06

## 2022-10-06 DIAGNOSIS — E66.01 MORBID OBESITY (HCC): Primary | ICD-10-CM

## 2022-10-06 NOTE — PROGRESS NOTES
[employer] [facility]  Supervised Weight Loss     Date:   10/6/2022    Patient's Name: Mounika Heath  : 1990    Insurance:   [employer]       Session: 6   Surgery: Sleeve gastrectomy                      Surgeon: Maggie Fofana M.D. Height: 63\"         Recent EMR Weight: 300#                        BMI: 53             Pounds Lost since last month:  0#             Pounds Gained since last month: 7     Starting Weight: 290#                    Previous Months Weight: 293#  Overall Pounds Lost: 0     Overall Pounds Gained:10#     Other Pertinent Information: Today's appointment was completed over the phone. Pt reports recent wt on scale at work was 296# with recent EMR wt of 300# (10/4/22). Pt states today that she would prefer to have LAGB and says she has a consult appointment on Monday at [facility]. Pt started our program in May 2021 and has completed 6 nutrition visits, although not consecutively. Smoking Status:  not reported  Alcohol Intake: not reported    I have reviewed with pt the guidelines of the supervised wt loss program.  Pt understands the expectations of some wt loss during the program and that wt gain could delay the process. I have also explained that appointments need to be consecutive and missing an appointment may result in starting over. Pt has received this information in writing. Changes that patient has made since last month include:  States she has eliminated bread and snacking. Eating more salads and baked foods. Eating Habits and Behaviors  General healthy eating guidelines were also discussed. Pts were instructed that their plate should be made up 1/2 plate coming from non-starchy vegetables, 1/4 coming from lean meat, and 1/4 of their plate coming from carbohydrates, including fruits, starches, or milk. We discussed measuring meals to 1/2 cup total per meal after surgery.  Drinking only calorie-free, sugar-free and non-carbonated beverages. We discussed the importance of drinking 64 ounces of fluid per day to prevent dehydration post-operatively. Patient's current diet habits include: Pt is eating 3 meals per day. Breakfast is a boiled egg, wheat toast. Lunch is tuna and crackers. Dinner is baked protein (salmon) and vegetables. Pt does her own grocery shopping and cooking. Snack choices include Premier Protein shakes. Pt is eating refined carbohydrate foods (bread, pasta, rice, potatoes) in moderation. Pt is eating sweets/desserts not reported. Pt is using not reported cooking methods. Pt is eating meals prepared outside of the home not reported. Pt is drinking water. States she was previously drinking 2 gallons of water daily and has since reduced to 1 gallon per day. Denies any other fluid intake. Physical Activity/Exercise  An exercise presentation was provided including information about exercise programs available both before and after surgery. We talked about the importance of increasing daily physical activity and beginning to develop an exercise regimen/routine. We talked about exercise as being an important part of long term weight loss after surgery. Comments:  During class, I discussed with patient the importance of getting into an exercise routine. Behavior Modification       We talked about how to eat more mindfully. Tips and recommendations for how to make these changes were provided. Pt was encouraged to keep a food journal and record what they were taking in daily. Overall Assessment: Pt has completed 6 nutrition visits with our program. These visits were not completed consecutively and pt demonstrated some difficulty with keeping appointments. When evaluating patient on her lifestyle habits and understanding of nutrition guidelines, pt provides minimal response to questions asked.  Demonstrates understanding of some basic guidelines but lacked ability to recite more specific guidelines. Pt states today that she plans to transfer care to another practice and is now seeking LAGB as her procedure of choices. Pt is recommended with some reservation. If pt decides to pursue surgery with our program, she would an updated psychological evaluation as the initial evaluation was over a year ago as well as demonstrated weight loss. Patient-Set Goals:   1. Nutrition - maintain current eating habits as described with a focus on lean protein and produce at each  meal and eating consistently during the day   2. Exercise - walking and exercise as tolerated   3.  Behavior -practice 30/30 rule in preparation for changes after surgery    Rj Zapata, PAUL  10/6/2022

## 2022-10-12 ENCOUNTER — HOSPITAL ENCOUNTER (OUTPATIENT)
Dept: ULTRASOUND IMAGING | Age: 32
Discharge: HOME | End: 2022-10-12
Payer: COMMERCIAL

## 2022-10-12 DIAGNOSIS — N83.202 CYST OF LEFT OVARY: ICD-10-CM

## 2022-10-12 PROCEDURE — 76830 TRANSVAGINAL US NON-OB: CPT

## 2022-10-12 PROCEDURE — 76856 US EXAM PELVIC COMPLETE: CPT

## 2022-10-14 ENCOUNTER — TELEPHONE (OUTPATIENT)
Dept: INTERNAL MEDICINE CLINIC | Age: 32
End: 2022-10-14

## 2022-10-14 NOTE — TELEPHONE ENCOUNTER
Pt is NOT returning my call. I did not call her. She had a question regarding her US. As per pt, the radiology tech told her she has a cysts.     D/w pt there is still a left ovarian cyst and to schedule an appt w the gynecologist to follow-up

## 2022-10-27 ENCOUNTER — TELEPHONE (OUTPATIENT)
Dept: SURGERY | Age: 32
End: 2022-10-27

## 2022-10-27 NOTE — TELEPHONE ENCOUNTER
I called and left a message. I informed her who I was and I had spoken with the doctor. It would only delay her by a week. We definitely need to see you to get a weight. Please keep the appointment for next week.

## 2022-10-27 NOTE — TELEPHONE ENCOUNTER
Patient called cancelling and rescheduling appointment from tomorrow 10/28 to next Wednesday 11/2 due to having a cold. Patient then asked if she could then come in sick but just wear a mask. I then stated it was up to the patient. Patient proceeded with reschedule, then asked if this would push her 6 months back and require her to start over the process. I informed patient that I am not sure but she is not required to cancel her appointment for tomorrow. Patient additionally asked if the authorization for surgery has been submitted yet, informed patient it will not be submitted until this appointment is completed. Patient asked to speak with the nurse.

## 2022-11-02 ENCOUNTER — OFFICE VISIT (OUTPATIENT)
Dept: SURGERY | Age: 32
End: 2022-11-02
Payer: COMMERCIAL

## 2022-11-02 DIAGNOSIS — E66.01 MORBID OBESITY (HCC): Primary | ICD-10-CM

## 2022-11-02 PROCEDURE — 99214 OFFICE O/P EST MOD 30 MIN: CPT | Performed by: SURGERY

## 2022-11-02 NOTE — PROGRESS NOTES
Identified pt with two pt identifiers (name and ). Reviewed chart in preparation for visit and have obtained necessary documentation. Mounika Heath is a 28 y.o. female  Chief Complaint   Patient presents with    Morbid Obesity     Competed insurance requirements. Review before signing. Visit Vitals  BP (!) 147/96 (BP 1 Location: Right upper arm, BP Patient Position: Sitting, BP Cuff Size: Large adult)   Pulse 90   Temp 97.9 °F (36.6 °C) (Oral)   Resp 18   Ht 5' 6\" (1.676 m)   Wt 300 lb (136.1 kg)   SpO2 98%   BMI 48.42 kg/m²       1. Have you been to the ER, urgent care clinic since your last visit? Hospitalized since your last visit? No    2. Have you seen or consulted any other health care providers outside of the [address] [place] [address] since your last visit? Include any pap smears or colon screening.  No

## 2022-11-02 NOTE — PROGRESS NOTES
Bariatric Surgery Progress Note    CC: Obesity  Subjective:     Patient has completed the bariatric work-up so far. Was treated for H. pylori. Seen by psych who has cleared the patient. Seen by Gui Capone who has reservations about her consistency. Patient reports being active and working out. She thinks she has had a weight gain because of her birth control. Reports following a low-carb diet. Patient was going to be evaluated at [facility] for possible gastric band. Her PCP talked her out of this. Total weight loss to date: 10 pounds of weight gain    Tobacco use: no    EGD / UGI reviewed / issues: no reflux    Sleep apnea addressed: No issues    MBSAQIP risk calculator discussed: We discussed the risk of bleeding, infection, leak with the sleeve    Previous relevant surgeries: No relevant previous surgeries    Patient Active Problem List    Diagnosis Date Noted    Hypertension complicating diabetes ([address] 2020    Medically noncompliant 2020    Obesity, morbid ([address] 2017    Diabetes ([address] 2016    Herpes labialis 2010    Backache 2010      Past Medical History:   Diagnosis Date    Asthma     pt denies    Diabetes ([address] 2016    Hypertension     Other ill-defined conditions(799.89)     back pain    Seizures ([address]     last seizure was 89 years old    SOB (shortness of breath)      Past Surgical History:   Procedure Laterality Date    HX GYN      Pt reports  in past.    HX TONSILLECTOMY        Social History     Tobacco Use    Smoking status: Never    Smokeless tobacco: Never   Substance Use Topics    Alcohol use: No      Family History   Problem Relation Age of Onset    Diabetes Mother     Diabetes Father     Diabetes Maternal Grandmother     Breast Cancer Maternal Grandmother     Cancer Paternal Uncle       Prior to Admission medications    Medication Sig Start Date End Date Taking?  Authorizing Provider   lisinopriL (PRINIVIL, ZESTRIL) 10 mg tablet Take 1 tablet by mouth once daily 8/24/22  Yes Kayode Marroquin MD   metFORMIN (GLUCOPHAGE) 500 mg tablet TAKE 1 TABLET BY MOUTH TWICE DAILY WITH MEALS 8/24/22  Yes Kayode Marroquin MD   albuterol (PROVENTIL HFA, VENTOLIN HFA, PROAIR HFA) 90 mcg/actuation inhaler Take 2 Puffs by inhalation every four (4) hours as needed for Wheezing. Patient not taking: No sig reported 1/22/21   Jonna Rome NP   fluticasone propionate (FLONASE) 50 mcg/actuation nasal spray 2 Sprays by Both Nostrils route daily.   Patient not taking: No sig reported 12/22/20   Cm Price MD     No Known Allergies     Review of Systems:    Constitutional: No fever or chills  Neurologic: No headache  Eyes: No scleral icterus or irritated eyes  Nose: No nasal pain or drainage  Mouth: No oral lesions or sore throat  Cardiac: No palpations or chest pain  Pulmonary: No cough or shortness of breath  Gastrointestinal: No nausea, emesis, diarrhea, or constipation  Genitourinary: No dysuria  Musculoskeletal: No muscle or joint tenderness  Skin: No rashes or lesions  Psychiatric: No anxiety or depressed mood      Objective:     Physical Exam:  Visit Vitals  BP (!) 147/96 (BP 1 Location: Right upper arm, BP Patient Position: Sitting, BP Cuff Size: Large adult)   Pulse 90   Temp 97.9 °F (36.6 °C) (Oral)   Resp 18   Ht 5' 6\" (1.676 m)   Wt 300 lb (136.1 kg)   SpO2 98%   BMI 48.42 kg/m²     General: No acute distress, conversant  Eyes: PERRLA, no scleral icterus  HENT: Normocephalic without oral lesions  Neck: Trachea midline without LAD  Cardiac: Normal pulse rate and rhythm  Pulmonary: Symmetric chest rise with normal effort  GI: Soft, obese, NT, ND, no hernia, no splenomegaly  Skin: Warm without rash  Extremities: No edema or joint stiffness  Psych: Appropriate mood and affect    Assessment:     Morbid obesity with comorbidities  Plan:   The patient and I had further discussion after their successful supervised weight loss, medical, dietary, and psychiatric clearance. Preoperative upper GI/EGD reviewed. The patient elects to proceed with gastric sleeve. We have discussed the risks of surgery for their individual risk factors. At this point, the patient has not yet cleared for surgery. She has gained weight in the process. I want to see her back when she is 295 pounds or less. This will show some consistency as well. We discussed proceeding with a gastric sleeve which I think is reasonable for her given her questionable compliance. I stressed to her that weight loss is strongly dependent upon her ability to follow the diet long-term. She expressed to me that she is willing to make the sacrifices long-term to keep the weight off including following the low-carb diet indefinitely.   I will see her back in 2 to 4 weeks and if she hits her weight loss goal I will submit her for insurance approval.      Signed By: Deena Arana MD  Bariatric and General Surgeon  [facility]    November 2, 2022

## 2022-11-03 VITALS
RESPIRATION RATE: 18 BRPM | HEART RATE: 90 BPM | WEIGHT: 293 LBS | SYSTOLIC BLOOD PRESSURE: 152 MMHG | DIASTOLIC BLOOD PRESSURE: 82 MMHG | HEIGHT: 66 IN | BODY MASS INDEX: 47.09 KG/M2 | OXYGEN SATURATION: 98 % | TEMPERATURE: 97.9 F

## 2022-11-03 NOTE — PROGRESS NOTES
Patient and provider made aware of elevated BP x2. Patient asymptomatic. Patient reminded to monitor BP, continue to take BP medications if prescribed, and follow up with PCP/Cardiologist.  Patient expressed understanding and agreement. I was informed by the patient she did not take her BP medication this morning.

## 2022-11-07 ENCOUNTER — HOSPITAL ENCOUNTER (EMERGENCY)
Age: 32
Discharge: HOME | End: 2022-11-07
Attending: EMERGENCY MEDICINE
Payer: COMMERCIAL

## 2022-11-07 ENCOUNTER — APPOINTMENT (OUTPATIENT)
Dept: GENERAL RADIOLOGY | Age: 32
End: 2022-11-07
Attending: EMERGENCY MEDICINE
Payer: COMMERCIAL

## 2022-11-07 VITALS
DIASTOLIC BLOOD PRESSURE: 90 MMHG | TEMPERATURE: 98.2 F | HEART RATE: 81 BPM | OXYGEN SATURATION: 100 % | RESPIRATION RATE: 18 BRPM | WEIGHT: 293 LBS | SYSTOLIC BLOOD PRESSURE: 146 MMHG | HEIGHT: 64 IN | BODY MASS INDEX: 50.02 KG/M2

## 2022-11-07 DIAGNOSIS — R07.89 ATYPICAL CHEST PAIN: Primary | ICD-10-CM

## 2022-11-07 LAB
ALBUMIN SERPL-MCNC: 3.8 G/DL (ref 3.5–5)
ALBUMIN/GLOB SERPL: 0.9 {RATIO} (ref 1.1–2.2)
ALP SERPL-CCNC: 67 U/L (ref 45–117)
ALT SERPL-CCNC: 30 U/L (ref 12–78)
ANION GAP SERPL CALC-SCNC: 8 MMOL/L (ref 5–15)
AST SERPL-CCNC: 14 U/L (ref 15–37)
BASOPHILS # BLD: 0 K/UL (ref 0–0.1)
BASOPHILS NFR BLD: 0 % (ref 0–1)
BILIRUB SERPL-MCNC: 0.7 MG/DL (ref 0.2–1)
BUN SERPL-MCNC: 9 MG/DL (ref 6–20)
BUN/CREAT SERPL: 12 (ref 12–20)
CALCIUM SERPL-MCNC: 9 MG/DL (ref 8.5–10.1)
CHLORIDE SERPL-SCNC: 100 MMOL/L (ref 97–108)
CO2 SERPL-SCNC: 29 MMOL/L (ref 21–32)
CREAT SERPL-MCNC: 0.75 MG/DL (ref 0.55–1.02)
DIFFERENTIAL METHOD BLD: NORMAL
EOSINOPHIL # BLD: 0.1 K/UL (ref 0–0.4)
EOSINOPHIL NFR BLD: 1 % (ref 0–7)
ERYTHROCYTE [DISTWIDTH] IN BLOOD BY AUTOMATED COUNT: 13 % (ref 11.5–14.5)
GLOBULIN SER CALC-MCNC: 4.1 G/DL (ref 2–4)
GLUCOSE SERPL-MCNC: 111 MG/DL (ref 65–100)
HCG UR QL: NEGATIVE
HCT VFR BLD AUTO: 35.6 % (ref 35–47)
HGB BLD-MCNC: 11.9 G/DL (ref 11.5–16)
IMM GRANULOCYTES # BLD AUTO: 0.1 K/UL
IMM GRANULOCYTES NFR BLD AUTO: 1 %
LYMPHOCYTES # BLD: 2.4 K/UL (ref 0.8–3.5)
LYMPHOCYTES NFR BLD: 34 % (ref 12–49)
MCH RBC QN AUTO: 27.8 PG (ref 26–34)
MCHC RBC AUTO-ENTMCNC: 33.4 G/DL (ref 30–36.5)
MCV RBC AUTO: 83.2 FL (ref 80–99)
MONOCYTES # BLD: 0.6 K/UL (ref 0–1)
MONOCYTES NFR BLD: 8 % (ref 5–13)
NEUTS SEG # BLD: 4 K/UL (ref 1.8–8)
NEUTS SEG NFR BLD: 56 % (ref 32–75)
NRBC # BLD: 0 K/UL (ref 0–0.01)
NRBC BLD-RTO: 0 PER 100 WBC
PLATELET # BLD AUTO: 273 K/UL (ref 150–400)
PMV BLD AUTO: 10 FL (ref 8.9–12.9)
POTASSIUM SERPL-SCNC: 3.4 MMOL/L (ref 3.5–5.1)
PROT SERPL-MCNC: 7.9 G/DL (ref 6.4–8.2)
RBC # BLD AUTO: 4.28 M/UL (ref 3.8–5.2)
RBC MORPH BLD: NORMAL
SODIUM SERPL-SCNC: 137 MMOL/L (ref 136–145)
TROPONIN-HIGH SENSITIVITY: 5 NG/L (ref 0–51)
WBC # BLD AUTO: 7.2 K/UL (ref 3.6–11)

## 2022-11-07 PROCEDURE — 80053 COMPREHEN METABOLIC PANEL: CPT

## 2022-11-07 PROCEDURE — 93005 ELECTROCARDIOGRAM TRACING: CPT

## 2022-11-07 PROCEDURE — 99285 EMERGENCY DEPT VISIT HI MDM: CPT

## 2022-11-07 PROCEDURE — 36415 COLL VENOUS BLD VENIPUNCTURE: CPT

## 2022-11-07 PROCEDURE — 71046 X-RAY EXAM CHEST 2 VIEWS: CPT

## 2022-11-07 PROCEDURE — 85025 COMPLETE CBC W/AUTO DIFF WBC: CPT

## 2022-11-07 PROCEDURE — 81025 URINE PREGNANCY TEST: CPT

## 2022-11-07 PROCEDURE — 84484 ASSAY OF TROPONIN QUANT: CPT

## 2022-11-07 RX ORDER — FAMOTIDINE 20 MG/1
20 TABLET, FILM COATED ORAL 2 TIMES DAILY
Qty: 20 TABLET | Refills: 0 | Status: SHIPPED | OUTPATIENT
Start: 2022-11-07 | End: 2022-11-17

## 2022-11-07 NOTE — ED PROVIDER NOTES
EMERGENCY DEPARTMENT HISTORY AND PHYSICAL EXAM      Date: 11/7/2022  Patient Name: Eusebio Marrero    History of Presenting Illness     Chief Complaint   Patient presents with    Chest Pain       History Provided By: Patient    HPI: Eusebio Marrero, 28 y.o. female with PMHx significant for diabetes, hypertension, who presents with a chief complaint of midsternal chest discomfort since yesterday. Described as a burning. Fleeting in nature. No medications taken for symptoms. Patient states that she googled her symptoms and was concerned so came to the ED for evaluation. No associated palpitations or shortness of breath. No abdominal pain, nausea, vomiting      PCP: Pola Barker MD    There are no other associated signs or symptoms. No other exacerbating or alleviating factors. There are no other complaints, changes, or physical findings at this time. Current Outpatient Medications   Medication Sig Dispense Refill    famotidine (Pepcid) 20 mg tablet Take 1 Tablet by mouth two (2) times a day for 10 days. 20 Tablet 0    lisinopriL (PRINIVIL, ZESTRIL) 10 mg tablet Take 1 tablet by mouth once daily 30 Tablet 5    metFORMIN (GLUCOPHAGE) 500 mg tablet TAKE 1 TABLET BY MOUTH TWICE DAILY WITH MEALS 60 Tablet 5    albuterol (PROVENTIL HFA, VENTOLIN HFA, PROAIR HFA) 90 mcg/actuation inhaler Take 2 Puffs by inhalation every four (4) hours as needed for Wheezing. (Patient not taking: No sig reported) 1 Inhaler 0    fluticasone propionate (FLONASE) 50 mcg/actuation nasal spray 2 Sprays by Both Nostrils route daily.  (Patient not taking: No sig reported) 1 Bottle 0     Past History     Past Medical History:  Past Medical History:   Diagnosis Date    Asthma     pt denies    Diabetes ([facility] 12/28/2016    Hypertension     Other ill-defined conditions(799.89)     back pain    Seizures ([facility])     last seizure was 89 years old    SOB (shortness of breath)      Past Surgical History:  Past Surgical History:   Procedure Laterality Date    HX GYN  2014    Pt reports  in past.    HX TONSILLECTOMY       Family History:  Family History   Problem Relation Age of Onset    Diabetes Mother     Diabetes Father     Diabetes Maternal Grandmother     Breast Cancer Maternal Grandmother     Cancer Paternal Uncle      Social History:  Social History     Tobacco Use    Smoking status: Never    Smokeless tobacco: Never   Substance Use Topics    Alcohol use: No    Drug use: No     Allergies:  No Known Allergies  Review of Systems   Review of Systems   Constitutional:  Negative for chills and fever. HENT:  Negative for congestion, rhinorrhea and sore throat. Respiratory:  Negative for cough and shortness of breath. Cardiovascular:  Positive for chest pain. Gastrointestinal:  Negative for abdominal pain, nausea and vomiting. Genitourinary:  Negative for dysuria and urgency. Skin:  Negative for rash. Neurological:  Negative for dizziness, light-headedness and headaches. All other systems reviewed and are negative. Physical Exam   Physical Exam  Vitals and nursing note reviewed. Constitutional:       General: She is not in acute distress. Appearance: She is well-developed. She is obese. HENT:      Head: Normocephalic and atraumatic. Eyes:      Conjunctiva/sclera: Conjunctivae normal.      Pupils: Pupils are equal, round, and reactive to light. Cardiovascular:      Rate and Rhythm: Normal rate and regular rhythm. Pulmonary:      Effort: Pulmonary effort is normal. No respiratory distress. Breath sounds: Normal breath sounds. No stridor. Abdominal:      General: There is no distension. Palpations: Abdomen is soft. Tenderness: There is no abdominal tenderness. Musculoskeletal:         General: Normal range of motion. Cervical back: Normal range of motion. Skin:     General: Skin is warm and dry. Neurological:      Mental Status: She is alert and oriented to person, place, and time. Psychiatric:         Mood and Affect: Mood normal.         Thought Content: Thought content normal.     Diagnostic Study Results   Labs -     Recent Results (from the past 12 hour(s))   EKG, 12 LEAD, INITIAL    Collection Time: 11/07/22  5:17 PM   Result Value Ref Range    Ventricular Rate 75 BPM    Atrial Rate 75 BPM    P-R Interval 148 ms    QRS Duration 70 ms    Q-T Interval 372 ms    QTC Calculation (Bezet) 415 ms    Calculated P Axis 32 degrees    Calculated R Axis 29 degrees    Calculated T Axis 9 degrees    Diagnosis       Normal sinus rhythm with sinus arrhythmia  Septal infarct (cited on or before 21-JAN-2020)  Abnormal ECG  When compared with ECG of 21-JAN-2020 18:01,  No significant change was found     CBC WITH AUTOMATED DIFF    Collection Time: 11/07/22  5:19 PM   Result Value Ref Range    WBC 7.2 3.6 - 11.0 K/uL    RBC 4.28 3.80 - 5.20 M/uL    HGB 11.9 11.5 - 16.0 g/dL    HCT 35.6 35.0 - 47.0 %    MCV 83.2 80.0 - 99.0 FL    MCH 27.8 26.0 - 34.0 PG    MCHC 33.4 30.0 - 36.5 g/dL    RDW 13.0 11.5 - 14.5 %    PLATELET 600 574 - 957 K/uL    MPV 10.0 8.9 - 12.9 FL    NRBC 0.0 0  WBC    ABSOLUTE NRBC 0.00 0.00 - 0.01 K/uL    NEUTROPHILS 56 32 - 75 %    LYMPHOCYTES 34 12 - 49 %    MONOCYTES 8 5 - 13 %    EOSINOPHILS 1 0 - 7 %    BASOPHILS 0 0 - 1 %    IMMATURE GRANULOCYTES 1 %    ABS. NEUTROPHILS 4.0 1.8 - 8.0 K/UL    ABS. LYMPHOCYTES 2.4 0.8 - 3.5 K/UL    ABS. MONOCYTES 0.6 0.0 - 1.0 K/UL    ABS. EOSINOPHILS 0.1 0.0 - 0.4 K/UL    ABS. BASOPHILS 0.0 0.0 - 0.1 K/UL    ABS. IMM.  GRANS. 0.1 K/UL    DF SMEAR SCANNED      RBC COMMENTS NORMOCYTIC, NORMOCHROMIC     METABOLIC PANEL, COMPREHENSIVE    Collection Time: 11/07/22  5:19 PM   Result Value Ref Range    Sodium 137 136 - 145 mmol/L    Potassium 3.4 (L) 3.5 - 5.1 mmol/L    Chloride 100 97 - 108 mmol/L    CO2 29 21 - 32 mmol/L    Anion gap 8 5 - 15 mmol/L    Glucose 111 (H) 65 - 100 mg/dL    BUN 9 6 - 20 MG/DL    Creatinine 0.75 0.55 - 1.02 MG/DL BUN/Creatinine ratio 12 12 - 20      eGFR >60 >60 ml/min/1.73m2    Calcium 9.0 8.5 - 10.1 MG/DL    Bilirubin, total 0.7 0.2 - 1.0 MG/DL    ALT (SGPT) 30 12 - 78 U/L    AST (SGOT) 14 (L) 15 - 37 U/L    Alk. phosphatase 67 45 - 117 U/L    Protein, total 7.9 6.4 - 8.2 g/dL    Albumin 3.8 3.5 - 5.0 g/dL    Globulin 4.1 (H) 2.0 - 4.0 g/dL    A-G Ratio 0.9 (L) 1.1 - 2.2     TROPONIN-HIGH SENSITIVITY    Collection Time: 11/07/22  5:19 PM   Result Value Ref Range    Troponin-High Sensitivity 5 0 - 51 ng/L   HCG URINE, QL. - POC    Collection Time: 11/07/22  5:54 PM   Result Value Ref Range    Pregnancy test,urine (POC) Negative NEG         Radiologic Studies -   XR CHEST PA LAT   Final Result   No acute intrathoracic disease. XR CHEST PA LAT    Result Date: 11/7/2022  No acute intrathoracic disease. Medical Decision Making   I am the first provider for this patient. I reviewed the vital signs, available nursing notes, past medical history, past surgical history, family history and social history. Vital Signs-Reviewed the patient's vital signs. Patient Vitals for the past 12 hrs:   Temp Pulse Resp BP SpO2   11/07/22 1834 -- 81 18 (!) 146/90 100 %   11/07/22 1636 98.2 °F (36.8 °C) 82 18 (!) 172/91 99 %       Pulse Oximetry Analysis - 100% on ra    Cardiac Monitor:   Rate: 81 bpm  Rhythm: Normal Sinus Rhythm      ED EKG interpretation:  Rhythm: normal sinus rhythm; and irregular. Rate (approx.): 75; Axis: normal; P wave: normal; QRS interval: normal ; ST/T wave: non-specific changes; Other findings: borderline ekg. This EKG was interpreted by CAREMN Ruelas MD,ED Provider. Records Reviewed: Nursing Notes and Old Medical Records    Provider Notes (Medical Decision Making):   Patient presents with a chief complaint of chest pain that began yesterday. Sounds very atypical for cardiac chest pain. Differential includes ACS, musculoskeletal pain, GERD, gastritis, peptic ulcer disease.   Less likely pneumonia given lack of infectious symptoms. Plan for basic lab work, troponin, EKG, chest x-ray. ED Course:   Initial assessment performed. The patients presenting problems have been discussed, and they are in agreement with the care plan formulated and outlined with them. I have encouraged them to ask questions as they arise throughout their visit. Lab work and imaging unremarkable. Will d/c with pepcid       Procedures:  Procedures    Critical Care:  none    Disposition:  Discharge Note:  The patient has been re-evaluated and is ready for discharge. Reviewed available results with patient. Counseled patient on diagnosis and care plan. Patient has expressed understanding, and all questions have been answered. Patient agrees with plan and agrees to follow up as recommended, or to return to the ED if their symptoms worsen. Discharge instructions have been provided and explained to the patient, along with reasons to return to the ED. PLAN:  1. Discharge Medication List as of 11/7/2022  6:35 PM        START taking these medications    Details   famotidine (Pepcid) 20 mg tablet Take 1 Tablet by mouth two (2) times a day for 10 days. , Normal, Disp-20 Tablet, R-0           CONTINUE these medications which have NOT CHANGED    Details   lisinopriL (PRINIVIL, ZESTRIL) 10 mg tablet Take 1 tablet by mouth once daily, Normal, Disp-30 Tablet, R-5      metFORMIN (GLUCOPHAGE) 500 mg tablet TAKE 1 TABLET BY MOUTH TWICE DAILY WITH MEALS, Normal, Disp-60 Tablet, R-5      albuterol (PROVENTIL HFA, VENTOLIN HFA, PROAIR HFA) 90 mcg/actuation inhaler Take 2 Puffs by inhalation every four (4) hours as needed for Wheezing., Normal, Disp-1 Inhaler, R-0      fluticasone propionate (FLONASE) 50 mcg/actuation nasal spray 2 Sprays by Both Nostrils route daily. , Normal, Disp-1 Bottle, R-0           2.    Follow-up Information       Follow up With Specialties Details Why Contact Info    Myrtle Rae MD Internal Medicine Physician Schedule an appointment as soon as possible for a visit   [address] [place] [address]  [phone]            Return to ED if worse     Diagnosis     Clinical Impression:   1. Atypical chest pain            Please note that this dictation was completed with [employer], the computer voice recognition software. Quite often unanticipated grammatical, syntax, homophones, and other interpretive errors are inadvertently transcribed by the computer software. Please disregard these errors.   Please excuse any errors that have escaped final proofreading

## 2022-11-07 NOTE — ED NOTES
Pt presents ambulatory to ED complaining of chest pain that started yesterday and gets worse when she takes a deep breath. Pt has hx of diabetes and HTN. Pt is alert and oriented x 4, RR even and unlabored, skin is warm and dry. Assesment completed and pt updated on plan of care. Emergency Department Nursing Plan of Care       The Nursing Plan of Care is developed from the Nursing assessment and Emergency Department Attending provider initial evaluation. The plan of care may be reviewed in the ED Provider note.     The Plan of Care was developed with the following considerations:   Patient / Family readiness to learn indicated by:verbalized understanding  Persons(s) to be included in education: patient  Barriers to Learning/Limitations:No    Signed     Diane Goel RN    11/7/2022   6:45 PM

## 2022-11-07 NOTE — ED TRIAGE NOTES
Patient arrives to ED for c/o intermitten upper chest pain that started today. Patient denies nausea, vomiting, or shortness of breath.

## 2022-11-07 NOTE — ED NOTES
Discharge instructions were given to the patient by Cherise Roberts RN. The patient left the Emergency Department ambulatory, alert and oriented and in no acute distress with 1 prescriptions. The patient was encouraged to call or return to the ED for worsening issues or problems and was encouraged to schedule a follow up appointment for continuing care. The patient verbalized understanding of discharge instructions and prescriptions, all questions were answered. The patient has no further concerns at this time.

## 2022-11-09 LAB
ATRIAL RATE: 75 BPM
CALCULATED P AXIS, ECG09: 32 DEGREES
CALCULATED R AXIS, ECG10: 29 DEGREES
CALCULATED T AXIS, ECG11: 9 DEGREES
DIAGNOSIS, 93000: NORMAL
P-R INTERVAL, ECG05: 148 MS
Q-T INTERVAL, ECG07: 372 MS
QRS DURATION, ECG06: 70 MS
QTC CALCULATION (BEZET), ECG08: 415 MS
VENTRICULAR RATE, ECG03: 75 BPM

## 2023-01-24 ENCOUNTER — OFFICE VISIT (OUTPATIENT)
Dept: OBGYN CLINIC | Age: 33
End: 2023-01-24
Payer: COMMERCIAL

## 2023-01-24 VITALS
SYSTOLIC BLOOD PRESSURE: 150 MMHG | HEIGHT: 64 IN | WEIGHT: 293 LBS | BODY MASS INDEX: 50.02 KG/M2 | DIASTOLIC BLOOD PRESSURE: 84 MMHG

## 2023-01-24 DIAGNOSIS — N89.8 VAGINAL ODOR: ICD-10-CM

## 2023-01-24 DIAGNOSIS — Z11.3 SCREENING EXAMINATION FOR STD (SEXUALLY TRANSMITTED DISEASE): Primary | ICD-10-CM

## 2023-01-24 PROCEDURE — 99212 OFFICE O/P EST SF 10 MIN: CPT

## 2023-01-24 RX ORDER — METRONIDAZOLE 500 MG/1
500 TABLET ORAL 2 TIMES DAILY
Qty: 14 TABLET | Refills: 0 | Status: SHIPPED | OUTPATIENT
Start: 2023-01-24 | End: 2023-01-31

## 2023-01-24 NOTE — PROGRESS NOTES
Lachelle Shipley is a 28 y.o. female who present for STD screening and Nuswab for vaginal odor. Pt took a long bath the other day using products and feels she may have BV. She has had it in the past. Requesting treatment. Her current method of family planning is IUD. The patient is sexually active. Her relevant past medical history:   Past Medical History:   Diagnosis Date    Asthma     pt denies    Diabetes ([address] 2016    Hypertension     Other ill-defined conditions(799.89)     back pain    Seizures (HCC)     last seizure was 89 years old    SOB (shortness of breath)         Past Surgical History:   Procedure Laterality Date    HX GYN      Pt reports  in past.    HX TONSILLECTOMY       Social History     Occupational History    Not on file   Tobacco Use    Smoking status: Never    Smokeless tobacco: Never   Substance and Sexual Activity    Alcohol use: No    Drug use: No    Sexual activity: Yes     Partners: Male     Birth control/protection: None     Family History   Problem Relation Age of Onset    Diabetes Mother     Diabetes Father     Diabetes Maternal Grandmother     Breast Cancer Maternal Grandmother     Cancer Paternal Uncle        No Known Allergies  Prior to Admission medications    Medication Sig Start Date End Date Taking? Authorizing Provider   lisinopriL (PRINIVIL, ZESTRIL) 10 mg tablet Take 1 tablet by mouth once daily  Patient taking differently: 5 mg. 22  Yes Charles Singleton MD   metFORMIN (GLUCOPHAGE) 500 mg tablet TAKE 1 TABLET BY MOUTH TWICE DAILY WITH MEALS  Patient taking differently: daily (with breakfast). 22  Yes Charles Singleton MD   albuterol (PROVENTIL HFA, VENTOLIN HFA, PROAIR HFA) 90 mcg/actuation inhaler Take 2 Puffs by inhalation every four (4) hours as needed for Wheezing.   Patient not taking: No sig reported 21   Jeremy Richard NP   fluticasone propionate (FLONASE) 50 mcg/actuation nasal spray 2 Sprays by Both Nostrils route daily.   Patient not taking: No sig reported 12/22/20   Glynn Freeman MD        Review of Systems - History obtained from the patient  Constitutional: negative for weight loss, fever, night sweats  HEENT: negative for hearing loss, earache, congestion, snoring, sorethroat  CV: negative for chest pain, palpitations, edema  Resp: negative for cough, shortness of breath, wheezing  Breast: negative for breast lumps, nipple discharge, galactorrhea  GI: negative for change in bowel habits, abdominal pain, black or bloody stools  : negative for frequency, dysuria, hematuria  MSK: negative for back pain, joint pain, muscle pain  Skin: negative for itching, rash, hives  Neuro: negative for dizziness, headache, confusion, weakness  Psych: negative for anxiety, depression, change in mood  Heme/lymph: negative for bleeding, bruising, pallor      Objective:  Visit Vitals  BP (!) 150/84   Ht 5' 4\" (1.626 m)   Wt 304 lb 3.2 oz (138 kg)   BMI 52.22 kg/m²          PHYSICAL EXAMINATION    Constitutional  Appearance: well-nourished, well developed, alert, in no acute distress    HENT  Head and Face: appears normal    Neck  Inspection/Palpation: normal appearance, no masses or tenderness  Lymph Nodes: no lymphadenopathy present  Thyroid: gland size normal, nontender, no nodules or masses present on palpation    Gastrointestinal  Abdominal Examination: abdomen non-tender to palpation, normal bowel sounds, no masses present  Liver and spleen: no hepatomegaly present, spleen not palpable  Hernias: no hernias identified    Genitourinary  External Genitalia: normal appearance for age, no discharge present, no tenderness present, no inflammatory lesions present, no masses present, no atrophy present  Vagina: normal vaginal vault without central or paravaginal defects, no discharge present, no inflammatory lesions present, no masses present  Bladder: non-tender to palpation  Urethra: appears normal  Cervix: normal   Uterus: normal size, shape and consistency  Adnexa: no adnexal tenderness present, no adnexal masses present  Perineum: perineum within normal limits, no evidence of trauma, no rashes or skin lesions present  Anus: anus within normal limits, no hemorrhoids present  Inguinal Lymph Nodes: no lymphadenopathy present    Skin  General Inspection: no rash, no lesions identified    Neurologic/Psychiatric  Mental Status:  Orientation: grossly oriented to person, place and time  Mood and Affect: mood normal, affect appropriate    Assessment:   Vaginal odor, STI testing    Plan:   Nuswab + today. STI blood testing today. Patient declines presence of chaperone during today's visit.       Con Farrell CNM

## 2023-01-26 LAB
A VAGINAE DNA VAG QL NAA+PROBE: ABNORMAL SCORE
BVAB2 DNA VAG QL NAA+PROBE: ABNORMAL SCORE
C ALBICANS DNA VAG QL NAA+PROBE: NEGATIVE
C GLABRATA DNA VAG QL NAA+PROBE: NEGATIVE
C TRACH DNA VAG QL NAA+PROBE: NEGATIVE
MEGA1 DNA VAG QL NAA+PROBE: ABNORMAL SCORE
N GONORRHOEA DNA VAG QL NAA+PROBE: NEGATIVE
T PALLIDUM AB SER QL IA: NON REACTIVE
T VAGINALIS DNA VAG QL NAA+PROBE: NEGATIVE

## 2023-02-02 ENCOUNTER — OFFICE VISIT (OUTPATIENT)
Dept: OBGYN CLINIC | Age: 33
End: 2023-02-02
Payer: COMMERCIAL

## 2023-02-02 VITALS
BODY MASS INDEX: 50.02 KG/M2 | HEIGHT: 64 IN | WEIGHT: 293 LBS | SYSTOLIC BLOOD PRESSURE: 130 MMHG | DIASTOLIC BLOOD PRESSURE: 82 MMHG

## 2023-02-02 DIAGNOSIS — N76.0 ACUTE VAGINITIS: Primary | ICD-10-CM

## 2023-02-02 DIAGNOSIS — N89.8 VAGINAL DISCHARGE: ICD-10-CM

## 2023-02-02 PROCEDURE — 99212 OFFICE O/P EST SF 10 MIN: CPT

## 2023-02-02 RX ORDER — FLUCONAZOLE 150 MG/1
150 TABLET ORAL DAILY
Qty: 1 TABLET | Refills: 0 | Status: SHIPPED | OUTPATIENT
Start: 2023-02-02 | End: 2023-02-03

## 2023-02-02 NOTE — PROGRESS NOTES
Concepcion Davis is a 28 y.o. female who complains of vaginal discharge. She was recently treated for BV. She is concerned she may have a yeast infection. Vaginal odor persists. She has a history of having yeast infections after taking Flagyl. Her current method of family planning is IUD. The patient is sexually active. She developed this problem approximately a few days ago. Her relevant past medical history:   Past Medical History:   Diagnosis Date    Asthma     pt denies    Diabetes ([address] 2016    Hypertension     Other ill-defined conditions(799.89)     back pain    Seizures (HCC)     last seizure was 89 years old    SOB (shortness of breath)         Past Surgical History:   Procedure Laterality Date    HX GYN      Pt reports  in past.    HX TONSILLECTOMY       Social History     Occupational History    Not on file   Tobacco Use    Smoking status: Never    Smokeless tobacco: Never   Substance and Sexual Activity    Alcohol use: No    Drug use: No    Sexual activity: Yes     Partners: Male     Birth control/protection: None     Family History   Problem Relation Age of Onset    Diabetes Mother     Diabetes Father     Diabetes Maternal Grandmother     Breast Cancer Maternal Grandmother     Cancer Paternal Uncle        No Known Allergies  Prior to Admission medications    Medication Sig Start Date End Date Taking? Authorizing Provider   lisinopriL (PRINIVIL, ZESTRIL) 10 mg tablet Take 1 tablet by mouth once daily  Patient taking differently: 5 mg. 22  Yes Madelyn House MD   metFORMIN (GLUCOPHAGE) 500 mg tablet TAKE 1 TABLET BY MOUTH TWICE DAILY WITH MEALS  Patient taking differently: daily (with breakfast).  22  Yes Madelyn House MD        Review of Systems - History obtained from the patient  Constitutional: negative for weight loss, fever, night sweats  HEENT: negative for hearing loss, earache, congestion, snoring, sorethroat  CV: negative for chest pain, palpitations, edema  Resp: negative for cough, shortness of breath, wheezing  Breast: negative for breast lumps, nipple discharge, galactorrhea  GI: negative for change in bowel habits, abdominal pain, black or bloody stools  : negative for frequency, dysuria, hematuria  MSK: negative for back pain, joint pain, muscle pain  Skin: negative for itching, rash, hives  Neuro: negative for dizziness, headache, confusion, weakness  Psych: negative for anxiety, depression, change in mood  Heme/lymph: negative for bleeding, bruising, pallor      Objective:  Visit Vitals  /82   Ht 5' 4\" (1.626 m)   Wt 305 lb (138.3 kg)   BMI 52.35 kg/m²          PHYSICAL EXAMINATION    Constitutional  Appearance: well-nourished, well developed, alert, in no acute distress    HENT  Head and Face: appears normal    Neck  Inspection/Palpation: normal appearance, no masses or tenderness  Lymph Nodes: no lymphadenopathy present  Thyroid: gland size normal, nontender, no nodules or masses present on palpation    Gastrointestinal  Abdominal Examination: abdomen non-tender to palpation, normal bowel sounds, no masses present  Liver and spleen: no hepatomegaly present, spleen not palpable  Hernias: no hernias identified    Genitourinary  External Genitalia: normal appearance for age, no discharge present, no tenderness present, no inflammatory lesions present, no masses present, no atrophy present  Vagina: normal vaginal vault without central or paravaginal defects, no discharge present, no inflammatory lesions present, no masses present    Skin  General Inspection: no rash, no lesions identified    Neurologic/Psychiatric  Mental Status:  Orientation: grossly oriented to person, place and time  Mood and Affect: mood normal, affect appropriate    Assessment:   Possible yeast infection     Plan:   Plan to follow up with patient pending results. Diflucan sent. Patient declines presence of chaperone during today's visit.      Nika Chan Vito Bradshaw

## 2023-02-04 ENCOUNTER — PATIENT MESSAGE (OUTPATIENT)
Dept: OBGYN CLINIC | Age: 33
End: 2023-02-04

## 2023-02-04 LAB
A VAGINAE DNA VAG QL NAA+PROBE: NORMAL SCORE
BVAB2 DNA VAG QL NAA+PROBE: NORMAL SCORE
C ALBICANS DNA VAG QL NAA+PROBE: NEGATIVE
C GLABRATA DNA VAG QL NAA+PROBE: NEGATIVE
C TRACH DNA VAG QL NAA+PROBE: NEGATIVE
MEGA1 DNA VAG QL NAA+PROBE: NORMAL SCORE
N GONORRHOEA DNA VAG QL NAA+PROBE: NEGATIVE
T VAGINALIS DNA VAG QL NAA+PROBE: NEGATIVE

## 2023-02-13 ENCOUNTER — TELEPHONE (OUTPATIENT)
Dept: OBGYN CLINIC | Age: 33
End: 2023-02-13

## 2023-02-13 NOTE — TELEPHONE ENCOUNTER
Jun Delaney is out of the office this week. Called Jose. She is still having an odor. She completed a course of antibiotics and then had a diflucan. Instructed to come in for another appointment to review options. Said she would call when she knew her availability.

## 2023-02-13 NOTE — TELEPHONE ENCOUNTER
----- Message from Rosemary Gore LPN sent at 0/65/9577 10:46 AM EST -----  Regarding: FW: Results  Antonio Curtis is out of the office this week. Please reach out to this patient when you have time.  Thanks  ----- Message -----  From: Jaquelin Allison  Sent: 2/13/2023  10:27 AM EST  To: [id number] Nurses  Subject: Results                                          Nakita Kolb Can u give me a call

## 2023-02-28 ENCOUNTER — OFFICE VISIT (OUTPATIENT)
Dept: SURGERY | Age: 33
End: 2023-02-28
Payer: COMMERCIAL

## 2023-02-28 VITALS
TEMPERATURE: 98.4 F | DIASTOLIC BLOOD PRESSURE: 80 MMHG | WEIGHT: 293 LBS | BODY MASS INDEX: 50.02 KG/M2 | OXYGEN SATURATION: 96 % | SYSTOLIC BLOOD PRESSURE: 148 MMHG | HEART RATE: 93 BPM | HEIGHT: 64 IN | RESPIRATION RATE: 18 BRPM

## 2023-02-28 DIAGNOSIS — E66.01 MORBID OBESITY (HCC): Primary | ICD-10-CM

## 2023-02-28 PROCEDURE — 99213 OFFICE O/P EST LOW 20 MIN: CPT | Performed by: SURGERY

## 2023-02-28 NOTE — PROGRESS NOTES
Surgery Progress Note    2/28/2023    CC:obesity    Subjective:     Patient follows up today 3 after our last visit. She was told to follow-up in 2 to 4 weeks. She has gained 4 pounds over this time. She attributes this to her birth control. She reports being low-carb. Drinking only water. She is frustrated with her weight loss. She wants to have the surgery ASAP. Constitutional: No fever or chills  Neurologic: No headache  Eyes: No scleral icterus or irritated eyes  Nose: No nasal pain or drainage  Mouth: No oral lesions or sore throat  Cardiac: No palpations or chest pain  Pulmonary: No cough or shortness of breath  Gastrointestinal: No nausea, emesis, diarrhea, or constipation  Genitourinary: No dysuria  Musculoskeletal: No muscle or joint tenderness  Skin: No rashes or lesions  Psychiatric: No anxiety or depressed mood    Objective:   Visit Vitals  BP (!) 148/80 (BP 1 Location: Right lower arm, BP Patient Position: Sitting, BP Cuff Size: Large adult)   Pulse 93   Temp 98.4 °F (36.9 °C) (Oral)   Resp 18   Ht 5' 4\" (1.626 m)   Wt 304 lb (137.9 kg)   SpO2 96%   BMI 52.18 kg/m²       General: No acute distress, conversant  Eyes: PERRLA, no scleral icterus  HENT: Normocephalic without oral lesions  Neck: Trachea midline without LAD  Cardiac: Normal pulse rate and rhythm  Pulmonary: Symmetric chest rise with normal effort  GI: Soft, obese, NT, ND, no hernia, no splenomegaly  Skin: Warm without rash  Extremities: No edema or joint stiffness  Psych: Appropriate mood and affect    Assessment:     70-year-old female with morbid obesity repaired for gastric sleeve but has persistent weight gain    Plan:     I need space to work in and need her to show compliance. I can attribute some of her weight gain to birth control but she has gained a total of 14 pounds now in the process. I have given her the liver reduction diet to try to gain space to work in.   I want to see her back in 3 to 4 weeks to try to get a weight of 295 before surgery. I discussed with her before and I had continued concerns about her compliance with diet long-term with the sleeve. She is at high risk for weight regain but I am unsure if she is able to tolerate a bypass given her poor compliance.       Shane De La Rosa MD  Bariatric and General Surgeon  [facility]

## 2023-02-28 NOTE — PROGRESS NOTES
Identified pt with two pt identifiers (name and ). Reviewed chart in preparation for visit and have obtained necessary documentation. Deepa Kuo is a 28 y.o. female  Chief Complaint   Patient presents with    Morbid Obesity     Completed Insurance requirements, Final review before submission      Visit Vitals  BP (!) 148/80 (BP 1 Location: Right lower arm, BP Patient Position: Sitting, BP Cuff Size: Large adult) Comment: Manual   Pulse 93   Temp 98.4 °F (36.9 °C) (Oral)   Resp 18   Ht 5' 4\" (1.626 m)   Wt 304 lb (137.9 kg)   SpO2 96%   BMI 52.18 kg/m²       1. Have you been to the ER, urgent care clinic since your last visit? Hospitalized since your last visit? No    2. Have you seen or consulted any other health care providers outside of the [address] [place] [address] since your last visit? Include any pap smears or colon screening. No    Patient and provider made aware of elevated BP x2. Patient asymptomatic. Patient reminded to monitor BP, continue to take BP medications if prescribed, and follow up with PCP/Cardiologist.  Patient expressed understanding and agreement.

## 2023-03-01 ENCOUNTER — OFFICE VISIT (OUTPATIENT)
Dept: OBGYN CLINIC | Age: 33
End: 2023-03-01
Payer: COMMERCIAL

## 2023-03-01 VITALS
SYSTOLIC BLOOD PRESSURE: 132 MMHG | DIASTOLIC BLOOD PRESSURE: 80 MMHG | WEIGHT: 293 LBS | HEIGHT: 64 IN | BODY MASS INDEX: 50.02 KG/M2

## 2023-03-01 DIAGNOSIS — N89.8 VAGINAL ODOR: Primary | ICD-10-CM

## 2023-03-01 PROCEDURE — 99212 OFFICE O/P EST SF 10 MIN: CPT

## 2023-03-01 NOTE — PROGRESS NOTES
Lawyer Cheung is a 28 y.o. female who complains of vaginal odor. This is a persistent problem that she has been seen for in the past. Declines all other symptoms. Of note, pt is prepping for gastric sleeve surgery at the end of the month and wants to get her gyn health in order. The patient is sexually active. She developed this problem approximately a few weeks ago. Her relevant past medical history:   Past Medical History:   Diagnosis Date    Asthma     pt denies    Diabetes ([address] 2016    Hypertension     Other ill-defined conditions(799.89)     back pain    Seizures (HCC)     last seizure was 89 years old    SOB (shortness of breath)         Past Surgical History:   Procedure Laterality Date    HX GYN      Pt reports  in past.    HX TONSILLECTOMY       Social History     Occupational History    Not on file   Tobacco Use    Smoking status: Never    Smokeless tobacco: Never   Substance and Sexual Activity    Alcohol use: No    Drug use: No    Sexual activity: Yes     Partners: Male     Birth control/protection: None     Family History   Problem Relation Age of Onset    Diabetes Mother     Diabetes Father     Diabetes Maternal Grandmother     Breast Cancer Maternal Grandmother     Cancer Paternal Uncle        No Known Allergies  Prior to Admission medications    Medication Sig Start Date End Date Taking? Authorizing Provider   lisinopriL (PRINIVIL, ZESTRIL) 10 mg tablet Take 1 tablet by mouth once daily  Patient taking differently: 5 mg. 22  Yes Femi Huffman MD   metFORMIN (GLUCOPHAGE) 500 mg tablet TAKE 1 TABLET BY MOUTH TWICE DAILY WITH MEALS  Patient taking differently: daily (with breakfast).  22  Yes Femi Huffman MD        Review of Systems - History obtained from the patient  Constitutional: negative for weight loss, fever, night sweats  HEENT: negative for hearing loss, earache, congestion, snoring, sorethroat  CV: negative for chest pain, palpitations, edema  Resp: negative for cough, shortness of breath, wheezing  Breast: negative for breast lumps, nipple discharge, galactorrhea  GI: negative for change in bowel habits, abdominal pain, black or bloody stools  : negative for frequency, dysuria, hematuria, pos for vaginal odor  MSK: negative for back pain, joint pain, muscle pain  Skin: negative for itching, rash, hives  Neuro: negative for dizziness, headache, confusion, weakness  Psych: negative for anxiety, depression, change in mood  Heme/lymph: negative for bleeding, bruising, pallor      Objective:  Visit Vitals  /80   Ht 5' 4\" (1.626 m)   Wt 308 lb 12.8 oz (140.1 kg)   BMI 53.01 kg/m²          PHYSICAL EXAMINATION    Constitutional  Appearance: well-nourished, well developed, alert, in no acute distress    HENT  Head and Face: appears normal    Neck  Inspection/Palpation: normal appearance, no masses or tenderness  Lymph Nodes: no lymphadenopathy present  Thyroid: gland size normal, nontender, no nodules or masses present on palpation      Gastrointestinal  Abdominal Examination: abdomen non-tender to palpation, normal bowel sounds, no masses present  Liver and spleen: no hepatomegaly present, spleen not palpable  Hernias: no hernias identified    Genitourinary  External Genitalia: normal appearance for age, no discharge present, no tenderness present, no inflammatory lesions present, no masses present, no atrophy present  Vagina: normal vaginal vault without central or paravaginal defects, no discharge present, no inflammatory lesions present, no masses present  Bladder: non-tender to palpation  Urethra: appears normal  Uterus: normal size, shape and consistency  Adnexa: no adnexal tenderness present, no adnexal masses present  Perineum: perineum within normal limits, no evidence of trauma, no rashes or skin lesions present  Anus: anus within normal limits, no hemorrhoids present  Inguinal Lymph Nodes: no lymphadenopathy present    Skin  General Inspection: no rash, no lesions identified    Neurologic/Psychiatric  Mental Status:  Orientation: grossly oriented to person, place and time  Mood and Affect: mood normal, affect appropriate    Assessment:   Vaginal odor of unknown source     Plan:   Nuswab + with yeast species check. Will treat pending results. Boric acid 600mg vaginally nightly for 7 nights. Reviewed health and lifestyle changes. Patient declines presence of chaperone during today's visit.       Latonia Martin CNM

## 2023-03-02 ENCOUNTER — TELEPHONE (OUTPATIENT)
Dept: OBGYN CLINIC | Age: 33
End: 2023-03-02

## 2023-03-03 NOTE — TELEPHONE ENCOUNTER
Spoke with Eros Mares,    She was able to get the medication. It is available without prescription but must be asked for, as it is behind the pharmacy desk.

## 2023-03-03 NOTE — TELEPHONE ENCOUNTER
----- Message from Rochelle Coronado LPN sent at 1/4/8147  1:47 PM EST -----  Regarding: FW: Results  Hafsa Talbot, I think this med needs to go to a compounding pharmacy.  ----- Message -----  From: Raven Robertson  Sent: 3/2/2023  11:41 AM EST  To: [id number] Nurses  Subject: Results                                          Gus Soler My prescription is not at the pharmacy

## 2023-03-04 LAB
A VAGINAE DNA VAG QL NAA+PROBE: ABNORMAL SCORE
BVAB2 DNA VAG QL NAA+PROBE: ABNORMAL SCORE
C ALBICANS DNA VAG QL NAA+PROBE: NEGATIVE
C GLABRATA DNA VAG QL NAA+PROBE: NEGATIVE
C KRUSEI DNA VAG QL NAA+PROBE: NEGATIVE
C LUSITANIAE DNA VAG QL NAA+PROBE: NEGATIVE
C TRACH DNA VAG QL NAA+PROBE: NEGATIVE
CANDIDA DNA VAG QL NAA+PROBE: NEGATIVE
MEGA1 DNA VAG QL NAA+PROBE: ABNORMAL SCORE
N GONORRHOEA DNA VAG QL NAA+PROBE: NEGATIVE
T VAGINALIS DNA VAG QL NAA+PROBE: NEGATIVE

## 2023-03-05 ENCOUNTER — PATIENT MESSAGE (OUTPATIENT)
Dept: OBGYN CLINIC | Age: 33
End: 2023-03-05

## 2023-03-15 ENCOUNTER — HOSPITAL ENCOUNTER (EMERGENCY)
Age: 33
Discharge: HOME | End: 2023-03-15
Attending: STUDENT IN AN ORGANIZED HEALTH CARE EDUCATION/TRAINING PROGRAM
Payer: COMMERCIAL

## 2023-03-15 VITALS
BODY MASS INDEX: 53.92 KG/M2 | TEMPERATURE: 98.4 F | SYSTOLIC BLOOD PRESSURE: 167 MMHG | DIASTOLIC BLOOD PRESSURE: 86 MMHG | WEIGHT: 293 LBS | HEIGHT: 62 IN | RESPIRATION RATE: 18 BRPM | HEART RATE: 85 BPM | OXYGEN SATURATION: 100 %

## 2023-03-15 DIAGNOSIS — N76.0 BV (BACTERIAL VAGINOSIS): Primary | ICD-10-CM

## 2023-03-15 DIAGNOSIS — B96.89 BV (BACTERIAL VAGINOSIS): Primary | ICD-10-CM

## 2023-03-15 LAB
APPEARANCE UR: CLEAR
BACTERIA URNS QL MICRO: ABNORMAL /HPF
BILIRUB UR QL: NEGATIVE
CLUE CELLS VAG QL WET PREP: NORMAL
COLOR UR: ABNORMAL
EPITH CASTS URNS QL MICRO: ABNORMAL /LPF
GLUCOSE UR STRIP.AUTO-MCNC: NEGATIVE MG/DL
HCG UR QL: NEGATIVE
HGB UR QL STRIP: NEGATIVE
KETONES UR QL STRIP.AUTO: ABNORMAL MG/DL
KOH PREP SPEC: NORMAL
LEUKOCYTE ESTERASE UR QL STRIP.AUTO: NEGATIVE
NITRITE UR QL STRIP.AUTO: NEGATIVE
PH UR STRIP: 5.5 (ref 5–8)
PROT UR STRIP-MCNC: 30 MG/DL
RBC #/AREA URNS HPF: ABNORMAL /HPF (ref 0–5)
SERVICE CMNT-IMP: NORMAL
SP GR UR REFRACTOMETRY: 1.02 (ref 1–1.03)
T VAGINALIS VAG QL WET PREP: NORMAL
UA: UC IF INDICATED,UAUC: ABNORMAL
UROBILINOGEN UR QL STRIP.AUTO: 1 EU/DL (ref 0.2–1)
WBC URNS QL MICRO: ABNORMAL /HPF (ref 0–4)

## 2023-03-15 PROCEDURE — 87491 CHLMYD TRACH DNA AMP PROBE: CPT

## 2023-03-15 PROCEDURE — 81025 URINE PREGNANCY TEST: CPT

## 2023-03-15 PROCEDURE — 99283 EMERGENCY DEPT VISIT LOW MDM: CPT

## 2023-03-15 PROCEDURE — 87210 SMEAR WET MOUNT SALINE/INK: CPT

## 2023-03-15 PROCEDURE — 81001 URINALYSIS AUTO W/SCOPE: CPT

## 2023-03-15 RX ORDER — METRONIDAZOLE 500 MG/1
500 TABLET ORAL 2 TIMES DAILY
Qty: 14 TABLET | Refills: 0 | Status: SHIPPED | OUTPATIENT
Start: 2023-03-15 | End: 2023-03-22

## 2023-03-15 NOTE — ED TRIAGE NOTES
Pt reports vaginal irritation x2 days, reports hx BV  Pt denies concern for STD  Pt denies urinary symptoms

## 2023-03-16 NOTE — ED PROVIDER NOTES
[facility] EMERGENCY DEPT  EMERGENCY DEPARTMENT ENCOUNTER       Pt Name: Bouchra Chavira  MRN: [id number]  [place] 1990  Date of evaluation: 3/15/2023  Provider: Marie Walker NP   PCP: Stuart Leon MD  Note Started: 9:52 PM 3/15/23     ED attending involment: I have seen and evaluated the patient. My supervision physician was available for consultation. CHIEF COMPLAINT       Chief Complaint   Patient presents with    Vaginal Itching        HISTORY OF PRESENT ILLNESS: 1 or more elements      History From: Patient  HPI Limitations : None     Bouchra Chavira is a 28 y.o. female who presents with vaginal itching. Onset 2 days ago. Reports irritation but no vaginal lesions or odor. She reports sx began after swimming which has happened in the past. Denies abd pain, fever, chills. She has hx of BV and suspect this is her condition. She is not sexually active. Pt has IUD, no menses      Nursing Notes were all reviewed and agreed with or any disagreements were addressed in the HPI. REVIEW OF SYSTEMS      Review of Systems   Constitutional:  Negative for chills and fever. Respiratory:  Negative for cough and shortness of breath. Gastrointestinal:  Negative for abdominal pain, nausea and vomiting. Genitourinary:  Positive for vaginal discharge (vaginal irritation). Negative for dysuria, frequency, genital sores, hematuria, menstrual problem, pelvic pain, urgency, vaginal bleeding and vaginal pain. Skin:  Negative for rash. Neurological:  Negative for dizziness and headaches. All other systems reviewed and are negative. Positives and Pertinent negatives as per HPI.     PAST HISTORY     Past Medical History:  Past Medical History:   Diagnosis Date    Asthma     pt denies    Di[address] [address] 12/28/2016    Hypertension     Other ill-defined conditions(799.89)     back pain    Seizures (HCC)     last seizure was 89 years old    SOB (shortness of breath)        Past Surgical History:  Past Surgical History:   Procedure Laterality Date    HX GYN  2014    Pt reports  in past.    HX TONSILLECTOMY         Family History:  Family History   Problem Relation Age of Onset    Diabetes Mother     Diabetes Father     Diabetes Maternal Grandmother     Breast Cancer Maternal Grandmother     Cancer Paternal Uncle        Social History:  Social History     Tobacco Use    Smoking status: Never    Smokeless tobacco: Never   Substance Use Topics    Alcohol use: No    Drug use: No       Allergies:  No Known Allergies    CURRENT MEDICATIONS      Discharge Medication List as of 3/15/2023  8:23 PM        CONTINUE these medications which have NOT CHANGED    Details   lisinopriL (PRINIVIL, ZESTRIL) 10 mg tablet Take 1 tablet by mouth once daily, Normal, Disp-30 Tablet, R-5      metFORMIN (GLUCOPHAGE) 500 mg tablet TAKE 1 TABLET BY MOUTH TWICE DAILY WITH MEALS, Normal, Disp-60 Tablet, R-5             PHYSICAL EXAM      ED Triage Vitals [03/15/23 1908]   ED Encounter Vitals Group      BP (!) 167/86      Pulse (Heart Rate) 85      Resp Rate 18      Temp 98.4 °F (36.9 °C)      Temp src       O2 Sat (%) 100 %      Weight 302 lb 8 oz      Height 5' 2\"        Physical Exam  Vitals and nursing note reviewed. Constitutional:       General: She is not in acute distress. Appearance: She is well-developed. She is not ill-appearing. HENT:      Head: Normocephalic and atraumatic. Right Ear: Tympanic membrane and ear canal normal.      Left Ear: Tympanic membrane and ear canal normal.      Nose: Nose normal.      Mouth/Throat:      Mouth: Mucous membranes are moist.      Pharynx: Oropharynx is clear. No oropharyngeal exudate or posterior oropharyngeal erythema. Eyes:      Extraocular Movements: Extraocular movements intact. Conjunctiva/sclera: Conjunctivae normal.      Pupils: Pupils are equal, round, and reactive to light. Cardiovascular:      Rate and Rhythm: Normal rate and regular rhythm.       Pulses: Normal pulses. Heart sounds: Normal heart sounds. Pulmonary:      Effort: Pulmonary effort is normal.      Breath sounds: Normal breath sounds. Abdominal:      General: Bowel sounds are normal. There is no distension. Palpations: Abdomen is soft. There is no mass. Tenderness: There is no abdominal tenderness. There is no right CVA tenderness, left CVA tenderness or guarding. Genitourinary:     Comments:  exam deferred   Musculoskeletal:      Cervical back: Normal range of motion and neck supple. Skin:     General: Skin is warm and dry. Neurological:      Mental Status: She is alert and oriented to person, place, and time. GCS: GCS eye subscore is 4. GCS verbal subscore is 5. GCS motor subscore is 6. DIAGNOSTIC RESULTS   LABS:     Recent Results (from the past 12 hour(s))   CARMELLA, OTHER SOURCES    Collection Time: 03/15/23  7:26 PM    Specimen: Vagina;  Other   Result Value Ref Range    Special Requests: NO SPECIAL REQUESTS      KOH NO YEAST SEEN     WET PREP    Collection Time: 03/15/23  7:26 PM    Specimen: Miscellaneous sample   Result Value Ref Range    Clue cells CLUE CELLS PRESENT      Wet prep NO TRICHOMONAS SEEN     URINALYSIS W/ REFLEX CULTURE    Collection Time: 03/15/23  7:26 PM    Specimen: Urine   Result Value Ref Range    Color YELLOW/STRAW      Appearance CLEAR CLEAR      Specific gravity 1.025 1.003 - 1.030      pH (UA) 5.5 5.0 - 8.0      Protein 30 (A) NEG mg/dL    Glucose Negative NEG mg/dL    Ketone TRACE (A) NEG mg/dL    Bilirubin Negative NEG      Blood Negative NEG      Urobilinogen 1.0 0.2 - 1.0 EU/dL    Nitrites Negative NEG      Leukocyte Esterase Negative NEG      WBC 0-4 0 - 4 /hpf    RBC 0-5 0 - 5 /hpf    Epithelial cells FEW FEW /lpf    Bacteria 1+ (A) NEG /hpf    UA:UC IF INDICATED CULTURE NOT INDICATED BY UA RESULT CNI     HCG URINE, QL. - POC    Collection Time: 03/15/23  7:32 PM   Result Value Ref Range    Pregnancy test,urine (POC) Negative NEG RADIOLOGY:  Non-plain film images such as CT, Ultrasound and MRI are read by the radiologist. Plain radiographic images are visualized and preliminarily interpreted by myself, Brigette Rosenberg NP, ED provider   with the below findings:        Interpretation per the Radiologist below, if available at the time of this note:     No results found. PROCEDURES   Unless otherwise noted below, none  Procedures     EMERGENCY DEPARTMENT COURSE and DIFFERENTIAL DIAGNOSIS/MDM   Vitals:    Vitals:    03/15/23 1908   BP: (!) 167/86   Pulse: 85   Resp: 18   Temp: 98.4 °F (36.9 °C)   SpO2: 100%   Weight: 137.2 kg (302 lb 8 oz)   Height: 5' 2\" (1.575 m)        Patient was given the following medications:  Medications - No data to display    CONSULTS: (Who and What was discussed)  None    Chronic Conditions: HTN, DM, Seizure     Social Determinants affecting Dx or Tx: None    Records Reviewed (source and summary): Previous Radiology studies, Previous Laboratory studies, and Nursing notes    MDM (CC/HPI Summary, DDx, ED Course, Reassessment, Disposition Considerations -Tests not done, Shared Decision Making, Pt Expectation of Test or Tx.)    29 yo F presenting with c/o vaginal discharge and irritation.  exam deferred and self swabs completed prior to this provider evaluation. Clue cells noted. Plan to treat with flagyl BID x 7 days. DDX: Chlamydia, Gonorrhea, BV, Candidiasis, Trichomonas, UTI              FINAL IMPRESSION     1. BV (bacterial vaginosis)          DISPOSITION/PLAN   Discharged        Care plan outlined and precautions discussed. Patient has no new complaints, changes, or physical findings. All of pt's questions and concerns were addressed. Patient was instructed and agrees to follow up with PCP, as well as to return to the ED upon further deterioration. Patient is ready to go home.            PATIENT REFERRED TO:  Follow-up Information       Follow up With Specialties Details Why Contact Info Paulo Muhammad MD Internal Medicine Physician Call in 1 week As needed, If symptoms worsen [id number]  [phone]                DISCHARGE MEDICATIONS:  Discharge Medication List as of 3/15/2023  8:23 PM        START taking these medications    Details   metroNIDAZOLE (FlagyL) 500 mg tablet Take 1 Tablet by mouth two (2) times a day for 7 days. , Normal, Disp-14 Tablet, R-0           CONTINUE these medications which have NOT CHANGED    Details   lisinopriL (PRINIVIL, ZESTRIL) 10 mg tablet Take 1 tablet by mouth once daily, Normal, Disp-30 Tablet, R-5      metFORMIN (GLUCOPHAGE) 500 mg tablet TAKE 1 TABLET BY MOUTH TWICE DAILY WITH MEALS, Normal, Disp-60 Tablet, R-5               DISCONTINUED MEDICATIONS:  Discharge Medication List as of 3/15/2023  8:23 PM          I am the Primary Clinician of Record. Brigette Rosenberg NP (electronically signed)    (Please note that parts of this dictation were completed with voice recognition software. Quite often unanticipated grammatical, syntax, homophones, and other interpretive errors are inadvertently transcribed by the computer software. Please disregards these errors.  Please excuse any errors that have escaped final proofreading.)

## 2023-03-16 NOTE — ED NOTES
Pt presents ambulatory to ED complaining of vaginal irritation x 2 days. Hx BV. No LMP d/t IUD. Pt is alert and oriented x 4, RR even and unlabored, skin is warm and dry. Assesment completed and pt updated on plan of care. Emergency Department Nursing Plan of Care       The Nursing Plan of Care is developed from the Nursing assessment and Emergency Department Attending provider initial evaluation. The plan of care may be reviewed in the ED Provider note.     The Plan of Care was developed with the following considerations:   Patient / Family readiness to learn indicated by:verbalized understanding  Persons(s) to be included in education: patient  Barriers to Learning/Limitations:No    Signed     Brock Riddle RN    3/15/2023   8:13 PM

## 2023-03-16 NOTE — DISCHARGE INSTRUCTIONS
It was a pleasure taking care of you at [facility] Emergency Department today. We know that when you come to [facility], you are entrusting us with your health, comfort, and safety. Our physicians and nurses honor that trust, and we truly appreciate the opportunity to care for you and your loved ones. We also value our feedback. If you receive a survey about your Emergency Department experience today, please fill it out. We care about our patients' feedback, and we listen to what you have to say. Thank you!

## 2023-03-22 DIAGNOSIS — N89.8 VAGINAL DISCHARGE: Primary | ICD-10-CM

## 2023-03-22 RX ORDER — FLUCONAZOLE 150 MG/1
150 TABLET ORAL ONCE
Qty: 1 TABLET | Refills: 0 | Status: SHIPPED | OUTPATIENT
Start: 2023-03-22 | End: 2023-03-22

## 2023-04-24 ENCOUNTER — HOSPITAL ENCOUNTER (EMERGENCY)
Age: 33
Discharge: HOME | End: 2023-04-24
Attending: EMERGENCY MEDICINE
Payer: COMMERCIAL

## 2023-04-24 VITALS
WEIGHT: 293 LBS | DIASTOLIC BLOOD PRESSURE: 94 MMHG | HEIGHT: 65 IN | HEART RATE: 91 BPM | OXYGEN SATURATION: 98 % | BODY MASS INDEX: 48.82 KG/M2 | TEMPERATURE: 98 F | RESPIRATION RATE: 18 BRPM | SYSTOLIC BLOOD PRESSURE: 164 MMHG

## 2023-04-24 DIAGNOSIS — Z20.822 COVID-19 RULED OUT BY LABORATORY TESTING: ICD-10-CM

## 2023-04-24 DIAGNOSIS — B34.9 VIRAL ILLNESS: Primary | ICD-10-CM

## 2023-04-24 LAB
CLUE CELLS VAG QL WET PREP: NORMAL
FLUAV RNA SPEC QL NAA+PROBE: NOT DETECTED
FLUBV RNA SPEC QL NAA+PROBE: NOT DETECTED
SARS-COV-2 RNA RESP QL NAA+PROBE: NOT DETECTED
T VAGINALIS VAG QL WET PREP: NORMAL

## 2023-04-24 PROCEDURE — 87636 SARSCOV2 & INF A&B AMP PRB: CPT

## 2023-04-24 PROCEDURE — 99283 EMERGENCY DEPT VISIT LOW MDM: CPT

## 2023-04-24 PROCEDURE — 87210 SMEAR WET MOUNT SALINE/INK: CPT

## 2023-04-24 NOTE — ED PROVIDER NOTES
EMERGENCY DEPARTMENT HISTORY AND PHYSICAL EXAM      Patient Name: Antonina Child  MRN: [id number]  YOB: 1990    Provider: George German MD  PCP: Hanna Adan MD     Time/Date of evaluation: 11:59 AM 23    History of Presenting Illness     Chief Complaint   Patient presents with    Nasal Congestion     Pt reports nasal congestion, sore throat, headache, body aches, and occ. Diarrhea since yesterday. Pt reports being sent home from work today and needing to be tested for covid. Pt reports taking tylenol today be has yet to have any relief. Sore Throat     History Provided By: Patient     History Tushar Mathews):   Antonina Child is a 28 y.o. female with a PMHx of diabetes, hypertension, and back pain who presents to the emergency department (room 11) by POV C/O nasal congestion, sore throat, headache, and myalgias for the past 2 days. Patient that she was sent home from work today due to the symptoms and needs to be tested for COVID. 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 She denies any cough, fevers, or chills. Additionally, she tells she is having a vaginal discharge and would like to be tested for bacterial vaginosis. She denies any concern for STD.     Past History     Past Medical History:  Past Medical History:   Diagnosis Date    Diabetes ([address] 2016    Hypertension     Other ill-defined conditions(799.89)     back pain    SOB (shortness of breath)        Past Surgical History:  Past Surgical History:   Procedure Laterality Date    HX GYN  2014    Pt reports  in past.    HX TONSILLECTOMY         Family History:  Family History   Problem Relation Age of Onset    Diabetes Mother     Diabetes Father     Diabetes Maternal Grandmother     Breast Cancer Maternal Grandmother     Cancer Paternal Uncle        Social History:  Social History     Tobacco Use Smoking status: Never    Smokeless tobacco: Never   Substance Use Topics    Alcohol use: No    Drug use: No       Medications:  Current Outpatient Medications   Medication Sig Dispense Refill    lisinopriL (PRINIVIL, ZESTRIL) 10 mg tablet Take 1 tablet by mouth once daily (Patient taking differently: 5 mg.) 30 Tablet 5    metFORMIN (GLUCOPHAGE) 500 mg tablet TAKE 1 TABLET BY MOUTH TWICE DAILY WITH MEALS (Patient taking differently: daily (with breakfast). ) 60 Tablet 5       Allergies:  No Known Allergies    Social Determinants of Health:  Social Determinants of Health     Tobacco Use: Low Risk     Smoking Tobacco Use: Never    Smokeless Tobacco Use: Never    Passive Exposure: Not on file   Alcohol Use: Not on file   Financial Resource Strain: Not on file   Food Insecurity: Not on file   Transportation Needs: Not on file   Physical Activity: Not on file   Stress: Not on file   Social Connections: Not on file   Intimate Partner Violence: Not on file   Depression: Not at risk    PHQ-2 Score: 0   Housing Stability: Not on file       Review of Systems     Review of Systems   HENT:  Positive for congestion. Gastrointestinal:  Positive for diarrhea and nausea. Genitourinary:  Positive for vaginal discharge. Negative for pelvic pain and vaginal bleeding. Musculoskeletal:  Positive for myalgias. Neurological:  Positive for headaches. All other systems reviewed and are negative. Physical Exam     Patient Vitals for the past 24 hrs:   Temp Pulse Resp BP SpO2   04/24/23 1207 -- -- -- -- 98 %   04/24/23 1137 98 °F (36.7 °C) 91 18 (!) 164/94 98 %       Physical Exam  Vitals and nursing note reviewed. Constitutional:       Appearance: Normal appearance. She is well-developed. She is obese. HENT:      Head: Normocephalic and atraumatic. Mouth/Throat:      Pharynx: No pharyngeal swelling, oropharyngeal exudate or posterior oropharyngeal erythema. Tonsils: No tonsillar exudate or tonsillar abscesses. Eyes:      Conjunctiva/sclera: Conjunctivae normal.   Cardiovascular:      Rate and Rhythm: Normal rate and regular rhythm. Pulses: Normal pulses. Heart sounds: Normal heart sounds, S1 normal and S2 normal.   Pulmonary:      Effort: Pulmonary effort is normal. No respiratory distress. Breath sounds: Normal breath sounds. No wheezing. Abdominal:      General: Bowel sounds are normal. There is no distension. Palpations: Abdomen is soft. Tenderness: There is no abdominal tenderness. There is no rebound. Musculoskeletal:         General: Normal range of motion. Cervical back: Full passive range of motion without pain, normal range of motion and neck supple. Skin:     General: Skin is warm and dry. Findings: No rash. Neurological:      Mental Status: She is alert and oriented to person, place, and time. Psychiatric:         Speech: Speech normal.         Behavior: Behavior normal.         Thought Content: Thought content normal.         Judgment: Judgment normal.       Diagnostic Study Results     Labs:  Recent Results (from the past 12 hour(s))   COVID-19 WITH INFLUENZA A/B    Collection Time: 04/24/23 12:05 PM   Result Value Ref Range    SARS-CoV-2 by PCR Not detected NOTD      Influenza A by PCR Not detected      Influenza B by PCR Not detected     WET PREP    Collection Time: 04/24/23 12:05 PM    Specimen: Miscellaneous sample   Result Value Ref Range    Clue cells CLUE CELLS ABSENT      Wet prep NO TRICHOMONAS SEEN         Radiologic Studies:   No orders to display     CT Results  (Last 48 hours)      None          CXR Results  (Last 48 hours)      None            ED Course     11:59 AM LEO White MD) am the first provider for this patient. Initial assessment performed. I reviewed the vital signs, available nursing notes, past medical history, past surgical history, family history and social history.  The patients presenting problems have been discussed, and they are in agreement with the care plan formulated and outlined with them. I have encouraged them to ask questions as they arise throughout their visit. Records Reviewed: Nursing Notes and Old Medical Records    Pulse Oximetry Analysis - 98% on RA    MEDICATIONS ADMINISTERED IN THE ED:  Medications - No data to display    ED Course as of 04/24/23 1243   Mon Apr 24, 2023   1240 Wet prep is negative for BV or Trichomonas. Her flu and COVID swabs are negative as well. Will discharge with symptomatic management, hydration, and rest, and have her follow-up with primary care as needed. [MS]      ED Course User Index  [MS] Amita Payan MD       Medical Decision Making     DDX: [id number], viral URI, strep pharyngitis, vaginosis, generalized malaise, dehydration    DISCUSSION:  This appears to be a moderate condition. This appears to be an acute condition. 28 y.o. female presents with nasal congestion, sore throat, headache, myalgias, and vaginal vomiting and diarrhea for the past 2 days. We will swab her for COVID since she works on a [employer] for [employer] and does take care of COVID-positive patients. Patient is requesting a swab for vaginal vaginosis. She is well-appearing and has a normal physical exam.  This may also represent viral URIs like metapneumovirus which is fairly prevalent at this time. The decision to perform testing and results were discussed with the patient. I discussed each of these tests and considerations with the patient. The patient agrees with the plan of discharge. Additional Considerations:  Tests considered in the ED, but not accomplished include: Point-of-care pregnancy because patient denies any abdominal pain or pregnancy related symptoms. Diagnosis and Disposition     DISCHARGE NOTE:  Jose Rafael Guardado results have been reviewed with her. She has been counseled regarding her diagnosis, treatment, and plan.  She verbally conveys understanding and agreement of the signs, symptoms, diagnosis, treatment and prognosis and additionally agrees to follow up as discussed. She also agrees with the care-plan and conveys that all of her questions have been answered. I have also provided discharge instructions for her that include: educational information regarding their diagnosis and treatment, and list of reasons why they would want to return to the ED prior to their follow-up appointment, should her condition change. She has been provided with education for proper emergency department utilization. CLINICAL IMPRESSION:    1. Viral illness    2. COVID-19 ruled out by laboratory testing        PLAN:  1. D/C Home  2. Current Discharge Medication List        3. Follow-up Information       Follow up With Specialties Details Why Contact Info    Jazmyn Camilo MD Internal Medicine Physician Schedule an appointment as soon as possible for a visit   [address]  [address]      [facility] EMERGENCY DEPT Emergency Medicine  If symptoms worsen [address] [facility]          I Michi Pizarro MD am the primary clinician of record. Willian Disclaimer     Please note that this dictation was completed with [email or web address], the computer voice recognition software. Quite often unanticipated grammatical, syntax, homophones, and other interpretive errors are inadvertently transcribed by the computer software. Please disregard these errors. Please excuse any errors that have escaped final proofreading.     Michi Pizarro MD

## 2023-04-24 NOTE — ED NOTES
Discharge instructions were given to the patient by Sofy White. The patient left the Emergency Department ambulatory, alert and oriented and in no acute distress with no prescriptions. The patient was encouraged to call or return to the ED for worsening issues or problems and was encouraged to schedule a follow up appointment for continuing care. The patient verbalized understanding of discharge instructions and prescriptions, all questions were answered. The patient has no further concerns at this time.

## 2023-04-25 ENCOUNTER — HOSPITAL ENCOUNTER (EMERGENCY)
Age: 33
Discharge: HOME | End: 2023-04-25
Attending: EMERGENCY MEDICINE
Payer: COMMERCIAL

## 2023-04-25 VITALS
RESPIRATION RATE: 16 BRPM | HEIGHT: 65 IN | TEMPERATURE: 98 F | BODY MASS INDEX: 48.82 KG/M2 | OXYGEN SATURATION: 99 % | HEART RATE: 90 BPM | SYSTOLIC BLOOD PRESSURE: 160 MMHG | DIASTOLIC BLOOD PRESSURE: 98 MMHG | WEIGHT: 293 LBS

## 2023-04-25 DIAGNOSIS — J02.9 SORE THROAT: Primary | ICD-10-CM

## 2023-04-25 DIAGNOSIS — I10 PRIMARY HYPERTENSION: ICD-10-CM

## 2023-04-25 LAB — DEPRECATED S PYO AG THROAT QL EIA: NEGATIVE

## 2023-04-25 PROCEDURE — 74011250636 HC RX REV CODE- 250/636: Performed by: EMERGENCY MEDICINE

## 2023-04-25 PROCEDURE — 99284 EMERGENCY DEPT VISIT MOD MDM: CPT

## 2023-04-25 PROCEDURE — 74011000250 HC RX REV CODE- 250: Performed by: EMERGENCY MEDICINE

## 2023-04-25 PROCEDURE — 87880 STREP A ASSAY W/OPTIC: CPT

## 2023-04-25 PROCEDURE — 87070 CULTURE OTHR SPECIMN AEROBIC: CPT

## 2023-04-25 PROCEDURE — 96374 THER/PROPH/DIAG INJ IV PUSH: CPT

## 2023-04-25 RX ORDER — DEXAMETHASONE SODIUM PHOSPHATE 10 MG/ML
10 INJECTION INTRAMUSCULAR; INTRAVENOUS
Status: COMPLETED | OUTPATIENT
Start: 2023-04-25 | End: 2023-04-25

## 2023-04-25 RX ORDER — FLUTICASONE PROPIONATE 50 MCG
2 SPRAY, SUSPENSION (ML) NASAL DAILY
Qty: 1 EACH | Refills: 0 | Status: SHIPPED | OUTPATIENT
Start: 2023-04-25

## 2023-04-25 RX ORDER — LIDOCAINE HYDROCHLORIDE 20 MG/ML
15 SOLUTION OROPHARYNGEAL AS NEEDED
Qty: 1 EACH | Refills: 0 | Status: SHIPPED | OUTPATIENT
Start: 2023-04-25

## 2023-04-25 RX ORDER — LIDOCAINE HYDROCHLORIDE 20 MG/ML
10 SOLUTION OROPHARYNGEAL
Status: COMPLETED | OUTPATIENT
Start: 2023-04-25 | End: 2023-04-25

## 2023-04-25 RX ORDER — GUAIFENESIN 100 MG/5ML
200 SOLUTION ORAL
Qty: 180 ML | Refills: 0 | Status: SHIPPED | OUTPATIENT
Start: 2023-04-25

## 2023-04-25 RX ORDER — PSEUDOEPHEDRINE HCL 30 MG
1 TABLET ORAL 2 TIMES DAILY
Qty: 20 TABLET | Refills: 0 | Status: SHIPPED | OUTPATIENT
Start: 2023-04-25

## 2023-04-25 RX ADMIN — LIDOCAINE HYDROCHLORIDE 10 ML: 20 SOLUTION ORAL; TOPICAL at 10:48

## 2023-04-25 RX ADMIN — DEXAMETHASONE SODIUM PHOSPHATE 10 MG: 10 INJECTION, SOLUTION INTRAMUSCULAR; INTRAVENOUS at 10:48

## 2023-04-25 NOTE — ED TRIAGE NOTES
Patient arrives to ED for c/o sore throat, body aches, and vomiting sine yesterday. Patient states seen yesterday for same and discharged home.

## 2023-04-25 NOTE — ED NOTES
Pt given discharge papers and instructed to follow up with PCP. E prescriptions flonase, loratadine, lidocaine, and robitussin sent to patients pharmacy. Pt given work note. Pt verbalized understanding of instructions and denies any further questions. VSS. Pt alert and oriented, nad. Pt ambulated to waiting room with steady gait.

## 2023-04-25 NOTE — ED PROVIDER NOTES
EMERGENCY DEPARTMENT HISTORY AND PHYSICAL EXAM      Patient Name: Junior Frazier  MRN: [id number]  YOB: 1990    Provider: Dora Hanna MD  PCP: Edith Tsang MD     Time/Date of evaluation: 9:51 AM 23    History of Presenting Illness     Chief Complaint   Patient presents with    Sore Throat     History Provided By: Patient     History Flory Sake):   Junior Frazier is a 28 y.o. female with a PMHx of diabetes, hypertension, and back pain  who presents to the emergency department (room 12) by POV C/O continued sore throat, nasal congestion, cough, nausea, vomiting, and myalgias for the past 2 days. Patient was seen in the emergency department yesterday and swabbed for both flu and COVID which were negative. She tells me the nausea and vomiting have gotten worse and she has been unable to keep down any medications. Past History     Past Medical History:  Past Medical History:   Diagnosis Date    Diabetes ([address] 2016    Hypertension     Other ill-defined conditions(799.89)     back pain    SOB (shortness of breath)        Past Surgical History:  Past Surgical History:   Procedure Laterality Date    HX GYN  2014    Pt reports  in past.    HX TONSILLECTOMY         Family History:  Family History   Problem Relation Age of Onset    Diabetes Mother     Diabetes Father     Diabetes Maternal Grandmother     Breast Cancer Maternal Grandmother     Cancer Paternal Uncle        Social History:  Social History     Tobacco Use    Smoking status: Never    Smokeless tobacco: Never   Substance Use Topics    Alcohol use: No    Drug use: No       Medications:  Current Outpatient Medications   Medication Sig Dispense Refill    fluticasone propionate (FLONASE) 50 mcg/actuation nasal spray 2 Sprays by Both Nostrils route daily. 1 Each 0    loratadine-pseudoephedrine (Loratadine-D) 5-120 mg per tablet Take 1 Tablet by mouth two (2) times a day.  20 Tablet 0    lidocaine (Lidocaine Viscous) 2 % solution Take 15 mL by mouth as needed for Pain. 1 Each 0    guaiFENesin (ROBITUSSIN) 100 mg/5 mL liquid Take 10 mL by mouth three (3) times daily as needed for Cough. 180 mL 0    lisinopriL (PRINIVIL, ZESTRIL) 10 mg tablet Take 1 tablet by mouth once daily (Patient taking differently: 5 mg.) 30 Tablet 5    metFORMIN (GLUCOPHAGE) 500 mg tablet TAKE 1 TABLET BY MOUTH TWICE DAILY WITH MEALS (Patient taking differently: daily (with breakfast). ) 60 Tablet 5       Allergies:  No Known Allergies    Social Determinants of Health:  Social Determinants of Health     Tobacco Use: Low Risk     Smoking Tobacco Use: Never    Smokeless Tobacco Use: Never    Passive Exposure: Not on file   Alcohol Use: Not on file   Financial Resource Strain: Not on file   Food Insecurity: Not on file   Transportation Needs: Not on file   Physical Activity: Not on file   Stress: Not on file   Social Connections: Not on file   Intimate Partner Violence: Not on file   Depression: Not at risk    PHQ-2 Score: 0   Housing Stability: Not on file       Review of Systems     Review of Systems   HENT:  Positive for congestion, rhinorrhea and sore throat. Gastrointestinal:  Positive for nausea and vomiting. Musculoskeletal:  Positive for myalgias. All other systems reviewed and are negative. Physical Exam     Patient Vitals for the past 24 hrs:   Temp Pulse Resp BP SpO2   04/25/23 1051 -- 90 16 (!) 160/98 99 %   04/25/23 0856 98 °F (36.7 °C) 95 18 (!) 172/82 99 %       Physical Exam  Vitals and nursing note reviewed. Constitutional:       Appearance: Normal appearance. She is well-developed. HENT:      Head: Normocephalic and atraumatic. Mouth/Throat:      Pharynx: No pharyngeal swelling or posterior oropharyngeal erythema. Tonsils: No tonsillar exudate or tonsillar abscesses. 0 on the right.       Comments: Unable to visualize the left tonsil  Eyes:      Conjunctiva/sclera: Conjunctivae normal.   Cardiovascular:      Rate and Rhythm: Normal rate and regular rhythm. Pulses: Normal pulses. Heart sounds: Normal heart sounds, S1 normal and S2 normal.   Pulmonary:      Effort: Pulmonary effort is normal. No respiratory distress. Breath sounds: Normal breath sounds. No wheezing. Abdominal:      General: Bowel sounds are normal. There is no distension. Palpations: Abdomen is soft. Tenderness: There is no abdominal tenderness. There is no rebound. Musculoskeletal:         General: Normal range of motion. Cervical back: Full passive range of motion without pain, normal range of motion and neck supple. Skin:     General: Skin is warm and dry. Findings: No rash. Neurological:      Mental Status: She is alert and oriented to person, place, and time. Psychiatric:         Speech: Speech normal.         Behavior: Behavior normal.         Thought Content: Thought content normal.         Judgment: Judgment normal.       Diagnostic Study Results     Labs:  Recent Results (from the past 12 hour(s))   STREP AG SCREEN, GROUP A    Collection Time: 04/25/23  9:40 AM    Specimen: Swab; Throat   Result Value Ref Range    Group A Strep Ag ID Negative NEG         ED Course     9:51 AM I Krystyna Fields MD) am the first provider for this patient. Initial assessment performed. I reviewed the vital signs, available nursing notes, past medical history, past surgical history, family history and social history. The patients presenting problems have been discussed, and they are in agreement with the care plan formulated and outlined with them. I have encouraged them to ask questions as they arise throughout their visit. Records Reviewed: {Records Reviewed:33392::\"Nursing Notes\"}    Pulse Oximetry Analysis - 99% on RA    Is this patient to be included in the SEP-1 core measure due to severe sepsis or septic shock?  {Sep-1 Core Yes/No:368835}    MEDICATIONS ADMINISTERED IN THE ED:  Medications   dexamethasone (PF) (DECADRON) 10 mg/mL injection 10 mg (10 mg IntraVENous Given 4/25/23 1048)   lidocaine (XYLOCAINE) 2 % viscous solution 10 mL (10 mL Mouth/Throat Given 4/25/23 1048)       ED Course as of 04/25/23 1522   Tue Apr 25, 2023   1027 Patient's rapid strep test is negative. We will place an IV and obtain a CT of the neck soft tissue to assess for peritonsillar abscess. [MS]   2011 Pt refusing IV and CT. Patient would like a prescription for some cough syrup, viscous lidocaine, and will await the throat culture results. [MS]      ED Course User Index  [MS] Vladislav Padilla MD       Medical Decision Making     DDX: ***    DISCUSSION:  {Severity Acuity:00468}   28 y.o. female *** In evaluation of the above differential diagnosis, consideration was given to the following tests and treatments (***). The decision to perform testing and results were discussed with the {Patient or Family:05140::\"patient\"}. ***  I discussed each of these tests and considerations with the {Patient or Family:00594::\"patient\"}. The {Patient or Family:94913::\"patient\"} agrees with the plan of {Disposition Type:21110::\"discharge\"}. Additional Considerations:  Tests considered in the ED, but not accomplished include: CBC and metabolic panel because patient refused. Imaging considered in the ED, but not accomplished include: CT neck soft tissue because patient refused. Diagnosis and Disposition     DISCHARGE NOTE:  Toy Arias results have been reviewed with her. She has been counseled regarding her diagnosis, treatment, and plan. She verbally conveys understanding and agreement of the signs, symptoms, diagnosis, treatment and prognosis and additionally agrees to follow up as discussed. She also agrees with the care-plan and conveys that all of her questions have been answered.  I have also provided discharge instructions for her that include: educational information regarding their diagnosis and treatment, and list of reasons why they would want to return to the ED prior to their follow-up appointment, should her condition change. She has been provided with education for proper emergency department utilization. CLINICAL IMPRESSION:    1. Sore throat    2. Primary hypertension        PLAN:  1. D/C Home  2. Discharge Medication List as of 4/25/2023 10:41 AM        START taking these medications    Details   fluticasone propionate (FLONASE) 50 mcg/actuation nasal spray 2 Sprays by Both Nostrils route daily. , Normal, Disp-1 Each, R-0      loratadine-pseudoephedrine (Loratadine-D) 5-120 mg per tablet Take 1 Tablet by mouth two (2) times a day., Normal, Disp-20 Tablet, R-0      lidocaine (Lidocaine Viscous) 2 % solution Take 15 mL by mouth as needed for Pain., Normal, Disp-1 Each, R-0      guaiFENesin (ROBITUSSIN) 100 mg/5 mL liquid Take 10 mL by mouth three (3) times daily as needed for Cough., Normal, Disp-180 mL, R-0           CONTINUE these medications which have NOT CHANGED    Details   lisinopriL (PRINIVIL, ZESTRIL) 10 mg tablet Take 1 tablet by mouth once daily, Normal, Disp-30 Tablet, R-5      metFORMIN (GLUCOPHAGE) 500 mg tablet TAKE 1 TABLET BY MOUTH TWICE DAILY WITH MEALS, Normal, Disp-60 Tablet, R-5           3. Follow-up Information       Follow up With Specialties Details Why Contact Info    Chris Gabriel MD Internal Medicine Physician Schedule an appointment as soon as possible for a visit   [address]  [address]      [facility] EMERGENCY DEPT Emergency Medicine  As needed, If symptoms worsen [address] [facility]          Thaddeus Heath MD am the primary clinician of record. Willian Disclaimer     Please note that this dictation was completed with Playrcart, the computer voice recognition software. Quite often unanticipated grammatical, syntax, homophones, and other interpretive errors are inadvertently transcribed by the computer software.   Please disregard these errors. Please excuse any errors that have escaped final proofreading.     Pedro Luis Maldonado MD

## 2023-04-25 NOTE — ED NOTES
Emergency Department Nursing Plan of Care       The Nursing Plan of Care is developed from the Nursing assessment and Emergency Department Attending provider initial evaluation. The plan of care may be reviewed in the ED Provider note.     The Plan of Care was developed with the following considerations:   Patient / Family readiness to learn indicated by:verbalized understanding  Persons(s) to be included in education: patient  Barriers to Learning/Limitations:No    Signed     Lissy Thomas RN    4/25/2023   9:35 AM

## 2023-04-27 LAB
BACTERIA SPEC CULT: NORMAL
SERVICE CMNT-IMP: NORMAL

## 2023-05-05 NOTE — TELEPHONE ENCOUNTER
Angely Oglesby Patient Age: 71 year old  MESSAGE: Interpreting service used: No    Insurance on file confirmed with caller: Yes    IM/FP- Medication Question-     Name of the Pharmacy: [facility]    Name of the medication: olmesartan (BENICAR) 5 MG tablet    Question about: Directions     PER DAUGHTER, DAVE(VERBAL STATES PATIENT SHOULD BE TAKING 5MG IN AM AND 5MG IN PM FOR TOTAL OF 10MG DAILY    Select provider's home site [facility] CALL TO CALL CENTER CMA QUEUE- ROUTE MESSAGE TO CALL CENTER CMA POOL ([phone]) and Quantity       Select provider's home site [facility] CALL TO CALL CENTER CMA QUEUE- ROUTE MESSAGE TO CALL CENTER CMA POOL ([phone])    Message read back to caller for accuracy: Yes       ALLERGIES:  Patient has no known allergies.  Current Outpatient Medications   Medication Sig Dispense Refill   • olmesartan (BENICAR) 5 MG tablet Take 1 tablet by mouth daily. 90 tablet 0   • metformin (GLUCOPHAGE) 1000 MG tablet Take 1 tablet by mouth 2 times daily (with meals). 180 tablet 0   • hydroCORTisone (CORTIZONE) 2.5 % cream Apply externally to affected area every night at bedtime as needed 30 g 3   • Microlet Lancets Misc TEST DAILY AS DIRECTED 100 each 2   • blood glucose (MARY CONTOUR NEXT TEST) test strip daily. Test blood sugar 1 times daily. Diagnosis: E11.9. Meter: Counter Test Strips] 100 strip 0   • simvastatin (ZOCOR) 40 MG tablet TAKE 1 TABLET BY MOUTH EVERY NIGHT 90 tablet 1   • empagliflozin (JARDIANCE) 25 MG tablet Take 1 tablet by mouth daily (before breakfast). 90 tablet 0   • aspirin (ECOTRIN) 81 MG EC tablet Take 1 Tab by mouth daily. - Oral       No current facility-administered medications for this visit.     PHARMACY to use: [facility]          Pharmacy preference(s) on file:   [id number] - [place], [place] - [address] AT [address]  [address] [address]  Phone: [phone] Fax: [phone]    [facility]  She did have a UTI in Sept when she was seen @ Urgent care, it looks like multiple attempts at calling her failed. I have sent in [address] to her Pharmacy. If this doesn't clear things up she needs an OV.   Thanks, [id number] - [place], [place] - [address] AT [address]  [address] [address]  Phone: [phone] Fax: [phone]      CALL BACK INFO: Ok to leave response (including medical information) on answering machine      PCP: Ruthie Kaur MD         INS: Payor: MEDICARE / Plan: PARTA AND B / Product Type: MEDICARE   PATIENT ADDRESS:  [address] [place] [address] [address]  [place] [address]

## 2023-06-05 RX ORDER — LISINOPRIL 10 MG/1
TABLET ORAL
Qty: 90 TABLET | Refills: 1 | Status: SHIPPED | OUTPATIENT
Start: 2023-06-05

## 2023-06-06 ENCOUNTER — OFFICE VISIT (OUTPATIENT)
Facility: CLINIC | Age: 33
End: 2023-06-06
Payer: COMMERCIAL

## 2023-06-06 VITALS
DIASTOLIC BLOOD PRESSURE: 88 MMHG | HEIGHT: 65 IN | TEMPERATURE: 98.2 F | BODY MASS INDEX: 48.82 KG/M2 | WEIGHT: 293 LBS | RESPIRATION RATE: 16 BRPM | HEART RATE: 77 BPM | OXYGEN SATURATION: 99 % | SYSTOLIC BLOOD PRESSURE: 140 MMHG

## 2023-06-06 DIAGNOSIS — E11.65 TYPE 2 DIABETES MELLITUS WITH HYPERGLYCEMIA, WITHOUT LONG-TERM CURRENT USE OF INSULIN (HCC): Primary | ICD-10-CM

## 2023-06-06 DIAGNOSIS — I15.2 HYPERTENSION COMPLICATING DIABETES (HCC): ICD-10-CM

## 2023-06-06 DIAGNOSIS — E66.01 MORBID OBESITY WITH BMI OF 50.0-59.9, ADULT (HCC): ICD-10-CM

## 2023-06-06 DIAGNOSIS — E11.59 HYPERTENSION COMPLICATING DIABETES (HCC): ICD-10-CM

## 2023-06-06 PROCEDURE — 99214 OFFICE O/P EST MOD 30 MIN: CPT | Performed by: INTERNAL MEDICINE

## 2023-06-06 PROCEDURE — 3075F SYST BP GE 130 - 139MM HG: CPT | Performed by: INTERNAL MEDICINE

## 2023-06-06 PROCEDURE — 3079F DIAST BP 80-89 MM HG: CPT | Performed by: INTERNAL MEDICINE

## 2023-06-06 SDOH — ECONOMIC STABILITY: HOUSING INSECURITY
IN THE LAST 12 MONTHS, WAS THERE A TIME WHEN YOU DID NOT HAVE A STEADY PLACE TO SLEEP OR SLEPT IN A SHELTER (INCLUDING NOW)?: NO

## 2023-06-06 SDOH — ECONOMIC STABILITY: INCOME INSECURITY: HOW HARD IS IT FOR YOU TO PAY FOR THE VERY BASICS LIKE FOOD, HOUSING, MEDICAL CARE, AND HEATING?: NOT VERY HARD

## 2023-06-06 SDOH — ECONOMIC STABILITY: FOOD INSECURITY: WITHIN THE PAST 12 MONTHS, YOU WORRIED THAT YOUR FOOD WOULD RUN OUT BEFORE YOU GOT MONEY TO BUY MORE.: NEVER TRUE

## 2023-06-06 SDOH — ECONOMIC STABILITY: FOOD INSECURITY: WITHIN THE PAST 12 MONTHS, THE FOOD YOU BOUGHT JUST DIDN'T LAST AND YOU DIDN'T HAVE MONEY TO GET MORE.: NEVER TRUE

## 2023-06-06 ASSESSMENT — PATIENT HEALTH QUESTIONNAIRE - PHQ9
SUM OF ALL RESPONSES TO PHQ9 QUESTIONS 1 & 2: 0
2. FEELING DOWN, DEPRESSED OR HOPELESS: 0
SUM OF ALL RESPONSES TO PHQ QUESTIONS 1-9: 0
1. LITTLE INTEREST OR PLEASURE IN DOING THINGS: 0
SUM OF ALL RESPONSES TO PHQ QUESTIONS 1-9: 0

## 2023-06-17 LAB — HBA1C MFR BLD HPLC: 9.2 %

## 2023-06-20 ENCOUNTER — TELEPHONE (OUTPATIENT)
Facility: CLINIC | Age: 33
End: 2023-06-20

## 2023-06-20 DIAGNOSIS — E11.65 TYPE 2 DIABETES MELLITUS WITH HYPERGLYCEMIA, WITHOUT LONG-TERM CURRENT USE OF INSULIN (HCC): Primary | ICD-10-CM

## 2023-06-20 NOTE — TELEPHONE ENCOUNTER
Pt called and  verified. D/w pt that I received a correspondence for her bariatric surgeons group. Her A1c has increased from ~8 to >9% due to her non-compliance. Pt requests refill of her metformin.   D/w pt that her surgery will not proceed until her A1c is <8%

## 2023-06-22 ENCOUNTER — TELEPHONE (OUTPATIENT)
Age: 33
End: 2023-06-22

## 2023-06-22 NOTE — TELEPHONE ENCOUNTER
Patient calling name and  verified. Patient states she needs to be seen for vaginal swab. She talked to priya on My Chart. Patient scheduled per Malik Vazquez to be seen today patient to ask job if she can get off and make appointment at end of day.

## 2023-06-26 ENCOUNTER — OFFICE VISIT (OUTPATIENT)
Age: 33
End: 2023-06-26

## 2023-06-26 VITALS — BODY MASS INDEX: 50.42 KG/M2 | SYSTOLIC BLOOD PRESSURE: 132 MMHG | DIASTOLIC BLOOD PRESSURE: 82 MMHG | WEIGHT: 293 LBS

## 2023-06-26 DIAGNOSIS — N89.8 VAGINAL DISCHARGE: Primary | ICD-10-CM

## 2023-06-26 PROCEDURE — 3079F DIAST BP 80-89 MM HG: CPT

## 2023-06-26 PROCEDURE — 3075F SYST BP GE 130 - 139MM HG: CPT

## 2023-06-26 RX ORDER — METRONIDAZOLE 500 MG/1
500 TABLET ORAL 2 TIMES DAILY
Qty: 14 TABLET | Refills: 0 | Status: SHIPPED | OUTPATIENT
Start: 2023-06-26 | End: 2023-07-03

## 2023-07-18 ENCOUNTER — TELEPHONE (OUTPATIENT)
Age: 33
End: 2023-07-18

## 2023-07-18 RX ORDER — FLUCONAZOLE 150 MG/1
150 TABLET ORAL ONCE
Qty: 1 TABLET | Refills: 0 | Status: SHIPPED | OUTPATIENT
Start: 2023-07-18 | End: 2023-07-18 | Stop reason: SDUPTHER

## 2023-07-18 RX ORDER — FLUCONAZOLE 100 MG/1
100 TABLET ORAL DAILY
Qty: 1 TABLET | Refills: 0 | Status: SHIPPED | OUTPATIENT
Start: 2023-07-18 | End: 2023-07-19

## 2023-07-18 RX ORDER — METRONIDAZOLE 500 MG/1
500 TABLET ORAL 2 TIMES DAILY
Qty: 14 TABLET | Refills: 0 | Status: SHIPPED | OUTPATIENT
Start: 2023-07-18 | End: 2023-07-25

## 2023-07-18 RX ORDER — METRONIDAZOLE 500 MG/1
500 TABLET ORAL 2 TIMES DAILY
Qty: 14 TABLET | Refills: 0 | Status: SHIPPED | OUTPATIENT
Start: 2023-07-18 | End: 2023-07-18 | Stop reason: SDUPTHER

## 2023-07-18 NOTE — TELEPHONE ENCOUNTER
Patient calling name and  verified. Patient states she needs medication for yeast sent to her pharmacy that you discussed in patient messages.       Please advise thank you

## 2023-08-23 ENCOUNTER — TELEMEDICINE (OUTPATIENT)
Facility: CLINIC | Age: 33
End: 2023-08-23
Payer: COMMERCIAL

## 2023-08-23 ENCOUNTER — TELEPHONE (OUTPATIENT)
Facility: CLINIC | Age: 33
End: 2023-08-23

## 2023-08-23 DIAGNOSIS — E11.65 TYPE 2 DIABETES MELLITUS WITH HYPERGLYCEMIA, WITHOUT LONG-TERM CURRENT USE OF INSULIN (HCC): Primary | ICD-10-CM

## 2023-08-23 DIAGNOSIS — I15.2 HYPERTENSION COMPLICATING DIABETES (HCC): ICD-10-CM

## 2023-08-23 DIAGNOSIS — E66.01 MORBID OBESITY WITH BMI OF 50.0-59.9, ADULT (HCC): ICD-10-CM

## 2023-08-23 DIAGNOSIS — E11.59 HYPERTENSION COMPLICATING DIABETES (HCC): ICD-10-CM

## 2023-08-23 PROCEDURE — 99213 OFFICE O/P EST LOW 20 MIN: CPT | Performed by: INTERNAL MEDICINE

## 2023-08-23 RX ORDER — SEMAGLUTIDE 0.68 MG/ML
0.25 INJECTION, SOLUTION SUBCUTANEOUS WEEKLY
Qty: 3 ML | Refills: 1 | Status: SHIPPED | OUTPATIENT
Start: 2023-08-23 | End: 2023-08-24 | Stop reason: CLARIF

## 2023-08-23 SDOH — ECONOMIC STABILITY: FOOD INSECURITY: WITHIN THE PAST 12 MONTHS, THE FOOD YOU BOUGHT JUST DIDN'T LAST AND YOU DIDN'T HAVE MONEY TO GET MORE.: NEVER TRUE

## 2023-08-23 SDOH — ECONOMIC STABILITY: FOOD INSECURITY: WITHIN THE PAST 12 MONTHS, YOU WORRIED THAT YOUR FOOD WOULD RUN OUT BEFORE YOU GOT MONEY TO BUY MORE.: NEVER TRUE

## 2023-08-23 SDOH — ECONOMIC STABILITY: INCOME INSECURITY: HOW HARD IS IT FOR YOU TO PAY FOR THE VERY BASICS LIKE FOOD, HOUSING, MEDICAL CARE, AND HEATING?: NOT HARD AT ALL

## 2023-08-23 NOTE — PROGRESS NOTES
2023    TELEHEALTH EVALUATION -- Audio/Visual    HPI:    Elaine Torres (:  1990) has requested an audio/video evaluation for the following concern(s):    Chief Complaint   Patient presents with    Discuss Medications     Pt is interested in starting weight loss med     Pt is in the process of bariatric surgery qualifications and she requests weight loss med at the urging of her specialist    Pt is being carlos @ [facility] [address] [place] Fifi Chou for bariatric intervention. PMH:  Type 2 DM-on metformin 500mg BID   -pt believes med may be too strong due to low sugar- low meaning 79  Hemoglobin A1C   Date Value Ref Range Status   2022 11.0 (H) 4.0 - 5.6 % Final     Comment:     NEW METHOD  PLEASE NOTE NEW REFERENCE RANGE  (NOTE)  HbA1C Interpretive Ranges  <5.7              Normal  5.7 - 6.4         Consider Prediabetes  >6.5              Consider Diabetes       Hemoglobin A1C, POC   Date Value Ref Range Status   10/04/2022 8.4 % Final      HTN-?compliant w medication   -pt reports her bp is nml off medication   -  BP Readings from Last 3 Encounters:   23 132/82   23 (!) 140/88   23 132/80      Morbid obesity-  Wt Readings from Last 3 Encounters:   23 (!) 303 lb (137.4 kg)   23 (!) 305 lb (138.3 kg)   23 (!) 308 lb 12.8 oz (140.1 kg)        Review of Systems  Review of systems (12) negative, except noted above. Prior to Visit Medications    Medication Sig Taking?  Authorizing Provider   metFORMIN (GLUCOPHAGE) 500 MG tablet Take 1 tablet by mouth 2 times daily (with meals) Yes Perry Coronado MD   lisinopril (PRINIVIL;ZESTRIL) 10 MG tablet Take 1 tablet by mouth once daily Yes Perry Coronado MD   Semaglutide,0.25 or 0.5MG/DOS, (OZEMPIC, 0.25 OR 0.5 MG/DOSE,) 2 MG/3ML SOPN Inject 0.25 mg into the skin once a week Yes Perry Coronado MD       Social History     Tobacco Use    Smoking status: Never    Smokeless tobacco: Never   Substance Use Topics    Alcohol use: No    Drug use:

## 2023-08-24 DIAGNOSIS — E11.65 TYPE 2 DIABETES MELLITUS WITH HYPERGLYCEMIA, WITHOUT LONG-TERM CURRENT USE OF INSULIN (HCC): Primary | ICD-10-CM

## 2023-08-24 DIAGNOSIS — E66.01 MORBID OBESITY WITH BMI OF 50.0-59.9, ADULT (HCC): ICD-10-CM

## 2023-08-24 RX ORDER — LIRAGLUTIDE 6 MG/ML
0.6 INJECTION SUBCUTANEOUS DAILY
Qty: 3 ML | Refills: 0 | Status: SHIPPED | OUTPATIENT
Start: 2023-08-24

## 2023-08-24 RX ORDER — PEN NEEDLE, DIABETIC 31 G X1/4"
NEEDLE, DISPOSABLE MISCELLANEOUS
Qty: 100 EACH | Refills: 3 | Status: SHIPPED | OUTPATIENT
Start: 2023-08-24

## 2023-09-07 ENCOUNTER — TELEPHONE (OUTPATIENT)
Facility: CLINIC | Age: 33
End: 2023-09-07

## 2023-09-07 DIAGNOSIS — E66.01 MORBID OBESITY WITH BMI OF 50.0-59.9, ADULT (HCC): Primary | ICD-10-CM

## 2023-09-07 RX ORDER — SEMAGLUTIDE 0.25 MG/.5ML
0.25 INJECTION, SOLUTION SUBCUTANEOUS
Qty: 2 ML | Refills: 0 | Status: SHIPPED | OUTPATIENT
Start: 2023-09-07

## 2023-09-07 NOTE — TELEPHONE ENCOUNTER
Pt does not want victoza.  She received a call that her insurance will cover wegovy if it is for weight loss

## 2023-09-07 NOTE — PROGRESS NOTES
Pt reports she does not want victoza.  Her insurance will fill wegovy if it clearly states FOR WEIGHT LOSS

## 2023-09-12 ENCOUNTER — CLINICAL DOCUMENTATION (OUTPATIENT)
Facility: CLINIC | Age: 33
End: 2023-09-12

## 2023-09-13 ENCOUNTER — TELEPHONE (OUTPATIENT)
Facility: CLINIC | Age: 33
End: 2023-09-13

## 2023-09-13 NOTE — TELEPHONE ENCOUNTER
Jenn Syed was denied by pt insurance. Denial letter printed and placed in provider box for review. Pt will need to have a trial and fail of victoza. Pt was made aware of this ask that you please put the victoza back in to the pharmacy.

## 2023-09-19 NOTE — TELEPHONE ENCOUNTER
Can you please send for Victoza for this patient? Spoke with the pharmacist who states that the original prescription was canceled for the [facility] but the wegovy was denied because pt needs a trial and fail of the victoza she refused to take because it was a daily injection instead of the once weekly. After explaining to her that her insurance denied the medication she wants because she hasn't had a trial and fail of medication on their list she agreed to trying the [address] [place] [address]. I couldn't get in touch with her afterwards to see if she already had this medication on hand, so that I could let Dr. Frances Garza know. I spoke with pt today and she doesn't, she called the pharmacy and they told her there was no prescription, that's when I called.

## 2023-09-20 RX ORDER — LIRAGLUTIDE 6 MG/ML
0.6 INJECTION SUBCUTANEOUS DAILY
Qty: 5 ADJUSTABLE DOSE PRE-FILLED PEN SYRINGE | Refills: 1 | Status: SHIPPED | OUTPATIENT
Start: 2023-09-20

## 2023-10-16 ENCOUNTER — OFFICE VISIT (OUTPATIENT)
Age: 33
End: 2023-10-16
Payer: COMMERCIAL

## 2023-10-16 ENCOUNTER — TELEMEDICINE (OUTPATIENT)
Facility: CLINIC | Age: 33
End: 2023-10-16
Payer: COMMERCIAL

## 2023-10-16 VITALS — DIASTOLIC BLOOD PRESSURE: 88 MMHG | BODY MASS INDEX: 50.42 KG/M2 | WEIGHT: 293 LBS | SYSTOLIC BLOOD PRESSURE: 122 MMHG

## 2023-10-16 DIAGNOSIS — E66.01 MORBID OBESITY WITH BMI OF 50.0-59.9, ADULT (HCC): Primary | ICD-10-CM

## 2023-10-16 DIAGNOSIS — G62.9 NEUROPATHY: ICD-10-CM

## 2023-10-16 DIAGNOSIS — E11.9 TYPE 2 DIABETES MELLITUS WITHOUT COMPLICATION, WITHOUT LONG-TERM CURRENT USE OF INSULIN (HCC): ICD-10-CM

## 2023-10-16 DIAGNOSIS — R11.2 NAUSEA AND VOMITING, UNSPECIFIED VOMITING TYPE: ICD-10-CM

## 2023-10-16 DIAGNOSIS — Z01.419 ENCOUNTER FOR GYNECOLOGICAL EXAMINATION: Primary | ICD-10-CM

## 2023-10-16 PROCEDURE — 99395 PREV VISIT EST AGE 18-39: CPT

## 2023-10-16 PROCEDURE — 99214 OFFICE O/P EST MOD 30 MIN: CPT | Performed by: INTERNAL MEDICINE

## 2023-10-16 NOTE — PROGRESS NOTES
10/16/2023    TELEHEALTH EVALUATION -- Audio/Visual    HPI:    Chilo Jenkins (:  1990) has requested an audio/video evaluation for the following concern(s):    Chief Complaint   Patient presents with    Advice Only     Patient needs to talk. Nausea & Vomiting    Peripheral Neuropathy     Pt started victoza and she is having nausea and vomiting on the lowest dose. Pt was started on GLP at the recommendation of the bariatric surgeon at [facility]. Pt w \"sometimes\" pain in her legs at night Pt is concerned re:diabetic neuropathy      Pt is being eval @ [facility] for bariatric intervention. She needs a letter of support    Type 2 DM-on metformin 500mg BID and recently started on victoza  Hemoglobin A1C   Date Value Ref Range Status   2022 11.0 (H) 4.0 - 5.6 % Final     Comment:     NEW METHOD  PLEASE NOTE NEW REFERENCE RANGE  (NOTE)  HbA1C Interpretive Ranges  <5.7              Normal  5.7 - 6.4         Consider Prediabetes  >6.5              Consider Diabetes       Hemoglobin A1C, POC   Date Value Ref Range Status   10/04/2022 8.4 % Final      HTN-?compliant w medication   -pt reports her bp is nml off medication   -  BP Readings from Last 3 Encounters:   10/16/23 122/88   23 132/82   23 (!) 140/88      Morbid obesity-pt is in the process of bariatic surgery  BP Readings from Last 3 Encounters:   10/16/23 122/88   23 132/82   23 (!) 140/88        Review of systems (12) negative, except noted above. Prior to Visit Medications    Medication Sig Taking?  Authorizing Provider   Insulin Pen Needle ([employer] PEN NEEDLES) 31G X 6 MM MISC Use daily with victoza Yes Dinorah Stovall MD   metFORMIN (GLUCOPHAGE) 500 MG tablet Take 1 tablet by mouth 2 times daily (with meals) Yes Dinorah Stovall MD   lisinopril (PRINIVIL;ZESTRIL) 10 MG tablet Take 1 tablet by mouth once daily Yes Dinorah Stovall MD   Liraglutide (VICTOZA) 18 MG/3ML SOPN SC injection Inject 0.6 mg into the skin daily

## 2023-10-16 NOTE — PROGRESS NOTES
Annual exam    Jeet Paula is a 35 y.o. presenting for annual exam. Her main concerns today include none. She declines a chaperone during the gynecologic exam today. Ob/Gyn Hx:    LMP - IUD  Menses - IUD  Contraception - IUD  STI - Desires  SA - yes    Health maintenance:  Pap - 2022 WNL  Gardasil - Received some per pt      Past Medical History:   Diagnosis Date    Diabetes ([address]) 2016    Hypertension     Other ill-defined conditions(799.89)     back pain    SOB (shortness of breath)        Past Surgical History:   Procedure Laterality Date    GYN  2014    Pt reports  in past.    TONSILLECTOMY         Family History   Problem Relation Age of Onset    Breast Cancer Maternal Grandmother     Cancer Paternal Uncle     Diabetes Maternal Grandmother     Diabetes Father     Diabetes Mother        Social History     Socioeconomic History    Marital status: Single     Spouse name: Not on file    Number of children: Not on file    Years of education: Not on file    Highest education level: Not on file   Occupational History    Not on file   Tobacco Use    Smoking status: Never    Smokeless tobacco: Never   Substance and Sexual Activity    Alcohol use: No    Drug use: No    Sexual activity: Not on file   Other Topics Concern    Not on file   Social History Narrative    Not on file     Social Determinants of Health     Financial Resource Strain: Low Risk  (2023)    Overall Financial Resource Strain (CARDIA)     Difficulty of Paying Living Expenses: Not hard at all   Food Insecurity: No Food Insecurity (2023)    Hunger Vital Sign     Worried About Running Out of Food in the Last Year: Never true     [address] in the Last Year: Never true   Transportation Needs: Unknown (2023)    PRAPARE - Transportation     Lack of Transportation (Medical): Not on file     Lack of Transportation (Non-Medical):  No   Physical Activity: Not on file   Stress: Not on file   Social Connections: Not on

## 2023-10-16 NOTE — PROGRESS NOTES
Sola Vasquez is a 35 y.o. female  HIPAA verified by two patient identifiers. Health Maintenance Due   Topic Date Due    Hepatitis B vaccine (1 of 3 - 3-dose series) Never done    COVID-19 Vaccine (1) Never done    Varicella vaccine (1 of 2 - 2-dose childhood series) Never done    Pneumococcal 0-64 years Vaccine (1 - PCV) Never done    Diabetic foot exam  Never done    Diabetic retinal exam  Never done    DTaP/Tdap/Td vaccine (1 - Tdap) Never done    Diabetic Alb to Cr ratio (uACR) test  05/18/2023    Lipids  05/18/2023    Flu vaccine (1) Never done    A1C test (Diabetic or Prediabetic)  09/17/2023    GFR test (Diabetes, CKD 3-4, OR last GFR 15-59)  11/07/2023     Chief Complaint   Patient presents with    Advice Only     Patient needs to talk. 10/16/2023     1:20 PM   Patient-Reported Vitals   Patient-Reported Weight 290lb         Pain Scale: 0/10  Pain Location:     1. Have you been to the ER, urgent care clinic since your last visit? Hospitalized since your last visit? No    2. Have you seen or consulted any other health care providers outside of the [address] [place] [address] since your last visit? Include any pap smears or colon screening.  No

## 2023-10-17 ENCOUNTER — TELEPHONE (OUTPATIENT)
Facility: CLINIC | Age: 33
End: 2023-10-17

## 2023-10-17 LAB
HBV SURFACE AG SER QL: <0.1 INDEX
HBV SURFACE AG SER QL: NEGATIVE
HCV AB SER IA-ACNC: 0.03 INDEX
HCV AB SERPL QL IA: NONREACTIVE
HIV 1+2 AB+HIV1 P24 AG SERPL QL IA: NONREACTIVE
HIV 1/2 RESULT COMMENT: NORMAL

## 2023-10-17 NOTE — TELEPHONE ENCOUNTER
Pt called stating ou were going to refer her to neurologist. She called scheduling to get [facility] scheduling and they gave her another number to call ([phone] for [facility].  Ne[facility]rological [facility]oc said you need to fax medical records and state what you are planning to do (per patient)  Pt # [phone]

## 2023-10-18 LAB — T PALLIDUM AB SER QL IA: NON REACTIVE

## 2023-10-20 DIAGNOSIS — G62.9 NEUROPATHY: Primary | ICD-10-CM

## 2023-10-21 LAB
C TRACH RRNA CVX QL NAA+PROBE: NEGATIVE
CYTOLOGIST CVX/VAG CYTO: NORMAL
CYTOLOGY CVX/VAG DOC CYTO: NORMAL
CYTOLOGY CVX/VAG DOC THIN PREP: NORMAL
DX ICD CODE: NORMAL
HPV GENOTYPE REFLEX: NORMAL
HPV I/H RISK 4 DNA CVX QL PROBE+SIG AMP: NEGATIVE
Lab: NORMAL
N GONORRHOEA RRNA CVX QL NAA+PROBE: NEGATIVE
OTHER STN SPEC: NORMAL
STAT OF ADQ CVX/VAG CYTO-IMP: NORMAL
T VAGINALIS RRNA SPEC QL NAA+PROBE: NEGATIVE

## 2023-10-22 DIAGNOSIS — N76.0 BACTERIAL VAGINOSIS: Primary | ICD-10-CM

## 2023-10-22 DIAGNOSIS — B96.89 BACTERIAL VAGINOSIS: Primary | ICD-10-CM

## 2023-10-22 RX ORDER — METRONIDAZOLE 7.5 MG/G
GEL VAGINAL
Qty: 70 G | Refills: 2 | Status: SHIPPED | OUTPATIENT
Start: 2023-10-22 | End: 2023-10-29

## 2023-11-02 ENCOUNTER — PATIENT MESSAGE (OUTPATIENT)
Age: 33
End: 2023-11-02

## 2023-11-06 ENCOUNTER — PROCEDURE VISIT (OUTPATIENT)
Age: 33
End: 2023-11-06
Payer: COMMERCIAL

## 2023-11-06 ENCOUNTER — TELEPHONE (OUTPATIENT)
Facility: CLINIC | Age: 33
End: 2023-11-06

## 2023-11-06 DIAGNOSIS — M54.32 SCIATICA OF LEFT SIDE: ICD-10-CM

## 2023-11-06 DIAGNOSIS — R20.2 NUMBNESS AND TINGLING OF BOTH FEET: Primary | ICD-10-CM

## 2023-11-06 DIAGNOSIS — R20.0 NUMBNESS AND TINGLING OF BOTH FEET: Primary | ICD-10-CM

## 2023-11-06 PROCEDURE — 95910 NRV CNDJ TEST 7-8 STUDIES: CPT | Performed by: PSYCHIATRY & NEUROLOGY

## 2023-11-06 PROCEDURE — 95886 MUSC TEST DONE W/N TEST COMP: CPT | Performed by: PSYCHIATRY & NEUROLOGY

## 2023-11-06 NOTE — PROGRESS NOTES
[facility]  [facility]  [address] [place] [address]  Phone [phone] Fax [phone]  Test Date:  2023    Patient: Nomi Martini : 1990 Physician: Steve Washington MD   Sex: Male Height: 5' 5\" Ref Phys: Traci Bedoya MD   ID#: [id number] Weight: 303 lbs. Technician: Marcela Kerr     Patient Complaints:  Numbness and tingling in feet    Patient History / Exam:  29-year-old female with diabetes who is being evaluated for numbness and tingling sensation in the toes of both feet. She also has low back pain that radiates mainly into her left lower extremity. NCV & EMG Findings:  Evaluation of the left tibial motor and the right tibial motor nerves showed prolonged distal onset latency (L8.0, R8.4 ms) and reduced amplitude (L0.5, R0.1 mV). All remaining nerves  were within normal limits. Left vs. Right side comparison data for the tibial motor nerve indicates abnormal L-R amplitude difference (80.0 %) and abnormal L-R velocity difference (Knee-Ankle, 42 m/s). All F Wave latencies were within normal limits. All F Wave left vs. right side latency differences were within normal limits. Needle evaluation of the left flexor digitorum longus muscle showed slightly increased polyphasic potentials. The right gastroc muscle showed increased insertional activity. All remaining muscles (as indicated in the following table) showed no evidence of electrical instability. Impression:    The electrodiagnostic testing is unremarkable except for low and slow tibial motor responses which are most likely due to technical reasons and inability to adequately stimulate due to depth of the nerve. No convincing evidence to suggest a large fiber peripheral neuropathy. Small fiber neuropathy is not excluded.   Mild acute neurogenic changes were seen in the medial head of gastrocnemius on the right and mild chronic changes were

## 2023-11-06 NOTE — TELEPHONE ENCOUNTER
Patient called stating she had a neuro appt today and was told that she could receive pain rx from her pcp. She is inquiring about medication. Patient states gabapentin, requesting return call      [phone]

## 2023-11-07 ENCOUNTER — TELEPHONE (OUTPATIENT)
Age: 33
End: 2023-11-07

## 2023-11-07 RX ORDER — METRONIDAZOLE 500 MG/1
500 TABLET ORAL 2 TIMES DAILY
Qty: 14 TABLET | Refills: 0 | Status: SHIPPED | OUTPATIENT
Start: 2023-11-07 | End: 2023-11-14

## 2023-11-07 NOTE — TELEPHONE ENCOUNTER
Magalie Reyes advised patient that Yvette Mahiyayo is not in the office today. Patient is reporting \"the medicine is making it worse\". She reported the vaginal cream is making her vaginal burning worse. She is requesting on oral medication. Please advise.

## 2023-11-07 NOTE — TELEPHONE ENCOUNTER
ERNESTO Shen sent in the Metronidazole two times every day for 7 days. This should clear up the BV infection.    Alfred Mcconnell

## 2023-11-07 NOTE — TELEPHONE ENCOUNTER
Patient called in, name and  verified. Patient is currently c/o vaginal irritation since starting new cream Tibucrio Herr has put her on. She does not want to come into the office for anything additional. She wants to speak to Tiburcio Herr directly. She has been informed that Tiburcio Herr is not int he office this week. Pt expressed understanding but would only like to speak with her. She has also been informed that we are currently working on an alternative (per messages with Ahsan Rangel) but wants to know how long this will take.

## 2023-11-09 ENCOUNTER — TELEMEDICINE (OUTPATIENT)
Facility: CLINIC | Age: 33
End: 2023-11-09
Payer: COMMERCIAL

## 2023-11-09 ENCOUNTER — TELEPHONE (OUTPATIENT)
Facility: CLINIC | Age: 33
End: 2023-11-09

## 2023-11-09 DIAGNOSIS — G62.9 NEUROPATHY: Primary | ICD-10-CM

## 2023-11-09 DIAGNOSIS — G89.29 CHRONIC LOW BACK PAIN WITH SCIATICA, SCIATICA LATERALITY UNSPECIFIED, UNSPECIFIED BACK PAIN LATERALITY: ICD-10-CM

## 2023-11-09 DIAGNOSIS — M54.40 CHRONIC LOW BACK PAIN WITH SCIATICA, SCIATICA LATERALITY UNSPECIFIED, UNSPECIFIED BACK PAIN LATERALITY: ICD-10-CM

## 2023-11-09 PROCEDURE — 99213 OFFICE O/P EST LOW 20 MIN: CPT | Performed by: INTERNAL MEDICINE

## 2023-11-09 RX ORDER — GABAPENTIN 100 MG/1
CAPSULE ORAL
Qty: 90 CAPSULE | Refills: 1 | Status: SHIPPED | OUTPATIENT
Start: 2023-11-09 | End: 2023-11-10 | Stop reason: SDUPTHER

## 2023-11-09 NOTE — TELEPHONE ENCOUNTER
Christin pharmacist @ [facility] needs clarification on the directions for the gabapentin.   Pharmacy# [phone]

## 2023-11-10 RX ORDER — GABAPENTIN 100 MG/1
CAPSULE ORAL
Qty: 90 CAPSULE | Refills: 1 | Status: SHIPPED | OUTPATIENT
Start: 2023-11-10 | End: 2024-02-02

## 2023-11-10 NOTE — PROGRESS NOTES
significant exanthematous lesions or discoloration noted on facial skin         [] Abnormal-            Psychiatric:       [] Normal Affect [] No Hallucinations        [] Abnormal-     Other pertinent observable physical exam findings-     ASSESSMENT/PLAN:  1. Neuropathy  D/w pt daily walking and weight loss may improve her discomfort  - MRI LUMBAR SPINE W CONTRAST; Future    2. Chronic low back pain with sciatica, sciatica laterality unspecified, unspecified back pain laterality  Pt declines PT  - MRI LUMBAR SPINE W CONTRAST; Future  - gabapentin (NEURONTIN) 100 MG capsule; 1 po qhs x 1 week then 2 po qhs x 1 week then 3 po qhs  Dispense: 90 capsule; Refill: 1          No follow-ups on file. Bibiana Whittington, was evaluated through a synchronous (real-time) audio-video encounter. The patient (or guardian if applicable) is aware that this is a billable service, which includes applicable co-pays. This Virtual Visit was conducted with patient's (and/or legal guardian's) consent. Patient identification was verified, and a caregiver was present when appropriate. The patient was located at Other: In car  Provider was located at Home ([address]): [place]        Total time spent on this encounter:  not billed based on time    --Cece Alanis MD on 11/10/2023 at 3:16 PM    An electronic signature was used to authenticate this note.

## 2023-11-28 ENCOUNTER — NURSE TRIAGE (OUTPATIENT)
Dept: OTHER | Facility: CLINIC | Age: 33
End: 2023-11-28

## 2023-11-28 ENCOUNTER — HOSPITAL ENCOUNTER (EMERGENCY)
Facility: HOSPITAL | Age: 33
Discharge: HOME | End: 2023-11-28
Payer: COMMERCIAL

## 2023-11-28 ENCOUNTER — APPOINTMENT (OUTPATIENT)
Facility: HOSPITAL | Age: 33
End: 2023-11-28
Payer: COMMERCIAL

## 2023-11-28 VITALS
HEART RATE: 95 BPM | WEIGHT: 293 LBS | BODY MASS INDEX: 53.92 KG/M2 | HEIGHT: 62 IN | SYSTOLIC BLOOD PRESSURE: 153 MMHG | OXYGEN SATURATION: 100 % | TEMPERATURE: 98.3 F | RESPIRATION RATE: 18 BRPM | DIASTOLIC BLOOD PRESSURE: 90 MMHG

## 2023-11-28 DIAGNOSIS — S43.402A SPRAIN OF LEFT SHOULDER, UNSPECIFIED SHOULDER SPRAIN TYPE, INITIAL ENCOUNTER: Primary | ICD-10-CM

## 2023-11-28 PROCEDURE — 73030 X-RAY EXAM OF SHOULDER: CPT

## 2023-11-28 PROCEDURE — 99283 EMERGENCY DEPT VISIT LOW MDM: CPT

## 2023-11-28 PROCEDURE — 6370000000 HC RX 637 (ALT 250 FOR IP): Performed by: NURSE PRACTITIONER

## 2023-11-28 RX ORDER — NAPROXEN 500 MG/1
500 TABLET ORAL 2 TIMES DAILY WITH MEALS
Qty: 60 TABLET | Refills: 0 | Status: SHIPPED | OUTPATIENT
Start: 2023-11-28

## 2023-11-28 RX ORDER — METHOCARBAMOL 500 MG/1
500 TABLET, FILM COATED ORAL ONCE
Status: COMPLETED | OUTPATIENT
Start: 2023-11-28 | End: 2023-11-28

## 2023-11-28 RX ORDER — LIDOCAINE 4 G/G
2 PATCH TOPICAL ONCE
Status: DISCONTINUED | OUTPATIENT
Start: 2023-11-28 | End: 2023-11-29 | Stop reason: HOSPADM

## 2023-11-28 RX ORDER — METHOCARBAMOL 750 MG/1
750 TABLET, FILM COATED ORAL 4 TIMES DAILY
Qty: 40 TABLET | Refills: 0 | Status: SHIPPED | OUTPATIENT
Start: 2023-11-28 | End: 2023-12-08

## 2023-11-28 RX ADMIN — METHOCARBAMOL 500 MG: 500 TABLET ORAL at 22:30

## 2023-11-28 ASSESSMENT — PAIN SCALES - GENERAL
PAINLEVEL_OUTOF10: 10
PAINLEVEL_OUTOF10: 10

## 2023-11-28 ASSESSMENT — PAIN DESCRIPTION - LOCATION
LOCATION: SHOULDER
LOCATION: SHOULDER

## 2023-11-28 ASSESSMENT — PAIN DESCRIPTION - ORIENTATION
ORIENTATION: LEFT
ORIENTATION: LEFT

## 2023-11-28 ASSESSMENT — PAIN DESCRIPTION - DESCRIPTORS: DESCRIPTORS: ACHING

## 2023-11-28 ASSESSMENT — PAIN - FUNCTIONAL ASSESSMENT: PAIN_FUNCTIONAL_ASSESSMENT: 0-10

## 2023-11-28 NOTE — TELEPHONE ENCOUNTER
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 Current Symptoms: pain in left shoulder and arm,states hurts to move arm at all,   States if she moves it at all it hurts,  unsure if swelling or discoloration, states she cant look at her arm, too much pain, upset on phone,     Pain Severity: 10/10 left shoulder    Temperature: n/a    What has been tried: ice and 2 took Tylenol     LMP:    Pregnant:     Recommended disposition: Go to ED Now  Advised to speak to [facility] today. Employee injury packet sent to employee with listing of approved providers and locations. Employee aware they must be seen by one of the panel physicians, not their own PCP. Employee verbalizes understanding. Care advice provided, caller verbalizes understanding; denies any other questions or concerns.     Patient/caller agrees to proceed to the Emergency Department    Reason for Disposition   Sounds like a serious injury to the triager    Protocols used: Shoulder Injury-ADULT-

## 2023-11-29 ENCOUNTER — TELEPHONE (OUTPATIENT)
Age: 33
End: 2023-11-29

## 2023-11-29 NOTE — DISCHARGE INSTRUCTIONS
It was a pleasure taking care of you at [facility] Emergency Department today. We know that when you come to [facility], you are entrusting us with your health, comfort, and safety. Our physicians and nurses honor that trust, and we truly appreciate the opportunity to care for you and your loved ones. We also value our feedback. If you receive a survey about your Emergency Department experience today, please fill it out. We care about our patients' feedback, and we listen to what you have to say. Thank you!

## 2023-11-29 NOTE — ED TRIAGE NOTES
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

## 2023-11-29 NOTE — TELEPHONE ENCOUNTER
Reached out to the patient about scheduling an ED F/U apt she stated she is interested in scheduling the apt but will have to call back to schedule.

## 2023-11-29 NOTE — ED NOTES
Discharge instructions were given to the patient by Aruna Black. The patient left the Emergency Department ambulatory, alert and oriented and in no acute distress with 2 prescriptions. The patient was encouraged to call or return to the ED for worsening issues or problems and was encouraged to schedule a follow up appointment for continuing care. The patient verbalized understanding of discharge instructions and prescriptions, all questions were answered. The patient has no further concerns at this time.         Jose Rojas RN  11/28/23 0797

## 2023-11-29 NOTE — ED NOTES
Pt offered IM injection for pain. Pt stated she could not do shots. Pt instructed by Provider to stay off work tomorrow and follow-up with orthopedics. Pt instructed on limiting additional stress on her shoulder until she can follow-up with the Orthopedist. Pt verbalized understanding.       Shukri Chambers RN  11/28/23 5619

## 2023-12-05 ENCOUNTER — TELEPHONE (OUTPATIENT)
Facility: CLINIC | Age: 33
End: 2023-12-05

## 2023-12-05 DIAGNOSIS — G89.29 CHRONIC LOW BACK PAIN WITH SCIATICA, SCIATICA LATERALITY UNSPECIFIED, UNSPECIFIED BACK PAIN LATERALITY: ICD-10-CM

## 2023-12-05 DIAGNOSIS — G62.9 NEUROPATHY: Primary | ICD-10-CM

## 2023-12-05 DIAGNOSIS — E66.01 MORBID OBESITY WITH BMI OF 50.0-59.9, ADULT (HCC): ICD-10-CM

## 2023-12-05 DIAGNOSIS — M54.40 CHRONIC LOW BACK PAIN WITH SCIATICA, SCIATICA LATERALITY UNSPECIFIED, UNSPECIFIED BACK PAIN LATERALITY: ICD-10-CM

## 2023-12-05 NOTE — TELEPHONE ENCOUNTER
[facility] called stating that they need a peer to peer for MRI Lumbar Spine, 6wk treatment, contact Kathy Ernst [phone] case # [id number]

## 2023-12-05 NOTE — TELEPHONE ENCOUNTER
Procedure requested hasn't been denied - fax will be sent also  Call [phone] option 4 for any questions   Reference # - [id number]

## 2023-12-27 ENCOUNTER — TELEPHONE (OUTPATIENT)
Age: 33
End: 2023-12-27

## 2023-12-27 NOTE — TELEPHONE ENCOUNTER
Jose sent in a patient appointment request to schedule a visit that she \"needed to speak with Jayant\". I called the patient to try and help get her an appointment made. I asked the patient what kind of appointment she was needing and she told me an annual exam. I offered the patient an appointment on the next available day (which is 01/25) as she wanted to see you only. She asked about an appointment with Dr. Whiting. I explained that Dr. Whiting is booking out into May at this time for her annual exams.    She then asked I sent a message to you asking you to call her directly. I explained to the patient that with the rotating schedules this was the next available appointment with you for an annual exam. Patient is aware you are not in the office the next few days.

## 2024-01-04 LAB
ESTIMATED AVERAGE GLUCOSE: ABNORMAL
HBA1C MFR BLD: 9 %

## 2024-01-08 ENCOUNTER — TELEPHONE (OUTPATIENT)
Age: 34
End: 2024-01-08

## 2024-01-11 ENCOUNTER — TELEPHONE (OUTPATIENT)
Age: 34
End: 2024-01-11

## 2024-01-12 ENCOUNTER — OFFICE VISIT (OUTPATIENT)
Age: 34
End: 2024-01-12

## 2024-01-12 VITALS — WEIGHT: 293 LBS | SYSTOLIC BLOOD PRESSURE: 124 MMHG | DIASTOLIC BLOOD PRESSURE: 82 MMHG | BODY MASS INDEX: 55.6 KG/M2

## 2024-01-12 DIAGNOSIS — N89.8 VAGINAL DISCHARGE: Primary | ICD-10-CM

## 2024-01-12 RX ORDER — METRONIDAZOLE 500 MG/1
500 TABLET ORAL 2 TIMES DAILY
Qty: 14 TABLET | Refills: 0 | Status: SHIPPED | OUTPATIENT
Start: 2024-01-12 | End: 2024-01-19

## 2024-01-12 RX ORDER — FLUCONAZOLE 150 MG/1
150 TABLET ORAL ONCE
Qty: 1 TABLET | Refills: 0 | Status: SHIPPED | OUTPATIENT
Start: 2024-01-12 | End: 2024-01-12

## 2024-01-12 NOTE — TELEPHONE ENCOUNTER
Patient called to invite to the Hypertension Management Program through [facility]. No answer. Voicemail left. Will call again for second attempt.

## 2024-01-12 NOTE — PROGRESS NOTES
Problem Visit    Jose Hernandez is a 33 y.o.  presenting for problem visit. Her main concern today is vaginal discharge and odor. Of note, pt has a hx of recurrent BV. She tried weekly suppression but felt it caused irritation.       Ob/Gyn Hx:    LMP- IUD  Menses- IUD  Contraception- IUD  SA- yes      Past Medical History:   Diagnosis Date    Diabetes (H[facility]) 2016    Hypertension     Other ill-defined conditions(799.89)     back pain    SOB (shortness of breath)        Past Surgical History:   Procedure Laterality Date    GYN  2014    Pt reports  in past.    TONSILLECTOMY         Family History   Problem Relation Age of Onset    Breast Cancer Maternal Grandmother     Cancer Paternal Uncle     Diabetes Maternal Grandmother     Diabetes Father     Diabetes Mother        Social History     Socioeconomic History    Marital status: Single     Spouse name: Not on file    Number of children: Not on file    Years of education: Not on file    Highest education level: Not on file   Occupational History    Not on file   Tobacco Use    Smoking status: Never    Smokeless tobacco: Never   Substance and Sexual Activity    Alcohol use: No    Drug use: No    Sexual activity: Not on file   Other Topics Concern    Not on file   Social History Narrative    Not on file     Social Determinants of Health     Financial Resource Strain: Low Risk  (2023)    Overall Financial Resource Strain (CARDIA)     Difficulty of Paying Living Expenses: Not hard at all   Food Insecurity: Not on file (2023)   Transportation Needs: Unknown (2023)    PRAPARE - Transportation     Lack of Transportation (Medical): Not on file     Lack of Transportation (Non-Medical): No   Physical Activity: Not on file   Stress: Not on file   Social Connections: Not on file   Intimate Partner Violence: Not on file   Housing Stability: Unknown (2023)    Housing Stability Vital Sign     Unable to Pay for Housing in the Last Year: Not on file

## 2024-01-12 NOTE — TELEPHONE ENCOUNTER
Patient called to invite to the Hypertension Management Program through [facility]. Patient declined.

## 2024-07-25 ENCOUNTER — TELEPHONE (OUTPATIENT)
Age: 34
End: 2024-07-25

## 2024-07-25 NOTE — TELEPHONE ENCOUNTER
Patient sent in a message regarding needing an urgent appt to our schedulers. Someone had reached out to the pt to get her scheduled but she did not want to provide details as to why the appt was needed urgently.She was advised by Jayant RUANO to reach out to the triage line to better assist with the urgency of the call.    Per the pt she reports having sx of vaginal burning and itching that started last week. She thinks it is BV and wanted to be seen today.    Per Jayant she is happy to see her First thing Monday (7/29) as she has over 20 patients on her schedule today and is overbooked quite a bit.    Patient has been informed and is upset due to being able to be fit in in the past. She asked about a slot that was offered on her VM but by the time she had called back, the spot was already taken by an emergency. Pt does not feel as though this is fair as she is a patient too and doesn't feel like she should have to lie about dying in order to be seen. I have explained the issue around having too many over books and wanting to be able to be efficient in the care that we offer our patients. She was offered to see someone else but reports that she only feels comfortable with Jayant.    During the call she said: \"She is trying to not go off on Jayant because she is her doctor but finds this unfair that she has to call our office to tell us what her issues are before she can be placed on the schedule.\" She feels strongly that she should be able to make an appt without \"telling all her business.\"    Patient has been placed on the schedule for Monday morning and is even double booked as Jayant has no openings. Pt was dissatisfied and would like to speak with Jayant.    Jayant has asked that we escalate this matter to management as she has does all she can for the pt and we have given multiple options to better assist her.

## 2024-07-25 NOTE — TELEPHONE ENCOUNTER
At Jayant Natarajan's request- I called and spoke with the patient. I introduced myself and told her I was the clinical supervisor with [facility]. I asked her if there was anything I could help her with. The patient stated she requested to speak with Jayant directly. I explained to her that Jayant had requested I call her and offer to help her as Jayant is in clinic all day and unable to reach out to the patient.     The patient said there was nothing I was able to help her with. She did confirm that she would be able to make it to her visit on Monday 07/29.

## 2024-07-29 ENCOUNTER — OFFICE VISIT (OUTPATIENT)
Age: 34
End: 2024-07-29
Payer: COMMERCIAL

## 2024-07-29 VITALS
WEIGHT: 293 LBS | BODY MASS INDEX: 53.92 KG/M2 | HEIGHT: 62 IN | SYSTOLIC BLOOD PRESSURE: 140 MMHG | DIASTOLIC BLOOD PRESSURE: 80 MMHG

## 2024-07-29 DIAGNOSIS — N94.9 VAGINAL BURNING: Primary | ICD-10-CM

## 2024-07-29 PROCEDURE — 99212 OFFICE O/P EST SF 10 MIN: CPT

## 2024-07-29 PROCEDURE — 3079F DIAST BP 80-89 MM HG: CPT

## 2024-07-29 PROCEDURE — 3077F SYST BP >= 140 MM HG: CPT

## 2024-07-29 NOTE — PROGRESS NOTES
Jose Hernandez is a 34 y.o. female who complains of  vaginal irritation, discharge, itching for one week since coming back from vacation. Pt knows that pools, ocean, hot tub, bathing suit can be a trigger. She did get in water on vacation and has since noticed these symptoms.     Her relevant past medical history:   Past Medical History:   Diagnosis Date    Diabetes (HCC) 2016    Hypertension     Other ill-defined conditions(799.89)     back pain    SOB (shortness of breath)         Past Surgical History:   Procedure Laterality Date    GYN  2014    Pt reports  in past.    TONSILLECTOMY       Social History     Occupational History    Not on file   Tobacco Use    Smoking status: Never    Smokeless tobacco: Never   Substance and Sexual Activity    Alcohol use: No    Drug use: No    Sexual activity: Not on file     Family History   Problem Relation Age of Onset    Breast Cancer Maternal Grandmother     Cancer Paternal Uncle     Diabetes Maternal Grandmother     Diabetes Father     Diabetes Mother        No Known Allergies  Prior to Admission medications    Medication Sig Start Date End Date Taking? Authorizing Provider   naproxen (NAPROSYN) 500 MG tablet Take 1 tablet by mouth 2 times daily (with meals) 23   Kenneth Leigh N, APRN - NP   gabapentin (NEURONTIN) 100 MG capsule 1 po qhs x 1 week then 2 po qhs x 1 week then 3 po qhs 11/10/23 2/2/24  Kimmy Callejas MD   Insulin Pen Needle ([employer] PEN NEEDLES) 31G X 6 MM MISC Use daily with victoza 23   Kimmy Callejas MD   metFORMIN (GLUCOPHAGE) 500 MG tablet Take 1 tablet by mouth 2 times daily (with meals)  Patient not taking: Reported on 2024   Kimmy Callejas MD   lisinopril (PRINIVIL;ZESTRIL) 10 MG tablet Take 1 tablet by mouth once daily  Patient not taking: Reported on 2024   Kimmy Callejas MD        Review of Systems - History obtained from the patient  Constitutional: negative for weight loss,

## 2024-07-29 NOTE — PROGRESS NOTES
Chief Complaint   Patient presents with    Follow-up       Ob/Gyn Hx:    LMP - Patient's last menstrual period was 2024.  Menses - 7d   Contraception - IUD.  Hx of STI - No    SA - Yes      Health Maintenance:  Last Pap:     1. Have you been to the ER, urgent care clinic, or hospitalized since your last visit?No    2. Have you seen or consulted any other health care providers outside of the [facility] since your last visit? No    Patient declines chaperone.    Radha Bailey LPN

## 2024-08-01 LAB
A VAGINAE DNA VAG QL NAA+PROBE: ABNORMAL SCORE
BVAB2 DNA VAG QL NAA+PROBE: ABNORMAL SCORE
C ALBICANS DNA VAG QL NAA+PROBE: NEGATIVE
C GLABRATA DNA VAG QL NAA+PROBE: NEGATIVE
C TRACH DNA SPEC QL NAA+PROBE: NEGATIVE
MEGA1 DNA VAG QL NAA+PROBE: ABNORMAL SCORE
N GONORRHOEA DNA VAG QL NAA+PROBE: NEGATIVE
SPECIMEN STATUS REPORT: NORMAL
T VAGINALIS DNA VAG QL NAA+PROBE: NEGATIVE

## 2024-08-01 RX ORDER — METRONIDAZOLE 500 MG/1
500 TABLET ORAL 2 TIMES DAILY
Qty: 14 TABLET | Refills: 0 | Status: SHIPPED | OUTPATIENT
Start: 2024-08-01 | End: 2024-08-08

## 2024-08-19 LAB
ESTIMATED AVERAGE GLUCOSE: NORMAL
HBA1C MFR BLD: 8.8 %

## 2024-08-27 RX ORDER — LISINOPRIL 10 MG/1
TABLET ORAL
Qty: 90 TABLET | Refills: 1 | Status: SHIPPED | OUTPATIENT
Start: 2024-08-27

## 2024-09-20 ENCOUNTER — TELEPHONE (OUTPATIENT)
Age: 34
End: 2024-09-20

## 2024-09-20 NOTE — TELEPHONE ENCOUNTER
Patient would like to restart bariatric surgery program; [facility], then [facility], no returning to [facility]. Sent SWL seminar for patient to complete

## 2025-01-16 ENCOUNTER — HOSPITAL ENCOUNTER (EMERGENCY)
Facility: HOSPITAL | Age: 35
Discharge: HOME | End: 2025-01-16
Attending: STUDENT IN AN ORGANIZED HEALTH CARE EDUCATION/TRAINING PROGRAM

## 2025-01-16 ENCOUNTER — APPOINTMENT (OUTPATIENT)
Facility: HOSPITAL | Age: 35
End: 2025-01-16

## 2025-01-16 VITALS
HEART RATE: 90 BPM | SYSTOLIC BLOOD PRESSURE: 161 MMHG | RESPIRATION RATE: 18 BRPM | BODY MASS INDEX: 53.92 KG/M2 | HEIGHT: 62 IN | TEMPERATURE: 97.4 F | OXYGEN SATURATION: 98 % | WEIGHT: 293 LBS | DIASTOLIC BLOOD PRESSURE: 95 MMHG

## 2025-01-16 DIAGNOSIS — R07.89 CHEST WALL PAIN: Primary | ICD-10-CM

## 2025-01-16 LAB
EKG ATRIAL RATE: 87 BPM
EKG DIAGNOSIS: NORMAL
EKG P AXIS: 65 DEGREES
EKG P-R INTERVAL: 148 MS
EKG Q-T INTERVAL: 364 MS
EKG QRS DURATION: 76 MS
EKG QTC CALCULATION (BAZETT): 438 MS
EKG R AXIS: 81 DEGREES
EKG T AXIS: 1 DEGREES
EKG VENTRICULAR RATE: 87 BPM

## 2025-01-16 PROCEDURE — 99284 EMERGENCY DEPT VISIT MOD MDM: CPT

## 2025-01-16 PROCEDURE — 71046 X-RAY EXAM CHEST 2 VIEWS: CPT

## 2025-01-16 PROCEDURE — 6370000000 HC RX 637 (ALT 250 FOR IP): Performed by: STUDENT IN AN ORGANIZED HEALTH CARE EDUCATION/TRAINING PROGRAM

## 2025-01-16 RX ORDER — IBUPROFEN 600 MG/1
600 TABLET, FILM COATED ORAL
Status: COMPLETED | OUTPATIENT
Start: 2025-01-16 | End: 2025-01-16

## 2025-01-16 RX ORDER — IBUPROFEN 600 MG/1
600 TABLET, FILM COATED ORAL 3 TIMES DAILY PRN
Qty: 30 TABLET | Refills: 0 | Status: SHIPPED | OUTPATIENT
Start: 2025-01-16

## 2025-01-16 RX ADMIN — IBUPROFEN 600 MG: 600 TABLET, FILM COATED ORAL at 20:09

## 2025-01-16 ASSESSMENT — PAIN SCALES - GENERAL: PAINLEVEL_OUTOF10: 5

## 2025-01-16 ASSESSMENT — PAIN - FUNCTIONAL ASSESSMENT: PAIN_FUNCTIONAL_ASSESSMENT: 0-10

## 2025-01-16 NOTE — ED TRIAGE NOTES
Pt arrives with cc of pleuritic chest pain.  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

## 2025-01-17 NOTE — ED NOTES
Discharge instructions were given to the patient by toñito JAMES RN.     The patient left the Emergency Department alert and oriented and in no acute distress with 1 prescriptions. The patient was encouraged to call or return to the ED for worsening issues or problems and was encouraged to schedule a follow up appointment for continuing care.     Ambulation assessment completed before discharge.  Pt left Emergency Department ambulating at baseline with no ortho devices  Ortho device education: none    The patient verbalized understanding of discharge instructions and prescriptions, all questions were answered. The patient has no further concerns at this time.

## 2025-01-17 NOTE — ED PROVIDER NOTES
07-NOV-2022 17:17,  No significant change was found         Radiologic Studies -   XR CHEST (2 VW)   Final Result   Normal two view chest x-ray.         Electronically signed by STEPHIE BAKER              Medical Decision Making   I am the first provider for this patient.    I reviewed the vital signs, available nursing notes, past medical history, past surgical history, family history and social history.    Vital Signs-Reviewed the patient's vital signs.  Patient Vitals for the past 12 hrs:   Temp Pulse Resp BP SpO2   01/16/25 1726 97.4 °F (36.3 °C) 90 18 (!) 161/95 98 %         Provider Notes (Medical Decision Making):   34-year-old presenting with chest pain.  Her reproducible chest pain is concerning for possible pleurisy, costochondritis, chest wall discomfort.  Chest x-ray will be obtained to assess for infiltrate or pneumothorax.  She denies any exertional pain or associated shortness of breath, nausea or diaphoresis, pain is reproducible as above, unlikely ACS.  She is low risk for PE on Wells criteria, fails PERC criteria, unlikely PE.  She is comfortable appearing with reassuring vital signs, hypertensive however with known history of hypertension.    ED Course:     .    Medications   ibuprofen (ADVIL;MOTRIN) tablet 600 mg (600 mg Oral Given 1/16/25 2009)        ED Course as of 01/16/25 2240   Thu Jan 16, 2025 2030 EKG is performed at 8: 25, independently interpreted by myself as sinus rhythm at a rate of 57, no ST segment elevation or depression concerning for ACS [CV]      ED Course User Index  [CV] Juanita Jacobo MD        Chest x-ray shows no acute process.    Patient is resting comfortably on reevaluation and feels improved.  Patient is counseled on supportive care and return precautions. Will return to the ED for any worsening pain, shortness of breath, or any new or worrisome symptoms. Will followup with primary care doctor within 4 days.    Clinical Management Tools:       Critical Care

## 2025-05-20 ENCOUNTER — HOSPITAL ENCOUNTER (EMERGENCY)
Facility: HOSPITAL | Age: 35
Discharge: HOME | End: 2025-05-20
Payer: MEDICAID

## 2025-05-20 VITALS
HEIGHT: 62 IN | SYSTOLIC BLOOD PRESSURE: 170 MMHG | BODY MASS INDEX: 53.92 KG/M2 | RESPIRATION RATE: 16 BRPM | DIASTOLIC BLOOD PRESSURE: 107 MMHG | HEART RATE: 82 BPM | OXYGEN SATURATION: 98 % | TEMPERATURE: 98.1 F | WEIGHT: 293 LBS

## 2025-05-20 DIAGNOSIS — R35.0 URINARY FREQUENCY: Primary | ICD-10-CM

## 2025-05-20 DIAGNOSIS — E11.69 TYPE 2 DIABETES MELLITUS WITH OTHER SPECIFIED COMPLICATION, WITHOUT LONG-TERM CURRENT USE OF INSULIN (HCC): ICD-10-CM

## 2025-05-20 DIAGNOSIS — R81 GLUCOSURIA: ICD-10-CM

## 2025-05-20 LAB
APPEARANCE UR: CLEAR
BACTERIA URNS QL MICRO: NEGATIVE /HPF
BILIRUB UR QL: NEGATIVE
CLUE CELLS VAG QL WET PREP: NORMAL
COLOR UR: ABNORMAL
EPITH CASTS URNS QL MICRO: ABNORMAL /LPF
GLUCOSE BLD STRIP.AUTO-MCNC: 292 MG/DL (ref 65–117)
GLUCOSE UR STRIP.AUTO-MCNC: >1000 MG/DL
HCG UR QL: NEGATIVE
HGB UR QL STRIP: NEGATIVE
KETONES UR QL STRIP.AUTO: NEGATIVE MG/DL
LEUKOCYTE ESTERASE UR QL STRIP.AUTO: NEGATIVE
NITRITE UR QL STRIP.AUTO: NEGATIVE
PH UR STRIP: 6 (ref 5–8)
PROT UR STRIP-MCNC: ABNORMAL MG/DL
RBC #/AREA URNS HPF: ABNORMAL /HPF (ref 0–5)
SERVICE CMNT-IMP: ABNORMAL
SP GR UR REFRACTOMETRY: 1.03 (ref 1–1.03)
T VAGINALIS VAG QL WET PREP: NORMAL
URINE CULTURE IF INDICATED: ABNORMAL
UROBILINOGEN UR QL STRIP.AUTO: 0.2 EU/DL (ref 0.2–1)
WBC URNS QL MICRO: ABNORMAL /HPF (ref 0–4)
YEAST WET PREP: NORMAL

## 2025-05-20 PROCEDURE — 87491 CHLMYD TRACH DNA AMP PROBE: CPT

## 2025-05-20 PROCEDURE — 81025 URINE PREGNANCY TEST: CPT

## 2025-05-20 PROCEDURE — 81001 URINALYSIS AUTO W/SCOPE: CPT

## 2025-05-20 PROCEDURE — 87591 N.GONORRHOEAE DNA AMP PROB: CPT

## 2025-05-20 PROCEDURE — 99283 EMERGENCY DEPT VISIT LOW MDM: CPT

## 2025-05-20 PROCEDURE — 87210 SMEAR WET MOUNT SALINE/INK: CPT

## 2025-05-20 PROCEDURE — 82962 GLUCOSE BLOOD TEST: CPT

## 2025-05-20 ASSESSMENT — PAIN - FUNCTIONAL ASSESSMENT: PAIN_FUNCTIONAL_ASSESSMENT: NONE - DENIES PAIN

## 2025-05-20 NOTE — ED TRIAGE NOTES
Pt presents ambulatory to ED complaining of urinary frequency and left lower flank pain. 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 Pt states she hold in urine because of this. Pt reports she has IUD.

## 2025-05-20 NOTE — DISCHARGE INSTRUCTIONS
Thank You!    It was a pleasure taking care of you in our Emergency Department today. We know that when you come to our Emergency Department, you are entrusting us with your health, comfort, and safety. Our physicians and nurses honor that trust, and truly appreciate the opportunity to care for you and your loved ones.      We also value your feedback. If you receive a survey about your Emergency Department experience today, please fill it out.  We care about our patients' feedback, and we listen to what you have to say.  Thank you.    Janeen Correia PA-C  ________________________________________________________________________  I have included a copy of your lab results and/or radiologic studies from today's visit so you can have them easily available at your follow-up visit. We hope you feel better and please do not hesitate to contact the ED if you have any questions at all!    Recent Results (from the past 12 hours)   Urinalysis with Reflex to Culture    Collection Time: 05/20/25  8:36 AM    Specimen: Urine   Result Value Ref Range    Color, UA YELLOW/STRAW      Appearance CLEAR CLEAR      Specific Gravity, UA 1.030 1.003 - 1.030      pH, Urine 6.0 5.0 - 8.0      Protein, UA TRACE (A) NEG mg/dL    Glucose, Ur >1000 (A) NEG mg/dL    Ketones, Urine Negative NEG mg/dL    Bilirubin, Urine Negative NEG      Blood, Urine Negative NEG      Urobilinogen, Urine 0.2 0.2 - 1.0 EU/dL    Nitrite, Urine Negative NEG      Leukocyte Esterase, Urine Negative NEG      WBC, UA 0-4 0 - 4 /hpf    RBC, UA 0-5 0 - 5 /hpf    Epithelial Cells, UA FEW FEW /lpf    BACTERIA, URINE Negative NEG /hpf    Urine Culture if Indicated CULTURE NOT INDICATED BY UA RESULT CNI     Wet prep, genital    Collection Time: 05/20/25  8:36 AM    Specimen: Miscellaneous sample   Result Value Ref Range    Clue Cells, Wet Prep NONE SEEN NOSEE      Trich, Wet Prep NONE SEEN NOSEE      Yeast, Wet Prep NONE SEEN NOSEE     POC Pregnancy Urine Qual    Collection

## 2025-05-20 NOTE — PROGRESS NOTES
Spiritual Care Partner Volunteer visited patient at [facility] in [facility] EMERGENCY DEPARTMENT on 5/20/2025   Documented by:  [facility]  To contact the  call: [phone] ([phone])

## 2025-05-20 NOTE — ED PROVIDER NOTES
[facility] EMERGENCY DEPARTMENT  EMERGENCY DEPARTMENT ENCOUNTER       Pt Name: Jose Hernandez  MRN: [id number]  Birthdate 1990  Date of evaluation: 2025  Provider: Janeen Correia PA-C   PCP: Kimmy Callejas MD  Note Started: 8:13 AM EDT 25     I have seen and evaluated the patient autonomously. My supervision physician was on site and available for consultation if needed.      CHIEF COMPLAINT       Chief Complaint   Patient presents with    Urinary Frequency    Flank Pain        HISTORY OF PRESENT ILLNESS: 1 or more elements      History From: Patient  HPI Limitations None  Arrival mode: Private vehicle     Jose Hernandez is a 34 y.o. female with past medical history, diabetes, hypertension, who presents with complaint of urinary frequency and urgency x 2 days.  Patient reports that she also has associated side pain.  Patient describes the side pain to me as \"feeling like I have a UTI\".  She is unable to describe what the pain feels like but states that it feels similar to when she has had previous UTIs.  Patient denies any dysuria or hematuria.  Patient denies any change in vaginal discharge, foul-smelling vaginal discharge, genital rash, genital lesion.  Patient denies any fever, chills, nausea, vomiting, diarrhea, constipation.     Nursing Notes were all reviewed and agreed with or any disagreements were addressed in the HPI.   Please see MDM for additional details of HPI and ROS  REVIEW OF SYSTEMS      Review of Systems     Positives and Pertinent negatives as per HPI.    PAST HISTORY     Past Medical History:  Past Medical History:   Diagnosis Date    Diabetes (HCC) 2016    Hypertension     Other ill-defined conditions(799.89)     back pain    SOB (shortness of breath)        Past Surgical History:  Past Surgical History:   Procedure Laterality Date    GYN  2014    Pt reports  in past.    TONSILLECTOMY         Family History:  Family History   Problem Relation Age of

## 2025-05-20 NOTE — ED NOTES
Discharge instructions were given to the patient by Ursula Valero RN.  The patient left the Emergency Department alert and oriented and in no acute distress with 0 prescription(s). The patient was encouraged to call or return to the ED for worsening issues or problems and was encouraged to schedule a follow up appointment for continuing care.  The patient verbalized understanding of discharge instructions and prescriptions; all questions were answered. The patient has no further concerns at this time.     
Yes

## 2025-06-14 ENCOUNTER — HOSPITAL ENCOUNTER (EMERGENCY)
Facility: HOSPITAL | Age: 35
Discharge: HOME | End: 2025-06-14
Payer: MEDICAID

## 2025-06-14 VITALS
HEART RATE: 85 BPM | SYSTOLIC BLOOD PRESSURE: 165 MMHG | TEMPERATURE: 98.4 F | DIASTOLIC BLOOD PRESSURE: 101 MMHG | HEIGHT: 62 IN | RESPIRATION RATE: 18 BRPM | OXYGEN SATURATION: 100 % | WEIGHT: 293 LBS | BODY MASS INDEX: 53.92 KG/M2

## 2025-06-14 DIAGNOSIS — K08.89 PAIN, DENTAL: Primary | ICD-10-CM

## 2025-06-14 PROCEDURE — 99283 EMERGENCY DEPT VISIT LOW MDM: CPT

## 2025-06-14 PROCEDURE — 6370000000 HC RX 637 (ALT 250 FOR IP): Performed by: PHYSICIAN ASSISTANT

## 2025-06-14 RX ORDER — SENNOSIDES 8.6 MG
650 CAPSULE ORAL EVERY 8 HOURS PRN
Qty: 15 TABLET | Refills: 0 | Status: SHIPPED | OUTPATIENT
Start: 2025-06-14 | End: 2025-06-19

## 2025-06-14 RX ORDER — PENICILLIN V POTASSIUM 500 MG/1
500 TABLET, FILM COATED ORAL 4 TIMES DAILY
Qty: 28 TABLET | Refills: 0 | Status: SHIPPED | OUTPATIENT
Start: 2025-06-14 | End: 2025-06-21

## 2025-06-14 RX ORDER — ACETAMINOPHEN 325 MG/1
650 TABLET ORAL
Status: COMPLETED | OUTPATIENT
Start: 2025-06-14 | End: 2025-06-14

## 2025-06-14 RX ORDER — PENICILLIN V POTASSIUM 250 MG/1
500 TABLET, FILM COATED ORAL
Status: COMPLETED | OUTPATIENT
Start: 2025-06-14 | End: 2025-06-14

## 2025-06-14 RX ORDER — IBUPROFEN 600 MG/1
600 TABLET, FILM COATED ORAL EVERY 6 HOURS PRN
Qty: 20 TABLET | Refills: 0 | Status: SHIPPED | OUTPATIENT
Start: 2025-06-14

## 2025-06-14 RX ADMIN — ACETAMINOPHEN 650 MG: 325 TABLET ORAL at 22:14

## 2025-06-14 RX ADMIN — PENICILLIN V POTASSIUM 500 MG: 250 TABLET, FILM COATED ORAL at 22:14

## 2025-06-14 RX ADMIN — BENZOCAINE, BUTAMBEN, AND TETRACAINE HYDROCHLORIDE: .028; .004; .004 AEROSOL, SPRAY TOPICAL at 22:13

## 2025-06-14 ASSESSMENT — PAIN DESCRIPTION - PAIN TYPE: TYPE: ACUTE PAIN

## 2025-06-14 ASSESSMENT — PAIN SCALES - GENERAL: PAINLEVEL_OUTOF10: 9

## 2025-06-14 ASSESSMENT — PAIN - FUNCTIONAL ASSESSMENT: PAIN_FUNCTIONAL_ASSESSMENT: 0-10

## 2025-06-14 ASSESSMENT — PAIN DESCRIPTION - ORIENTATION: ORIENTATION: RIGHT;LOWER

## 2025-06-14 ASSESSMENT — PAIN DESCRIPTION - LOCATION: LOCATION: TEETH

## 2025-06-14 ASSESSMENT — PAIN DESCRIPTION - FREQUENCY: FREQUENCY: CONTINUOUS

## 2025-06-14 ASSESSMENT — PAIN DESCRIPTION - DESCRIPTORS: DESCRIPTORS: THROBBING

## 2025-06-15 NOTE — ED NOTES
Pt presents ambulatory to ED complaining of right lower dental pain for the last three days. Pt reports root canal on the right lower side in February, and has been lost to follow up since then. Pt is alert and oriented x 4, RR even and unlabored, skin is warm and dry. Assesment completed and pt updated on plan of care.       Emergency Department Nursing Plan of Care       The Nursing Plan of Care is developed from the Nursing assessment and Emergency Department Attending provider initial evaluation.  The plan of care may be reviewed in the “ED Provider note”.    The Plan of Care was developed with the following considerations:   Patient / Family readiness to learn indicated by:verbalized understanding  Persons(s) to be included in education: patient  Barriers to Learning/Limitations:None    Signed     Reinier Tejada RN    6/14/2025   9:55 PM

## 2025-06-15 NOTE — ED PROVIDER NOTES
[facility] EMERGENCY DEPARTMENT  EMERGENCY DEPARTMENT ENCOUNTER       Pt Name: Jose Hernandez  MRN: [id number]  Birthdate 1990  Date of evaluation: 2025  Provider: DUARTE Camarena   PCP: Kimmy Callejas MD  Note Started: 10:03 PM EDT 25     CHIEF COMPLAINT       Chief Complaint   Patient presents with    Dental Pain     Right lower tooth        HISTORY OF PRESENT ILLNESS: 1 or more elements      History From: Patient  HPI Limitations: None  Arrival mode:      Jose Hernandez is a 34 y.o. female who presents with CC of right lower dental pain worsening over the last 3 days.  Pain is described as sharp and constant, unimproved with ibuprofen 800 mg.  The patient reports having had a root canal at the tooth 4 months ago.  She was supposed to return to the dentist for another procedure but has been unable to schedule it.  Denies fever, difficulty swallowing.  She has a dentist appointment scheduled for this upcoming Wednesday.     Nursing Notes were all reviewed and agreed with or any disagreements were addressed in the HPI.   Please see MDM for additional details of HPI and ROS  REVIEW OF SYSTEMS      Review of Systems   Constitutional:  Negative for fever.   HENT:  Positive for dental problem.         Positives and Pertinent negatives as per HPI.    PAST HISTORY     Past Medical History:  Past Medical History:   Diagnosis Date    Diabetes (HCC) 2016    Hypertension     Other ill-defined conditions(799.89)     back pain    SOB (shortness of breath)        Past Surgical History:  Past Surgical History:   Procedure Laterality Date    GYN  2014    Pt reports  in past.    TONSILLECTOMY         Family History:  Family History   Problem Relation Age of Onset    Breast Cancer Maternal Grandmother     Cancer Paternal Uncle     Diabetes Maternal Grandmother     Diabetes Father     Diabetes Mother        Social History:  Social History     Tobacco Use    Smoking status: Never

## 2025-06-15 NOTE — ED TRIAGE NOTES
Pt came in the ED with complaints of dental pain in the right lower side x 3 days.  Pt reports she had her root canal done a while ago but was not able to go back for follow up.